# Patient Record
Sex: MALE | Race: WHITE | Employment: FULL TIME | URBAN - METROPOLITAN AREA
[De-identification: names, ages, dates, MRNs, and addresses within clinical notes are randomized per-mention and may not be internally consistent; named-entity substitution may affect disease eponyms.]

---

## 2017-01-03 ENCOUNTER — TELEPHONE (OUTPATIENT)
Dept: FAMILY MEDICINE CLINIC | Age: 47
End: 2017-01-03

## 2017-01-06 DIAGNOSIS — K21.00 GASTROESOPHAGEAL REFLUX DISEASE WITH ESOPHAGITIS: Primary | ICD-10-CM

## 2017-01-06 RX ORDER — OMEPRAZOLE 20 MG/1
20 CAPSULE, DELAYED RELEASE ORAL DAILY
Qty: 30 CAPSULE | Refills: 2 | Status: SHIPPED | OUTPATIENT
Start: 2017-01-06 | End: 2017-04-04 | Stop reason: SDUPTHER

## 2017-01-08 DIAGNOSIS — F41.1 GENERALIZED ANXIETY DISORDER: ICD-10-CM

## 2017-01-08 RX ORDER — CLONAZEPAM 1 MG/1
TABLET ORAL
Qty: 60 TABLET | Refills: 1 | OUTPATIENT
Start: 2017-01-08 | End: 2017-03-10 | Stop reason: SDUPTHER

## 2017-01-11 DIAGNOSIS — M19.90 OSTEOARTHRITIS, UNSPECIFIED OSTEOARTHRITIS TYPE, UNSPECIFIED SITE: ICD-10-CM

## 2017-01-11 RX ORDER — HYDROCODONE BITARTRATE AND ACETAMINOPHEN 5; 325 MG/1; MG/1
TABLET ORAL
Qty: 60 TABLET | Refills: 0 | Status: SHIPPED | OUTPATIENT
Start: 2017-01-11 | End: 2017-02-11 | Stop reason: SDUPTHER

## 2017-02-11 DIAGNOSIS — M19.90 OSTEOARTHRITIS, UNSPECIFIED OSTEOARTHRITIS TYPE, UNSPECIFIED SITE: ICD-10-CM

## 2017-02-11 RX ORDER — HYDROCODONE BITARTRATE AND ACETAMINOPHEN 5; 325 MG/1; MG/1
TABLET ORAL
Qty: 60 TABLET | Refills: 0 | Status: SHIPPED | OUTPATIENT
Start: 2017-02-11 | End: 2017-07-14 | Stop reason: SDUPTHER

## 2017-03-10 DIAGNOSIS — F41.1 GENERALIZED ANXIETY DISORDER: ICD-10-CM

## 2017-03-10 RX ORDER — CLONAZEPAM 1 MG/1
TABLET ORAL
Qty: 60 TABLET | Refills: 1 | Status: SHIPPED | OUTPATIENT
Start: 2017-03-10 | End: 2017-04-04 | Stop reason: SDUPTHER

## 2017-03-21 ENCOUNTER — TELEPHONE (OUTPATIENT)
Dept: FAMILY MEDICINE CLINIC | Age: 47
End: 2017-03-21

## 2017-03-21 DIAGNOSIS — M19.90 OSTEOARTHRITIS, UNSPECIFIED OSTEOARTHRITIS TYPE, UNSPECIFIED SITE: ICD-10-CM

## 2017-03-21 RX ORDER — HYDROCODONE BITARTRATE AND ACETAMINOPHEN 5; 325 MG/1; MG/1
TABLET ORAL
Qty: 60 TABLET | Refills: 0 | Status: SHIPPED | OUTPATIENT
Start: 2017-03-21 | End: 2017-04-20 | Stop reason: SDUPTHER

## 2017-04-04 ENCOUNTER — OFFICE VISIT (OUTPATIENT)
Dept: FAMILY MEDICINE CLINIC | Age: 47
End: 2017-04-04

## 2017-04-04 VITALS
DIASTOLIC BLOOD PRESSURE: 70 MMHG | HEIGHT: 72 IN | TEMPERATURE: 98.1 F | BODY MASS INDEX: 39.14 KG/M2 | SYSTOLIC BLOOD PRESSURE: 136 MMHG | HEART RATE: 83 BPM | WEIGHT: 289 LBS | RESPIRATION RATE: 12 BRPM

## 2017-04-04 DIAGNOSIS — M54.40 CHRONIC LOW BACK PAIN WITH SCIATICA, SCIATICA LATERALITY UNSPECIFIED, UNSPECIFIED BACK PAIN LATERALITY: ICD-10-CM

## 2017-04-04 DIAGNOSIS — K21.00 GASTROESOPHAGEAL REFLUX DISEASE WITH ESOPHAGITIS: ICD-10-CM

## 2017-04-04 DIAGNOSIS — I10 ESSENTIAL HYPERTENSION: ICD-10-CM

## 2017-04-04 DIAGNOSIS — F41.1 GENERALIZED ANXIETY DISORDER: ICD-10-CM

## 2017-04-04 DIAGNOSIS — Z91.199 POOR COMPLIANCE: ICD-10-CM

## 2017-04-04 DIAGNOSIS — G89.29 CHRONIC LOW BACK PAIN WITH SCIATICA, SCIATICA LATERALITY UNSPECIFIED, UNSPECIFIED BACK PAIN LATERALITY: ICD-10-CM

## 2017-04-04 PROCEDURE — 99214 OFFICE O/P EST MOD 30 MIN: CPT | Performed by: FAMILY MEDICINE

## 2017-04-04 RX ORDER — ATENOLOL 25 MG/1
25 TABLET ORAL DAILY
Qty: 90 TABLET | Refills: 3 | Status: SHIPPED | OUTPATIENT
Start: 2017-04-04 | End: 2018-04-04 | Stop reason: SDUPTHER

## 2017-04-04 RX ORDER — OMEPRAZOLE 20 MG/1
20 CAPSULE, DELAYED RELEASE ORAL DAILY
Qty: 30 CAPSULE | Refills: 5 | Status: SHIPPED | OUTPATIENT
Start: 2017-04-04 | End: 2017-04-12 | Stop reason: SDUPTHER

## 2017-04-04 RX ORDER — CLONAZEPAM 1 MG/1
TABLET ORAL
Qty: 60 TABLET | Refills: 1 | Status: SHIPPED | OUTPATIENT
Start: 2017-04-04 | End: 2017-07-06 | Stop reason: SDUPTHER

## 2017-04-12 DIAGNOSIS — K21.00 GASTROESOPHAGEAL REFLUX DISEASE WITH ESOPHAGITIS: ICD-10-CM

## 2017-04-12 RX ORDER — OMEPRAZOLE 20 MG/1
20 CAPSULE, DELAYED RELEASE ORAL DAILY
Qty: 30 CAPSULE | Refills: 5 | Status: SHIPPED | OUTPATIENT
Start: 2017-04-12 | End: 2017-04-24 | Stop reason: SDUPTHER

## 2017-04-13 ASSESSMENT — ENCOUNTER SYMPTOMS
CONSTIPATION: 0
NAUSEA: 1
WHEEZING: 0
BLOOD IN STOOL: 0
COUGH: 0
DIARRHEA: 0
ABDOMINAL PAIN: 0
CHEST TIGHTNESS: 1
BACK PAIN: 1
VOMITING: 0
SHORTNESS OF BREATH: 0

## 2017-04-20 DIAGNOSIS — M19.90 OSTEOARTHRITIS, UNSPECIFIED OSTEOARTHRITIS TYPE, UNSPECIFIED SITE: ICD-10-CM

## 2017-04-20 RX ORDER — HYDROCODONE BITARTRATE AND ACETAMINOPHEN 5; 325 MG/1; MG/1
TABLET ORAL
Qty: 60 TABLET | Refills: 0 | Status: SHIPPED | OUTPATIENT
Start: 2017-04-20 | End: 2017-05-18 | Stop reason: SDUPTHER

## 2017-04-24 DIAGNOSIS — K21.00 GASTROESOPHAGEAL REFLUX DISEASE WITH ESOPHAGITIS: ICD-10-CM

## 2017-04-24 RX ORDER — OMEPRAZOLE 20 MG/1
20 CAPSULE, DELAYED RELEASE ORAL DAILY
Qty: 30 CAPSULE | Refills: 5 | Status: SHIPPED | OUTPATIENT
Start: 2017-04-24 | End: 2018-02-23 | Stop reason: ALTCHOICE

## 2017-05-18 DIAGNOSIS — M19.90 OSTEOARTHRITIS, UNSPECIFIED OSTEOARTHRITIS TYPE, UNSPECIFIED SITE: ICD-10-CM

## 2017-05-18 RX ORDER — HYDROCODONE BITARTRATE AND ACETAMINOPHEN 5; 325 MG/1; MG/1
TABLET ORAL
Qty: 60 TABLET | Refills: 0 | Status: SHIPPED | OUTPATIENT
Start: 2017-05-18 | End: 2017-06-16 | Stop reason: SDUPTHER

## 2017-06-16 ENCOUNTER — TELEPHONE (OUTPATIENT)
Dept: FAMILY MEDICINE CLINIC | Age: 47
End: 2017-06-16

## 2017-06-16 DIAGNOSIS — M19.90 OSTEOARTHRITIS, UNSPECIFIED OSTEOARTHRITIS TYPE, UNSPECIFIED SITE: ICD-10-CM

## 2017-06-16 RX ORDER — HYDROCODONE BITARTRATE AND ACETAMINOPHEN 5; 325 MG/1; MG/1
TABLET ORAL
Qty: 60 TABLET | Refills: 0 | Status: SHIPPED | OUTPATIENT
Start: 2017-06-16 | End: 2017-07-14 | Stop reason: SDUPTHER

## 2017-07-06 DIAGNOSIS — F41.1 GENERALIZED ANXIETY DISORDER: ICD-10-CM

## 2017-07-06 RX ORDER — CLONAZEPAM 1 MG/1
TABLET ORAL
Qty: 60 TABLET | Refills: 0 | Status: SHIPPED | OUTPATIENT
Start: 2017-07-06 | End: 2017-08-07 | Stop reason: SDUPTHER

## 2017-07-07 LAB
ANION GAP SERPL CALCULATED.3IONS-SCNC: 14 MEQ/L (ref 7–13)
BUN BLDV-MCNC: 9 MG/DL (ref 6–20)
CALCIUM SERPL-MCNC: 9.1 MG/DL (ref 8.6–10.2)
CHLORIDE BLD-SCNC: 98 MEQ/L (ref 98–107)
CO2: 25 MEQ/L (ref 22–29)
CREAT SERPL-MCNC: 0.68 MG/DL (ref 0.7–1.2)
CREATININE URINE: 211.5 MG/DL
GFR AFRICAN AMERICAN: >60
GFR NON-AFRICAN AMERICAN: >60
GLUCOSE BLD-MCNC: 167 MG/DL (ref 74–109)
HBA1C MFR BLD: 7.7 % (ref 4.8–5.9)
LDL CHOLESTEROL DIRECT: 106 MG/DL (ref 0–129)
MICROALBUMIN UR-MCNC: 20.4 MG/DL
MICROALBUMIN/CREAT UR-RTO: 96.5 MG/G (ref 0–30)
POTASSIUM SERPL-SCNC: 4.6 MEQ/L (ref 3.5–5.1)
SODIUM BLD-SCNC: 137 MEQ/L (ref 132–144)

## 2017-07-11 DIAGNOSIS — F41.1 GENERALIZED ANXIETY DISORDER: ICD-10-CM

## 2017-07-11 RX ORDER — CLONAZEPAM 1 MG/1
TABLET ORAL
Qty: 60 TABLET | Refills: 0 | Status: CANCELLED | OUTPATIENT
Start: 2017-07-11

## 2017-07-14 ENCOUNTER — OFFICE VISIT (OUTPATIENT)
Dept: FAMILY MEDICINE CLINIC | Age: 47
End: 2017-07-14

## 2017-07-14 VITALS
BODY MASS INDEX: 39.42 KG/M2 | RESPIRATION RATE: 16 BRPM | WEIGHT: 291 LBS | DIASTOLIC BLOOD PRESSURE: 86 MMHG | HEART RATE: 82 BPM | HEIGHT: 72 IN | SYSTOLIC BLOOD PRESSURE: 132 MMHG | TEMPERATURE: 98.8 F

## 2017-07-14 DIAGNOSIS — I10 BENIGN ESSENTIAL HTN: Primary | ICD-10-CM

## 2017-07-14 DIAGNOSIS — M54.41 CHRONIC RIGHT-SIDED LOW BACK PAIN WITH RIGHT-SIDED SCIATICA: ICD-10-CM

## 2017-07-14 DIAGNOSIS — G89.29 CHRONIC RIGHT-SIDED LOW BACK PAIN WITH RIGHT-SIDED SCIATICA: ICD-10-CM

## 2017-07-14 DIAGNOSIS — E11.29 DIABETES MELLITUS WITH MICROALBUMINURIA (HCC): ICD-10-CM

## 2017-07-14 DIAGNOSIS — R80.9 DIABETES MELLITUS WITH MICROALBUMINURIA (HCC): ICD-10-CM

## 2017-07-14 DIAGNOSIS — M17.0 OSTEOARTHRITIS OF BOTH KNEES, UNSPECIFIED OSTEOARTHRITIS TYPE: ICD-10-CM

## 2017-07-14 DIAGNOSIS — F41.1 GENERALIZED ANXIETY DISORDER: ICD-10-CM

## 2017-07-14 PROCEDURE — 99213 OFFICE O/P EST LOW 20 MIN: CPT | Performed by: FAMILY MEDICINE

## 2017-07-14 RX ORDER — HYDROCODONE BITARTRATE AND ACETAMINOPHEN 5; 325 MG/1; MG/1
TABLET ORAL
Qty: 60 TABLET | Refills: 0 | Status: SHIPPED | OUTPATIENT
Start: 2017-07-14 | End: 2017-08-14 | Stop reason: SDUPTHER

## 2017-07-14 RX ORDER — LISINOPRIL 5 MG/1
5 TABLET ORAL DAILY
Qty: 30 TABLET | Refills: 3 | Status: SHIPPED | OUTPATIENT
Start: 2017-07-14 | End: 2017-08-08 | Stop reason: SDUPTHER

## 2017-07-14 RX ORDER — IBUPROFEN 800 MG/1
TABLET ORAL
Qty: 90 TABLET | Refills: 3 | Status: SHIPPED | OUTPATIENT
Start: 2017-07-14 | End: 2018-10-15 | Stop reason: SDUPTHER

## 2017-07-14 RX ORDER — CYCLOBENZAPRINE HCL 10 MG
TABLET ORAL
Qty: 60 TABLET | Refills: 2 | Status: SHIPPED | OUTPATIENT
Start: 2017-07-14 | End: 2018-06-20 | Stop reason: SDUPTHER

## 2017-07-14 RX ORDER — CLONAZEPAM 1 MG/1
TABLET ORAL
Qty: 60 TABLET | Refills: 0 | Status: CANCELLED | OUTPATIENT
Start: 2017-07-14

## 2017-07-14 ASSESSMENT — PATIENT HEALTH QUESTIONNAIRE - PHQ9
1. LITTLE INTEREST OR PLEASURE IN DOING THINGS: 0
2. FEELING DOWN, DEPRESSED OR HOPELESS: 0
SUM OF ALL RESPONSES TO PHQ9 QUESTIONS 1 & 2: 0
SUM OF ALL RESPONSES TO PHQ QUESTIONS 1-9: 0

## 2017-07-24 ASSESSMENT — ENCOUNTER SYMPTOMS
ABDOMINAL PAIN: 0
BACK PAIN: 1
COUGH: 1
SHORTNESS OF BREATH: 0

## 2017-08-07 DIAGNOSIS — F41.1 GENERALIZED ANXIETY DISORDER: ICD-10-CM

## 2017-08-07 DIAGNOSIS — E11.9 TYPE 2 DIABETES MELLITUS WITHOUT COMPLICATION, WITHOUT LONG-TERM CURRENT USE OF INSULIN (HCC): ICD-10-CM

## 2017-08-07 RX ORDER — CLONAZEPAM 1 MG/1
TABLET ORAL
Qty: 60 TABLET | Refills: 0 | Status: SHIPPED | OUTPATIENT
Start: 2017-08-07 | End: 2017-09-06 | Stop reason: SDUPTHER

## 2017-08-08 ENCOUNTER — TELEPHONE (OUTPATIENT)
Dept: FAMILY MEDICINE CLINIC | Age: 47
End: 2017-08-08

## 2017-08-08 DIAGNOSIS — I10 BENIGN ESSENTIAL HTN: ICD-10-CM

## 2017-08-08 RX ORDER — LISINOPRIL 5 MG/1
5 TABLET ORAL DAILY
Qty: 30 TABLET | Refills: 5 | Status: SHIPPED | OUTPATIENT
Start: 2017-08-08 | End: 2018-02-11 | Stop reason: SDUPTHER

## 2017-08-14 DIAGNOSIS — M17.0 OSTEOARTHRITIS OF BOTH KNEES, UNSPECIFIED OSTEOARTHRITIS TYPE: ICD-10-CM

## 2017-08-14 DIAGNOSIS — G89.29 CHRONIC RIGHT-SIDED LOW BACK PAIN WITH RIGHT-SIDED SCIATICA: ICD-10-CM

## 2017-08-14 DIAGNOSIS — M54.41 CHRONIC RIGHT-SIDED LOW BACK PAIN WITH RIGHT-SIDED SCIATICA: ICD-10-CM

## 2017-08-15 RX ORDER — HYDROCODONE BITARTRATE AND ACETAMINOPHEN 5; 325 MG/1; MG/1
TABLET ORAL
Qty: 60 TABLET | Refills: 0 | Status: SHIPPED | OUTPATIENT
Start: 2017-08-15 | End: 2017-10-20 | Stop reason: DRUGHIGH

## 2017-08-17 ENCOUNTER — OFFICE VISIT (OUTPATIENT)
Dept: FAMILY MEDICINE CLINIC | Age: 47
End: 2017-08-17

## 2017-08-17 VITALS
HEART RATE: 84 BPM | DIASTOLIC BLOOD PRESSURE: 68 MMHG | HEIGHT: 72 IN | RESPIRATION RATE: 20 BRPM | BODY MASS INDEX: 38.06 KG/M2 | SYSTOLIC BLOOD PRESSURE: 108 MMHG | WEIGHT: 281 LBS | TEMPERATURE: 98.6 F

## 2017-08-17 DIAGNOSIS — F41.1 GENERALIZED ANXIETY DISORDER: ICD-10-CM

## 2017-08-17 DIAGNOSIS — G45.0 TIA INVOLVING VERTEBRAL ARTERY: Primary | ICD-10-CM

## 2017-08-17 DIAGNOSIS — I10 BENIGN ESSENTIAL HTN: ICD-10-CM

## 2017-08-17 PROCEDURE — 99213 OFFICE O/P EST LOW 20 MIN: CPT | Performed by: FAMILY MEDICINE

## 2017-08-17 RX ORDER — ATORVASTATIN CALCIUM 40 MG/1
TABLET, FILM COATED ORAL
Refills: 0 | COMMUNITY
Start: 2017-08-11 | End: 2017-08-31

## 2017-08-23 ENCOUNTER — TELEPHONE (OUTPATIENT)
Dept: FAMILY MEDICINE CLINIC | Age: 47
End: 2017-08-23

## 2017-08-28 PROBLEM — I10 BENIGN ESSENTIAL HTN: Status: ACTIVE | Noted: 2017-08-28

## 2017-08-28 ASSESSMENT — ENCOUNTER SYMPTOMS
WHEEZING: 0
ABDOMINAL PAIN: 0
BACK PAIN: 1
SHORTNESS OF BREATH: 0
BLOOD IN STOOL: 0

## 2017-08-31 ENCOUNTER — OFFICE VISIT (OUTPATIENT)
Dept: FAMILY MEDICINE CLINIC | Age: 47
End: 2017-08-31

## 2017-08-31 DIAGNOSIS — J01.90 ACUTE BACTERIAL SINUSITIS: ICD-10-CM

## 2017-08-31 DIAGNOSIS — B96.89 ACUTE BACTERIAL SINUSITIS: ICD-10-CM

## 2017-08-31 DIAGNOSIS — F41.1 GENERALIZED ANXIETY DISORDER: Primary | ICD-10-CM

## 2017-08-31 DIAGNOSIS — E53.8 B12 DEFICIENCY: ICD-10-CM

## 2017-08-31 DIAGNOSIS — I10 BENIGN ESSENTIAL HTN: ICD-10-CM

## 2017-08-31 DIAGNOSIS — E11.9 TYPE 2 DIABETES MELLITUS WITHOUT COMPLICATION, WITHOUT LONG-TERM CURRENT USE OF INSULIN (HCC): ICD-10-CM

## 2017-08-31 DIAGNOSIS — G45.0 TIA INVOLVING BASILAR ARTERY: ICD-10-CM

## 2017-08-31 PROCEDURE — 96372 THER/PROPH/DIAG INJ SC/IM: CPT | Performed by: FAMILY MEDICINE

## 2017-08-31 PROCEDURE — 99214 OFFICE O/P EST MOD 30 MIN: CPT | Performed by: FAMILY MEDICINE

## 2017-08-31 RX ORDER — CYANOCOBALAMIN 1000 UG/ML
1000 INJECTION INTRAMUSCULAR; SUBCUTANEOUS ONCE
Status: COMPLETED | OUTPATIENT
Start: 2017-08-31 | End: 2017-08-31

## 2017-08-31 RX ORDER — AZITHROMYCIN 250 MG/1
TABLET, FILM COATED ORAL
Qty: 1 PACKET | Refills: 0 | Status: SHIPPED | OUTPATIENT
Start: 2017-08-31 | End: 2017-09-10

## 2017-08-31 RX ADMIN — CYANOCOBALAMIN 1000 MCG: 1000 INJECTION INTRAMUSCULAR; SUBCUTANEOUS at 14:31

## 2017-09-01 VITALS
HEART RATE: 86 BPM | BODY MASS INDEX: 37.94 KG/M2 | HEIGHT: 71 IN | RESPIRATION RATE: 18 BRPM | SYSTOLIC BLOOD PRESSURE: 132 MMHG | WEIGHT: 271 LBS | DIASTOLIC BLOOD PRESSURE: 80 MMHG | TEMPERATURE: 98.7 F

## 2017-09-01 ASSESSMENT — ENCOUNTER SYMPTOMS
SINUS PRESSURE: 1
ANAL BLEEDING: 0
CONSTIPATION: 0
SHORTNESS OF BREATH: 0
RHINORRHEA: 1
DIARRHEA: 0
BACK PAIN: 1
ABDOMINAL PAIN: 0

## 2017-09-06 DIAGNOSIS — F41.1 GENERALIZED ANXIETY DISORDER: ICD-10-CM

## 2017-09-06 RX ORDER — CLONAZEPAM 1 MG/1
TABLET ORAL
Qty: 60 TABLET | Refills: 0 | Status: SHIPPED | OUTPATIENT
Start: 2017-09-06 | End: 2017-10-06 | Stop reason: SDUPTHER

## 2017-09-13 DIAGNOSIS — M54.41 CHRONIC RIGHT-SIDED LOW BACK PAIN WITH RIGHT-SIDED SCIATICA: ICD-10-CM

## 2017-09-13 DIAGNOSIS — G89.29 CHRONIC RIGHT-SIDED LOW BACK PAIN WITH RIGHT-SIDED SCIATICA: ICD-10-CM

## 2017-09-13 DIAGNOSIS — M17.0 OSTEOARTHRITIS OF BOTH KNEES, UNSPECIFIED OSTEOARTHRITIS TYPE: ICD-10-CM

## 2017-09-13 RX ORDER — HYDROCODONE BITARTRATE AND ACETAMINOPHEN 5; 325 MG/1; MG/1
TABLET ORAL
Qty: 60 TABLET | Refills: 0 | Status: CANCELLED | OUTPATIENT
Start: 2017-09-13

## 2017-09-13 RX ORDER — HYDROCODONE BITARTRATE AND ACETAMINOPHEN 7.5; 325 MG/1; MG/1
1 TABLET ORAL NIGHTLY PRN
Qty: 30 TABLET | Refills: 0 | Status: SHIPPED | OUTPATIENT
Start: 2017-09-13 | End: 2017-10-11 | Stop reason: SDUPTHER

## 2017-10-06 DIAGNOSIS — F41.1 GENERALIZED ANXIETY DISORDER: ICD-10-CM

## 2017-10-06 RX ORDER — CLONAZEPAM 1 MG/1
TABLET ORAL
Qty: 60 TABLET | Refills: 0 | Status: SHIPPED | OUTPATIENT
Start: 2017-10-06 | End: 2017-11-05 | Stop reason: SDUPTHER

## 2017-10-06 NOTE — TELEPHONE ENCOUNTER
Call, as rx called    ; Controlled Substances Monitoring:     Attestation: The Prescription Monitoring Report for this patient was reviewed today. Orville Gitelman, DO)  Documentation: Possible medication side effects, risk of tolerance and/or dependence, and alternative treatments discussed., No signs of potential drug abuse or diversion identified. Orville Gitelman, DO)  Chronic Pain: Dose reduction has been attempted. Orville Gitelman, DO)  Medication Contracts: Existing medication contract.  Orville Gitelman, DO)

## 2017-10-11 DIAGNOSIS — M17.0 OSTEOARTHRITIS OF BOTH KNEES, UNSPECIFIED OSTEOARTHRITIS TYPE: ICD-10-CM

## 2017-10-11 DIAGNOSIS — M54.41 CHRONIC RIGHT-SIDED LOW BACK PAIN WITH RIGHT-SIDED SCIATICA: ICD-10-CM

## 2017-10-11 DIAGNOSIS — G89.29 CHRONIC RIGHT-SIDED LOW BACK PAIN WITH RIGHT-SIDED SCIATICA: ICD-10-CM

## 2017-10-11 RX ORDER — HYDROCODONE BITARTRATE AND ACETAMINOPHEN 7.5; 325 MG/1; MG/1
1 TABLET ORAL NIGHTLY PRN
Qty: 30 TABLET | Refills: 0 | Status: SHIPPED | OUTPATIENT
Start: 2017-10-11 | End: 2017-11-10 | Stop reason: SDUPTHER

## 2017-10-11 NOTE — TELEPHONE ENCOUNTER
Call, as rx called    ; Controlled Substances Monitoring:     Attestation: The Prescription Monitoring Report for this patient was reviewed today. Joey Kong, )  Documentation: Possible medication side effects, risk of tolerance and/or dependence, and alternative treatments discussed., No signs of potential drug abuse or diversion identified. Joey Kong, )  Chronic Pain: Treatment objectives documented - patient is progressing appropriately. , Functional status reviewed - continues with improved or maintaining ADL's., Dose reduction has been attempted.  Joey Kong, )

## 2017-10-13 LAB
ANION GAP SERPL CALCULATED.3IONS-SCNC: 18 MEQ/L (ref 7–13)
BUN BLDV-MCNC: 10 MG/DL (ref 6–20)
CALCIUM SERPL-MCNC: 9.6 MG/DL (ref 8.6–10.2)
CHLORIDE BLD-SCNC: 98 MEQ/L (ref 98–107)
CO2: 27 MEQ/L (ref 22–29)
CREAT SERPL-MCNC: 0.64 MG/DL (ref 0.7–1.2)
GFR AFRICAN AMERICAN: >60
GFR NON-AFRICAN AMERICAN: >60
GLUCOSE BLD-MCNC: 92 MG/DL (ref 74–109)
HBA1C MFR BLD: 6.2 % (ref 4.8–5.9)
POTASSIUM SERPL-SCNC: 4.7 MEQ/L (ref 3.5–5.1)
SODIUM BLD-SCNC: 143 MEQ/L (ref 132–144)

## 2017-10-20 ENCOUNTER — OFFICE VISIT (OUTPATIENT)
Dept: FAMILY MEDICINE CLINIC | Age: 47
End: 2017-10-20

## 2017-10-20 VITALS
BODY MASS INDEX: 37.8 KG/M2 | HEART RATE: 78 BPM | TEMPERATURE: 98.8 F | HEIGHT: 71 IN | WEIGHT: 270 LBS | SYSTOLIC BLOOD PRESSURE: 112 MMHG | RESPIRATION RATE: 16 BRPM | DIASTOLIC BLOOD PRESSURE: 82 MMHG

## 2017-10-20 DIAGNOSIS — F41.1 GAD (GENERALIZED ANXIETY DISORDER): ICD-10-CM

## 2017-10-20 DIAGNOSIS — E11.9 CONTROLLED TYPE 2 DIABETES MELLITUS WITHOUT COMPLICATION, WITHOUT LONG-TERM CURRENT USE OF INSULIN (HCC): Primary | ICD-10-CM

## 2017-10-20 DIAGNOSIS — M54.16 CHRONIC RADICULAR LUMBAR PAIN: ICD-10-CM

## 2017-10-20 DIAGNOSIS — I10 BENIGN ESSENTIAL HTN: ICD-10-CM

## 2017-10-20 DIAGNOSIS — G89.29 CHRONIC RADICULAR LUMBAR PAIN: ICD-10-CM

## 2017-10-20 PROCEDURE — 99213 OFFICE O/P EST LOW 20 MIN: CPT | Performed by: FAMILY MEDICINE

## 2017-10-20 PROCEDURE — G8427 DOCREV CUR MEDS BY ELIG CLIN: HCPCS | Performed by: FAMILY MEDICINE

## 2017-10-20 PROCEDURE — G8417 CALC BMI ABV UP PARAM F/U: HCPCS | Performed by: FAMILY MEDICINE

## 2017-10-20 PROCEDURE — 3044F HG A1C LEVEL LT 7.0%: CPT | Performed by: FAMILY MEDICINE

## 2017-10-20 PROCEDURE — 4004F PT TOBACCO SCREEN RCVD TLK: CPT | Performed by: FAMILY MEDICINE

## 2017-10-20 PROCEDURE — G8484 FLU IMMUNIZE NO ADMIN: HCPCS | Performed by: FAMILY MEDICINE

## 2017-10-20 NOTE — PROGRESS NOTES
ADENOIDECTOMY      UPPER GASTROINTESTINAL ENDOSCOPY  2011    NORMAL     Social History     Social History    Marital status:      Spouse name: N/A    Number of children: N/A    Years of education: N/A     Occupational History    Not on file. Social History Main Topics    Smoking status: Current Every Day Smoker     Packs/day: 0.50     Types: Cigarettes    Smokeless tobacco: Never Used    Alcohol use Yes      Comment: occasionally    Drug use: No    Sexual activity: Not on file     Other Topics Concern    Not on file     Social History Narrative    No narrative on file     Family History   Problem Relation Age of Onset    Diabetes Father           Current Outpatient Prescriptions on File Prior to Visit   Medication Sig Dispense Refill    HYDROcodone-acetaminophen ([employer]dy) 7.5-325 MG per tablet Take 1 tablet by mouth nightly as needed for Pain . 30 tablet 0    clonazePAM (KLONOPIN) 1 MG tablet take 1 tablet by mouth twice a day if needed 60 tablet 0    lisinopril (PRINIVIL;ZESTRIL) 5 MG tablet Take 1 tablet by mouth daily 30 tablet 5    metFORMIN (GLUCOPHAGE) 1000 MG tablet take 1 tablet by mouth twice a day 180 tablet 3    ibuprofen (ADVIL;MOTRIN) 800 MG tablet take 1 tablet by mouth daily if needed 90 tablet 3    omeprazole (PRILOSEC) 20 MG delayed release capsule Take 1 capsule by mouth daily 30 capsule 5    atenolol (TENORMIN) 25 MG tablet Take 1 tablet by mouth daily 90 tablet 3    nitroGLYCERIN (NITROSTAT) 0.4 MG SL tablet Dissolve 1 tab under tongue at first sign of chest pain. May repeat every 5 minutes until relief is obtained. If pain persists after taking 3 tabs in a 15-minute period, or the pain is different than is typically experienced, call 9-1-1 immediately.  25 tablet 3    Respiratory Therapy Supplies YONIS 1 Device by Does not apply route nightly PRESSURE 16cm; needs c-pap machine/KONG 1 Device 0    aspirin 81 MG tablet Take 325 mg by mouth daily       cyclobenzaprine Results     Component Value Ref Range & Units Status Collected Lab   Hemoglobin A1C 7.7   4.8 - 5.9 % Final 07/07/2017  8:01 AM  - [facility]   Testing Performed By     Saqib [place] Renetta Name Director Address Valid Date Range   [address] [facility] LAB Unknown Unknown 04/06/16 1754-08/30/17 1247   Lab and Collection     Hemoglobin A1C on 7/7/2017   Result History       Orders Only on 10/13/2017   Component Date Value Ref Range Status    Hemoglobin A1C 10/13/2017 6.2* 4.8 - 5.9 % Final    Sodium 10/13/2017 143  132 - 144 mEq/L Final    Potassium 10/13/2017 4.7  3.5 - 5.1 mEq/L Final    Chloride 10/13/2017 98  98 - 107 mEq/L Final    CO2 10/13/2017 27  22 - 29 mEq/L Final    Anion Gap 10/13/2017 18* 7 - 13 mEq/L Final    Glucose 10/13/2017 92  74 - 109 mg/dL Final    BUN 10/13/2017 10  6 - 20 mg/dL Final    CREATININE 10/13/2017 0.64* 0.70 - 1.20 mg/dL Final    GFR Non- 10/13/2017 >60.0  >60 Final    Comment: >60 mL/min/1.73m2 EGFR, calc. for ages 25 and older using the  MDRD formula (not corrected for weight), is valid for stable  renal function.  GFR  10/13/2017 >60.0  >60 Final    Comment: >60 mL/min/1.73m2 EGFR, calc. for ages 25 and older using the  MDRD formula (not corrected for weight), is valid for stable  renal function.  Calcium 10/13/2017 9.6  8.6 - 10.2 mg/dL Final    much improved [id number] lab  Assessment & Plan   1. Controlled type 2 diabetes mellitus without complication, without long-term current use of insulin ([facility])   DIABETES FOOT EXAM    Hemoglobin A1C    Comprehensive Metabolic Panel   2. Benign essential HTN     3. KENNY (generalized anxiety disorder)     4. Chronic radicular lumbar pain     no narcs w/ etoh ;See above orders, as use and side effects reviewed ;Continue same meds, as common side effects and use reviewed-call if ineffective or problems ; The patient is asked to make an attempt to improve

## 2017-10-29 ASSESSMENT — ENCOUNTER SYMPTOMS
ABDOMINAL PAIN: 0
COUGH: 0
BLOOD IN STOOL: 0
SHORTNESS OF BREATH: 0
BACK PAIN: 1

## 2017-11-05 DIAGNOSIS — F41.1 GENERALIZED ANXIETY DISORDER: ICD-10-CM

## 2017-11-05 RX ORDER — CLONAZEPAM 1 MG/1
TABLET ORAL
Qty: 60 TABLET | Refills: 0 | Status: SHIPPED | OUTPATIENT
Start: 2017-11-05 | End: 2017-12-05 | Stop reason: SDUPTHER

## 2017-11-10 DIAGNOSIS — G89.29 CHRONIC RIGHT-SIDED LOW BACK PAIN WITH RIGHT-SIDED SCIATICA: ICD-10-CM

## 2017-11-10 DIAGNOSIS — M54.41 CHRONIC RIGHT-SIDED LOW BACK PAIN WITH RIGHT-SIDED SCIATICA: ICD-10-CM

## 2017-11-10 DIAGNOSIS — M17.0 OSTEOARTHRITIS OF BOTH KNEES, UNSPECIFIED OSTEOARTHRITIS TYPE: ICD-10-CM

## 2017-11-10 RX ORDER — HYDROCODONE BITARTRATE AND ACETAMINOPHEN 7.5; 325 MG/1; MG/1
1 TABLET ORAL NIGHTLY PRN
Qty: 30 TABLET | Refills: 0 | Status: SHIPPED | OUTPATIENT
Start: 2017-11-10 | End: 2017-12-08 | Stop reason: SDUPTHER

## 2017-11-10 NOTE — TELEPHONE ENCOUNTER
Pt's wife Yaquelin Speak called in asking for a refill of [place] to be sent to [facility] in [place]. Please approve or deny.  Thanks    Last ov 10/20/17  Fill 10/11/17    Pt can be reached at [phone]

## 2017-12-05 ENCOUNTER — TELEPHONE (OUTPATIENT)
Dept: FAMILY MEDICINE CLINIC | Age: 47
End: 2017-12-05

## 2017-12-05 DIAGNOSIS — F41.1 GENERALIZED ANXIETY DISORDER: ICD-10-CM

## 2017-12-05 RX ORDER — CLONAZEPAM 1 MG/1
TABLET ORAL
Qty: 60 TABLET | Refills: 0 | Status: SHIPPED | OUTPATIENT
Start: 2017-12-05 | End: 2018-01-04 | Stop reason: SDUPTHER

## 2017-12-05 NOTE — TELEPHONE ENCOUNTER
Call, as rx called    ; Controlled Substances Monitoring:     Attestation: The Prescription Monitoring Report for this patient was reviewed today. Patricio Mendez DO)  Documentation: Possible medication side effects, risk of tolerance and/or dependence, and alternative treatments discussed., No signs of potential drug abuse or diversion identified., Existing medication contract. Patricio Mendez DO)  Chronic Pain: Dose reduction has been attempted.  Patricio Mendez DO)

## 2017-12-08 DIAGNOSIS — G89.29 CHRONIC RIGHT-SIDED LOW BACK PAIN WITH RIGHT-SIDED SCIATICA: ICD-10-CM

## 2017-12-08 DIAGNOSIS — M54.41 CHRONIC RIGHT-SIDED LOW BACK PAIN WITH RIGHT-SIDED SCIATICA: ICD-10-CM

## 2017-12-08 DIAGNOSIS — M17.0 OSTEOARTHRITIS OF BOTH KNEES, UNSPECIFIED OSTEOARTHRITIS TYPE: ICD-10-CM

## 2017-12-08 RX ORDER — HYDROCODONE BITARTRATE AND ACETAMINOPHEN 7.5; 325 MG/1; MG/1
1 TABLET ORAL NIGHTLY PRN
Qty: 30 TABLET | Refills: 0 | Status: SHIPPED | OUTPATIENT
Start: 2017-12-08 | End: 2018-01-09 | Stop reason: SDUPTHER

## 2017-12-08 NOTE — TELEPHONE ENCOUNTER
Pt wife requests refill on medication. Please approve or deny this request.  Last seen by you on 10/20/17.  Last Fill: 11/10/17  Future Appointments  Date Time Provider Cesar Shin   2/23/2018 8:00 AM DO PHAN Estrada [facility] PCP [facility] [place]

## 2017-12-11 DIAGNOSIS — K21.00 GASTROESOPHAGEAL REFLUX DISEASE WITH ESOPHAGITIS: ICD-10-CM

## 2017-12-11 RX ORDER — OMEPRAZOLE 20 MG/1
20 CAPSULE, DELAYED RELEASE ORAL DAILY
Qty: 30 CAPSULE | Refills: 5 | Status: SHIPPED | OUTPATIENT
Start: 2017-12-11 | End: 2018-08-13 | Stop reason: CLARIF

## 2018-01-04 DIAGNOSIS — F41.1 GENERALIZED ANXIETY DISORDER: ICD-10-CM

## 2018-01-04 RX ORDER — CLONAZEPAM 1 MG/1
TABLET ORAL
Qty: 60 TABLET | Refills: 0 | Status: SHIPPED | OUTPATIENT
Start: 2018-01-04 | End: 2018-02-01 | Stop reason: SDUPTHER

## 2018-01-04 NOTE — TELEPHONE ENCOUNTER
Call, as rx called    ; Controlled Substances Monitoring:     Attestation: The Prescription Monitoring Report for this patient was reviewed today. Jay Morgan DO)  Documentation: Possible medication side effects, risk of tolerance and/or dependence, and alternative treatments discussed., No signs of potential drug abuse or diversion identified. Jay Morgan DO)  Chronic Pain: Dose reduction has been attempted. Jay Morgan DO)  Medication Contracts: Existing medication contract.  Jay Morgan DO)

## 2018-01-04 NOTE — TELEPHONE ENCOUNTER
Pharmacy REQUESTING REFILL. ORDER PENDED.  THANK YOU.    LOV-10/20/2017  Last refill-12/5/2017    Future Appointments  Date Time Provider Cesar Shin   2/23/2018 8:00 AM DO PHAN [place] 0562

## 2018-01-09 DIAGNOSIS — G89.29 CHRONIC RIGHT-SIDED LOW BACK PAIN WITH RIGHT-SIDED SCIATICA: ICD-10-CM

## 2018-01-09 DIAGNOSIS — M54.41 CHRONIC RIGHT-SIDED LOW BACK PAIN WITH RIGHT-SIDED SCIATICA: ICD-10-CM

## 2018-01-09 DIAGNOSIS — M17.0 OSTEOARTHRITIS OF BOTH KNEES, UNSPECIFIED OSTEOARTHRITIS TYPE: ICD-10-CM

## 2018-01-09 RX ORDER — HYDROCODONE BITARTRATE AND ACETAMINOPHEN 7.5; 325 MG/1; MG/1
1 TABLET ORAL NIGHTLY PRN
Qty: 30 TABLET | Refills: 0 | Status: SHIPPED | OUTPATIENT
Start: 2018-01-09 | End: 2018-02-09 | Stop reason: SDUPTHER

## 2018-01-31 ENCOUNTER — TELEPHONE (OUTPATIENT)
Dept: FAMILY MEDICINE CLINIC | Age: 48
End: 2018-01-31

## 2018-01-31 DIAGNOSIS — R73.01 IFG (IMPAIRED FASTING GLUCOSE): Primary | ICD-10-CM

## 2018-02-01 DIAGNOSIS — F41.1 GENERALIZED ANXIETY DISORDER: ICD-10-CM

## 2018-02-01 RX ORDER — CLONAZEPAM 1 MG/1
TABLET ORAL
Qty: 60 TABLET | Refills: 0 | Status: SHIPPED | OUTPATIENT
Start: 2018-02-01 | End: 2018-02-23 | Stop reason: SDUPTHER

## 2018-02-01 NOTE — TELEPHONE ENCOUNTER
Pharmacy requesting refill    Medication pended. Last Ov: 10/20/17  Last Rx: 1/4/18    Please approve or deny.     Future Appointments  Date Time Provider Cesar Shin   2/23/2018 8:00 AM [address]

## 2018-02-09 ENCOUNTER — TELEPHONE (OUTPATIENT)
Dept: FAMILY MEDICINE CLINIC | Age: 48
End: 2018-02-09

## 2018-02-09 DIAGNOSIS — M17.0 OSTEOARTHRITIS OF BOTH KNEES, UNSPECIFIED OSTEOARTHRITIS TYPE: ICD-10-CM

## 2018-02-09 DIAGNOSIS — G89.29 CHRONIC RIGHT-SIDED LOW BACK PAIN WITH RIGHT-SIDED SCIATICA: ICD-10-CM

## 2018-02-09 DIAGNOSIS — M54.41 CHRONIC RIGHT-SIDED LOW BACK PAIN WITH RIGHT-SIDED SCIATICA: ICD-10-CM

## 2018-02-09 RX ORDER — HYDROCODONE BITARTRATE AND ACETAMINOPHEN 7.5; 325 MG/1; MG/1
1 TABLET ORAL NIGHTLY PRN
Qty: 30 TABLET | Refills: 0 | Status: SHIPPED | OUTPATIENT
Start: 2018-02-09 | End: 2018-03-09 | Stop reason: SDUPTHER

## 2018-02-09 NOTE — TELEPHONE ENCOUNTER
I SPOKE WITH THE PATIENT REGARDING HIS MONITOR. HE IS WILLING TO GO WITH ANY GLUCOSE MONITOR AND TEST STRIP THE INSURANCE WILL PAY FOR. IT DOES NOT MATTER TO HIM, AS LONG AS HE MAY BE ABLE TO TEST HIS SUGAR.   PLEASE SEND TO [facility]

## 2018-02-11 DIAGNOSIS — I10 BENIGN ESSENTIAL HTN: ICD-10-CM

## 2018-02-11 RX ORDER — LISINOPRIL 5 MG/1
TABLET ORAL
Qty: 30 TABLET | Refills: 5 | Status: SHIPPED | OUTPATIENT
Start: 2018-02-11 | End: 2018-08-07 | Stop reason: SDUPTHER

## 2018-02-11 NOTE — TELEPHONE ENCOUNTER
Pharmacy requesting refill    Medication pended. Last Ov: 10/20/17  Last Rx: 1/10/18    Please approve or deny.     Future Appointments  Date Time Provider Cesar Shin   2/23/2018 8:00 AM [address]

## 2018-02-12 NOTE — TELEPHONE ENCOUNTER
Call pharm as to [facility] glucometer Ins will cover???; let me know, as we can order #100 test strips ans 1- glucometer IF pharmacist asks!!

## 2018-02-20 DIAGNOSIS — E11.9 CONTROLLED TYPE 2 DIABETES MELLITUS WITHOUT COMPLICATION, WITHOUT LONG-TERM CURRENT USE OF INSULIN (HCC): ICD-10-CM

## 2018-02-20 LAB
ALBUMIN SERPL-MCNC: 4.1 G/DL (ref 3.9–4.9)
ALP BLD-CCNC: 77 U/L (ref 35–104)
ALT SERPL-CCNC: 49 U/L (ref 0–41)
ANION GAP SERPL CALCULATED.3IONS-SCNC: 21 MEQ/L (ref 7–13)
AST SERPL-CCNC: 61 U/L (ref 0–40)
BILIRUB SERPL-MCNC: 1.2 MG/DL (ref 0–1.2)
BUN BLDV-MCNC: 9 MG/DL (ref 6–20)
CALCIUM SERPL-MCNC: 9.1 MG/DL (ref 8.6–10.2)
CHLORIDE BLD-SCNC: 97 MEQ/L (ref 98–107)
CO2: 24 MEQ/L (ref 22–29)
CREAT SERPL-MCNC: 0.58 MG/DL (ref 0.7–1.2)
GFR AFRICAN AMERICAN: >60
GFR NON-AFRICAN AMERICAN: >60
GLOBULIN: 3.2 G/DL (ref 2.3–3.5)
GLUCOSE BLD-MCNC: 154 MG/DL (ref 74–109)
HBA1C MFR BLD: 6.8 % (ref 4.8–5.9)
POTASSIUM SERPL-SCNC: 4.5 MEQ/L (ref 3.5–5.1)
SODIUM BLD-SCNC: 142 MEQ/L (ref 132–144)
TOTAL PROTEIN: 7.3 G/DL (ref 6.4–8.1)

## 2018-02-23 ENCOUNTER — OFFICE VISIT (OUTPATIENT)
Dept: FAMILY MEDICINE CLINIC | Age: 48
End: 2018-02-23
Payer: COMMERCIAL

## 2018-02-23 VITALS
HEART RATE: 94 BPM | BODY MASS INDEX: 40.6 KG/M2 | TEMPERATURE: 97.4 F | WEIGHT: 290 LBS | HEIGHT: 71 IN | RESPIRATION RATE: 18 BRPM | SYSTOLIC BLOOD PRESSURE: 102 MMHG | DIASTOLIC BLOOD PRESSURE: 74 MMHG

## 2018-02-23 DIAGNOSIS — K21.00 GASTROESOPHAGEAL REFLUX DISEASE WITH ESOPHAGITIS: ICD-10-CM

## 2018-02-23 DIAGNOSIS — I10 BENIGN ESSENTIAL HTN: ICD-10-CM

## 2018-02-23 DIAGNOSIS — F41.1 GENERALIZED ANXIETY DISORDER: ICD-10-CM

## 2018-02-23 DIAGNOSIS — N18.1 DIABETES MELLITUS DUE TO UNDERLYING CONDITION, CONTROLLED, WITH STAGE 1 CHRONIC KIDNEY DISEASE, WITHOUT LONG-TERM CURRENT USE OF INSULIN (HCC): Primary | ICD-10-CM

## 2018-02-23 DIAGNOSIS — E08.22 DIABETES MELLITUS DUE TO UNDERLYING CONDITION, CONTROLLED, WITH STAGE 1 CHRONIC KIDNEY DISEASE, WITHOUT LONG-TERM CURRENT USE OF INSULIN (HCC): Primary | ICD-10-CM

## 2018-02-23 PROCEDURE — 99213 OFFICE O/P EST LOW 20 MIN: CPT | Performed by: FAMILY MEDICINE

## 2018-02-23 RX ORDER — BLOOD-GLUCOSE METER
1 KIT MISCELLANEOUS DAILY PRN
Qty: 1 KIT | Refills: 0 | Status: SHIPPED | OUTPATIENT
Start: 2018-02-23

## 2018-02-23 RX ORDER — PANTOPRAZOLE SODIUM 40 MG/1
40 TABLET, DELAYED RELEASE ORAL DAILY
Qty: 30 TABLET | Refills: 3 | Status: SHIPPED | OUTPATIENT
Start: 2018-02-23 | End: 2018-06-20 | Stop reason: SDUPTHER

## 2018-02-23 RX ORDER — CLONAZEPAM 1 MG/1
TABLET ORAL
Qty: 60 TABLET | Refills: 0 | Status: SHIPPED | OUTPATIENT
Start: 2018-02-23 | End: 2018-04-04 | Stop reason: SDUPTHER

## 2018-02-23 NOTE — TELEPHONE ENCOUNTER
Pt called back states insurance will cover \"Free Style\" glucometer and test strips. Send to [employer].

## 2018-02-23 NOTE — PROGRESS NOTES
Bilateral 10/26/2016    DR. ALTAMIRANO     TONSILLECTOMY AND ADENOIDECTOMY      UPPER GASTROINTESTINAL ENDOSCOPY  2011    NORMAL     Social History     Social History    Marital status:      Spouse name: N/A    Number of children: N/A    Years of education: N/A     Occupational History    Not on file. Social History Main Topics    Smoking status: Current Every Day Smoker     Packs/day: 0.50     Types: Cigarettes    Smokeless tobacco: Never Used    Alcohol use Yes      Comment: occasionally    Drug use: No    Sexual activity: Not on file     Other Topics Concern    Not on file     Social History Narrative    No narrative on file     Family History   Problem Relation Age of Onset    Diabetes Father           Current Outpatient Prescriptions on File Prior to Visit   Medication Sig Dispense Refill    lisinopril (PRINIVIL;ZESTRIL) 5 MG tablet take 1 tablet by mouth once daily 30 tablet 5    HYDROcodone-acetaminophen (NORCO) 7.5-325 MG per tablet Take 1 tablet by mouth nightly as needed for Pain for up to 30 days. 30 tablet 0    omeprazole (PRILOSEC) 20 MG delayed release capsule take 1 capsule by mouth daily 30 capsule 5    metFORMIN (GLUCOPHAGE) 1000 MG tablet take 1 tablet by mouth twice a day 180 tablet 3    atenolol (TENORMIN) 25 MG tablet Take 1 tablet by mouth daily 90 tablet 3    nitroGLYCERIN (NITROSTAT) 0.4 MG SL tablet Dissolve 1 tab under tongue at first sign of chest pain. May repeat every 5 minutes until relief is obtained. If pain persists after taking 3 tabs in a 15-minute period, or the pain is different than is typically experienced, call 9-1-1 immediately.  25 tablet 3    Respiratory Therapy Supplies YONIS 1 Device by Does not apply route nightly PRESSURE 16cm; needs c-pap machine/KONG 1 Device 0    aspirin 81 MG tablet Take 325 mg by mouth daily       glucose monitoring kit (FREESTYLE) monitoring kit 1 kit by Does not apply route daily as needed (glucose) 1 kit 0    glucose blood VI test strips (ASCENSIA AUTODISC VI;ONE TOUCH ULTRA TEST VI) strip 1 each by In Vitro route daily As needed. 100 each 5    ibuprofen (ADVIL;MOTRIN) 800 MG tablet take 1 tablet by mouth daily if needed 90 tablet 3    cyclobenzaprine (FLEXERIL) 10 MG tablet take 1 tablet by mouth three times a day if needed 60 tablet 2     Current Facility-Administered Medications on File Prior to Visit   Medication Dose Route Frequency Provider Last Rate Last Dose    methylPREDNISolone acetate (DEPO-MEDROL) injection 40 mg  40 mg Intra-Lesional Once Armida Posey DO           Objective    Vitals:    02/23/18 0757   BP: 102/74   Pulse: 94   Resp: 18   Temp: 97.4 °F (36.3 °C)   TempSrc: Temporal   Weight: 290 lb (131.5 kg)   Height: 5' 11\" (1.803 m)     Physical Exam   Constitutional: He is oriented to person, place, and time and well-developed, well-nourished, and in no distress. No distress. Eyes: No scleral icterus. Neck: Normal range of motion. Neck supple. Carotid bruit is not present. No neck rigidity. No thyroid mass and no thyromegaly present. Cardiovascular: Normal rate, regular rhythm, S1 normal, S2 normal and normal heart sounds. No extrasystoles are present. No murmur heard. Pulmonary/Chest: Effort normal and breath sounds normal.   Abdominal: Soft. Normal appearance and bowel sounds are normal. He exhibits no abdominal bruit and no mass. There is no hepatosplenomegaly. There is tenderness (1/4 tx) in the epigastric area. There is no rigidity, no rebound, no guarding, no CVA tenderness, no tenderness at McBurney's point and negative Davalos's sign. Musculoskeletal: He exhibits no edema. Lumbar back: He exhibits tenderness, pain and spasm. He exhibits no bony tenderness. Back:    Neurological: He is alert and oriented to person, place, and time. He has normal sensation, normal strength and intact cranial nerves.  He has a normal Straight Leg Raise Test. Gait normal.   Reflex Scores:

## 2018-03-04 PROBLEM — E08.22: Status: ACTIVE | Noted: 2018-03-04

## 2018-03-04 PROBLEM — N18.1: Status: ACTIVE | Noted: 2018-03-04

## 2018-03-04 ASSESSMENT — ENCOUNTER SYMPTOMS
BLOOD IN STOOL: 0
ABDOMINAL DISTENTION: 0
DIARRHEA: 0
SHORTNESS OF BREATH: 0
CONSTIPATION: 0
BACK PAIN: 1
NAUSEA: 1
ABDOMINAL PAIN: 1
VOMITING: 0

## 2018-03-09 ENCOUNTER — TELEPHONE (OUTPATIENT)
Dept: FAMILY MEDICINE CLINIC | Age: 48
End: 2018-03-09

## 2018-03-09 DIAGNOSIS — M54.41 CHRONIC RIGHT-SIDED LOW BACK PAIN WITH RIGHT-SIDED SCIATICA: ICD-10-CM

## 2018-03-09 DIAGNOSIS — M17.0 OSTEOARTHRITIS OF BOTH KNEES, UNSPECIFIED OSTEOARTHRITIS TYPE: ICD-10-CM

## 2018-03-09 DIAGNOSIS — G89.29 CHRONIC RIGHT-SIDED LOW BACK PAIN WITH RIGHT-SIDED SCIATICA: ICD-10-CM

## 2018-03-09 RX ORDER — HYDROCODONE BITARTRATE AND ACETAMINOPHEN 7.5; 325 MG/1; MG/1
1 TABLET ORAL NIGHTLY PRN
Qty: 30 TABLET | Refills: 0 | Status: SHIPPED | OUTPATIENT
Start: 2018-03-09 | End: 2018-04-09 | Stop reason: SDUPTHER

## 2018-03-09 NOTE — TELEPHONE ENCOUNTER
Call, as rx called    ; Controlled Substances Monitoring: The Prescription Monitoring Report for this patient was reviewed today. Leatha Chauhan DO)    Possible medication side effects, risk of tolerance/dependence & alternative treatments discussed., No signs of potential drug abuse or diversion identified. Leatha Chauhan DO)         Treatment objectives documented - patient is progressing appropriately. , Functional status reviewed - continues with improved or maintaining ADL's., Reviewed the patient's functional status and documentation. , Dose reduction has been attempted. Leatha Chauhan DO)    Existing medication contract.  Leatha Chauhan DO)

## 2018-03-09 NOTE — TELEPHONE ENCOUNTER
Pharmacy requests refill on medication.  Please approve or deny this request.    LOV 2/23/2018    Last refill 2/9/18    Future Appointments  Date Time Provider Cesar Shin   5/23/2018 8:30 AM Mika Fernandez [facility]

## 2018-04-04 DIAGNOSIS — F41.1 GENERALIZED ANXIETY DISORDER: ICD-10-CM

## 2018-04-04 DIAGNOSIS — I10 ESSENTIAL HYPERTENSION: ICD-10-CM

## 2018-04-04 RX ORDER — ATENOLOL 25 MG/1
25 TABLET ORAL DAILY
Qty: 90 TABLET | Refills: 3 | Status: SHIPPED | OUTPATIENT
Start: 2018-04-04 | End: 2019-03-31 | Stop reason: SDUPTHER

## 2018-04-04 RX ORDER — CLONAZEPAM 1 MG/1
TABLET ORAL
Qty: 60 TABLET | Refills: 0 | Status: SHIPPED | OUTPATIENT
Start: 2018-04-04 | End: 2018-05-03 | Stop reason: SDUPTHER

## 2018-04-09 DIAGNOSIS — G89.29 CHRONIC RIGHT-SIDED LOW BACK PAIN WITH RIGHT-SIDED SCIATICA: ICD-10-CM

## 2018-04-09 DIAGNOSIS — M17.0 OSTEOARTHRITIS OF BOTH KNEES, UNSPECIFIED OSTEOARTHRITIS TYPE: ICD-10-CM

## 2018-04-09 DIAGNOSIS — M54.41 CHRONIC RIGHT-SIDED LOW BACK PAIN WITH RIGHT-SIDED SCIATICA: ICD-10-CM

## 2018-04-09 RX ORDER — HYDROCODONE BITARTRATE AND ACETAMINOPHEN 7.5; 325 MG/1; MG/1
1 TABLET ORAL NIGHTLY PRN
Qty: 30 TABLET | Refills: 0 | Status: SHIPPED | OUTPATIENT
Start: 2018-04-09 | End: 2018-05-07 | Stop reason: SDUPTHER

## 2018-05-03 DIAGNOSIS — F41.1 GENERALIZED ANXIETY DISORDER: ICD-10-CM

## 2018-05-03 RX ORDER — CLONAZEPAM 1 MG/1
TABLET ORAL
Qty: 60 TABLET | Refills: 0 | Status: SHIPPED | OUTPATIENT
Start: 2018-05-03 | End: 2018-06-04 | Stop reason: SDUPTHER

## 2018-05-07 DIAGNOSIS — M17.0 OSTEOARTHRITIS OF BOTH KNEES, UNSPECIFIED OSTEOARTHRITIS TYPE: ICD-10-CM

## 2018-05-07 DIAGNOSIS — G89.29 CHRONIC RIGHT-SIDED LOW BACK PAIN WITH RIGHT-SIDED SCIATICA: ICD-10-CM

## 2018-05-07 DIAGNOSIS — M54.41 CHRONIC RIGHT-SIDED LOW BACK PAIN WITH RIGHT-SIDED SCIATICA: ICD-10-CM

## 2018-05-07 RX ORDER — HYDROCODONE BITARTRATE AND ACETAMINOPHEN 7.5; 325 MG/1; MG/1
1 TABLET ORAL NIGHTLY PRN
Qty: 30 TABLET | Refills: 0 | Status: SHIPPED | OUTPATIENT
Start: 2018-05-07 | End: 2018-06-07 | Stop reason: SDUPTHER

## 2018-05-18 ENCOUNTER — TELEPHONE (OUTPATIENT)
Dept: FAMILY MEDICINE CLINIC | Age: 48
End: 2018-05-18

## 2018-06-04 DIAGNOSIS — F41.1 GENERALIZED ANXIETY DISORDER: ICD-10-CM

## 2018-06-04 RX ORDER — CLONAZEPAM 1 MG/1
TABLET ORAL
Qty: 60 TABLET | Refills: 0 | Status: SHIPPED | OUTPATIENT
Start: 2018-06-04 | End: 2018-07-06 | Stop reason: SDUPTHER

## 2018-06-07 ENCOUNTER — TELEPHONE (OUTPATIENT)
Dept: FAMILY MEDICINE CLINIC | Age: 48
End: 2018-06-07

## 2018-06-07 DIAGNOSIS — M17.0 OSTEOARTHRITIS OF BOTH KNEES, UNSPECIFIED OSTEOARTHRITIS TYPE: ICD-10-CM

## 2018-06-07 DIAGNOSIS — G89.29 CHRONIC RIGHT-SIDED LOW BACK PAIN WITH RIGHT-SIDED SCIATICA: ICD-10-CM

## 2018-06-07 DIAGNOSIS — M54.41 CHRONIC RIGHT-SIDED LOW BACK PAIN WITH RIGHT-SIDED SCIATICA: ICD-10-CM

## 2018-06-07 RX ORDER — HYDROCODONE BITARTRATE AND ACETAMINOPHEN 7.5; 325 MG/1; MG/1
1 TABLET ORAL NIGHTLY PRN
Qty: 30 TABLET | Refills: 0 | Status: SHIPPED | OUTPATIENT
Start: 2018-06-07 | End: 2018-06-07 | Stop reason: SDUPTHER

## 2018-06-07 RX ORDER — HYDROCODONE BITARTRATE AND ACETAMINOPHEN 7.5; 325 MG/1; MG/1
1 TABLET ORAL NIGHTLY PRN
Qty: 30 TABLET | Refills: 0 | Status: SHIPPED | OUTPATIENT
Start: 2018-06-07 | End: 2018-07-09 | Stop reason: SDUPTHER

## 2018-06-20 ENCOUNTER — TELEPHONE (OUTPATIENT)
Dept: FAMILY MEDICINE CLINIC | Age: 48
End: 2018-06-20

## 2018-06-20 DIAGNOSIS — G89.29 CHRONIC RIGHT-SIDED LOW BACK PAIN WITH RIGHT-SIDED SCIATICA: ICD-10-CM

## 2018-06-20 DIAGNOSIS — M54.41 CHRONIC RIGHT-SIDED LOW BACK PAIN WITH RIGHT-SIDED SCIATICA: ICD-10-CM

## 2018-06-20 DIAGNOSIS — K21.00 GASTROESOPHAGEAL REFLUX DISEASE WITH ESOPHAGITIS: ICD-10-CM

## 2018-06-20 RX ORDER — CYCLOBENZAPRINE HCL 10 MG
TABLET ORAL
Qty: 40 TABLET | Refills: 2 | Status: SHIPPED | OUTPATIENT
Start: 2018-06-20 | End: 2018-08-01 | Stop reason: SDUPTHER

## 2018-06-20 RX ORDER — PANTOPRAZOLE SODIUM 40 MG/1
TABLET, DELAYED RELEASE ORAL
Qty: 30 TABLET | Refills: 3 | Status: SHIPPED | OUTPATIENT
Start: 2018-06-20 | End: 2018-10-13 | Stop reason: SDUPTHER

## 2018-07-06 DIAGNOSIS — F41.1 GENERALIZED ANXIETY DISORDER: ICD-10-CM

## 2018-07-06 RX ORDER — CLONAZEPAM 1 MG/1
TABLET ORAL
Qty: 60 TABLET | Refills: 0 | Status: SHIPPED | OUTPATIENT
Start: 2018-07-06 | End: 2018-08-06 | Stop reason: SDUPTHER

## 2018-07-06 NOTE — TELEPHONE ENCOUNTER
Call, as rx called    ; Controlled Substances Monitoring:     RX Monitoring 7/6/2018   Attestation The Prescription Monitoring Report for this patient was reviewed today. Documentation Possible medication side effects, risk of tolerance/dependence & alternative treatments discussed. ;No signs of potential drug abuse or diversion identified. Chronic Pain Dose reduction has been attempted. Medication Contracts Existing medication contract.

## 2018-07-09 DIAGNOSIS — M17.0 OSTEOARTHRITIS OF BOTH KNEES, UNSPECIFIED OSTEOARTHRITIS TYPE: ICD-10-CM

## 2018-07-09 DIAGNOSIS — F41.1 GENERALIZED ANXIETY DISORDER: ICD-10-CM

## 2018-07-09 DIAGNOSIS — M54.41 CHRONIC RIGHT-SIDED LOW BACK PAIN WITH RIGHT-SIDED SCIATICA: ICD-10-CM

## 2018-07-09 DIAGNOSIS — G89.29 CHRONIC RIGHT-SIDED LOW BACK PAIN WITH RIGHT-SIDED SCIATICA: ICD-10-CM

## 2018-07-09 RX ORDER — HYDROCODONE BITARTRATE AND ACETAMINOPHEN 7.5; 325 MG/1; MG/1
1 TABLET ORAL NIGHTLY PRN
Qty: 30 TABLET | Refills: 0 | Status: SHIPPED | OUTPATIENT
Start: 2018-07-09 | End: 2018-08-07 | Stop reason: ALTCHOICE

## 2018-07-09 NOTE — TELEPHONE ENCOUNTER
Call, as rx called    ; Controlled Substances Monitoring:     RX Monitoring 7/9/2018   Attestation The Prescription Monitoring Report for this patient was reviewed today. Documentation Possible medication side effects, risk of tolerance/dependence & alternative treatments discussed. ;Obtaining appropriate analgesic effect of treatment. ;No signs of potential drug abuse or diversion identified. Chronic Pain Treatment objectives documented - patient is progressing appropriately. ;Functional status reviewed - continues with improved or maintaining ADL's.;Reviewed the patient's functional status and documentation. ;Dose reduction has been attempted. Medication Contracts Existing medication contract.

## 2018-07-09 NOTE — TELEPHONE ENCOUNTER
patient requesting refill. Please approve or deny refill request. Medication pended. Thank you. Last OV: 2/23/2018  Last RX: 06/07/18    Next Visit Date:  No future appointments.

## 2018-08-06 DIAGNOSIS — F41.1 GENERALIZED ANXIETY DISORDER: ICD-10-CM

## 2018-08-06 RX ORDER — CLONAZEPAM 1 MG/1
TABLET ORAL
Qty: 60 TABLET | Refills: 0 | Status: SHIPPED | OUTPATIENT
Start: 2018-08-06 | End: 2018-09-06 | Stop reason: SDUPTHER

## 2018-08-06 NOTE — TELEPHONE ENCOUNTER
Call, as rx called    ; Controlled Substances Monitoring:     RX Monitoring 8/6/2018   Attestation The Prescription Monitoring Report for this patient was reviewed today. Documentation Possible medication side effects, risk of tolerance/dependence & alternative treatments discussed. ;No signs of potential drug abuse or diversion identified. Chronic Pain Dose reduction has been attempted. Medication Contracts Existing medication contract.

## 2018-08-07 ENCOUNTER — CARE COORDINATION (OUTPATIENT)
Dept: CASE MANAGEMENT | Age: 48
End: 2018-08-07

## 2018-08-07 DIAGNOSIS — I20.0 UNSTABLE ANGINA (HCC): ICD-10-CM

## 2018-08-07 PROCEDURE — 1111F DSCHRG MED/CURRENT MED MERGE: CPT | Performed by: FAMILY MEDICINE

## 2018-08-07 RX ORDER — MAGNESIUM OXIDE 400 MG/1
1 TABLET ORAL 2 TIMES DAILY
COMMUNITY

## 2018-08-07 RX ORDER — LISINOPRIL 5 MG/1
5 TABLET ORAL DAILY
COMMUNITY
End: 2018-08-13 | Stop reason: SDUPTHER

## 2018-08-07 RX ORDER — HYDROCODONE BITARTRATE AND ACETAMINOPHEN 7.5; 3 MG/1; MG/1
1 TABLET ORAL EVERY 6 HOURS PRN
COMMUNITY
End: 2018-08-13 | Stop reason: SDUPTHER

## 2018-08-07 RX ORDER — ASPIRIN 325 MG
325 TABLET ORAL DAILY
COMMUNITY

## 2018-08-08 DIAGNOSIS — M54.41 CHRONIC RIGHT-SIDED LOW BACK PAIN WITH RIGHT-SIDED SCIATICA: ICD-10-CM

## 2018-08-08 DIAGNOSIS — M17.0 OSTEOARTHRITIS OF BOTH KNEES, UNSPECIFIED OSTEOARTHRITIS TYPE: ICD-10-CM

## 2018-08-08 DIAGNOSIS — G89.29 CHRONIC RIGHT-SIDED LOW BACK PAIN WITH RIGHT-SIDED SCIATICA: ICD-10-CM

## 2018-08-09 DIAGNOSIS — R07.89 ATYPICAL CHEST PAIN: Primary | ICD-10-CM

## 2018-08-09 PROBLEM — I20.0 UNSTABLE ANGINA (HCC): Status: ACTIVE | Noted: 2018-08-09

## 2018-08-09 RX ORDER — NITROGLYCERIN 0.4 MG/1
TABLET SUBLINGUAL
Qty: 25 TABLET | Refills: 3 | Status: SHIPPED | OUTPATIENT
Start: 2018-08-09

## 2018-08-09 RX ORDER — HYDROCODONE BITARTRATE AND ACETAMINOPHEN 7.5; 325 MG/1; MG/1
1 TABLET ORAL NIGHTLY PRN
Qty: 30 TABLET | Refills: 0 | Status: SHIPPED | OUTPATIENT
Start: 2018-08-09 | End: 2018-09-07 | Stop reason: SDUPTHER

## 2018-08-09 NOTE — TELEPHONE ENCOUNTER
Call, as I re-ordered the pain rx. .    ;  Controlled Substances Monitoring:     RX Monitoring 8/9/2018   Attestation The Prescription Monitoring Report for this patient was reviewed today. Documentation Possible medication side effects, risk of tolerance/dependence & alternative treatments discussed. ;Obtaining appropriate analgesic effect of treatment. ;No signs of potential drug abuse or diversion identified. Chronic Pain Treatment objectives documented - patient is progressing appropriately. ;Functional status reviewed - continues with improved or maintaining ADL's.;Reviewed the patient's functional status and documentation. ;Dose reduction has been attempted. Medication Contracts Existing medication contract.

## 2018-08-13 ENCOUNTER — OFFICE VISIT (OUTPATIENT)
Dept: FAMILY MEDICINE CLINIC | Age: 48
End: 2018-08-13
Payer: COMMERCIAL

## 2018-08-13 VITALS
HEART RATE: 91 BPM | BODY MASS INDEX: 40.46 KG/M2 | HEIGHT: 71 IN | SYSTOLIC BLOOD PRESSURE: 102 MMHG | RESPIRATION RATE: 16 BRPM | DIASTOLIC BLOOD PRESSURE: 70 MMHG | TEMPERATURE: 97.5 F | WEIGHT: 289 LBS

## 2018-08-13 DIAGNOSIS — F41.1 GAD (GENERALIZED ANXIETY DISORDER): ICD-10-CM

## 2018-08-13 DIAGNOSIS — K76.6 PORTAL HYPERTENSION (HCC): ICD-10-CM

## 2018-08-13 DIAGNOSIS — K75.9 HEPATITIS: ICD-10-CM

## 2018-08-13 DIAGNOSIS — K75.9 HEPATITIS: Primary | ICD-10-CM

## 2018-08-13 DIAGNOSIS — R07.89 ATYPICAL CHEST PAIN: ICD-10-CM

## 2018-08-13 DIAGNOSIS — I10 ESSENTIAL HYPERTENSION: ICD-10-CM

## 2018-08-13 LAB
HAV IGM SER IA-ACNC: NORMAL
HEPATITIS B CORE IGM ANTIBODY: NORMAL
HEPATITIS B SURFACE ANTIGEN INTERPRETATION: NORMAL
HEPATITIS C ANTIBODY INTERPRETATION: NORMAL
HEPATITIS INTERPRETATION:: NORMAL

## 2018-08-13 PROCEDURE — 99495 TRANSJ CARE MGMT MOD F2F 14D: CPT | Performed by: FAMILY MEDICINE

## 2018-08-13 PROCEDURE — 1111F DSCHRG MED/CURRENT MED MERGE: CPT | Performed by: FAMILY MEDICINE

## 2018-08-13 RX ORDER — SPIRONOLACTONE 50 MG/1
50 TABLET, FILM COATED ORAL DAILY
Qty: 30 TABLET | Refills: 3 | Status: SHIPPED | OUTPATIENT
Start: 2018-08-13 | End: 2018-12-03 | Stop reason: SDUPTHER

## 2018-08-13 ASSESSMENT — PATIENT HEALTH QUESTIONNAIRE - PHQ9
SUM OF ALL RESPONSES TO PHQ QUESTIONS 1-9: 0
2. FEELING DOWN, DEPRESSED OR HOPELESS: 0
SUM OF ALL RESPONSES TO PHQ9 QUESTIONS 1 & 2: 0
SUM OF ALL RESPONSES TO PHQ QUESTIONS 1-9: 0
1. LITTLE INTEREST OR PLEASURE IN DOING THINGS: 0

## 2018-08-13 NOTE — PROGRESS NOTES
mouth once daily 90 tablet 3    HYDROcodone-acetaminophen (NORCO) 7.5-325 MG per tablet Take 1 tablet by mouth nightly as needed for Pain for up to 30 days. . 30 tablet 0    nitroGLYCERIN (NITROSTAT) 0.4 MG SL tablet up to max of 3 total doses. If no relief after 1 dose, call 911. 25 tablet 3    aspirin 325 MG tablet Take 325 mg by mouth daily      magnesium oxide (MAG-OX) 400 MG tablet Take 1 tablet by mouth 2 times daily      Multiple Vitamins-Minerals (MENS MULTIVITAMIN PLUS) TABS Take 1 tablet by mouth daily      clonazePAM (KLONOPIN) 1 MG tablet take 1 tablet by mouth twice a day if needed 60 tablet 0    cyclobenzaprine (FLEXERIL) 10 MG tablet take 1 tablet by mouth three times a day if needed 40 tablet 1    pantoprazole (PROTONIX) 40 MG tablet take 1 tablet by mouth once daily 30 tablet 3    atenolol (TENORMIN) 25 MG tablet take 1 tablet by mouth daily 90 tablet 3    glucose monitoring kit (FREESTYLE) monitoring kit 1 kit by Does not apply route daily as needed (glucose) 1 kit 0    glucose blood VI test strips (ASCENSIA AUTODISC VI;ONE TOUCH ULTRA TEST VI) strip 1 each by In Vitro route daily As needed. 100 each 5    ibuprofen (ADVIL;MOTRIN) 800 MG tablet take 1 tablet by mouth daily if needed 90 tablet 3    nitroGLYCERIN (NITROSTAT) 0.4 MG SL tablet Dissolve 1 tab under tongue at first sign of chest pain. May repeat every 5 minutes until relief is obtained. If pain persists after taking 3 tabs in a 15-minute period, or the pain is different than is typically experienced, call 9-1-1 immediately.  25 tablet 3    Respiratory Therapy Supplies YONIS 1 Device by Does not apply route nightly PRESSURE 16cm; needs c-pap machine/KONG 1 Device 0        Medications patient taking as of now reconciled against medications ordered at time of hospital discharge: Yes    Chief Complaint   Patient presents with   [address] from Hospital     f/u from hospital stay at [facility] from 08/05/2018-08/06/2018 for unstable

## 2018-08-15 DIAGNOSIS — K76.6 PORTAL HYPERTENSION (HCC): Primary | ICD-10-CM

## 2018-08-15 DIAGNOSIS — K70.30 ALCOHOLIC CIRRHOSIS OF LIVER WITHOUT ASCITES (HCC): ICD-10-CM

## 2018-08-21 ASSESSMENT — ENCOUNTER SYMPTOMS
SHORTNESS OF BREATH: 0
RECTAL PAIN: 0
BACK PAIN: 1
ABDOMINAL DISTENTION: 0
VOMITING: 0
BLOOD IN STOOL: 0
ABDOMINAL PAIN: 0
NAUSEA: 1

## 2018-09-06 DIAGNOSIS — K76.6 PORTAL HYPERTENSION (HCC): ICD-10-CM

## 2018-09-06 DIAGNOSIS — K70.30 ALCOHOLIC CIRRHOSIS OF LIVER WITHOUT ASCITES (HCC): ICD-10-CM

## 2018-09-06 LAB
ALBUMIN SERPL-MCNC: 4 G/DL (ref 3.9–4.9)
ALP BLD-CCNC: 58 U/L (ref 35–104)
ALT SERPL-CCNC: 64 U/L (ref 0–41)
ANION GAP SERPL CALCULATED.3IONS-SCNC: 15 MEQ/L (ref 7–13)
AST SERPL-CCNC: 87 U/L (ref 0–40)
BILIRUB SERPL-MCNC: 0.6 MG/DL (ref 0–1.2)
BUN BLDV-MCNC: 8 MG/DL (ref 6–20)
CALCIUM SERPL-MCNC: 9.9 MG/DL (ref 8.6–10.2)
CHLORIDE BLD-SCNC: 99 MEQ/L (ref 98–107)
CO2: 24 MEQ/L (ref 22–29)
CREAT SERPL-MCNC: 0.65 MG/DL (ref 0.7–1.2)
GFR AFRICAN AMERICAN: >60
GFR NON-AFRICAN AMERICAN: >60
GLOBULIN: 3.6 G/DL (ref 2.3–3.5)
GLUCOSE BLD-MCNC: 106 MG/DL (ref 74–109)
POTASSIUM SERPL-SCNC: 5.1 MEQ/L (ref 3.5–5.1)
SODIUM BLD-SCNC: 138 MEQ/L (ref 132–144)
TOTAL PROTEIN: 7.6 G/DL (ref 6.4–8.1)

## 2018-09-07 ENCOUNTER — TELEPHONE (OUTPATIENT)
Dept: FAMILY MEDICINE CLINIC | Age: 48
End: 2018-09-07

## 2018-09-07 DIAGNOSIS — M54.41 CHRONIC RIGHT-SIDED LOW BACK PAIN WITH RIGHT-SIDED SCIATICA: ICD-10-CM

## 2018-09-07 DIAGNOSIS — M17.0 OSTEOARTHRITIS OF BOTH KNEES, UNSPECIFIED OSTEOARTHRITIS TYPE: ICD-10-CM

## 2018-09-07 DIAGNOSIS — G89.29 CHRONIC RIGHT-SIDED LOW BACK PAIN WITH RIGHT-SIDED SCIATICA: ICD-10-CM

## 2018-09-07 RX ORDER — HYDROCODONE BITARTRATE AND ACETAMINOPHEN 7.5; 325 MG/1; MG/1
1 TABLET ORAL NIGHTLY PRN
Qty: 30 TABLET | Refills: 0 | Status: SHIPPED | OUTPATIENT
Start: 2018-09-07 | End: 2018-10-08 | Stop reason: SDUPTHER

## 2018-09-07 NOTE — TELEPHONE ENCOUNTER
PT WIFE CALLED IN FOR REFILL ON MED FOR PT HYDROcodone-acetaminophen (NORCO) 7.5-325 MG per tablet     PLEASE ADVISE      PT PH: [phone]

## 2018-10-02 DIAGNOSIS — F41.1 GENERALIZED ANXIETY DISORDER: ICD-10-CM

## 2018-10-02 RX ORDER — CLONAZEPAM 1 MG/1
TABLET ORAL
Qty: 60 TABLET | Refills: 0 | Status: SHIPPED | OUTPATIENT
Start: 2018-10-02 | End: 2018-11-03 | Stop reason: SDUPTHER

## 2018-10-08 DIAGNOSIS — G89.29 CHRONIC RIGHT-SIDED LOW BACK PAIN WITH RIGHT-SIDED SCIATICA: ICD-10-CM

## 2018-10-08 DIAGNOSIS — M17.0 OSTEOARTHRITIS OF BOTH KNEES, UNSPECIFIED OSTEOARTHRITIS TYPE: ICD-10-CM

## 2018-10-08 DIAGNOSIS — M54.41 CHRONIC RIGHT-SIDED LOW BACK PAIN WITH RIGHT-SIDED SCIATICA: ICD-10-CM

## 2018-10-08 RX ORDER — HYDROCODONE BITARTRATE AND ACETAMINOPHEN 7.5; 325 MG/1; MG/1
1 TABLET ORAL NIGHTLY PRN
Qty: 30 TABLET | Refills: 0 | Status: SHIPPED | OUTPATIENT
Start: 2018-10-08 | End: 2018-11-07 | Stop reason: SDUPTHER

## 2018-10-15 DIAGNOSIS — M17.0 OSTEOARTHRITIS OF BOTH KNEES, UNSPECIFIED OSTEOARTHRITIS TYPE: ICD-10-CM

## 2018-10-15 RX ORDER — IBUPROFEN 800 MG/1
TABLET ORAL
Qty: 90 TABLET | Refills: 3 | Status: SHIPPED | OUTPATIENT
Start: 2018-10-15

## 2018-11-03 ENCOUNTER — TELEPHONE (OUTPATIENT)
Dept: FAMILY MEDICINE CLINIC | Age: 48
End: 2018-11-03

## 2018-11-03 DIAGNOSIS — F41.1 GENERALIZED ANXIETY DISORDER: ICD-10-CM

## 2018-11-04 RX ORDER — CLONAZEPAM 1 MG/1
TABLET ORAL
Qty: 60 TABLET | Refills: 0 | Status: SHIPPED | OUTPATIENT
Start: 2018-11-04 | End: 2018-11-30 | Stop reason: SDUPTHER

## 2018-11-07 DIAGNOSIS — G89.29 CHRONIC RIGHT-SIDED LOW BACK PAIN WITH RIGHT-SIDED SCIATICA: ICD-10-CM

## 2018-11-07 DIAGNOSIS — M17.0 OSTEOARTHRITIS OF BOTH KNEES, UNSPECIFIED OSTEOARTHRITIS TYPE: ICD-10-CM

## 2018-11-07 DIAGNOSIS — M54.41 CHRONIC RIGHT-SIDED LOW BACK PAIN WITH RIGHT-SIDED SCIATICA: ICD-10-CM

## 2018-11-07 RX ORDER — HYDROCODONE BITARTRATE AND ACETAMINOPHEN 7.5; 325 MG/1; MG/1
1 TABLET ORAL NIGHTLY PRN
Qty: 30 TABLET | Refills: 0 | Status: SHIPPED | OUTPATIENT
Start: 2018-11-07 | End: 2018-12-07 | Stop reason: SDUPTHER

## 2018-11-07 NOTE — TELEPHONE ENCOUNTER
PATIENT LAST SEEN 8/13/18  LAST REFILL 10/8/18  PLEASE APPROVE OR DENY. No future appointments.     **PT DUE FOR FOLLOW UP APPT**

## 2018-11-20 DIAGNOSIS — G89.29 CHRONIC RIGHT-SIDED LOW BACK PAIN WITH RIGHT-SIDED SCIATICA: ICD-10-CM

## 2018-11-20 DIAGNOSIS — M54.41 CHRONIC RIGHT-SIDED LOW BACK PAIN WITH RIGHT-SIDED SCIATICA: ICD-10-CM

## 2018-11-20 RX ORDER — CYCLOBENZAPRINE HCL 10 MG
TABLET ORAL
Qty: 40 TABLET | Refills: 2 | Status: SHIPPED | OUTPATIENT
Start: 2018-11-20 | End: 2018-12-05 | Stop reason: SDUPTHER

## 2018-11-30 DIAGNOSIS — F41.1 GENERALIZED ANXIETY DISORDER: ICD-10-CM

## 2018-11-30 RX ORDER — CLONAZEPAM 1 MG/1
TABLET ORAL
Qty: 60 TABLET | Refills: 0 | Status: SHIPPED | OUTPATIENT
Start: 2018-11-30 | End: 2019-01-02 | Stop reason: SDUPTHER

## 2018-12-03 DIAGNOSIS — K76.6 PORTAL HYPERTENSION (HCC): ICD-10-CM

## 2018-12-03 RX ORDER — SPIRONOLACTONE 50 MG/1
TABLET, FILM COATED ORAL
Qty: 30 TABLET | Refills: 5 | Status: SHIPPED | OUTPATIENT
Start: 2018-12-03

## 2018-12-05 DIAGNOSIS — M54.41 CHRONIC RIGHT-SIDED LOW BACK PAIN WITH RIGHT-SIDED SCIATICA: ICD-10-CM

## 2018-12-05 DIAGNOSIS — G89.29 CHRONIC RIGHT-SIDED LOW BACK PAIN WITH RIGHT-SIDED SCIATICA: ICD-10-CM

## 2018-12-05 RX ORDER — CYCLOBENZAPRINE HCL 10 MG
TABLET ORAL
Qty: 40 TABLET | Refills: 2 | Status: SHIPPED | OUTPATIENT
Start: 2018-12-05 | End: 2019-02-08 | Stop reason: SDUPTHER

## 2018-12-07 ENCOUNTER — TELEPHONE (OUTPATIENT)
Dept: FAMILY MEDICINE CLINIC | Age: 48
End: 2018-12-07

## 2018-12-07 DIAGNOSIS — G89.29 CHRONIC RIGHT-SIDED LOW BACK PAIN WITH RIGHT-SIDED SCIATICA: ICD-10-CM

## 2018-12-07 DIAGNOSIS — M17.0 OSTEOARTHRITIS OF BOTH KNEES, UNSPECIFIED OSTEOARTHRITIS TYPE: ICD-10-CM

## 2018-12-07 DIAGNOSIS — M54.41 CHRONIC RIGHT-SIDED LOW BACK PAIN WITH RIGHT-SIDED SCIATICA: ICD-10-CM

## 2018-12-07 RX ORDER — HYDROCODONE BITARTRATE AND ACETAMINOPHEN 7.5; 325 MG/1; MG/1
1 TABLET ORAL NIGHTLY PRN
Qty: 30 TABLET | Refills: 0 | Status: SHIPPED | OUTPATIENT
Start: 2018-12-07 | End: 2019-01-04 | Stop reason: SDUPTHER

## 2018-12-07 NOTE — TELEPHONE ENCOUNTER
Call, as rx called    ; Controlled Substances Monitoring:     RX Monitoring 12/7/2018   Attestation The Prescription Monitoring Report for this patient was reviewed today. Documentation Possible medication side effects, risk of tolerance/dependence & alternative treatments discussed. ;Obtaining appropriate analgesic effect of treatment. ;No signs of potential drug abuse or diversion identified. Chronic Pain Treatment objectives documented - patient is progressing appropriately. ;Functional status reviewed - continues with improved or maintaining ADL's.;Reviewed the patient's functional status and documentation. ;Dose reduction has been attempted. Medication Contracts Existing medication contract.

## 2018-12-07 NOTE — TELEPHONE ENCOUNTER
The patient is requesting a refill for HYDROcodone-acetaminophen (NORCO) 7.5-325 MG per tablet.   [phone]  LOV 8/13/18    The patient uses [facility] [address]    Please approve or deny. Thank you!

## 2018-12-18 ENCOUNTER — OFFICE VISIT (OUTPATIENT)
Dept: FAMILY MEDICINE CLINIC | Age: 48
End: 2018-12-18
Payer: COMMERCIAL

## 2018-12-18 DIAGNOSIS — E11.9 CONTROLLED TYPE 2 DIABETES MELLITUS WITHOUT COMPLICATION, WITHOUT LONG-TERM CURRENT USE OF INSULIN (HCC): ICD-10-CM

## 2018-12-18 DIAGNOSIS — J20.9 BRONCHOSPASM WITH BRONCHITIS, ACUTE: Primary | ICD-10-CM

## 2018-12-18 DIAGNOSIS — Z13.220 SCREENING FOR HYPERLIPIDEMIA: ICD-10-CM

## 2018-12-18 PROCEDURE — 2022F DILAT RTA XM EVC RTNOPTHY: CPT | Performed by: FAMILY MEDICINE

## 2018-12-18 PROCEDURE — G8427 DOCREV CUR MEDS BY ELIG CLIN: HCPCS | Performed by: FAMILY MEDICINE

## 2018-12-18 PROCEDURE — 3044F HG A1C LEVEL LT 7.0%: CPT | Performed by: FAMILY MEDICINE

## 2018-12-18 PROCEDURE — G8598 ASA/ANTIPLAT THER USED: HCPCS | Performed by: FAMILY MEDICINE

## 2018-12-18 PROCEDURE — 99213 OFFICE O/P EST LOW 20 MIN: CPT | Performed by: FAMILY MEDICINE

## 2018-12-18 PROCEDURE — G8484 FLU IMMUNIZE NO ADMIN: HCPCS | Performed by: FAMILY MEDICINE

## 2018-12-18 PROCEDURE — 4004F PT TOBACCO SCREEN RCVD TLK: CPT | Performed by: FAMILY MEDICINE

## 2018-12-18 PROCEDURE — G8417 CALC BMI ABV UP PARAM F/U: HCPCS | Performed by: FAMILY MEDICINE

## 2018-12-18 RX ORDER — METHYLPREDNISOLONE 4 MG/1
TABLET ORAL
Qty: 21 TABLET | Refills: 1 | Status: SHIPPED | OUTPATIENT
Start: 2018-12-18

## 2018-12-18 RX ORDER — DEXTROMETHORPHAN HYDROBROMIDE AND PROMETHAZINE HYDROCHLORIDE 15; 6.25 MG/5ML; MG/5ML
5 SYRUP ORAL 4 TIMES DAILY PRN
Qty: 180 ML | Refills: 0 | Status: SHIPPED | OUTPATIENT
Start: 2018-12-18 | End: 2018-12-27

## 2018-12-18 RX ORDER — AZITHROMYCIN 250 MG/1
TABLET, FILM COATED ORAL
Qty: 6 TABLET | Refills: 0 | Status: SHIPPED | OUTPATIENT
Start: 2018-12-18 | End: 2018-12-28

## 2018-12-18 RX ORDER — TRIAMCINOLONE ACETONIDE 1 MG/G
CREAM TOPICAL
COMMUNITY
Start: 2018-11-06

## 2018-12-18 NOTE — PROGRESS NOTES
Subjective  Cheikh Regan, 53 y.o. male presents today with:  Chief Complaint   Patient presents with    Nasal Congestion     onset about a month ago; been taking cold and flu        HPI  Patient presents today for nasal congestion that onset about a month ago. After 2-3 wks of pnd/coryza, more tightness of cough and wheezing  ;NO cp/palp/mendes/edema   No palp/fever;more fatigue w/ father's recent death  Rev/rxs; No abuse nor side effects on meds   Disc reducing etoh w/rxs/agrees  No gi-gu sxs  Review of Systems   Constitutional: Positive for fatigue. Negative for diaphoresis and fever. HENT: Positive for congestion, sinus pain, sinus pressure and voice change. Negative for ear pain, sore throat, tinnitus and trouble swallowing. Eyes: Negative for visual disturbance. Respiratory: Positive for cough and wheezing. Cardiovascular: Negative for chest pain, palpitations and leg swelling. Gastrointestinal: Negative for abdominal pain and nausea. Genitourinary: Negative for dysuria and frequency. Musculoskeletal: Positive for arthralgias and back pain. Neurological: Positive for headaches. Negative for light-headedness and numbness. Psychiatric/Behavioral: Positive for dysphoric mood. Negative for sleep disturbance. The patient is nervous/anxious (better/rx;no s-e). Allergies   Allergen Reactions    Oxycodone-Acetaminophen Nausea And Vomiting    Propoxyphene N-Acetaminophen Nausea And Vomiting     Darvocet       Past Medical History:   Diagnosis Date    DJD (degenerative joint disease)     NECK    Generalized anxiety disorder     GERD (gastroesophageal reflux disease)     History of depression     History of MRSA infection     HTN (hypertension)     IFG (impaired fasting glucose)     Type 2 diabetes mellitus ([facility] [address] 2015     Past Surgical History:   Procedure Laterality Date    CARDIAC CATHETERIZATION  08/06/2018    DR. FORBES    COLONOSCOPY  2011    NORMAL    FINGER SURGERY [CBT/meds/exercise/PT]  Reviewed Medication Agreement  Reviewed use/abuse/diversion of meds  No significant interactions/side effects reported    [facility]arrs UTD  Call or return to clinic prn if these symptoms worsen or fail to improve as anticipated.       Isaac Castro, DO

## 2018-12-24 VITALS
BODY MASS INDEX: 38.08 KG/M2 | SYSTOLIC BLOOD PRESSURE: 110 MMHG | OXYGEN SATURATION: 97 % | WEIGHT: 272 LBS | HEIGHT: 71 IN | HEART RATE: 86 BPM | TEMPERATURE: 97.3 F | DIASTOLIC BLOOD PRESSURE: 76 MMHG | RESPIRATION RATE: 18 BRPM

## 2018-12-24 ASSESSMENT — ENCOUNTER SYMPTOMS
VOICE CHANGE: 1
WHEEZING: 1
ABDOMINAL PAIN: 0
COUGH: 1
TROUBLE SWALLOWING: 0
SINUS PRESSURE: 1
NAUSEA: 0
SINUS PAIN: 1
SORE THROAT: 0
BACK PAIN: 1

## 2019-01-04 DIAGNOSIS — G89.29 CHRONIC RIGHT-SIDED LOW BACK PAIN WITH RIGHT-SIDED SCIATICA: ICD-10-CM

## 2019-01-04 DIAGNOSIS — M54.41 CHRONIC RIGHT-SIDED LOW BACK PAIN WITH RIGHT-SIDED SCIATICA: ICD-10-CM

## 2019-01-04 DIAGNOSIS — M17.0 OSTEOARTHRITIS OF BOTH KNEES, UNSPECIFIED OSTEOARTHRITIS TYPE: ICD-10-CM

## 2019-01-04 RX ORDER — HYDROCODONE BITARTRATE AND ACETAMINOPHEN 7.5; 325 MG/1; MG/1
1 TABLET ORAL NIGHTLY PRN
Qty: 30 TABLET | Refills: 0 | Status: SHIPPED | OUTPATIENT
Start: 2019-01-04 | End: 2019-02-05 | Stop reason: SDUPTHER

## 2019-01-31 DIAGNOSIS — F41.1 GENERALIZED ANXIETY DISORDER: ICD-10-CM

## 2019-01-31 RX ORDER — CLONAZEPAM 1 MG/1
TABLET ORAL
Qty: 60 TABLET | Refills: 0 | Status: SHIPPED | OUTPATIENT
Start: 2019-01-31 | End: 2019-02-27 | Stop reason: SDUPTHER

## 2019-02-05 ENCOUNTER — TELEPHONE (OUTPATIENT)
Dept: FAMILY MEDICINE CLINIC | Age: 49
End: 2019-02-05

## 2019-02-05 DIAGNOSIS — G89.29 CHRONIC RIGHT-SIDED LOW BACK PAIN WITH RIGHT-SIDED SCIATICA: ICD-10-CM

## 2019-02-05 DIAGNOSIS — M54.41 CHRONIC RIGHT-SIDED LOW BACK PAIN WITH RIGHT-SIDED SCIATICA: ICD-10-CM

## 2019-02-05 DIAGNOSIS — M17.0 OSTEOARTHRITIS OF BOTH KNEES, UNSPECIFIED OSTEOARTHRITIS TYPE: ICD-10-CM

## 2019-02-05 RX ORDER — HYDROCODONE BITARTRATE AND ACETAMINOPHEN 7.5; 325 MG/1; MG/1
1 TABLET ORAL NIGHTLY PRN
Qty: 30 TABLET | Refills: 0 | Status: SHIPPED | OUTPATIENT
Start: 2019-02-05 | End: 2019-03-04 | Stop reason: SDUPTHER

## 2019-02-08 DIAGNOSIS — G89.29 CHRONIC RIGHT-SIDED LOW BACK PAIN WITH RIGHT-SIDED SCIATICA: ICD-10-CM

## 2019-02-08 DIAGNOSIS — M54.41 CHRONIC RIGHT-SIDED LOW BACK PAIN WITH RIGHT-SIDED SCIATICA: ICD-10-CM

## 2019-02-08 RX ORDER — CYCLOBENZAPRINE HCL 10 MG
TABLET ORAL
Qty: 40 TABLET | Refills: 2 | Status: SHIPPED | OUTPATIENT
Start: 2019-02-08

## 2019-02-20 DIAGNOSIS — K21.00 GASTROESOPHAGEAL REFLUX DISEASE WITH ESOPHAGITIS: ICD-10-CM

## 2019-02-20 RX ORDER — PANTOPRAZOLE SODIUM 40 MG/1
TABLET, DELAYED RELEASE ORAL
Qty: 30 TABLET | Refills: 3 | Status: SHIPPED | OUTPATIENT
Start: 2019-02-20

## 2019-02-25 ENCOUNTER — TELEPHONE (OUTPATIENT)
Dept: FAMILY MEDICINE CLINIC | Age: 49
End: 2019-02-25

## 2019-03-04 ENCOUNTER — TELEPHONE (OUTPATIENT)
Dept: FAMILY MEDICINE CLINIC | Age: 49
End: 2019-03-04

## 2019-03-04 DIAGNOSIS — M54.41 CHRONIC RIGHT-SIDED LOW BACK PAIN WITH RIGHT-SIDED SCIATICA: ICD-10-CM

## 2019-03-04 DIAGNOSIS — G89.29 CHRONIC RIGHT-SIDED LOW BACK PAIN WITH RIGHT-SIDED SCIATICA: ICD-10-CM

## 2019-03-04 DIAGNOSIS — M17.0 OSTEOARTHRITIS OF BOTH KNEES, UNSPECIFIED OSTEOARTHRITIS TYPE: ICD-10-CM

## 2019-03-04 RX ORDER — HYDROCODONE BITARTRATE AND ACETAMINOPHEN 7.5; 325 MG/1; MG/1
1 TABLET ORAL NIGHTLY PRN
Qty: 30 TABLET | Refills: 0 | Status: SHIPPED | OUTPATIENT
Start: 2019-03-04 | End: 2019-03-29 | Stop reason: SDUPTHER

## 2019-03-05 ENCOUNTER — TELEPHONE (OUTPATIENT)
Dept: FAMILY MEDICINE CLINIC | Age: 49
End: 2019-03-05

## 2019-03-31 DIAGNOSIS — I10 ESSENTIAL HYPERTENSION: ICD-10-CM

## 2019-03-31 RX ORDER — ATENOLOL 25 MG/1
TABLET ORAL
Qty: 90 TABLET | Refills: 3 | Status: SHIPPED | OUTPATIENT
Start: 2019-03-31

## 2020-01-14 LAB
APPEARANCE: CLEAR
BILIRUBIN, URINE: NEGATIVE
BLOOD, URINE: NEGATIVE
COLOR, URINE: YELLOW
GLUCOSE, URINE: ABNORMAL MG/DL
KETONES, URINE: NEGATIVE MG/DL
LEUKOCYTE ESTERASE, URINE: NEGATIVE
NITRITE, URINE: NEGATIVE
PH UA: 6 (ref 5–8)
PROTEIN UA: NEGATIVE MG/DL
SPECIFIC GRAVITY, URINE: 1.02 (ref 1–1)
UROBILINOGEN, URINE: 4 MG/DL (ref 0–1.9)

## 2020-01-21 LAB — C-REACTIVE PROTEIN: 1.32 MG/DL

## 2020-01-22 LAB
ALBUMIN: 2.8 G/DL (ref 3.4–5)
ALP BLD-CCNC: 95 U/L (ref 33–120)
ALT SERPL-CCNC: 30 U/L (ref 10–52)
ANION GAP SERPL CALCULATED.3IONS-SCNC: 12 MMOL/L (ref 10–20)
AST SERPL-CCNC: 43 U/L (ref 9–39)
BICARBONATE: 33 MMOL/L (ref 21–32)
BILIRUB SERPL-MCNC: 7.3 MG/DL (ref 0–1.2)
CALCIUM SERPL-MCNC: 8.5 MG/DL (ref 8.6–10.3)
CHLORIDE BLD-SCNC: 91 MMOL/L (ref 98–107)
CREAT SERPL-MCNC: 1.06 MG/DL (ref 0.5–1.3)
ERYTHROCYTE [DISTWIDTH] IN BLOOD BY AUTOMATED COUNT: 15.2 % (ref 11.5–14)
GFR AFRICAN AMERICAN: >60 ML/MIN/1.73M2
GFR NON-AFRICAN AMERICAN: >60 ML/MIN/1.73M2
GLUCOSE: 252 MG/DL (ref 74–99)
HCT VFR BLD CALC: 28.2 % (ref 41–52)
HEMOGLOBIN: 9.5 G/DL (ref 13.5–17)
MCHC RBC AUTO-ENTMCNC: 33.7 G/DL (ref 32–36)
MCV RBC AUTO: 107 FL (ref 80–100)
PLATELET # BLD: 86 X10E9/L (ref 150–450)
POTASSIUM SERPL-SCNC: 4.2 MMOL/L (ref 3.5–5.3)
RBC # BLD: 2.63 X10E12/L (ref 4.5–5.9)
SODIUM BLD-SCNC: 132 MMOL/L (ref 136–145)
TOTAL PROTEIN: 5.8 G/DL (ref 6.4–8.2)
UREA NITROGEN: 10 MG/DL (ref 6–23)
WBC: 8 X10E9/L (ref 4.4–11.3)

## 2020-03-02 NOTE — TELEPHONE ENCOUNTER
Pharmacy requesting refill    Medication pended. Last Ov: 10/20/17  Last Rx: 10/6/17    Please approve or deny.     Future Appointments  Date Time Provider Cesar Shin   2/23/2018 8:00 AM DO PHAN Saha PCP [facility] EMERGENCY [facility] AT [place] decreased strength/impaired balance/decreased ROM

## 2021-11-10 NOTE — TELEPHONE ENCOUNTER
Pharmacy REQUESTING REFILL. ORDER PENDED.  [address].    LOV-10/20/2017    Future Appointments  Date Time Provider Cesar Shni   2/23/2018 8:00 AM DO PHAN Mckinely [address]

## 2022-12-12 ENCOUNTER — HOSPITAL ENCOUNTER (OUTPATIENT)
Dept: DATA CONVERSION | Facility: HOSPITAL | Age: 52
Discharge: HOME | End: 2022-12-12
Attending: TRANSPLANT SURGERY | Admitting: TRANSPLANT SURGERY

## 2022-12-12 DIAGNOSIS — E11.40 TYPE 2 DIABETES MELLITUS WITH DIABETIC NEUROPATHY, UNSPECIFIED (MULTI): ICD-10-CM

## 2022-12-12 DIAGNOSIS — B19.20 UNSPECIFIED VIRAL HEPATITIS C WITHOUT HEPATIC COMA: ICD-10-CM

## 2022-12-12 DIAGNOSIS — E66.9 OBESITY, UNSPECIFIED: ICD-10-CM

## 2022-12-12 DIAGNOSIS — E11.22 TYPE 2 DIABETES MELLITUS WITH DIABETIC CHRONIC KIDNEY DISEASE (MULTI): ICD-10-CM

## 2022-12-12 DIAGNOSIS — E87.5 HYPERKALEMIA: ICD-10-CM

## 2022-12-12 DIAGNOSIS — D84.9 IMMUNODEFICIENCY, UNSPECIFIED (MULTI): ICD-10-CM

## 2022-12-12 DIAGNOSIS — Z79.4 LONG TERM (CURRENT) USE OF INSULIN (MULTI): ICD-10-CM

## 2022-12-12 DIAGNOSIS — K70.11 ALCOHOLIC HEPATITIS WITH ASCITES (MULTI): ICD-10-CM

## 2022-12-12 DIAGNOSIS — N17.9 ACUTE KIDNEY FAILURE, UNSPECIFIED (CMS-HCC): ICD-10-CM

## 2022-12-12 DIAGNOSIS — G47.33 OBSTRUCTIVE SLEEP APNEA (ADULT) (PEDIATRIC): ICD-10-CM

## 2022-12-12 DIAGNOSIS — Z88.2 ALLERGY STATUS TO SULFONAMIDES: ICD-10-CM

## 2022-12-12 DIAGNOSIS — R06.02 SHORTNESS OF BREATH: ICD-10-CM

## 2022-12-12 DIAGNOSIS — K75.89 OTHER SPECIFIED INFLAMMATORY LIVER DISEASES: ICD-10-CM

## 2022-12-12 DIAGNOSIS — Z87.891 PERSONAL HISTORY OF NICOTINE DEPENDENCE: ICD-10-CM

## 2022-12-12 DIAGNOSIS — K70.31 ALCOHOLIC CIRRHOSIS OF LIVER WITH ASCITES (MULTI): ICD-10-CM

## 2022-12-12 DIAGNOSIS — I12.9 HYPERTENSIVE CHRONIC KIDNEY DISEASE WITH STAGE 1 THROUGH STAGE 4 CHRONIC KIDNEY DISEASE, OR UNSPECIFIED CHRONIC KIDNEY DISEASE: ICD-10-CM

## 2022-12-12 DIAGNOSIS — Z94.4 LIVER TRANSPLANT STATUS (MULTI): ICD-10-CM

## 2022-12-12 DIAGNOSIS — K43.2 INCISIONAL HERNIA WITHOUT OBSTRUCTION OR GANGRENE: ICD-10-CM

## 2022-12-12 DIAGNOSIS — K43.9 VENTRAL HERNIA WITHOUT OBSTRUCTION OR GANGRENE: ICD-10-CM

## 2022-12-12 DIAGNOSIS — Z79.82 LONG TERM (CURRENT) USE OF ASPIRIN: ICD-10-CM

## 2022-12-12 DIAGNOSIS — D62 ACUTE POSTHEMORRHAGIC ANEMIA: ICD-10-CM

## 2022-12-12 DIAGNOSIS — N18.2 CHRONIC KIDNEY DISEASE, STAGE 2 (MILD): ICD-10-CM

## 2022-12-12 DIAGNOSIS — Z86.73 PERSONAL HISTORY OF TRANSIENT ISCHEMIC ATTACK (TIA), AND CEREBRAL INFARCTION WITHOUT RESIDUAL DEFICITS: ICD-10-CM

## 2022-12-12 LAB
POCT GLUCOSE: 59 MG/DL (ref 74–99)
POCT GLUCOSE: 67 MG/DL (ref 74–99)
POCT GLUCOSE: 75 MG/DL (ref 74–99)

## 2022-12-14 LAB
COMPLETE PATHOLOGY REPORT: NORMAL
CONVERTED CLINICAL DIAGNOSIS-HISTORY: NORMAL
CONVERTED FINAL DIAGNOSIS: NORMAL
CONVERTED FINAL REPORT PDF LINK TO COPY AND PASTE: NORMAL
CONVERTED GROSS DESCRIPTION: NORMAL
CONVERTED PHYSICIAN NOTIFICATION: NORMAL

## 2023-02-27 LAB
ALANINE AMINOTRANSFERASE (SGPT) (U/L) IN SER/PLAS: 6 U/L (ref 10–52)
ALBUMIN (G/DL) IN SER/PLAS: 4.5 G/DL (ref 3.4–5)
ALKALINE PHOSPHATASE (U/L) IN SER/PLAS: 90 U/L (ref 33–120)
ANION GAP IN SER/PLAS: 11 MMOL/L (ref 10–20)
ASPARTATE AMINOTRANSFERASE (SGOT) (U/L) IN SER/PLAS: 11 U/L (ref 9–39)
BASOPHILS (10*3/UL) IN BLOOD BY AUTOMATED COUNT: 0.04 X10E9/L (ref 0–0.1)
BASOPHILS/100 LEUKOCYTES IN BLOOD BY AUTOMATED COUNT: 0.7 % (ref 0–2)
BILIRUBIN DIRECT (MG/DL) IN SER/PLAS: 0.1 MG/DL (ref 0–0.3)
BILIRUBIN TOTAL (MG/DL) IN SER/PLAS: 1 MG/DL (ref 0–1.2)
CALCIUM (MG/DL) IN SER/PLAS: 8.8 MG/DL (ref 8.6–10.3)
CARBON DIOXIDE, TOTAL (MMOL/L) IN SER/PLAS: 27 MMOL/L (ref 21–32)
CHLORIDE (MMOL/L) IN SER/PLAS: 104 MMOL/L (ref 98–107)
CREATININE (MG/DL) IN SER/PLAS: 1.04 MG/DL (ref 0.5–1.3)
EOSINOPHILS (10*3/UL) IN BLOOD BY AUTOMATED COUNT: 0.16 X10E9/L (ref 0–0.7)
EOSINOPHILS/100 LEUKOCYTES IN BLOOD BY AUTOMATED COUNT: 2.9 % (ref 0–6)
ERYTHROCYTE DISTRIBUTION WIDTH (RATIO) BY AUTOMATED COUNT: 14.1 % (ref 11.5–14.5)
ERYTHROCYTE MEAN CORPUSCULAR HEMOGLOBIN CONCENTRATION (G/DL) BY AUTOMATED: 32.5 G/DL (ref 32–36)
ERYTHROCYTE MEAN CORPUSCULAR VOLUME (FL) BY AUTOMATED COUNT: 90 FL (ref 80–100)
ERYTHROCYTES (10*6/UL) IN BLOOD BY AUTOMATED COUNT: 4.64 X10E12/L (ref 4.5–5.9)
GAMMA GLUTAMYL TRANSFERASE (U/L) IN SER/PLAS: 11 U/L (ref 5–64)
GFR MALE: 87 ML/MIN/1.73M2
GLUCOSE (MG/DL) IN SER/PLAS: 96 MG/DL (ref 74–99)
HEMATOCRIT (%) IN BLOOD BY AUTOMATED COUNT: 41.9 % (ref 41–52)
HEMOGLOBIN (G/DL) IN BLOOD: 13.6 G/DL (ref 13.5–17.5)
IMMATURE GRANULOCYTES/100 LEUKOCYTES IN BLOOD BY AUTOMATED COUNT: 0.4 % (ref 0–0.9)
LEUKOCYTES (10*3/UL) IN BLOOD BY AUTOMATED COUNT: 5.5 X10E9/L (ref 4.4–11.3)
LYMPHOCYTES (10*3/UL) IN BLOOD BY AUTOMATED COUNT: 1.48 X10E9/L (ref 1.2–4.8)
LYMPHOCYTES/100 LEUKOCYTES IN BLOOD BY AUTOMATED COUNT: 27 % (ref 13–44)
MAGNESIUM (MG/DL) IN SER/PLAS: 2.05 MG/DL (ref 1.6–2.4)
MONOCYTES (10*3/UL) IN BLOOD BY AUTOMATED COUNT: 0.51 X10E9/L (ref 0.1–1)
MONOCYTES/100 LEUKOCYTES IN BLOOD BY AUTOMATED COUNT: 9.3 % (ref 2–10)
NEUTROPHILS (10*3/UL) IN BLOOD BY AUTOMATED COUNT: 3.28 X10E9/L (ref 1.2–7.7)
NEUTROPHILS/100 LEUKOCYTES IN BLOOD BY AUTOMATED COUNT: 59.7 % (ref 40–80)
PHOSPHATE (MG/DL) IN SER/PLAS: 3.3 MG/DL (ref 2.5–4.9)
PLATELETS (10*3/UL) IN BLOOD AUTOMATED COUNT: 174 X10E9/L (ref 150–450)
POTASSIUM (MMOL/L) IN SER/PLAS: 4 MMOL/L (ref 3.5–5.3)
PROTEIN TOTAL: 6.7 G/DL (ref 6.4–8.2)
SODIUM (MMOL/L) IN SER/PLAS: 138 MMOL/L (ref 136–145)
TACROLIMUS (NG/ML) IN BLOOD: 3.2 NG/ML (ref 2–15)
UREA NITROGEN (MG/DL) IN SER/PLAS: 11 MG/DL (ref 6–23)

## 2023-03-01 LAB
6-ACETYLMORPHINE: <25 NG/ML
7-AMINOCLONAZEPAM: <25 NG/ML
ALPHA-HYDROXYALPRAZOLAM: <25 NG/ML
ALPHA-HYDROXYMIDAZOLAM: <25 NG/ML
ALPRAZOLAM: <25 NG/ML
AMPHETAMINE (PRESENCE) IN URINE BY SCREEN METHOD: ABNORMAL
BARBITURATES PRESENCE IN URINE BY SCREEN METHOD: ABNORMAL
CANNABINOIDS IN URINE BY SCREEN METHOD: ABNORMAL
CHLORDIAZEPOXIDE: <25 NG/ML
CLONAZEPAM: <25 NG/ML
COCAINE (PRESENCE) IN URINE BY SCREEN METHOD: ABNORMAL
CODEINE: <50 NG/ML
CREATINE, URINE FOR DRUG: 134.6 MG/DL
DIAZEPAM: <25 NG/ML
DRUG SCREEN COMMENT URINE: ABNORMAL
EDDP: <25 NG/ML
FENTANYL CONFIRMATION, URINE: <2.5 NG/ML
HYDROCODONE: <25 NG/ML
HYDROMORPHONE: <25 NG/ML
LORAZEPAM: <25 NG/ML
METHADONE CONFIRMATION,URINE: <25 NG/ML
MIDAZOLAM: <25 NG/ML
MORPHINE URINE: <50 NG/ML
NORDIAZEPAM: <25 NG/ML
NORFENTANYL: <2.5 NG/ML
NORHYDROCODONE: <25 NG/ML
NOROXYCODONE: >1000 NG/ML
O-DESMETHYLTRAMADOL: <50 NG/ML
OXAZEPAM: <25 NG/ML
OXYCODONE: 2256 NG/ML
OXYMORPHONE: 1831 NG/ML
PHENCYCLIDINE (PRESENCE) IN URINE BY SCREEN METHOD: ABNORMAL
TEMAZEPAM: <25 NG/ML
TRAMADOL: <50 NG/ML
ZOLPIDEM METABOLITE (ZCA): <25 NG/ML
ZOLPIDEM: <25 NG/ML

## 2023-03-01 NOTE — H&P
History & Physical Reviewed:   I have reviewed the History and Physical dated:  08-Dec-2022   History and Physical reviewed and relevant findings noted. Patient examined to review pertinent physical  findings.: No significant changes   Home Medications Reviewed: no changes noted   Allergies Reviewed: no changes noted       ERAS (Enhanced Recovery After Surgery):  ·  ERAS Patient: no     Consent:   COVID-19 Consent:  ·  COVID-19 Risk Consent Surgeon has reviewed key risks related to the risk of nain COVID-19 and if they contract COVID-19 what the risks are.     Attestation:   Note Completion:  I am a:  Resident/Fellow   Attending Attestation I saw and evaluated the patient.  I personally obtained the key and critical portions of the history and physical exam or was physically present for key and  critical portions performed by the resident/fellow. I reviewed the resident/fellow?s documentation and discussed the patient with the resident/fellow.  I agree with the resident/fellow?s medical decision making as documented in the note.     I personally evaluated the patient on 12-Dec-2022         Electronic Signatures:  Speedy Franco (Resident))  (Signed 12-Dec-2022 06:30)   Authored: History & Physical Reviewed, ERAS, Consent,  Note Completion  Jet Bradley)  (Signed 12-Dec-2022 15:58)   Authored: Note Completion   Co-Signer: History & Physical Reviewed, ERAS, Consent, Note Completion      Last Updated: 12-Dec-2022 15:58 by Jet Bradley)

## 2023-03-02 PROBLEM — E55.9 VITAMIN D DEFICIENCY: Status: ACTIVE | Noted: 2023-03-02

## 2023-03-02 PROBLEM — I95.9 HYPOTENSION: Status: ACTIVE | Noted: 2023-03-02

## 2023-03-02 PROBLEM — J18.9 PNEUMONIA: Status: ACTIVE | Noted: 2023-03-02

## 2023-03-02 PROBLEM — D50.9 ANEMIA, IRON DEFICIENCY: Status: ACTIVE | Noted: 2023-03-02

## 2023-03-02 PROBLEM — F41.9 ANXIETY: Status: ACTIVE | Noted: 2023-03-02

## 2023-03-02 PROBLEM — R06.00 DYSPNEA: Status: ACTIVE | Noted: 2023-03-02

## 2023-03-02 PROBLEM — R13.19 ESOPHAGEAL DYSPHAGIA: Status: ACTIVE | Noted: 2023-03-02

## 2023-03-02 PROBLEM — G47.00 INSOMNIA: Status: ACTIVE | Noted: 2023-03-02

## 2023-03-02 PROBLEM — R25.2 MUSCLE CRAMPING: Status: ACTIVE | Noted: 2023-03-02

## 2023-03-02 PROBLEM — R10.9 ABDOMINAL PAIN: Status: ACTIVE | Noted: 2023-03-02

## 2023-03-02 PROBLEM — N18.30 CKD (CHRONIC KIDNEY DISEASE) STAGE 3, GFR 30-59 ML/MIN (MULTI): Status: ACTIVE | Noted: 2023-03-02

## 2023-03-02 PROBLEM — E87.6 HYPOKALEMIA: Status: ACTIVE | Noted: 2023-03-02

## 2023-03-02 PROBLEM — K70.31 ALCOHOLIC CIRRHOSIS OF LIVER WITH ASCITES (MULTI): Status: ACTIVE | Noted: 2023-03-02

## 2023-03-02 PROBLEM — K64.8 INTERNAL HEMORRHOID, BLEEDING: Status: ACTIVE | Noted: 2023-03-02

## 2023-03-02 PROBLEM — E66.09 OBESITY DUE TO EXCESS CALORIES, UNSPECIFIED OBESITY SEVERITY: Status: ACTIVE | Noted: 2023-03-02

## 2023-03-02 PROBLEM — K76.0 FATTY LIVER: Status: ACTIVE | Noted: 2023-03-02

## 2023-03-02 PROBLEM — D53.9 MACROCYTIC ANEMIA: Status: ACTIVE | Noted: 2023-03-02

## 2023-03-02 PROBLEM — I85.10 ESOPHAGEAL VARICES IN CIRRHOSIS (MULTI): Status: ACTIVE | Noted: 2023-03-02

## 2023-03-02 PROBLEM — K59.00 CONSTIPATION: Status: ACTIVE | Noted: 2023-03-02

## 2023-03-02 PROBLEM — K46.9 HERNIA, ABDOMINAL: Status: ACTIVE | Noted: 2023-03-02

## 2023-03-02 PROBLEM — I10 HYPERTENSION: Status: ACTIVE | Noted: 2023-03-02

## 2023-03-02 PROBLEM — K43.2 INCISIONAL HERNIA, WITHOUT OBSTRUCTION OR GANGRENE: Status: ACTIVE | Noted: 2023-03-02

## 2023-03-02 PROBLEM — D84.9 IMMUNOSUPPRESSION (MULTI): Status: ACTIVE | Noted: 2023-03-02

## 2023-03-02 PROBLEM — K74.60 ESOPHAGEAL VARICES IN CIRRHOSIS (MULTI): Status: ACTIVE | Noted: 2023-03-02

## 2023-03-02 PROBLEM — E11.40 DIABETIC NEUROPATHY, PAINFUL (MULTI): Status: ACTIVE | Noted: 2023-03-02

## 2023-03-02 PROBLEM — E87.70 FLUID OVERLOAD, UNSPECIFIED: Status: ACTIVE | Noted: 2023-03-02

## 2023-03-02 PROBLEM — R79.89 D-DIMER, ELEVATED: Status: ACTIVE | Noted: 2023-03-02

## 2023-03-02 PROBLEM — R73.9 HYPERGLYCEMIA: Status: ACTIVE | Noted: 2023-03-02

## 2023-03-02 PROBLEM — Z94.4 HISTORY OF LIVER TRANSPLANT (MULTI): Status: ACTIVE | Noted: 2023-03-02

## 2023-03-02 PROBLEM — R60.0 BILATERAL LOWER EXTREMITY EDEMA: Status: ACTIVE | Noted: 2023-03-02

## 2023-03-02 PROBLEM — K76.89 NODULE ON LIVER: Status: ACTIVE | Noted: 2023-03-02

## 2023-03-02 PROBLEM — M54.16 LUMBAR RADICULOPATHY, CHRONIC: Status: ACTIVE | Noted: 2023-03-02

## 2023-03-02 PROBLEM — R51.9 PERSISTENT HEADACHES: Status: ACTIVE | Noted: 2023-03-02

## 2023-03-02 PROBLEM — K76.6 PORTAL HYPERTENSION (MULTI): Status: ACTIVE | Noted: 2023-03-02

## 2023-03-02 RX ORDER — TIZANIDINE 4 MG/1
1 TABLET ORAL 2 TIMES DAILY
COMMUNITY
Start: 2021-09-07 | End: 2023-10-17 | Stop reason: ALTCHOICE

## 2023-03-02 RX ORDER — DAPAGLIFLOZIN 10 MG/1
1 TABLET, FILM COATED ORAL DAILY
COMMUNITY
Start: 2022-07-07 | End: 2024-01-17 | Stop reason: SDUPTHER

## 2023-03-02 RX ORDER — CARVEDILOL 12.5 MG/1
1 TABLET ORAL 2 TIMES DAILY
COMMUNITY
Start: 2022-07-28

## 2023-03-02 RX ORDER — PEN NEEDLE, DIABETIC 30 GX3/16"
NEEDLE, DISPOSABLE MISCELLANEOUS
COMMUNITY
Start: 2022-03-08 | End: 2024-04-16 | Stop reason: SDUPTHER

## 2023-03-02 RX ORDER — IBUPROFEN 200 MG
1 TABLET ORAL
COMMUNITY
Start: 2022-07-13

## 2023-03-02 RX ORDER — TACROLIMUS 1 MG/1
1 CAPSULE ORAL EVERY 12 HOURS
COMMUNITY
Start: 2022-08-29 | End: 2023-10-17 | Stop reason: ALTCHOICE

## 2023-03-02 RX ORDER — DAPSONE 100 MG/1
1 TABLET ORAL DAILY
COMMUNITY
Start: 2022-10-27 | End: 2023-10-17 | Stop reason: ALTCHOICE

## 2023-03-02 RX ORDER — POTASSIUM CHLORIDE 20 MEQ/1
1 TABLET, EXTENDED RELEASE ORAL DAILY
COMMUNITY
Start: 2022-09-29 | End: 2023-10-17 | Stop reason: ALTCHOICE

## 2023-03-02 RX ORDER — CLINDAMYCIN PHOSPHATE 10 UG/ML
LOTION TOPICAL
COMMUNITY
Start: 2022-10-21 | End: 2023-10-17 | Stop reason: ALTCHOICE

## 2023-03-02 RX ORDER — INSULIN ASPART 100 [IU]/ML
INJECTION, SOLUTION INTRAVENOUS; SUBCUTANEOUS
COMMUNITY
Start: 2022-05-23 | End: 2024-04-16 | Stop reason: SDUPTHER

## 2023-03-02 RX ORDER — MYCOPHENOLATE MOFETIL 250 MG/1
1 CAPSULE ORAL EVERY 12 HOURS
COMMUNITY
Start: 2022-06-17 | End: 2023-10-17 | Stop reason: ALTCHOICE

## 2023-03-02 RX ORDER — PANTOPRAZOLE SODIUM 40 MG/1
1 TABLET, DELAYED RELEASE ORAL 2 TIMES DAILY
COMMUNITY
End: 2023-10-17 | Stop reason: ALTCHOICE

## 2023-03-02 RX ORDER — METOPROLOL TARTRATE 50 MG/1
1 TABLET ORAL 2 TIMES DAILY
COMMUNITY
Start: 2022-06-20 | End: 2023-10-17 | Stop reason: ALTCHOICE

## 2023-03-02 RX ORDER — ONDANSETRON 4 MG/1
1 TABLET, ORALLY DISINTEGRATING ORAL EVERY 8 HOURS PRN
COMMUNITY
Start: 2022-07-15 | End: 2024-01-08 | Stop reason: SDUPTHER

## 2023-03-02 RX ORDER — OXYCODONE HYDROCHLORIDE 5 MG/1
1 TABLET ORAL EVERY 8 HOURS
COMMUNITY
Start: 2022-09-08 | End: 2023-04-06 | Stop reason: SDUPTHER

## 2023-03-02 RX ORDER — LIDOCAINE 50 MG/G
PATCH TOPICAL
COMMUNITY
Start: 2022-07-11 | End: 2023-12-11 | Stop reason: SDUPTHER

## 2023-03-02 RX ORDER — ASPIRIN 81 MG/1
1 TABLET ORAL DAILY
COMMUNITY
Start: 2022-06-20 | End: 2023-11-13 | Stop reason: SDUPTHER

## 2023-03-02 RX ORDER — INSULIN GLARGINE 100 [IU]/ML
INJECTION, SOLUTION SUBCUTANEOUS
COMMUNITY
Start: 2022-06-21 | End: 2023-10-17 | Stop reason: ALTCHOICE

## 2023-03-02 RX ORDER — NITROGLYCERIN 0.4 MG/1
1 TABLET SUBLINGUAL EVERY 5 MIN PRN
COMMUNITY
Start: 2016-10-05

## 2023-03-02 RX ORDER — FERROUS SULFATE 325(65) MG
1 TABLET ORAL 3 TIMES DAILY
COMMUNITY
Start: 2022-10-31 | End: 2024-04-11 | Stop reason: SDUPTHER

## 2023-03-02 RX ORDER — NALOXONE HYDROCHLORIDE 4 MG/.1ML
SPRAY NASAL
COMMUNITY
Start: 2020-03-03 | End: 2023-10-17 | Stop reason: ALTCHOICE

## 2023-03-07 ENCOUNTER — TELEPHONE (OUTPATIENT)
Dept: PRIMARY CARE | Facility: CLINIC | Age: 53
End: 2023-03-07
Payer: MEDICARE

## 2023-03-07 NOTE — TELEPHONE ENCOUNTER
[employer] from  PT states they have a referral from Dr. Maynard for treatment for adhesive capsulitis of right shoulder (order is in [employer]). His ins requires his pcp to submit a request for prior auth to the ins. We then need to fax the auth # back to [employer].    Janna Fx [phone]    [employer] Ph # [phone]                 Fx # [phone]

## 2023-03-13 LAB
ALANINE AMINOTRANSFERASE (SGPT) (U/L) IN SER/PLAS: 6 U/L (ref 10–52)
ALBUMIN (G/DL) IN SER/PLAS: 4.2 G/DL (ref 3.4–5)
ALKALINE PHOSPHATASE (U/L) IN SER/PLAS: 71 U/L (ref 33–120)
ANION GAP IN SER/PLAS: 7 MMOL/L (ref 10–20)
ASPARTATE AMINOTRANSFERASE (SGOT) (U/L) IN SER/PLAS: 7 U/L (ref 9–39)
BASOPHILS (10*3/UL) IN BLOOD BY AUTOMATED COUNT: 0.01 X10E9/L (ref 0–0.1)
BASOPHILS/100 LEUKOCYTES IN BLOOD BY AUTOMATED COUNT: 0.2 % (ref 0–2)
BILIRUBIN DIRECT (MG/DL) IN SER/PLAS: 0.2 MG/DL (ref 0–0.3)
BILIRUBIN TOTAL (MG/DL) IN SER/PLAS: 1.1 MG/DL (ref 0–1.2)
CALCIUM (MG/DL) IN SER/PLAS: 8.5 MG/DL (ref 8.6–10.3)
CARBON DIOXIDE, TOTAL (MMOL/L) IN SER/PLAS: 30 MMOL/L (ref 21–32)
CHLORIDE (MMOL/L) IN SER/PLAS: 103 MMOL/L (ref 98–107)
CREATININE (MG/DL) IN SER/PLAS: 1.02 MG/DL (ref 0.5–1.3)
EOSINOPHILS (10*3/UL) IN BLOOD BY AUTOMATED COUNT: 0.1 X10E9/L (ref 0–0.7)
EOSINOPHILS/100 LEUKOCYTES IN BLOOD BY AUTOMATED COUNT: 1.6 % (ref 0–6)
ERYTHROCYTE DISTRIBUTION WIDTH (RATIO) BY AUTOMATED COUNT: 13.2 % (ref 11.5–14.5)
ERYTHROCYTE MEAN CORPUSCULAR HEMOGLOBIN CONCENTRATION (G/DL) BY AUTOMATED: 33 G/DL (ref 32–36)
ERYTHROCYTE MEAN CORPUSCULAR VOLUME (FL) BY AUTOMATED COUNT: 90 FL (ref 80–100)
ERYTHROCYTES (10*6/UL) IN BLOOD BY AUTOMATED COUNT: 4.68 X10E12/L (ref 4.5–5.9)
ESTIMATED AVERAGE GLUCOSE FOR HBA1C: 59 MG/DL
GAMMA GLUTAMYL TRANSFERASE (U/L) IN SER/PLAS: 12 U/L (ref 5–64)
GFR MALE: 89 ML/MIN/1.73M2
GLUCOSE (MG/DL) IN SER/PLAS: 118 MG/DL (ref 74–99)
HEMATOCRIT (%) IN BLOOD BY AUTOMATED COUNT: 42.1 % (ref 41–52)
HEMOGLOBIN (G/DL) IN BLOOD: 13.9 G/DL (ref 13.5–17.5)
HEMOGLOBIN A1C/HEMOGLOBIN TOTAL IN BLOOD: 3.7 %
IMMATURE GRANULOCYTES/100 LEUKOCYTES IN BLOOD BY AUTOMATED COUNT: 0.2 % (ref 0–0.9)
LEUKOCYTES (10*3/UL) IN BLOOD BY AUTOMATED COUNT: 6.2 X10E9/L (ref 4.4–11.3)
LYMPHOCYTES (10*3/UL) IN BLOOD BY AUTOMATED COUNT: 1.52 X10E9/L (ref 1.2–4.8)
LYMPHOCYTES/100 LEUKOCYTES IN BLOOD BY AUTOMATED COUNT: 24.4 % (ref 13–44)
MAGNESIUM (MG/DL) IN SER/PLAS: 1.98 MG/DL (ref 1.6–2.4)
MONOCYTES (10*3/UL) IN BLOOD BY AUTOMATED COUNT: 0.61 X10E9/L (ref 0.1–1)
MONOCYTES/100 LEUKOCYTES IN BLOOD BY AUTOMATED COUNT: 9.8 % (ref 2–10)
NEUTROPHILS (10*3/UL) IN BLOOD BY AUTOMATED COUNT: 3.98 X10E9/L (ref 1.2–7.7)
NEUTROPHILS/100 LEUKOCYTES IN BLOOD BY AUTOMATED COUNT: 63.8 % (ref 40–80)
PHOSPHATE (MG/DL) IN SER/PLAS: 2.5 MG/DL (ref 2.5–4.9)
PLATELETS (10*3/UL) IN BLOOD AUTOMATED COUNT: 139 X10E9/L (ref 150–450)
POTASSIUM (MMOL/L) IN SER/PLAS: 3.8 MMOL/L (ref 3.5–5.3)
PROTEIN TOTAL: 6.5 G/DL (ref 6.4–8.2)
SODIUM (MMOL/L) IN SER/PLAS: 136 MMOL/L (ref 136–145)
TACROLIMUS (NG/ML) IN BLOOD: 4.9 NG/ML (ref 2–15)
UREA NITROGEN (MG/DL) IN SER/PLAS: 17 MG/DL (ref 6–23)

## 2023-03-14 LAB
CYTOMEGALOVIRUS DNA, PCR COMMENT: NORMAL
CYTOMEGALOVIRUS DNA, PCR IU/ML: NOT DETECTED IU/ML
CYTOMEGALOVIRUS DNA, PCR LOG IU/ML: NORMAL LOG IU/ML
EBV PCR PLASMA LOG IU/ML: ABNORMAL LOG IU/ML
EBV PCR, QUANT, PLASMA: ABNORMAL IU/ML

## 2023-03-14 NOTE — TELEPHONE ENCOUNTER
I called patient and advised him that his PT referral was approved by his insurance and he is able to attend his PT Eval., appointment today 3/14/23 at 11AM in [facility].  Advised patient that referral auth was faxed to their office as well.

## 2023-03-14 NOTE — TELEPHONE ENCOUNTER
MARGARETM for [facility] PT, advising her that PT referral was approved by [employer] and the approval was faxed to her today. PT authorization from [employer] scanned into patient's chart.

## 2023-03-16 LAB
C. DIFFICILE TOXIN, PCR: NOT DETECTED
CAMPYLOBACTER GP: ABNORMAL
NOROVIRUS GI/GII: ABNORMAL
ROTAVIRUS A: ABNORMAL
SALMONELLA SP.: ABNORMAL
SHIGA TOXIN 1: ABNORMAL
SHIGA TOXIN 2: ABNORMAL
SHIGELLA SP.: ABNORMAL
VIBRIO GRP.: ABNORMAL
YERSINIA ENTEROCOLITICA: ABNORMAL

## 2023-03-21 LAB
CRYPTOSPORIDIUM ANTIGEN-DATA CONVERSION: NEGATIVE
GIARDIA LAMBLIA AG-DATA CONVERSION: NEGATIVE
OVA + PARASITE EXAM: NEGATIVE

## 2023-03-28 LAB
ALANINE AMINOTRANSFERASE (SGPT) (U/L) IN SER/PLAS: 8 U/L (ref 10–52)
ALBUMIN (G/DL) IN SER/PLAS: 3.9 G/DL (ref 3.4–5)
ALKALINE PHOSPHATASE (U/L) IN SER/PLAS: 94 U/L (ref 33–120)
ANION GAP IN SER/PLAS: 11 MMOL/L (ref 10–20)
ASPARTATE AMINOTRANSFERASE (SGOT) (U/L) IN SER/PLAS: 10 U/L (ref 9–39)
BASOPHILS (10*3/UL) IN BLOOD BY AUTOMATED COUNT: 0.04 X10E9/L (ref 0–0.1)
BASOPHILS/100 LEUKOCYTES IN BLOOD BY AUTOMATED COUNT: 0.8 % (ref 0–2)
BILIRUBIN DIRECT (MG/DL) IN SER/PLAS: 0.3 MG/DL (ref 0–0.3)
BILIRUBIN TOTAL (MG/DL) IN SER/PLAS: 1.6 MG/DL (ref 0–1.2)
CALCIUM (MG/DL) IN SER/PLAS: 8.4 MG/DL (ref 8.6–10.3)
CARBON DIOXIDE, TOTAL (MMOL/L) IN SER/PLAS: 27 MMOL/L (ref 21–32)
CHLORIDE (MMOL/L) IN SER/PLAS: 104 MMOL/L (ref 98–107)
CREATININE (MG/DL) IN SER/PLAS: 1.51 MG/DL (ref 0.5–1.3)
EOSINOPHILS (10*3/UL) IN BLOOD BY AUTOMATED COUNT: 0.43 X10E9/L (ref 0–0.7)
EOSINOPHILS/100 LEUKOCYTES IN BLOOD BY AUTOMATED COUNT: 8.3 % (ref 0–6)
ERYTHROCYTE DISTRIBUTION WIDTH (RATIO) BY AUTOMATED COUNT: 14.1 % (ref 11.5–14.5)
ERYTHROCYTE MEAN CORPUSCULAR HEMOGLOBIN CONCENTRATION (G/DL) BY AUTOMATED: 33 G/DL (ref 32–36)
ERYTHROCYTE MEAN CORPUSCULAR VOLUME (FL) BY AUTOMATED COUNT: 92 FL (ref 80–100)
ERYTHROCYTES (10*6/UL) IN BLOOD BY AUTOMATED COUNT: 4.4 X10E12/L (ref 4.5–5.9)
GAMMA GLUTAMYL TRANSFERASE (U/L) IN SER/PLAS: 8 U/L (ref 5–64)
GFR MALE: 55 ML/MIN/1.73M2
GLUCOSE (MG/DL) IN SER/PLAS: 103 MG/DL (ref 74–99)
HEMATOCRIT (%) IN BLOOD BY AUTOMATED COUNT: 40.3 % (ref 41–52)
HEMOGLOBIN (G/DL) IN BLOOD: 13.3 G/DL (ref 13.5–17.5)
IMMATURE GRANULOCYTES/100 LEUKOCYTES IN BLOOD BY AUTOMATED COUNT: 0.2 % (ref 0–0.9)
LEUKOCYTES (10*3/UL) IN BLOOD BY AUTOMATED COUNT: 5.2 X10E9/L (ref 4.4–11.3)
LYMPHOCYTES (10*3/UL) IN BLOOD BY AUTOMATED COUNT: 1.37 X10E9/L (ref 1.2–4.8)
LYMPHOCYTES/100 LEUKOCYTES IN BLOOD BY AUTOMATED COUNT: 26.4 % (ref 13–44)
MAGNESIUM (MG/DL) IN SER/PLAS: 1.63 MG/DL (ref 1.6–2.4)
MONOCYTES (10*3/UL) IN BLOOD BY AUTOMATED COUNT: 0.42 X10E9/L (ref 0.1–1)
MONOCYTES/100 LEUKOCYTES IN BLOOD BY AUTOMATED COUNT: 8.1 % (ref 2–10)
NEUTROPHILS (10*3/UL) IN BLOOD BY AUTOMATED COUNT: 2.92 X10E9/L (ref 1.2–7.7)
NEUTROPHILS/100 LEUKOCYTES IN BLOOD BY AUTOMATED COUNT: 56.2 % (ref 40–80)
PHOSPHATE (MG/DL) IN SER/PLAS: 3.1 MG/DL (ref 2.5–4.9)
PLATELETS (10*3/UL) IN BLOOD AUTOMATED COUNT: 126 X10E9/L (ref 150–450)
POTASSIUM (MMOL/L) IN SER/PLAS: 4.1 MMOL/L (ref 3.5–5.3)
PROTEIN TOTAL: 6.4 G/DL (ref 6.4–8.2)
SODIUM (MMOL/L) IN SER/PLAS: 138 MMOL/L (ref 136–145)
TACROLIMUS (NG/ML) IN BLOOD: 6.1 NG/ML (ref 2–15)
UREA NITROGEN (MG/DL) IN SER/PLAS: 17 MG/DL (ref 6–23)

## 2023-03-30 LAB
ALANINE AMINOTRANSFERASE (SGPT) (U/L) IN SER/PLAS: NORMAL
ALBUMIN (G/DL) IN SER/PLAS: NORMAL
ALKALINE PHOSPHATASE (U/L) IN SER/PLAS: NORMAL
ANION GAP IN SER/PLAS: NORMAL
ASPARTATE AMINOTRANSFERASE (SGOT) (U/L) IN SER/PLAS: NORMAL
BASOPHILS (10*3/UL) IN BLOOD BY AUTOMATED COUNT: NORMAL
BASOPHILS/100 LEUKOCYTES IN BLOOD BY AUTOMATED COUNT: NORMAL
BILIRUBIN DIRECT (MG/DL) IN SER/PLAS: NORMAL
BILIRUBIN TOTAL (MG/DL) IN SER/PLAS: NORMAL
C REACTIVE PROTEIN (MG/L) IN SER/PLAS: 0.93 MG/DL
CALCIUM (MG/DL) IN SER/PLAS: NORMAL
CARBON DIOXIDE, TOTAL (MMOL/L) IN SER/PLAS: NORMAL
CHLORIDE (MMOL/L) IN SER/PLAS: NORMAL
CREATINE KINASE (U/L) IN SER/PLAS: 155 U/L (ref 0–325)
CREATININE (MG/DL) IN SER/PLAS: NORMAL
CREATININE (MG/DL) IN URINE: 125 MG/DL (ref 20–370)
CRYOGLOBULIN  (SJC): NORMAL
EBV PCR PLASMA LOG IU/ML: NORMAL
EBV PCR, QUANT, PLASMA: NORMAL
EOSINOPHILS (10*3/UL) IN BLOOD BY AUTOMATED COUNT: NORMAL
EOSINOPHILS/100 LEUKOCYTES IN BLOOD BY AUTOMATED COUNT: NORMAL
ERYTHROCYTE DISTRIBUTION WIDTH (RATIO) BY AUTOMATED COUNT: NORMAL
ERYTHROCYTE MEAN CORPUSCULAR HEMOGLOBIN CONCENTRATION (G/DL) BY AUTOMATED: NORMAL
ERYTHROCYTE MEAN CORPUSCULAR VOLUME (FL) BY AUTOMATED COUNT: NORMAL
ERYTHROCYTES (10*6/UL) IN BLOOD BY AUTOMATED COUNT: NORMAL
GAMMA GLUTAMYL TRANSFERASE (U/L) IN SER/PLAS: NORMAL
GFR FEMALE: NORMAL
GFR MALE: NORMAL
GLUCOSE (MG/DL) IN SER/PLAS: NORMAL
HEMATOCRIT (%) IN BLOOD BY AUTOMATED COUNT: NORMAL
HEMOGLOBIN (G/DL) IN BLOOD: NORMAL
IMMATURE GRANULOCYTES/100 LEUKOCYTES IN BLOOD BY AUTOMATED COUNT: NORMAL
LEUKOCYTES (10*3/UL) IN BLOOD BY AUTOMATED COUNT: NORMAL
LYMPHOCYTES (10*3/UL) IN BLOOD BY AUTOMATED COUNT: NORMAL
LYMPHOCYTES/100 LEUKOCYTES IN BLOOD BY AUTOMATED COUNT: NORMAL
MAGNESIUM (MG/DL) IN SER/PLAS: NORMAL
MANUAL DIFFERENTIAL Y/N: NORMAL
MONOCYTES (10*3/UL) IN BLOOD BY AUTOMATED COUNT: NORMAL
MONOCYTES/100 LEUKOCYTES IN BLOOD BY AUTOMATED COUNT: NORMAL
NEUTROPHILS (10*3/UL) IN BLOOD BY AUTOMATED COUNT: NORMAL
NEUTROPHILS/100 LEUKOCYTES IN BLOOD BY AUTOMATED COUNT: NORMAL
NRBC (PER 100 WBCS) BY AUTOMATED COUNT: NORMAL
PERCENT CRYOCRIT (SJC): NORMAL
PHOSPHATE (MG/DL) IN SER/PLAS: 3.4 MG/DL (ref 2.5–4.9)
PHOSPHATE (MG/DL) IN SER/PLAS: NORMAL
PLATELETS (10*3/UL) IN BLOOD AUTOMATED COUNT: NORMAL
POTASSIUM (MMOL/L) IN SER/PLAS: NORMAL
PROTEIN (MG/DL) IN URINE: 16 MG/DL (ref 5–25)
PROTEIN TOTAL: NORMAL
PROTEIN/CREATININE (MG/MG) IN URINE: 0.13 MG/MG CREAT (ref 0–0.17)
RHEUMATOID FACTOR (IU/ML) IN SERUM OR PLASMA: <10 IU/ML (ref 0–15)
SEDIMENTATION RATE, ERYTHROCYTE: 5 MM/H (ref 0–20)
SODIUM (MMOL/L) IN SER/PLAS: NORMAL
TACROLIMUS (NG/ML) IN BLOOD: NORMAL
UREA NITROGEN (MG/DL) IN SER/PLAS: NORMAL

## 2023-03-31 LAB
ANTI-CENTROMERE: <0.2 AI
ANTI-CHROMATIN: <0.2 AI
ANTI-DNA (DS): 5 IU/ML
ANTI-JO-1 IGG: <0.2 AI
ANTI-NUCLEAR ANTIBODY (ANA): NEGATIVE
ANTI-RIBOSOMAL P: <0.2 AI
ANTI-RNP: 0.2 AI
ANTI-SCL-70: <0.2 AI
ANTI-SM/RNP: <0.2 AI
ANTI-SM: <0.2 AI
ANTI-SSA: <0.2 AI
ANTI-SSB: <0.2 AI

## 2023-04-01 LAB — CITRULLINE ANTIBODY, IGG: 2 UNITS (ref 0–19)

## 2023-04-03 DIAGNOSIS — M54.16 LUMBAR RADICULOPATHY, CHRONIC: ICD-10-CM

## 2023-04-03 LAB
ANCA IFA PATTERN: NORMAL
ANCA IFA TITER: NORMAL
MYELOPEROXIDASE (MPO) AB, IGG: 0 AU/ML (ref 0–19)
SERINE PROTEINASE 3 (PR3) AB, IGG: 0 AU/ML (ref 0–19)

## 2023-04-06 RX ORDER — OXYCODONE HYDROCHLORIDE 5 MG/1
5 TABLET ORAL EVERY 8 HOURS
Qty: 90 TABLET | Refills: 0 | Status: SHIPPED | OUTPATIENT
Start: 2023-04-06 | End: 2023-05-03 | Stop reason: SDUPTHER

## 2023-04-08 LAB
EJ (GLYCYL-TRNA SYNTHETASE) ANTIBODY: NEGATIVE
FIBRILLARIN (U3 RNP) AB, IGG: NEGATIVE
JO-1 (HISTIDYL-TRNA SYNTHETASE) AB, IGG: 9 AU/ML (ref 0–40)
KU ANTIBODY: NEGATIVE
MDA5 (CADM-140) AB: NEGATIVE
MI-2 (NUCLEAR HELICASE PROTEIN) ANTIBODY: NEGATIVE
MYOSITIS PANEL INTERPRETIVE DATA: NORMAL
NXP2 (NUCLEAR MATRIX PROTEIN-2) AB: NEGATIVE
OJ (ISOLEUCYL-TRNA SYNTHETASE) ANTIBODY: NEGATIVE
P155/140 ANTIBODY: NEGATIVE
PL-12 (ALANYL-TRNA SYNTHETASE) ANTIBODY: NEGATIVE
PL-7 (THREONYL-TRNA SYNTHETASE) ANTIBODY: NEGATIVE
PM/SCL 100 ANTIBODY, IGG: NEGATIVE
SAE1 (SUMO ACTIVATING ENZYME) AB: NEGATIVE
SMITH/RNP (ENA) AB, IGG: 2 UNITS (ref 0–19)
SRP (SIGNAL RECOGNITION PARTICLE) AB: NEGATIVE
SSA-52 (RO52) (ENA) ANTIBODY, IGG: 1 AU/ML (ref 0–40)
SSA-60 (RO60) (ENA) ANTIBODY, IGG: 1 AU/ML (ref 0–40)
TIF-1 GAMMA (155 KDA) AB: NEGATIVE

## 2023-04-11 LAB
ALANINE AMINOTRANSFERASE (SGPT) (U/L) IN SER/PLAS: 7 U/L (ref 10–52)
ALBUMIN (G/DL) IN SER/PLAS: 4.2 G/DL (ref 3.4–5)
ALKALINE PHOSPHATASE (U/L) IN SER/PLAS: 103 U/L (ref 33–120)
ANION GAP IN SER/PLAS: 11 MMOL/L (ref 10–20)
ASPARTATE AMINOTRANSFERASE (SGOT) (U/L) IN SER/PLAS: 9 U/L (ref 9–39)
BASOPHILS (10*3/UL) IN BLOOD BY AUTOMATED COUNT: 0.05 X10E9/L (ref 0–0.1)
BASOPHILS/100 LEUKOCYTES IN BLOOD BY AUTOMATED COUNT: 0.8 % (ref 0–2)
BILIRUBIN DIRECT (MG/DL) IN SER/PLAS: 0.3 MG/DL (ref 0–0.3)
BILIRUBIN TOTAL (MG/DL) IN SER/PLAS: 1.3 MG/DL (ref 0–1.2)
CALCIUM (MG/DL) IN SER/PLAS: 8.3 MG/DL (ref 8.6–10.3)
CARBON DIOXIDE, TOTAL (MMOL/L) IN SER/PLAS: 28 MMOL/L (ref 21–32)
CHLORIDE (MMOL/L) IN SER/PLAS: 103 MMOL/L (ref 98–107)
CREATININE (MG/DL) IN SER/PLAS: 1.52 MG/DL (ref 0.5–1.3)
EOSINOPHILS (10*3/UL) IN BLOOD BY AUTOMATED COUNT: 0.53 X10E9/L (ref 0–0.7)
EOSINOPHILS/100 LEUKOCYTES IN BLOOD BY AUTOMATED COUNT: 8 % (ref 0–6)
ERYTHROCYTE DISTRIBUTION WIDTH (RATIO) BY AUTOMATED COUNT: 14.1 % (ref 11.5–14.5)
ERYTHROCYTE MEAN CORPUSCULAR HEMOGLOBIN CONCENTRATION (G/DL) BY AUTOMATED: 32.5 G/DL (ref 32–36)
ERYTHROCYTE MEAN CORPUSCULAR VOLUME (FL) BY AUTOMATED COUNT: 93 FL (ref 80–100)
ERYTHROCYTES (10*6/UL) IN BLOOD BY AUTOMATED COUNT: 4.24 X10E12/L (ref 4.5–5.9)
GAMMA GLUTAMYL TRANSFERASE (U/L) IN SER/PLAS: 8 U/L (ref 5–64)
GFR MALE: 55 ML/MIN/1.73M2
GLUCOSE (MG/DL) IN SER/PLAS: 95 MG/DL (ref 74–99)
HEMATOCRIT (%) IN BLOOD BY AUTOMATED COUNT: 39.4 % (ref 41–52)
HEMOGLOBIN (G/DL) IN BLOOD: 12.8 G/DL (ref 13.5–17.5)
IMMATURE GRANULOCYTES/100 LEUKOCYTES IN BLOOD BY AUTOMATED COUNT: 0.5 % (ref 0–0.9)
LEUKOCYTES (10*3/UL) IN BLOOD BY AUTOMATED COUNT: 6.6 X10E9/L (ref 4.4–11.3)
LYMPHOCYTES (10*3/UL) IN BLOOD BY AUTOMATED COUNT: 1.25 X10E9/L (ref 1.2–4.8)
LYMPHOCYTES/100 LEUKOCYTES IN BLOOD BY AUTOMATED COUNT: 18.9 % (ref 13–44)
MAGNESIUM (MG/DL) IN SER/PLAS: 1.64 MG/DL (ref 1.6–2.4)
MONOCYTES (10*3/UL) IN BLOOD BY AUTOMATED COUNT: 0.52 X10E9/L (ref 0.1–1)
MONOCYTES/100 LEUKOCYTES IN BLOOD BY AUTOMATED COUNT: 7.9 % (ref 2–10)
NEUTROPHILS (10*3/UL) IN BLOOD BY AUTOMATED COUNT: 4.23 X10E9/L (ref 1.2–7.7)
NEUTROPHILS/100 LEUKOCYTES IN BLOOD BY AUTOMATED COUNT: 63.9 % (ref 40–80)
PHOSPHATE (MG/DL) IN SER/PLAS: 3.5 MG/DL (ref 2.5–4.9)
PLATELETS (10*3/UL) IN BLOOD AUTOMATED COUNT: 184 X10E9/L (ref 150–450)
POTASSIUM (MMOL/L) IN SER/PLAS: 4.3 MMOL/L (ref 3.5–5.3)
PROTEIN TOTAL: 6.7 G/DL (ref 6.4–8.2)
SODIUM (MMOL/L) IN SER/PLAS: 138 MMOL/L (ref 136–145)
TACROLIMUS (NG/ML) IN BLOOD: 6.9 NG/ML (ref 2–15)
UREA NITROGEN (MG/DL) IN SER/PLAS: 19 MG/DL (ref 6–23)

## 2023-04-18 ENCOUNTER — APPOINTMENT (OUTPATIENT)
Dept: PRIMARY CARE | Facility: CLINIC | Age: 53
End: 2023-04-18
Payer: MEDICARE

## 2023-04-18 LAB
ALANINE AMINOTRANSFERASE (SGPT) (U/L) IN SER/PLAS: 9 U/L (ref 10–52)
ALBUMIN (G/DL) IN SER/PLAS: 4 G/DL (ref 3.4–5)
ALKALINE PHOSPHATASE (U/L) IN SER/PLAS: 92 U/L (ref 33–120)
ANION GAP IN SER/PLAS: 12 MMOL/L (ref 10–20)
ASPARTATE AMINOTRANSFERASE (SGOT) (U/L) IN SER/PLAS: 10 U/L (ref 9–39)
BASOPHILS (10*3/UL) IN BLOOD BY AUTOMATED COUNT: 0.06 X10E9/L (ref 0–0.1)
BASOPHILS/100 LEUKOCYTES IN BLOOD BY AUTOMATED COUNT: 0.9 % (ref 0–2)
BILIRUBIN DIRECT (MG/DL) IN SER/PLAS: 0.2 MG/DL (ref 0–0.3)
BILIRUBIN TOTAL (MG/DL) IN SER/PLAS: 1.2 MG/DL (ref 0–1.2)
CALCIUM (MG/DL) IN SER/PLAS: 8.4 MG/DL (ref 8.6–10.3)
CARBON DIOXIDE, TOTAL (MMOL/L) IN SER/PLAS: 26 MMOL/L (ref 21–32)
CHLORIDE (MMOL/L) IN SER/PLAS: 103 MMOL/L (ref 98–107)
CREATININE (MG/DL) IN SER/PLAS: 1.41 MG/DL (ref 0.5–1.3)
EOSINOPHILS (10*3/UL) IN BLOOD BY AUTOMATED COUNT: 0.17 X10E9/L (ref 0–0.7)
EOSINOPHILS/100 LEUKOCYTES IN BLOOD BY AUTOMATED COUNT: 2.5 % (ref 0–6)
ERYTHROCYTE DISTRIBUTION WIDTH (RATIO) BY AUTOMATED COUNT: 14.1 % (ref 11.5–14.5)
ERYTHROCYTE MEAN CORPUSCULAR HEMOGLOBIN CONCENTRATION (G/DL) BY AUTOMATED: 32.4 G/DL (ref 32–36)
ERYTHROCYTE MEAN CORPUSCULAR VOLUME (FL) BY AUTOMATED COUNT: 93 FL (ref 80–100)
ERYTHROCYTES (10*6/UL) IN BLOOD BY AUTOMATED COUNT: 4.11 X10E12/L (ref 4.5–5.9)
GAMMA GLUTAMYL TRANSFERASE (U/L) IN SER/PLAS: 6 U/L (ref 5–64)
GFR MALE: 60 ML/MIN/1.73M2
GLUCOSE (MG/DL) IN SER/PLAS: 107 MG/DL (ref 74–99)
HEMATOCRIT (%) IN BLOOD BY AUTOMATED COUNT: 38.3 % (ref 41–52)
HEMOGLOBIN (G/DL) IN BLOOD: 12.4 G/DL (ref 13.5–17.5)
IMMATURE GRANULOCYTES/100 LEUKOCYTES IN BLOOD BY AUTOMATED COUNT: 0.7 % (ref 0–0.9)
LEUKOCYTES (10*3/UL) IN BLOOD BY AUTOMATED COUNT: 6.8 X10E9/L (ref 4.4–11.3)
LYMPHOCYTES (10*3/UL) IN BLOOD BY AUTOMATED COUNT: 1.26 X10E9/L (ref 1.2–4.8)
LYMPHOCYTES/100 LEUKOCYTES IN BLOOD BY AUTOMATED COUNT: 18.5 % (ref 13–44)
MAGNESIUM (MG/DL) IN SER/PLAS: 1.51 MG/DL (ref 1.6–2.4)
MONOCYTES (10*3/UL) IN BLOOD BY AUTOMATED COUNT: 0.43 X10E9/L (ref 0.1–1)
MONOCYTES/100 LEUKOCYTES IN BLOOD BY AUTOMATED COUNT: 6.3 % (ref 2–10)
NEUTROPHILS (10*3/UL) IN BLOOD BY AUTOMATED COUNT: 4.84 X10E9/L (ref 1.2–7.7)
NEUTROPHILS/100 LEUKOCYTES IN BLOOD BY AUTOMATED COUNT: 71.1 % (ref 40–80)
PHOSPHATE (MG/DL) IN SER/PLAS: 3.5 MG/DL (ref 2.5–4.9)
PLATELETS (10*3/UL) IN BLOOD AUTOMATED COUNT: 170 X10E9/L (ref 150–450)
POTASSIUM (MMOL/L) IN SER/PLAS: 4 MMOL/L (ref 3.5–5.3)
PROTEIN TOTAL: 6.1 G/DL (ref 6.4–8.2)
SODIUM (MMOL/L) IN SER/PLAS: 137 MMOL/L (ref 136–145)
TACROLIMUS (NG/ML) IN BLOOD: 5.1 NG/ML (ref 2–15)
UREA NITROGEN (MG/DL) IN SER/PLAS: 16 MG/DL (ref 6–23)

## 2023-04-25 ENCOUNTER — APPOINTMENT (OUTPATIENT)
Dept: LAB | Facility: LAB | Age: 53
End: 2023-04-25

## 2023-04-25 LAB
ALANINE AMINOTRANSFERASE (SGPT) (U/L) IN SER/PLAS: 7 U/L (ref 10–52)
ALBUMIN (G/DL) IN SER/PLAS: 4.1 G/DL (ref 3.4–5)
ALBUMIN (G/DL) IN SER/PLAS: 4.1 G/DL (ref 3.4–5)
ALBUMIN (MG/L) IN URINE: 11.9 MG/L
ALBUMIN/CREATININE (UG/MG) IN URINE: 7.6 UG/MG CRT (ref 0–30)
ALKALINE PHOSPHATASE (U/L) IN SER/PLAS: 97 U/L (ref 33–120)
ANION GAP IN SER/PLAS: 12 MMOL/L (ref 10–20)
ASPARTATE AMINOTRANSFERASE (SGOT) (U/L) IN SER/PLAS: 9 U/L (ref 9–39)
BASOPHILS (10*3/UL) IN BLOOD BY AUTOMATED COUNT: 0.03 X10E9/L (ref 0–0.1)
BASOPHILS/100 LEUKOCYTES IN BLOOD BY AUTOMATED COUNT: 0.6 % (ref 0–2)
BILIRUBIN DIRECT (MG/DL) IN SER/PLAS: 0.2 MG/DL (ref 0–0.3)
BILIRUBIN TOTAL (MG/DL) IN SER/PLAS: 1.3 MG/DL (ref 0–1.2)
CALCIDIOL (25 OH VITAMIN D3) (NG/ML) IN SER/PLAS: 44 NG/ML
CALCIUM (MG/DL) IN SER/PLAS: 8.5 MG/DL (ref 8.6–10.6)
CARBON DIOXIDE, TOTAL (MMOL/L) IN SER/PLAS: 28 MMOL/L (ref 21–32)
CHLORIDE (MMOL/L) IN SER/PLAS: 105 MMOL/L (ref 98–107)
CREATININE (MG/DL) IN SER/PLAS: 1.25 MG/DL (ref 0.5–1.3)
CREATININE (MG/DL) IN URINE: 156 MG/DL (ref 20–370)
CREATININE (MG/DL) IN URINE: 156 MG/DL (ref 20–370)
EOSINOPHILS (10*3/UL) IN BLOOD BY AUTOMATED COUNT: 0.31 X10E9/L (ref 0–0.7)
EOSINOPHILS/100 LEUKOCYTES IN BLOOD BY AUTOMATED COUNT: 6.4 % (ref 0–6)
ERYTHROCYTE DISTRIBUTION WIDTH (RATIO) BY AUTOMATED COUNT: 14.2 % (ref 11.5–14.5)
ERYTHROCYTE MEAN CORPUSCULAR HEMOGLOBIN CONCENTRATION (G/DL) BY AUTOMATED: 32 G/DL (ref 32–36)
ERYTHROCYTE MEAN CORPUSCULAR VOLUME (FL) BY AUTOMATED COUNT: 94 FL (ref 80–100)
ERYTHROCYTES (10*6/UL) IN BLOOD BY AUTOMATED COUNT: 4.01 X10E12/L (ref 4.5–5.9)
FERRITIN (UG/LL) IN SER/PLAS: 287 UG/L (ref 20–300)
GAMMA GLUTAMYL TRANSFERASE (U/L) IN SER/PLAS: 7 U/L (ref 5–64)
GFR MALE: 70 ML/MIN/1.73M2
GLUCOSE (MG/DL) IN SER/PLAS: 76 MG/DL (ref 74–99)
HEMATOCRIT (%) IN BLOOD BY AUTOMATED COUNT: 37.8 % (ref 41–52)
HEMOGLOBIN (G/DL) IN BLOOD: 12.1 G/DL (ref 13.5–17.5)
HYALINE CASTS, URINE: ABNORMAL /LPF
IMMATURE GRANULOCYTES/100 LEUKOCYTES IN BLOOD BY AUTOMATED COUNT: 0.4 % (ref 0–0.9)
IRON (UG/DL) IN SER/PLAS: 64 UG/DL (ref 35–150)
IRON BINDING CAPACITY (UG/DL) IN SER/PLAS: 238 UG/DL (ref 240–445)
IRON SATURATION (%) IN SER/PLAS: 27 % (ref 25–45)
LEUKOCYTES (10*3/UL) IN BLOOD BY AUTOMATED COUNT: 4.8 X10E9/L (ref 4.4–11.3)
LYMPHOCYTES (10*3/UL) IN BLOOD BY AUTOMATED COUNT: 1.27 X10E9/L (ref 1.2–4.8)
LYMPHOCYTES/100 LEUKOCYTES IN BLOOD BY AUTOMATED COUNT: 26.3 % (ref 13–44)
MAGNESIUM (MG/DL) IN SER/PLAS: 1.73 MG/DL (ref 1.6–2.4)
MONOCYTES (10*3/UL) IN BLOOD BY AUTOMATED COUNT: 0.34 X10E9/L (ref 0.1–1)
MONOCYTES/100 LEUKOCYTES IN BLOOD BY AUTOMATED COUNT: 7 % (ref 2–10)
MUCUS, URINE: ABNORMAL /LPF
NEUTROPHILS (10*3/UL) IN BLOOD BY AUTOMATED COUNT: 2.86 X10E9/L (ref 1.2–7.7)
NEUTROPHILS/100 LEUKOCYTES IN BLOOD BY AUTOMATED COUNT: 59.3 % (ref 40–80)
NRBC (PER 100 WBCS) BY AUTOMATED COUNT: 0 /100 WBC (ref 0–0)
PARATHYRIN INTACT (PG/ML) IN SER/PLAS: 111.8 PG/ML (ref 18.5–88)
PHOSPHATE (MG/DL) IN SER/PLAS: 3.4 MG/DL (ref 2.5–4.9)
PLATELETS (10*3/UL) IN BLOOD AUTOMATED COUNT: 154 X10E9/L (ref 150–450)
POC CALCIUM IONIZED (MMOL/L) IN BLOOD: 1.13 MMOL/L (ref 1.1–1.33)
POTASSIUM (MMOL/L) IN SER/PLAS: 4.8 MMOL/L (ref 3.5–5.3)
PROSTATE SPECIFIC ANTIGEN,SCREEN: 0.22 NG/ML (ref 0–4)
PROTEIN (MG/DL) IN URINE: 16 MG/DL (ref 5–25)
PROTEIN TOTAL: 6 G/DL (ref 6.4–8.2)
PROTEIN/CREATININE (MG/MG) IN URINE: 0.1 MG/MG CREAT (ref 0–0.17)
RBC, URINE: <1 /HPF (ref 0–5)
SODIUM (MMOL/L) IN SER/PLAS: 140 MMOL/L (ref 136–145)
SQUAMOUS EPITHELIAL CELLS, URINE: <1 /HPF
TACROLIMUS (NG/ML) IN BLOOD: 5.7 NG/ML (ref 2–15)
URATE (MG/DL) IN SER/PLAS: 7.2 MG/DL (ref 4–7.5)
UREA NITROGEN (MG/DL) IN SER/PLAS: 16 MG/DL (ref 6–23)
WBC, URINE: <1 /HPF (ref 0–5)

## 2023-04-26 LAB
CYTOMEGALOVIRUS DNA, PCR COMMENT: ABNORMAL
CYTOMEGALOVIRUS DNA, PCR IU/ML: ABNORMAL IU/ML
CYTOMEGALOVIRUS DNA, PCR LOG IU/ML: ABNORMAL LOG IU/ML
EBV PCR PLASMA LOG IU/ML: NORMAL LOG IU/ML
EBV PCR, QUANT, PLASMA: NOT DETECTED IU/ML
URINE CULTURE: NORMAL

## 2023-05-01 LAB
CRYOCRIT IMMUNODIFFUSION (SJC): NORMAL
CRYOCRIT IMMUNOFIXATION (SJC): NORMAL
CRYOGLOBULIN  (SJC): NEGATIVE
PERCENT CRYOCRIT (SJC): NORMAL %
RHEUMATOID FACTOR (SJC): NORMAL IU/ML

## 2023-05-03 DIAGNOSIS — M54.16 LUMBAR RADICULOPATHY, CHRONIC: ICD-10-CM

## 2023-05-03 RX ORDER — OXYCODONE HYDROCHLORIDE 5 MG/1
5 TABLET ORAL EVERY 8 HOURS
Qty: 90 TABLET | Refills: 0 | Status: SHIPPED | OUTPATIENT
Start: 2023-05-03 | End: 2023-05-31 | Stop reason: SDUPTHER

## 2023-05-03 NOTE — TELEPHONE ENCOUNTER
Rx Refill Request     Name: Karl Estevez  :  1970   Medication Name:  OXYCODONE 5MG  Specific Pharmacy location:  [facility]  Date of last appointment:  Visit date not found   Date of next appointment:  2023   Best number to reach patient:  [phone]

## 2023-05-08 LAB
ALANINE AMINOTRANSFERASE (SGPT) (U/L) IN SER/PLAS: 5 U/L (ref 10–52)
ALBUMIN (G/DL) IN SER/PLAS: 3.9 G/DL (ref 3.4–5)
ALKALINE PHOSPHATASE (U/L) IN SER/PLAS: 78 U/L (ref 33–120)
ANION GAP IN SER/PLAS: 9 MMOL/L (ref 10–20)
ASPARTATE AMINOTRANSFERASE (SGOT) (U/L) IN SER/PLAS: 7 U/L (ref 9–39)
BASOPHILS (10*3/UL) IN BLOOD BY AUTOMATED COUNT: 0.04 X10E9/L (ref 0–0.1)
BASOPHILS/100 LEUKOCYTES IN BLOOD BY AUTOMATED COUNT: 0.8 % (ref 0–2)
BILIRUBIN DIRECT (MG/DL) IN SER/PLAS: 0.2 MG/DL (ref 0–0.3)
BILIRUBIN TOTAL (MG/DL) IN SER/PLAS: 0.9 MG/DL (ref 0–1.2)
CALCIUM (MG/DL) IN SER/PLAS: 8.1 MG/DL (ref 8.6–10.3)
CARBON DIOXIDE, TOTAL (MMOL/L) IN SER/PLAS: 28 MMOL/L (ref 21–32)
CHLORIDE (MMOL/L) IN SER/PLAS: 102 MMOL/L (ref 98–107)
CREATININE (MG/DL) IN SER/PLAS: 1.21 MG/DL (ref 0.5–1.3)
EOSINOPHILS (10*3/UL) IN BLOOD BY AUTOMATED COUNT: 0.29 X10E9/L (ref 0–0.7)
EOSINOPHILS/100 LEUKOCYTES IN BLOOD BY AUTOMATED COUNT: 5.5 % (ref 0–6)
ERYTHROCYTE DISTRIBUTION WIDTH (RATIO) BY AUTOMATED COUNT: 13.2 % (ref 11.5–14.5)
ERYTHROCYTE MEAN CORPUSCULAR HEMOGLOBIN CONCENTRATION (G/DL) BY AUTOMATED: 32.3 G/DL (ref 32–36)
ERYTHROCYTE MEAN CORPUSCULAR VOLUME (FL) BY AUTOMATED COUNT: 92 FL (ref 80–100)
ERYTHROCYTES (10*6/UL) IN BLOOD BY AUTOMATED COUNT: 3.92 X10E12/L (ref 4.5–5.9)
GAMMA GLUTAMYL TRANSFERASE (U/L) IN SER/PLAS: 6 U/L (ref 5–64)
GFR MALE: 72 ML/MIN/1.73M2
GLUCOSE (MG/DL) IN SER/PLAS: 100 MG/DL (ref 74–99)
HEMATOCRIT (%) IN BLOOD BY AUTOMATED COUNT: 35.9 % (ref 41–52)
HEMOGLOBIN (G/DL) IN BLOOD: 11.6 G/DL (ref 13.5–17.5)
IMMATURE GRANULOCYTES/100 LEUKOCYTES IN BLOOD BY AUTOMATED COUNT: 0.6 % (ref 0–0.9)
LEUKOCYTES (10*3/UL) IN BLOOD BY AUTOMATED COUNT: 5.3 X10E9/L (ref 4.4–11.3)
LYMPHOCYTES (10*3/UL) IN BLOOD BY AUTOMATED COUNT: 1.24 X10E9/L (ref 1.2–4.8)
LYMPHOCYTES/100 LEUKOCYTES IN BLOOD BY AUTOMATED COUNT: 23.5 % (ref 13–44)
MAGNESIUM (MG/DL) IN SER/PLAS: 1.73 MG/DL (ref 1.6–2.4)
MONOCYTES (10*3/UL) IN BLOOD BY AUTOMATED COUNT: 0.45 X10E9/L (ref 0.1–1)
MONOCYTES/100 LEUKOCYTES IN BLOOD BY AUTOMATED COUNT: 8.5 % (ref 2–10)
NEUTROPHILS (10*3/UL) IN BLOOD BY AUTOMATED COUNT: 3.22 X10E9/L (ref 1.2–7.7)
NEUTROPHILS/100 LEUKOCYTES IN BLOOD BY AUTOMATED COUNT: 61.1 % (ref 40–80)
PHOSPHATE (MG/DL) IN SER/PLAS: 3 MG/DL (ref 2.5–4.9)
PLATELETS (10*3/UL) IN BLOOD AUTOMATED COUNT: 131 X10E9/L (ref 150–450)
POTASSIUM (MMOL/L) IN SER/PLAS: 4.3 MMOL/L (ref 3.5–5.3)
PROTEIN TOTAL: 6 G/DL (ref 6.4–8.2)
SODIUM (MMOL/L) IN SER/PLAS: 135 MMOL/L (ref 136–145)
TACROLIMUS (NG/ML) IN BLOOD: 4.6 NG/ML (ref 2–15)
UREA NITROGEN (MG/DL) IN SER/PLAS: 12 MG/DL (ref 6–23)

## 2023-05-09 ENCOUNTER — APPOINTMENT (OUTPATIENT)
Dept: PRIMARY CARE | Facility: CLINIC | Age: 53
End: 2023-05-09
Payer: MEDICARE

## 2023-05-12 ENCOUNTER — APPOINTMENT (OUTPATIENT)
Dept: PRIMARY CARE | Facility: CLINIC | Age: 53
End: 2023-05-12
Payer: MEDICARE

## 2023-05-22 LAB
ALANINE AMINOTRANSFERASE (SGPT) (U/L) IN SER/PLAS: 6 U/L (ref 10–52)
ALBUMIN (G/DL) IN SER/PLAS: 4.2 G/DL (ref 3.4–5)
ALKALINE PHOSPHATASE (U/L) IN SER/PLAS: 82 U/L (ref 33–120)
ANION GAP IN SER/PLAS: 11 MMOL/L (ref 10–20)
ASPARTATE AMINOTRANSFERASE (SGOT) (U/L) IN SER/PLAS: 8 U/L (ref 9–39)
BASOPHILS (10*3/UL) IN BLOOD BY AUTOMATED COUNT: 0.07 X10E9/L (ref 0–0.1)
BASOPHILS/100 LEUKOCYTES IN BLOOD BY AUTOMATED COUNT: 1.3 % (ref 0–2)
BILIRUBIN DIRECT (MG/DL) IN SER/PLAS: 0.2 MG/DL (ref 0–0.3)
BILIRUBIN TOTAL (MG/DL) IN SER/PLAS: 0.9 MG/DL (ref 0–1.2)
CALCIUM (MG/DL) IN SER/PLAS: 8.8 MG/DL (ref 8.6–10.3)
CARBON DIOXIDE, TOTAL (MMOL/L) IN SER/PLAS: 26 MMOL/L (ref 21–32)
CHLORIDE (MMOL/L) IN SER/PLAS: 103 MMOL/L (ref 98–107)
CREATININE (MG/DL) IN SER/PLAS: 1.13 MG/DL (ref 0.5–1.3)
EOSINOPHILS (10*3/UL) IN BLOOD BY AUTOMATED COUNT: 0.23 X10E9/L (ref 0–0.7)
EOSINOPHILS/100 LEUKOCYTES IN BLOOD BY AUTOMATED COUNT: 4.2 % (ref 0–6)
ERYTHROCYTE DISTRIBUTION WIDTH (RATIO) BY AUTOMATED COUNT: 13.4 % (ref 11.5–14.5)
ERYTHROCYTE MEAN CORPUSCULAR HEMOGLOBIN CONCENTRATION (G/DL) BY AUTOMATED: 31.7 G/DL (ref 32–36)
ERYTHROCYTE MEAN CORPUSCULAR VOLUME (FL) BY AUTOMATED COUNT: 92 FL (ref 80–100)
ERYTHROCYTES (10*6/UL) IN BLOOD BY AUTOMATED COUNT: 4.13 X10E12/L (ref 4.5–5.9)
GAMMA GLUTAMYL TRANSFERASE (U/L) IN SER/PLAS: 7 U/L (ref 5–64)
GFR MALE: 78 ML/MIN/1.73M2
GLUCOSE (MG/DL) IN SER/PLAS: 100 MG/DL (ref 74–99)
HEMATOCRIT (%) IN BLOOD BY AUTOMATED COUNT: 37.9 % (ref 41–52)
HEMOGLOBIN (G/DL) IN BLOOD: 12 G/DL (ref 13.5–17.5)
IMMATURE GRANULOCYTES/100 LEUKOCYTES IN BLOOD BY AUTOMATED COUNT: 0.4 % (ref 0–0.9)
LEUKOCYTES (10*3/UL) IN BLOOD BY AUTOMATED COUNT: 5.5 X10E9/L (ref 4.4–11.3)
LYMPHOCYTES (10*3/UL) IN BLOOD BY AUTOMATED COUNT: 1.43 X10E9/L (ref 1.2–4.8)
LYMPHOCYTES/100 LEUKOCYTES IN BLOOD BY AUTOMATED COUNT: 26 % (ref 13–44)
MAGNESIUM (MG/DL) IN SER/PLAS: 1.67 MG/DL (ref 1.6–2.4)
MONOCYTES (10*3/UL) IN BLOOD BY AUTOMATED COUNT: 0.38 X10E9/L (ref 0.1–1)
MONOCYTES/100 LEUKOCYTES IN BLOOD BY AUTOMATED COUNT: 6.9 % (ref 2–10)
NEUTROPHILS (10*3/UL) IN BLOOD BY AUTOMATED COUNT: 3.36 X10E9/L (ref 1.2–7.7)
NEUTROPHILS/100 LEUKOCYTES IN BLOOD BY AUTOMATED COUNT: 61.2 % (ref 40–80)
PHOSPHATE (MG/DL) IN SER/PLAS: 2.9 MG/DL (ref 2.5–4.9)
PLATELETS (10*3/UL) IN BLOOD AUTOMATED COUNT: 151 X10E9/L (ref 150–450)
POTASSIUM (MMOL/L) IN SER/PLAS: 3.9 MMOL/L (ref 3.5–5.3)
PROTEIN TOTAL: 6.6 G/DL (ref 6.4–8.2)
SODIUM (MMOL/L) IN SER/PLAS: 136 MMOL/L (ref 136–145)
TACROLIMUS (NG/ML) IN BLOOD: 3.9 NG/ML (ref 2–15)
UREA NITROGEN (MG/DL) IN SER/PLAS: 15 MG/DL (ref 6–23)

## 2023-05-30 DIAGNOSIS — M54.16 LUMBAR RADICULOPATHY, CHRONIC: ICD-10-CM

## 2023-05-30 NOTE — TELEPHONE ENCOUNTER
Rx Refill Request     Name: Karl Smithyajaira  :  1970   Medication Name:  Oxycodone 5 MG-Take 1 tablet by mouth every 8 hours.  Specific Pharmacy location:  [facility]  Date of last appointment:  2023   Date of next appointment:  Visit date not found   Best number to reach patient:  [phone]

## 2023-05-31 RX ORDER — OXYCODONE HYDROCHLORIDE 5 MG/1
5 TABLET ORAL EVERY 8 HOURS
Qty: 90 TABLET | Refills: 0 | Status: SHIPPED | OUTPATIENT
Start: 2023-06-02 | End: 2023-07-05 | Stop reason: SDUPTHER

## 2023-06-01 ENCOUNTER — TELEPHONE (OUTPATIENT)
Dept: PRIMARY CARE | Facility: CLINIC | Age: 53
End: 2023-06-01
Payer: MEDICARE

## 2023-06-01 NOTE — TELEPHONE ENCOUNTER
Called patient to try to reschedule the appt he walked out on and he didn't want to schedule anything until August because he said he was just here and not his fault he didn't get seen when he walked out after waiting an hour.

## 2023-06-22 LAB
ALANINE AMINOTRANSFERASE (SGPT) (U/L) IN SER/PLAS: 5 U/L (ref 10–52)
ALBUMIN (G/DL) IN SER/PLAS: 4.1 G/DL (ref 3.4–5)
ALKALINE PHOSPHATASE (U/L) IN SER/PLAS: 76 U/L (ref 33–120)
ANION GAP IN SER/PLAS: 11 MMOL/L (ref 10–20)
ASPARTATE AMINOTRANSFERASE (SGOT) (U/L) IN SER/PLAS: 10 U/L (ref 9–39)
BASOPHILS (10*3/UL) IN BLOOD BY AUTOMATED COUNT: 0.05 X10E9/L (ref 0–0.1)
BASOPHILS/100 LEUKOCYTES IN BLOOD BY AUTOMATED COUNT: 1 % (ref 0–2)
BILIRUBIN DIRECT (MG/DL) IN SER/PLAS: 0.1 MG/DL (ref 0–0.3)
BILIRUBIN TOTAL (MG/DL) IN SER/PLAS: 0.5 MG/DL (ref 0–1.2)
CALCIUM (MG/DL) IN SER/PLAS: 8.5 MG/DL (ref 8.6–10.3)
CARBON DIOXIDE, TOTAL (MMOL/L) IN SER/PLAS: 26 MMOL/L (ref 21–32)
CHLORIDE (MMOL/L) IN SER/PLAS: 105 MMOL/L (ref 98–107)
CREATININE (MG/DL) IN SER/PLAS: 1.19 MG/DL (ref 0.5–1.3)
EOSINOPHILS (10*3/UL) IN BLOOD BY AUTOMATED COUNT: 0.25 X10E9/L (ref 0–0.7)
EOSINOPHILS/100 LEUKOCYTES IN BLOOD BY AUTOMATED COUNT: 5.2 % (ref 0–6)
ERYTHROCYTE DISTRIBUTION WIDTH (RATIO) BY AUTOMATED COUNT: 12.8 % (ref 11.5–14.5)
ERYTHROCYTE MEAN CORPUSCULAR HEMOGLOBIN CONCENTRATION (G/DL) BY AUTOMATED: 33.4 G/DL (ref 32–36)
ERYTHROCYTE MEAN CORPUSCULAR VOLUME (FL) BY AUTOMATED COUNT: 87 FL (ref 80–100)
ERYTHROCYTES (10*6/UL) IN BLOOD BY AUTOMATED COUNT: 4.38 X10E12/L (ref 4.5–5.9)
ESTIMATED AVERAGE GLUCOSE FOR HBA1C: 54 MG/DL
GAMMA GLUTAMYL TRANSFERASE (U/L) IN SER/PLAS: 5 U/L (ref 5–64)
GFR MALE: 74 ML/MIN/1.73M2
GLUCOSE (MG/DL) IN SER/PLAS: 110 MG/DL (ref 74–99)
HEMATOCRIT (%) IN BLOOD BY AUTOMATED COUNT: 38 % (ref 41–52)
HEMOGLOBIN (G/DL) IN BLOOD: 12.7 G/DL (ref 13.5–17.5)
HEMOGLOBIN A1C/HEMOGLOBIN TOTAL IN BLOOD: 3.5 %
IMMATURE GRANULOCYTES/100 LEUKOCYTES IN BLOOD BY AUTOMATED COUNT: 0.4 % (ref 0–0.9)
LEUKOCYTES (10*3/UL) IN BLOOD BY AUTOMATED COUNT: 4.8 X10E9/L (ref 4.4–11.3)
LYMPHOCYTES (10*3/UL) IN BLOOD BY AUTOMATED COUNT: 1.3 X10E9/L (ref 1.2–4.8)
LYMPHOCYTES/100 LEUKOCYTES IN BLOOD BY AUTOMATED COUNT: 26.9 % (ref 13–44)
MAGNESIUM (MG/DL) IN SER/PLAS: 1.81 MG/DL (ref 1.6–2.4)
MONOCYTES (10*3/UL) IN BLOOD BY AUTOMATED COUNT: 0.34 X10E9/L (ref 0.1–1)
MONOCYTES/100 LEUKOCYTES IN BLOOD BY AUTOMATED COUNT: 7 % (ref 2–10)
NEUTROPHILS (10*3/UL) IN BLOOD BY AUTOMATED COUNT: 2.88 X10E9/L (ref 1.2–7.7)
NEUTROPHILS/100 LEUKOCYTES IN BLOOD BY AUTOMATED COUNT: 59.5 % (ref 40–80)
PHOSPHATE (MG/DL) IN SER/PLAS: 3.8 MG/DL (ref 2.5–4.9)
PLATELETS (10*3/UL) IN BLOOD AUTOMATED COUNT: 151 X10E9/L (ref 150–450)
POTASSIUM (MMOL/L) IN SER/PLAS: 4 MMOL/L (ref 3.5–5.3)
PROTEIN TOTAL: 6.3 G/DL (ref 6.4–8.2)
SODIUM (MMOL/L) IN SER/PLAS: 138 MMOL/L (ref 136–145)
TACROLIMUS (NG/ML) IN BLOOD: 4.4 NG/ML (ref 2–15)
UREA NITROGEN (MG/DL) IN SER/PLAS: 18 MG/DL (ref 6–23)

## 2023-07-05 DIAGNOSIS — M54.16 LUMBAR RADICULOPATHY, CHRONIC: ICD-10-CM

## 2023-07-05 RX ORDER — OXYCODONE HYDROCHLORIDE 5 MG/1
5 TABLET ORAL EVERY 8 HOURS
Qty: 90 TABLET | Refills: 0 | Status: SHIPPED | OUTPATIENT
Start: 2023-07-05 | End: 2023-08-02 | Stop reason: SDUPTHER

## 2023-07-05 NOTE — TELEPHONE ENCOUNTER
Rx Refill Request Telephone Encounter    Name:  Karl Estevez  :  [id number]  Medication Name:  oxycodone (Roxicodone) 5 mg   Specific Pharmacy location:  [facility]  Date of last appointment:    Date of next appointment:    Best number to reach patient:  [phone]        Patient is aware it is important he attend his appointment for this medication on  as he walked out of his appointment on  and has not been seen since

## 2023-07-12 ENCOUNTER — OFFICE VISIT (OUTPATIENT)
Dept: PRIMARY CARE | Facility: CLINIC | Age: 53
End: 2023-07-12
Payer: MEDICARE

## 2023-07-12 VITALS
TEMPERATURE: 97.8 F | DIASTOLIC BLOOD PRESSURE: 76 MMHG | SYSTOLIC BLOOD PRESSURE: 122 MMHG | WEIGHT: 216 LBS | BODY MASS INDEX: 30.92 KG/M2 | RESPIRATION RATE: 16 BRPM | OXYGEN SATURATION: 97 % | HEART RATE: 95 BPM | HEIGHT: 70 IN

## 2023-07-12 DIAGNOSIS — J01.01 ACUTE RECURRENT MAXILLARY SINUSITIS: Primary | ICD-10-CM

## 2023-07-12 PROCEDURE — 1036F TOBACCO NON-USER: CPT | Performed by: FAMILY MEDICINE

## 2023-07-12 PROCEDURE — 3008F BODY MASS INDEX DOCD: CPT | Performed by: FAMILY MEDICINE

## 2023-07-12 PROCEDURE — 3044F HG A1C LEVEL LT 7.0%: CPT | Performed by: FAMILY MEDICINE

## 2023-07-12 PROCEDURE — 3078F DIAST BP <80 MM HG: CPT | Performed by: FAMILY MEDICINE

## 2023-07-12 PROCEDURE — 3074F SYST BP LT 130 MM HG: CPT | Performed by: FAMILY MEDICINE

## 2023-07-12 PROCEDURE — 99213 OFFICE O/P EST LOW 20 MIN: CPT | Performed by: FAMILY MEDICINE

## 2023-07-12 PROCEDURE — 3066F NEPHROPATHY DOC TX: CPT | Performed by: FAMILY MEDICINE

## 2023-07-12 RX ORDER — CEFUROXIME AXETIL 250 MG/1
250 TABLET ORAL 2 TIMES DAILY
Qty: 20 TABLET | Refills: 0 | Status: SHIPPED | OUTPATIENT
Start: 2023-07-12 | End: 2023-07-22

## 2023-07-16 ASSESSMENT — ENCOUNTER SYMPTOMS
BACK PAIN: 1
FATIGUE: 0
ADENOPATHY: 0
RHINORRHEA: 1
TREMORS: 0
DYSURIA: 0
PALPITATIONS: 0
COUGH: 0
NUMBNESS: 1
SINUS PRESSURE: 1
SINUS PAIN: 1
CHEST TIGHTNESS: 0
FEVER: 0
SHORTNESS OF BREATH: 0
CONFUSION: 0
ARTHRALGIAS: 1
HEADACHES: 0
ABDOMINAL DISTENTION: 0
WEAKNESS: 0
HEMATURIA: 0
MYALGIAS: 1
SLEEP DISTURBANCE: 0
SORE THROAT: 0
FREQUENCY: 0
ABDOMINAL PAIN: 0
BLOOD IN STOOL: 0

## 2023-07-16 NOTE — PROGRESS NOTES
Subjective   Patient ID: Karl Estevez is a 52 y.o. male who presents for Back Pain (3 month follow up ) and Sinusitis.    Back Pain  Associated symptoms include numbness (3 of painful diabetic peripheral neuropathy). Pertinent negatives include no abdominal pain, chest pain, dysuria, fever, headaches or weakness.   Sinusitis  Associated symptoms include sinus pressure. Pertinent negatives include no coughing, headaches, shortness of breath or sore throat.      52-year-old patient is here to recheck his chronic pain management for nightly peripheral neuropathy earlier and abdominal pain.  He would now low back discomfort is followed by orthopedics and is currently deciding on whether to proceed with lumbar surgery for disc disease.  Patient's been oxycodone 5 mg every 8 hours with definite improvement been endorsed by his subspecialist otherwise benefit still outweighs the risk without any significant side effects.  Patient is status post liver transplant is on immunosuppressive drugs.  His diabetic insulin-dependent and followed by endocrinologist with his insulin and insulin sliding scale.  Takes metoprolol 25 mg twice a day.  Otherwise gets his left followed almost mostly by his liver transplant team.  Besides orthopedic diabetic and orthopedic clinic we see him for his pain as well as muscle relaxer and hypertension.  Gratefully no chest pain shortness of palpitations or new GI- issues.  Sugars A1c cholesterol through have endocrinologist as well as liver functions through his liver transplant team  Safety issues regarding oxycodone was reviewed  OARRS reviewed  Template for opioids completed    OARRS:  No data recorded  I have personally reviewed the OARRS report for Karl Estevez. I have considered the risks of abuse, dependence, addiction and diversion    Is the patient prescribed a combination of a benzodiazepine and opioid?  No    Last Urine Drug Screen / ordered today: Yes  Recent Results (from  the past 34656 hour(s))   OPIATE/OPIOID/BENZO PRESCRIPTION COMPLIANCE    Collection Time: 02/27/23  8:42 AM   Result Value Ref Range    DRUG SCREEN COMMENT URINE SEE BELOW     Creatine, Urine 134.6 mg/dL    Amphetamine Screen, Urine PRESUMPTIVE NEGATIVE NEGATIVE    Barbiturate Screen, Urine PRESUMPTIVE NEGATIVE NEGATIVE    Cannabinoid Screen, Urine PRESUMPTIVE NEGATIVE NEGATIVE    Cocaine Screen, Urine PRESUMPTIVE NEGATIVE NEGATIVE    PCP Screen, Urine PRESUMPTIVE NEGATIVE NEGATIVE    7-Aminoclonazepam <25 Cutoff <25 ng/mL    Alpha-Hydroxyalprazolam <25 Cutoff <25 ng/mL    Alpha-Hydroxymidazolam <25 Cutoff <25 ng/mL    Alprazolam <25 Cutoff <25 ng/mL    Chlordiazepoxide <25 Cutoff <25 ng/mL    Clonazepam <25 Cutoff <25 ng/mL    Diazepam <25 Cutoff <25 ng/mL    Lorazepam <25 Cutoff <25 ng/mL    Midazolam <25 Cutoff <25 ng/mL    Nordiazepam <25 Cutoff <25 ng/mL    Oxazepam <25 Cutoff <25 ng/mL    Temazepam <25 Cutoff <25 ng/mL    Zolpidem <25 Cutoff <25 ng/mL    Zolpidem Metabolite (ZCA) <25 Cutoff <25 ng/mL    6-Acetylmorphine <25 Cutoff <25 ng/mL    Codeine <50 Cutoff <50 ng/mL    Hydrocodone <25 Cutoff <25 ng/mL    Hydromorphone <25 Cutoff <25 ng/mL    Morphine Urine <50 Cutoff <50 ng/mL    Norhydrocodone <25 Cutoff <25 ng/mL    Noroxycodone >1000 (A) Cutoff <25 ng/mL    Oxycodone 2,256 (A) Cutoff <25 ng/mL    Oxymorphone 1,831 (A) Cutoff <25 ng/mL    Tramadol <50 Cutoff <50 ng/mL    O-Desmethyltramadol <50 Cutoff <50 ng/mL    Fentanyl <2.5 Cutoff<2.5 ng/mL    Norfentanyl <2.5 Cutoff<2.5 ng/mL    METHADONE CONFIRMATION,URINE <25 Cutoff <25 ng/mL    EDDP <25 Cutoff <25 ng/mL   DRUG SCREEN,URINE    Collection Time: 02/03/22  3:10 AM   Result Value Ref Range    DRUG SCREEN COMMENT URINE SEE BELOW     Amphetamine Screen, Urine PRESUMPTIVE NEGATIVE NEGATIVE    Barbiturate Screen, Urine PRESUMPTIVE NEGATIVE NEGATIVE    BENZODIAZEPINE (PRESENCE) IN URINE BY SCREEN METHOD PRESUMPTIVE NEGATIVE NEGATIVE    Cannabinoid  Screen, Urine PRESUMPTIVE NEGATIVE NEGATIVE    Cocaine Screen, Urine PRESUMPTIVE NEGATIVE NEGATIVE    Fentanyl, Ur PRESUMPTIVE NEGATIVE NEGATIVE    Methadone Screen, Urine PRESUMPTIVE NEGATIVE NEGATIVE    Opiate Screen, Urine PRESUMPTIVE NEGATIVE NEGATIVE    Oxycodone Screen, Ur PRESUMPTIVE POSITIVE (A) NEGATIVE    PCP Screen, Urine PRESUMPTIVE NEGATIVE NEGATIVE   Drug Screen, Urine With Reflex to Confirmation    Collection Time: 09/28/21  9:12 AM   Result Value Ref Range    DRUG SCREEN COMMENT URINE SEE BELOW     Amphetamine Screen, Urine PRESUMPTIVE NEGATIVE NEGATIVE    Barbiturate Screen, Urine PRESUMPTIVE NEGATIVE NEGATIVE    BENZODIAZEPINE (PRESENCE) IN URINE BY SCREEN METHOD PRESUMPTIVE NEGATIVE NEGATIVE    Cannabinoid Screen, Urine PRESUMPTIVE NEGATIVE NEGATIVE    Cocaine Screen, Urine PRESUMPTIVE NEGATIVE NEGATIVE    Fentanyl, Ur PRESUMPTIVE NEGATIVE NEGATIVE    Methadone Screen, Urine PRESUMPTIVE NEGATIVE NEGATIVE    Opiate Screen, Urine PRESUMPTIVE NEGATIVE NEGATIVE    Oxycodone Screen, Ur PRESUMPTIVE NEGATIVE NEGATIVE    PCP Screen, Urine PRESUMPTIVE NEGATIVE NEGATIVE   Opiate Confirmation, Urine    Collection Time: 09/15/21 11:36 AM   Result Value Ref Range    6-Acetylmorphine <25 Cutoff <25 ng/mL    Codeine <50 Cutoff <50 ng/mL    Hydrocodone <25 Cutoff <25 ng/mL    Hydromorphone <25 Cutoff <25 ng/mL    Morphine Urine <50 Cutoff <50 ng/mL    Norhydrocodone <25 Cutoff <25 ng/mL    Noroxycodone 240 (A) Cutoff <25 ng/mL    Oxycodone 187 (A) Cutoff <25 ng/mL    Oxymorphone <25 Cutoff <25 ng/mL   Benzodiazepine Confirmation, Urine    Collection Time: 09/01/21 12:00 AM   Result Value Ref Range    7-Aminoclonazepam <25 Cutoff <25 ng/mL    Alpha-Hydroxyalprazolam 55 (A) Cutoff <25 ng/mL    Alpha-Hydroxymidazolam <25 Cutoff <25 ng/mL    Alprazolam 30 (A) Cutoff <25 ng/mL    Chlordiazepoxide <25 Cutoff <25 ng/mL    Clonazepam <25 Cutoff <25 ng/mL    Diazepam <25 Cutoff <25 ng/mL    Lorazepam <25 Cutoff <25 ng/mL     Midazolam <25 Cutoff <25 ng/mL    Nordiazepam <25 Cutoff <25 ng/mL    Oxazepam <25 Cutoff <25 ng/mL    Temazepam <25 Cutoff <25 ng/mL     Results are as expected.     Controlled Substance Agreement:  Date of the Last Agreement: 2-  Reviewed Controlled Substance Agreement including but not limited to the benefits, risks, and alternatives to treatment with a Controlled Substance medication(s).    Opioids:  What is the patient's goal of therapy?  For control of low back discomfort as well as abdominal pain peripheral neuropathy endorsed by his pain clinic physician as well as his diabetic diabetic doctor.  Benefits always a risk and no evidence abuse side effects or divergence  Is this being achieved with current treatment? yes    I have calculated the patient's Morphine Dose Equivalent (MED):   I have considered referral to Pain Management and/or a specialist, and do not feel it is necessary at this time.    I feel that it is clinically indicated to continue this current medication regimen after consideration of alternative therapies, and other non-opioid treatment.    Opioid Risk Screening:  No data recorded    Pain Assessment:  No data recorded  Review of Systems   Constitutional:  Negative for fatigue and fever.   HENT:  Positive for postnasal drip, rhinorrhea, sinus pressure and sinus pain. Negative for hearing loss and sore throat.    Eyes:  Negative for visual disturbance.   Respiratory:  Negative for cough, chest tightness and shortness of breath.    Cardiovascular:  Negative for chest pain, palpitations and leg swelling.   Gastrointestinal:  Negative for abdominal distention, abdominal pain and blood in stool.   Genitourinary:  Negative for dysuria, frequency and hematuria.   Musculoskeletal:  Positive for arthralgias, back pain and myalgias.   Skin:  Negative for rash.   Neurological:  Positive for numbness (3 of painful diabetic peripheral neuropathy). Negative for tremors, weakness and headaches.  "  Hematological:  Negative for adenopathy.   Psychiatric/Behavioral:  Negative for behavioral problems, confusion, sleep disturbance and suicidal ideas.        Objective   /76 (BP Location: Right arm, Patient Position: Sitting, BP Cuff Size: Large adult)   Pulse 95   Temp 36.6 °C (97.8 °F) (Temporal)   Resp 16   Ht 1.778 m (5' 10\")   Wt 98 kg (216 lb)   SpO2 97%   BMI 30.99 kg/m²    Contains abnormal data Hepatic Function Panel  Order: [id number]  Status: Final result       Visible to patient: Yes (not seen)    0 Result Notes   important suggestion  Newer results are available. Click to view them now.                  Component Ref Range & Units 4 mo ago  (3/13/23) 4 mo ago  (2/27/23) 5 mo ago  (2/13/23) 5 mo ago  (1/30/23) 6 mo ago  (1/17/23) 6 mo ago  (1/3/23) 6 mo ago  (12/19/22)   Albumin 3.4 - 5.0 g/dL 4.2 4.5 4.1 4.2 4.2 4.0 4.0   Total Bilirubin 0.0 - 1.2 mg/dL 1.1 1.0 0.8 0.9 1.2 0.7 0.5   Bilirubin, Direct 0.0 - 0.3 mg/dL 0.2 0.1 0.1 0.1 0.1 0.1 0.1   Alkaline Phosphatase 33 - 120 U/L 71 90 81 83 80 74 81   ALT (SGPT) 10 - 52 U/L 6 Low  6 Low  CM 5 Low  CM 6 Low  CM 7 Low  CM 5 Low  CM 6 Low  CM   Comment:  Patients treated with Sulfasalazine may generate   falsely decreased results for ALT.   AST 9 - 39 U/L 7 Low  11 6 Low  9 8 Low  8 Low  9   Total Protein 6.4 - 8.2 g/dL 6.5 6.7 6.7 6.8 6.6 6.7 6.9   Resulting Agency              [facility]  Order: [id number]  Status: Final result       Visible to patient: Yes (not seen)    0 Result Notes   important suggestion  Newer results are available. Click to view them now.                Component Ref Range & Units 4 mo ago  (3/13/23) 4 mo ago  (2/27/23) 5 mo ago  (2/13/23) 5 mo ago  (1/30/23) 6 mo ago  (1/17/23) 6 mo ago  (1/3/23) 6 mo ago  (12/19/22)   Magnesium 1.60 - 2.40 mg/dL 1.98 2.05 1.92 1.98 1.84 1.90 1.76   Resulting Agency  [facility]" [facility]                 CBC and Auto Differential  Order: [id number]  Status: Final result       Visible to patient: Yes (not seen)    0 Result Notes   important suggestion  Newer results are available. Click to view them now.                Component Ref Range & Units 4 mo ago  (2/27/23) 5 mo ago  (2/13/23) 5 mo ago  (1/30/23) 6 mo ago  (1/17/23) 6 mo ago  (1/3/23) 6 mo ago  (12/19/22) 7 mo ago  (12/5/22)   WBC 4.4 - 11.3 x10E9/L 5.5 5.4 5.8 5.1 5.3 5.2 5.2   RBC 4.50 - 5.90 x10E12/L 4.64 4.41 Low  4.47 Low  4.79 5.10 5.18 5.09   Hemoglobin 13.5 - 17.5 g/dL 13.6 12.9 Low  12.7 Low  12.9 Low  13.2 Low  13.4 Low  13.0 Low    Hematocrit 41.0 - 52.0 % 41.9 39.5 Low  39.8 Low  40.5 Low  42.8 42.2 41.6   MCV 80 - 100 fL 90 90 89 85 84 81 82   MCHC 32.0 - 36.0 g/dL 32.5 32.7 31.9 Low  31.9 Low  30.8 Low  31.8 Low  31.3 Low    Platelets 150 - 450 x10E9/L 174 174 185 146 Low  160 219 184   RDW 11.5 - 14.5 % 14.1 14.6 High  15.9 High  15.9 High  15.4 High  15.3 High  16.9 High    Neutrophils % 40.0 - 80.0 % 59.7 62.3 61.5 55.0 63.0 65.8 61.2   Immature Granulocytes %, Automated 0.0 - 0.9 % 0.4            Basic Metabolic Panel  Order: [id number]  Status: Final result       Visible to patient: Yes (not seen)    0 Result Notes   important suggestion  Newer results are available. Click to view them now.                Component Ref Range & Units 4 mo ago  (2/27/23) 5 mo ago  (2/13/23) 5 mo ago  (1/30/23) 6 mo ago  (1/17/23) 6 mo ago  (1/3/23) 6 mo ago  (12/19/22) 7 mo ago  (12/5/22)   Glucose 74 - 99 mg/dL 96 113 High  89 124 High  85 86 91   Sodium 136 - 145 mmol/L 138 136 138 138 139 138 137   Potassium 3.5 - 5.3 mmol/L 4.0 4.0 4.1 4.0 4.2 4.1 3.8   Chloride 98 - 107 mmol/L 104 102 105 103 105 100 102   Bicarbonate 21 - 32 mmol/L 27 27 28 29 29 33 High  30   Anion Gap 10 - 20 mmol/L 11 11 9 Low  10 9 Low  9 Low  9 Low    Urea Nitrogen 6 - 23 mg/dL 11 11 13 13 13 14 14   Creatinine 0.50 - 1.30 mg/dL  1.04 1.10 1.13 1.08 1.18 1.13 1.28   GFR MALE >90 mL/min/1.73m2 87 81 CM 79 CM 83 CM 75 CM 79 CM 68 CM   Comment:  CALCULATIONS OF ESTIMATED GFR ARE PERFORMED   USING THE 2021 CKD-EPI STUDY REFIT EQUATION   WITHOUT THE RACE VARIABLE FOR THE IDMS-TRACEABLE   CREATININE METHODS.    https://jasn.asnjournals.org/content/early/2021/09/22/ASN.5612580055   Calcium 8.6 - 10.3 mg/dL 8.8 9.1 8.5 Low  8.6 8.7 9.1 8.6   Resulting Agency  [facility]                       Physical Exam  Signs reviewed and normal pulse ox is normal  General-inspection is a pleasant or active individual in no acute distress  ENT eyes are clear PERRLA bilaterally nose shows sublimation the right with thick yellow rhinorrhea right greater than left tenderness right maxillary sinus both TMs retracted with superior hyperemia in the right TM.  Oral exam shows posterior injection only no ulcerations or exudate.  Neck neck is all without mass adenopathy bruits or rigidity  Cardiovascular-RSR without significant murmurs gallop or ectopy  Respiratory-chest clear anteriorly and posteriorly  Abdominal upper incisions healing nicely.  No rebound tenderness no bruits no hepatosplenomegaly and no significant upper or lower abdominal tenderness today no CVA tenderness  Peripheral vascular no symmetric or asymmetric edema except for maybe trace over 4+ edema slightly reduced sensation to vibration bilaterally of the lower feet but vascular tone intact  Assessment/Plan     Placed on Ceftin twice a day for 10 days for the buffed sinusitis normal saline as well as antihistamines at nighttime and nasal steroids  Follow-up with endocrinology for his diabetes  Follow-up with liver transplant team regarding his liver  Follow-up the orthopedist regarding his chronic low back pain and potential surgery  May continue every 8-12 hours  on his oxycodone use and side effects reviewed no evidence abuse or side effects  Oarrs performed  Safety issues reviewed  Template for opioids completed  Follow-up in 3 months or earlier if normal problems  @discharge  The above diagnosis and treatment plan was discussed with the patient patient will continue appropriate diet and exercise as reviewed  Patient will recheck earlier if any interval problems of significance or clinical worsening of the above problems.  Agrees above surveillance.  All question were addressed regarding above meds

## 2023-07-21 LAB
ALANINE AMINOTRANSFERASE (SGPT) (U/L) IN SER/PLAS: 7 U/L (ref 10–52)
ALBUMIN (G/DL) IN SER/PLAS: 4 G/DL (ref 3.4–5)
ALKALINE PHOSPHATASE (U/L) IN SER/PLAS: 81 U/L (ref 33–120)
ANION GAP IN SER/PLAS: 10 MMOL/L (ref 10–20)
ASPARTATE AMINOTRANSFERASE (SGOT) (U/L) IN SER/PLAS: 9 U/L (ref 9–39)
BASOPHILS (10*3/UL) IN BLOOD BY AUTOMATED COUNT: 0.05 X10E9/L (ref 0–0.1)
BASOPHILS/100 LEUKOCYTES IN BLOOD BY AUTOMATED COUNT: 0.9 % (ref 0–2)
BILIRUBIN TOTAL (MG/DL) IN SER/PLAS: 0.7 MG/DL (ref 0–1.2)
CALCIDIOL (25 OH VITAMIN D3) (NG/ML) IN SER/PLAS: 28 NG/ML
CALCIUM (MG/DL) IN SER/PLAS: 8.5 MG/DL (ref 8.6–10.3)
CARBON DIOXIDE, TOTAL (MMOL/L) IN SER/PLAS: 29 MMOL/L (ref 21–32)
CHLORIDE (MMOL/L) IN SER/PLAS: 102 MMOL/L (ref 98–107)
CREATININE (MG/DL) IN SER/PLAS: 1.35 MG/DL (ref 0.5–1.3)
CREATININE (MG/DL) IN URINE: 242 MG/DL (ref 20–370)
EOSINOPHILS (10*3/UL) IN BLOOD BY AUTOMATED COUNT: 0.4 X10E9/L (ref 0–0.7)
EOSINOPHILS/100 LEUKOCYTES IN BLOOD BY AUTOMATED COUNT: 7.4 % (ref 0–6)
ERYTHROCYTE DISTRIBUTION WIDTH (RATIO) BY AUTOMATED COUNT: 12.8 % (ref 11.5–14.5)
ERYTHROCYTE MEAN CORPUSCULAR HEMOGLOBIN CONCENTRATION (G/DL) BY AUTOMATED: 32.8 G/DL (ref 32–36)
ERYTHROCYTE MEAN CORPUSCULAR VOLUME (FL) BY AUTOMATED COUNT: 87 FL (ref 80–100)
ERYTHROCYTES (10*6/UL) IN BLOOD BY AUTOMATED COUNT: 4.76 X10E12/L (ref 4.5–5.9)
ESTIMATED AVERAGE GLUCOSE FOR HBA1C: 71 MG/DL
FERRITIN (UG/LL) IN SER/PLAS: 151 UG/L (ref 20–300)
GAMMA GLUTAMYL TRANSFERASE (U/L) IN SER/PLAS: 9 U/L (ref 5–64)
GFR MALE: 63 ML/MIN/1.73M2
GLUCOSE (MG/DL) IN SER/PLAS: 82 MG/DL (ref 74–99)
HEMATOCRIT (%) IN BLOOD BY AUTOMATED COUNT: 41.2 % (ref 41–52)
HEMOGLOBIN (G/DL) IN BLOOD: 13.5 G/DL (ref 13.5–17.5)
HEMOGLOBIN A1C/HEMOGLOBIN TOTAL IN BLOOD: 4.1 %
IMMATURE GRANULOCYTES/100 LEUKOCYTES IN BLOOD BY AUTOMATED COUNT: 0.2 % (ref 0–0.9)
IRON (UG/DL) IN SER/PLAS: 59 UG/DL (ref 35–150)
IRON BINDING CAPACITY (UG/DL) IN SER/PLAS: 244 UG/DL (ref 240–445)
IRON SATURATION (%) IN SER/PLAS: 24 % (ref 25–45)
LEUKOCYTES (10*3/UL) IN BLOOD BY AUTOMATED COUNT: 5.4 X10E9/L (ref 4.4–11.3)
LYMPHOCYTES (10*3/UL) IN BLOOD BY AUTOMATED COUNT: 1.41 X10E9/L (ref 1.2–4.8)
LYMPHOCYTES/100 LEUKOCYTES IN BLOOD BY AUTOMATED COUNT: 26.2 % (ref 13–44)
MONOCYTES (10*3/UL) IN BLOOD BY AUTOMATED COUNT: 0.42 X10E9/L (ref 0.1–1)
MONOCYTES/100 LEUKOCYTES IN BLOOD BY AUTOMATED COUNT: 7.8 % (ref 2–10)
NEUTROPHILS (10*3/UL) IN BLOOD BY AUTOMATED COUNT: 3.1 X10E9/L (ref 1.2–7.7)
NEUTROPHILS/100 LEUKOCYTES IN BLOOD BY AUTOMATED COUNT: 57.5 % (ref 40–80)
PARATHYRIN INTACT (PG/ML) IN SER/PLAS: 85.3 PG/ML (ref 18.5–88)
PLATELETS (10*3/UL) IN BLOOD AUTOMATED COUNT: 147 X10E9/L (ref 150–450)
POTASSIUM (MMOL/L) IN SER/PLAS: 3.9 MMOL/L (ref 3.5–5.3)
PROTEIN (MG/DL) IN URINE: 29 MG/DL (ref 5–25)
PROTEIN TOTAL: 6.5 G/DL (ref 6.4–8.2)
PROTEIN/CREATININE (MG/MG) IN URINE: 0.12 MG/MG CREAT (ref 0–0.17)
SODIUM (MMOL/L) IN SER/PLAS: 137 MMOL/L (ref 136–145)
TACROLIMUS (NG/ML) IN BLOOD: 4.4 NG/ML (ref 2–15)
UREA NITROGEN (MG/DL) IN SER/PLAS: 16 MG/DL (ref 6–23)

## 2023-07-23 LAB
CYTOMEGALOVIRUS DNA, PCR COMMENT: NORMAL
CYTOMEGALOVIRUS DNA, PCR IU/ML: NOT DETECTED IU/ML
CYTOMEGALOVIRUS DNA, PCR LOG IU/ML: NORMAL LOG IU/ML

## 2023-08-02 DIAGNOSIS — M54.16 LUMBAR RADICULOPATHY, CHRONIC: ICD-10-CM

## 2023-08-02 NOTE — TELEPHONE ENCOUNTER
Rx Refill Request Telephone Encounter    Name:  Karl Estevez  :  [id number]  Medication Name:  oxycodone   Specific Pharmacy location:  [facility]    Date of last appointment: 23  Date of next appointment:  10/31/23  Best number to reach patient:  [phone]

## 2023-08-03 RX ORDER — OXYCODONE HYDROCHLORIDE 5 MG/1
5 TABLET ORAL EVERY 8 HOURS
Qty: 21 TABLET | Refills: 0 | Status: SHIPPED | OUTPATIENT
Start: 2023-08-03 | End: 2023-08-10

## 2023-08-17 PROBLEM — M14.679 CHARCOT ARTHROPATHY OF MIDFOOT: Status: ACTIVE | Noted: 2023-08-17

## 2023-08-17 PROBLEM — D63.1 ANEMIA IN CHRONIC KIDNEY DISEASE: Status: ACTIVE | Noted: 2022-06-13

## 2023-08-17 PROBLEM — I10 BENIGN ESSENTIAL HTN: Status: ACTIVE | Noted: 2017-08-28

## 2023-08-17 PROBLEM — N18.1: Status: ACTIVE | Noted: 2018-03-04

## 2023-08-17 PROBLEM — M76.829 POSTERIOR TIBIAL TENDON DYSFUNCTION: Status: ACTIVE | Noted: 2023-08-17

## 2023-08-17 PROBLEM — R73.01 IFG (IMPAIRED FASTING GLUCOSE): Status: ACTIVE | Noted: 2023-08-17

## 2023-08-17 PROBLEM — M48.062 NEUROGENIC CLAUDICATION DUE TO LUMBAR SPINAL STENOSIS: Status: ACTIVE | Noted: 2023-08-17

## 2023-08-17 PROBLEM — E83.51 HYPOCALCEMIA: Status: ACTIVE | Noted: 2023-08-17

## 2023-08-17 PROBLEM — R20.2 PARESTHESIA: Status: ACTIVE | Noted: 2023-08-17

## 2023-08-17 PROBLEM — G62.9 PERIPHERAL POLYNEUROPATHY: Status: ACTIVE | Noted: 2023-08-17

## 2023-08-17 PROBLEM — E87.70 GENERALIZED EDEMA DUE TO FLUID OVERLOAD: Status: ACTIVE | Noted: 2023-08-17

## 2023-08-17 PROBLEM — M75.01 ADHESIVE CAPSULITIS OF RIGHT SHOULDER: Status: ACTIVE | Noted: 2023-08-17

## 2023-08-17 PROBLEM — B27.00 EBV (EPSTEIN-BARR VIRUS) VIREMIA: Status: ACTIVE | Noted: 2023-08-17

## 2023-08-17 PROBLEM — Z86.59 HISTORY OF DEPRESSION: Status: ACTIVE | Noted: 2023-08-17

## 2023-08-17 PROBLEM — M19.90 DJD (DEGENERATIVE JOINT DISEASE): Status: ACTIVE | Noted: 2023-08-17

## 2023-08-17 PROBLEM — B25.9 CYTOMEGALOVIRUS (CMV) VIREMIA (MULTI): Status: ACTIVE | Noted: 2023-08-17

## 2023-08-17 PROBLEM — I73.00 RAYNAUD'S PHENOMENON: Status: ACTIVE | Noted: 2023-08-17

## 2023-08-17 PROBLEM — N40.0 BPH (BENIGN PROSTATIC HYPERPLASIA): Status: ACTIVE | Noted: 2023-08-17

## 2023-08-17 PROBLEM — F41.1 GENERALIZED ANXIETY DISORDER: Status: ACTIVE | Noted: 2023-08-17

## 2023-08-17 PROBLEM — N18.9 ANEMIA IN CHRONIC KIDNEY DISEASE: Status: ACTIVE | Noted: 2022-06-13

## 2023-08-17 PROBLEM — R60.1 GENERALIZED EDEMA DUE TO FLUID OVERLOAD: Status: ACTIVE | Noted: 2023-08-17

## 2023-08-17 PROBLEM — J98.4 MILD OBSTRUCTION OF PULMONARY AIRFLOW: Status: ACTIVE | Noted: 2023-08-17

## 2023-08-17 PROBLEM — E08.22: Status: ACTIVE | Noted: 2018-03-04

## 2023-08-17 PROBLEM — I20.0 UNSTABLE ANGINA (MULTI): Status: ACTIVE | Noted: 2018-08-09

## 2023-08-17 PROBLEM — N25.81 SECONDARY HYPERPARATHYROIDISM OF RENAL ORIGIN (MULTI): Status: ACTIVE | Noted: 2023-08-17

## 2023-08-17 PROBLEM — K21.9 GERD (GASTROESOPHAGEAL REFLUX DISEASE): Status: ACTIVE | Noted: 2023-08-17

## 2023-08-17 PROBLEM — Z86.14 HISTORY OF MRSA INFECTION: Status: ACTIVE | Noted: 2023-08-17

## 2023-08-17 PROBLEM — N17.9 AKI (ACUTE KIDNEY INJURY) (CMS-HCC): Status: ACTIVE | Noted: 2023-08-17

## 2023-08-17 PROBLEM — E79.0 HYPERURICEMIA: Status: ACTIVE | Noted: 2023-08-17

## 2023-08-17 PROBLEM — M20.41 ACQUIRED HAMMERTOE OF RIGHT FOOT: Status: ACTIVE | Noted: 2023-08-17

## 2023-08-17 RX ORDER — METOLAZONE 5 MG/1
1 TABLET ORAL DAILY
COMMUNITY
Start: 2022-09-23 | End: 2023-10-17 | Stop reason: ALTCHOICE

## 2023-08-17 RX ORDER — ATENOLOL 25 MG/1
1 TABLET ORAL DAILY
COMMUNITY
Start: 2009-11-09 | End: 2023-10-17 | Stop reason: ALTCHOICE

## 2023-08-17 RX ORDER — CLONAZEPAM 1 MG/1
0.5 TABLET ORAL AS NEEDED
COMMUNITY
Start: 2009-11-09 | End: 2023-10-17 | Stop reason: ALTCHOICE

## 2023-08-17 RX ORDER — GABAPENTIN 300 MG/1
CAPSULE ORAL
COMMUNITY
End: 2023-10-17 | Stop reason: SDUPTHER

## 2023-08-17 RX ORDER — METOPROLOL TARTRATE 25 MG/1
TABLET, FILM COATED ORAL
COMMUNITY
Start: 2020-01-22 | End: 2023-10-17 | Stop reason: ALTCHOICE

## 2023-08-17 RX ORDER — OXYCODONE HYDROCHLORIDE 5 MG/1
1 CAPSULE ORAL EVERY 6 HOURS PRN
COMMUNITY
Start: 2022-12-12 | End: 2023-11-07 | Stop reason: SDUPTHER

## 2023-08-17 RX ORDER — CLOBETASOL PROPIONATE 0.5 MG/G
CREAM TOPICAL 2 TIMES DAILY
COMMUNITY
Start: 2022-12-08

## 2023-08-17 RX ORDER — METFORMIN HYDROCHLORIDE 1000 MG/1
1 TABLET ORAL 2 TIMES DAILY
COMMUNITY
Start: 2018-08-11 | End: 2023-10-17 | Stop reason: ALTCHOICE

## 2023-08-17 RX ORDER — PROMETHAZINE HYDROCHLORIDE 25 MG/1
25 TABLET ORAL EVERY 6 HOURS PRN
COMMUNITY
Start: 2020-06-10 | End: 2023-12-11 | Stop reason: ALTCHOICE

## 2023-08-17 RX ORDER — TIZANIDINE 4 MG/1
1 TABLET ORAL 3 TIMES DAILY
COMMUNITY
Start: 2022-07-12 | End: 2024-01-08 | Stop reason: SDUPTHER

## 2023-08-17 RX ORDER — MAGNESIUM HYDROXIDE 400 MG/5ML
SUSPENSION, ORAL (FINAL DOSE FORM) ORAL
COMMUNITY
End: 2023-10-17 | Stop reason: ALTCHOICE

## 2023-08-17 RX ORDER — BUMETANIDE 1 MG/1
3 TABLET ORAL DAILY
COMMUNITY
Start: 2023-01-25 | End: 2023-10-17 | Stop reason: ALTCHOICE

## 2023-08-17 RX ORDER — TACROLIMUS 0.5 MG/1
1 CAPSULE ORAL 2 TIMES DAILY
COMMUNITY
Start: 2022-08-05 | End: 2023-11-13 | Stop reason: ALTCHOICE

## 2023-08-17 RX ORDER — NYSTATIN 100000 [USP'U]/G
POWDER TOPICAL 2 TIMES DAILY
COMMUNITY
Start: 2022-07-15 | End: 2023-10-17 | Stop reason: ALTCHOICE

## 2023-08-17 RX ORDER — FOLIC ACID 1 MG/1
TABLET ORAL
COMMUNITY
End: 2023-10-17 | Stop reason: ALTCHOICE

## 2023-08-17 RX ORDER — OXYCODONE HYDROCHLORIDE 5 MG/1
TABLET ORAL EVERY 12 HOURS
COMMUNITY
Start: 2020-01-28 | End: 2023-10-17 | Stop reason: ALTCHOICE

## 2023-08-17 RX ORDER — INSULIN GLARGINE 100 [IU]/ML
30 INJECTION, SOLUTION SUBCUTANEOUS 2 TIMES DAILY
COMMUNITY
Start: 2022-07-15 | End: 2023-10-17 | Stop reason: ALTCHOICE

## 2023-08-17 RX ORDER — INSULIN GLARGINE 100 [IU]/ML
INJECTION, SOLUTION SUBCUTANEOUS
COMMUNITY
End: 2023-10-17 | Stop reason: ALTCHOICE

## 2023-08-17 RX ORDER — LORAZEPAM 0.5 MG/1
TABLET ORAL
COMMUNITY
End: 2023-10-17 | Stop reason: ALTCHOICE

## 2023-08-17 RX ORDER — PNV NO.95/FERROUS FUM/FOLIC AC 28MG-0.8MG
TABLET ORAL EVERY 24 HOURS
COMMUNITY
End: 2023-10-17 | Stop reason: ALTCHOICE

## 2023-08-17 RX ORDER — MIDODRINE HYDROCHLORIDE 5 MG/1
TABLET ORAL
COMMUNITY
Start: 2020-06-24 | End: 2023-10-17 | Stop reason: ALTCHOICE

## 2023-08-17 RX ORDER — LANOLIN ALCOHOL/MO/W.PET/CERES
CREAM (GRAM) TOPICAL
COMMUNITY
End: 2023-10-17 | Stop reason: ALTCHOICE

## 2023-08-17 RX ORDER — INSULIN PUMP SYRINGE, 3 ML
1 EACH MISCELLANEOUS
COMMUNITY
Start: 2018-02-23 | End: 2023-12-11 | Stop reason: ALTCHOICE

## 2023-08-17 RX ORDER — INSULIN ASPART 100 [IU]/ML
30 INJECTION, SOLUTION INTRAVENOUS; SUBCUTANEOUS 3 TIMES DAILY
COMMUNITY
Start: 2022-06-03 | End: 2023-10-17 | Stop reason: ALTCHOICE

## 2023-08-17 RX ORDER — GABAPENTIN 100 MG/1
200 CAPSULE ORAL 2 TIMES DAILY
COMMUNITY
Start: 2020-03-06 | End: 2023-10-17 | Stop reason: ALTCHOICE

## 2023-08-17 RX ORDER — ACETAMINOPHEN 500 MG
2 TABLET ORAL DAILY
COMMUNITY
Start: 2022-03-21

## 2023-08-17 RX ORDER — CYCLOBENZAPRINE HCL 10 MG
1 TABLET ORAL 2 TIMES DAILY PRN
COMMUNITY
Start: 2019-02-08 | End: 2023-10-17 | Stop reason: ALTCHOICE

## 2023-08-17 RX ORDER — SPIRONOLACTONE 50 MG/1
1 TABLET, FILM COATED ORAL DAILY
COMMUNITY
Start: 2018-12-03 | End: 2023-10-17 | Stop reason: ALTCHOICE

## 2023-08-17 RX ORDER — LACTULOSE 10 G/15ML
30 SOLUTION ORAL; RECTAL
COMMUNITY
Start: 2020-07-02 | End: 2023-10-17 | Stop reason: ALTCHOICE

## 2023-08-17 RX ORDER — BUDESONIDE AND FORMOTEROL FUMARATE DIHYDRATE 80; 4.5 UG/1; UG/1
2 AEROSOL RESPIRATORY (INHALATION) 2 TIMES DAILY
COMMUNITY
Start: 2023-01-16 | End: 2023-12-11 | Stop reason: ALTCHOICE

## 2023-08-17 RX ORDER — CHLORHEXIDINE GLUCONATE ORAL RINSE 1.2 MG/ML
SOLUTION DENTAL
COMMUNITY
Start: 2023-03-02 | End: 2023-10-17 | Stop reason: ALTCHOICE

## 2023-08-17 RX ORDER — LANOLIN ALCOHOL/MO/W.PET/CERES
CREAM (GRAM) TOPICAL EVERY 24 HOURS
COMMUNITY
Start: 2020-01-31 | End: 2023-10-17 | Stop reason: ALTCHOICE

## 2023-08-17 RX ORDER — ACETAMINOPHEN 325 MG/1
2 TABLET ORAL
COMMUNITY
Start: 2022-07-01 | End: 2024-02-12 | Stop reason: SDUPTHER

## 2023-08-17 RX ORDER — UMECLIDINIUM BROMIDE AND VILANTEROL TRIFENATATE 62.5; 25 UG/1; UG/1
1 POWDER RESPIRATORY (INHALATION) DAILY
COMMUNITY
Start: 2023-02-03 | End: 2024-04-29 | Stop reason: ALTCHOICE

## 2023-08-17 RX ORDER — POLYETHYLENE GLYCOL 3350 17 G/17G
17 POWDER, FOR SOLUTION ORAL DAILY
COMMUNITY
Start: 2022-07-12 | End: 2023-10-17 | Stop reason: ALTCHOICE

## 2023-08-17 RX ORDER — NALOXONE HYDROCHLORIDE 4 MG/.1ML
0.1 SPRAY NASAL AS NEEDED
COMMUNITY
Start: 2020-03-03 | End: 2023-10-17 | Stop reason: ALTCHOICE

## 2023-08-17 RX ORDER — PREDNISONE 5 MG/1
TABLET ORAL
COMMUNITY
Start: 2022-09-29 | End: 2023-10-17 | Stop reason: ALTCHOICE

## 2023-08-17 RX ORDER — DAPAGLIFLOZIN 10 MG/1
1 TABLET, FILM COATED ORAL DAILY
COMMUNITY
Start: 2022-07-01 | End: 2023-10-17 | Stop reason: ALTCHOICE

## 2023-08-17 RX ORDER — METFORMIN HYDROCHLORIDE 1000 MG/1
1000 TABLET ORAL DAILY
COMMUNITY
End: 2023-10-17 | Stop reason: SINTOL

## 2023-08-17 RX ORDER — TRAZODONE HYDROCHLORIDE 100 MG/1
1 TABLET ORAL DAILY
COMMUNITY
Start: 2022-07-12 | End: 2023-10-17 | Stop reason: ALTCHOICE

## 2023-08-17 RX ORDER — CHLORHEXIDINE GLUCONATE 40 MG/ML
SOLUTION TOPICAL
COMMUNITY
Start: 2023-03-02 | End: 2023-10-17 | Stop reason: ALTCHOICE

## 2023-08-17 RX ORDER — ASPIRIN 325 MG
325 TABLET ORAL DAILY
COMMUNITY
End: 2023-10-17 | Stop reason: ALTCHOICE

## 2023-08-17 RX ORDER — SPIRONOLACTONE 100 MG/1
200 TABLET, FILM COATED ORAL
COMMUNITY
Start: 2020-08-04 | End: 2023-10-17 | Stop reason: ALTCHOICE

## 2023-08-17 RX ORDER — IBUPROFEN 800 MG/1
1 TABLET ORAL DAILY PRN
COMMUNITY
Start: 2018-10-15 | End: 2023-10-17 | Stop reason: ALTCHOICE

## 2023-08-17 RX ORDER — DOCUSATE SODIUM 100 MG/1
1 CAPSULE, LIQUID FILLED ORAL 2 TIMES DAILY
COMMUNITY
Start: 2022-07-12 | End: 2023-12-11 | Stop reason: ALTCHOICE

## 2023-08-17 RX ORDER — ACETAZOLAMIDE 500 MG/1
1 CAPSULE, EXTENDED RELEASE ORAL DAILY
COMMUNITY
Start: 2022-08-28 | End: 2023-10-17 | Stop reason: ALTCHOICE

## 2023-08-17 RX ORDER — TRAZODONE HYDROCHLORIDE 50 MG/1
TABLET ORAL
COMMUNITY
Start: 2020-02-17 | End: 2023-10-17 | Stop reason: ALTCHOICE

## 2023-08-17 RX ORDER — FUROSEMIDE 20 MG/1
1 TABLET ORAL DAILY
COMMUNITY
End: 2023-10-17 | Stop reason: ALTCHOICE

## 2023-08-17 RX ORDER — LORAZEPAM 0.5 MG/1
TABLET ORAL 2 TIMES DAILY PRN
COMMUNITY
Start: 2022-06-01 | End: 2023-12-11 | Stop reason: ALTCHOICE

## 2023-08-17 RX ORDER — PREDNISOLONE 5 MG/1
5 TABLET ORAL DAILY
COMMUNITY
End: 2023-10-17 | Stop reason: ALTCHOICE

## 2023-08-17 RX ORDER — LISINOPRIL 5 MG/1
1 TABLET ORAL DAILY
COMMUNITY
Start: 2018-08-11 | End: 2023-10-17 | Stop reason: ALTCHOICE

## 2023-08-17 RX ORDER — CEPHALEXIN 500 MG/1
1 CAPSULE ORAL 2 TIMES DAILY
COMMUNITY
Start: 2022-12-12 | End: 2023-10-17 | Stop reason: ALTCHOICE

## 2023-08-17 RX ORDER — ALLOPURINOL 100 MG/1
1 TABLET ORAL DAILY
COMMUNITY
End: 2024-01-24 | Stop reason: SDUPTHER

## 2023-08-17 RX ORDER — TIRZEPATIDE 7.5 MG/.5ML
INJECTION, SOLUTION SUBCUTANEOUS
COMMUNITY
Start: 2022-10-13 | End: 2023-10-17 | Stop reason: ALTCHOICE

## 2023-08-17 RX ORDER — VALGANCICLOVIR 450 MG/1
1 TABLET, FILM COATED ORAL DAILY
COMMUNITY
Start: 2022-07-01 | End: 2023-10-17 | Stop reason: ALTCHOICE

## 2023-08-17 RX ORDER — BUMETANIDE 2 MG/1
2 TABLET ORAL DAILY
COMMUNITY
End: 2023-10-17 | Stop reason: ALTCHOICE

## 2023-08-17 RX ORDER — CLOTRIMAZOLE 10 MG/1
LOZENGE ORAL; TOPICAL
COMMUNITY
Start: 2022-09-06 | End: 2023-10-17 | Stop reason: ALTCHOICE

## 2023-08-17 RX ORDER — PANTOPRAZOLE SODIUM 40 MG/1
1 TABLET, DELAYED RELEASE ORAL DAILY
COMMUNITY
Start: 2019-02-20 | End: 2023-10-17 | Stop reason: ALTCHOICE

## 2023-08-17 RX ORDER — PREDNISONE 20 MG/1
1 TABLET ORAL DAILY
COMMUNITY
Start: 2022-07-01 | End: 2023-10-17 | Stop reason: ALTCHOICE

## 2023-08-17 RX ORDER — VELPATASVIR AND SOFOSBUVIR 100; 400 MG/1; MG/1
1 TABLET, FILM COATED ORAL DAILY
COMMUNITY
Start: 2022-07-15 | End: 2023-10-17 | Stop reason: ALTCHOICE

## 2023-08-17 RX ORDER — METFORMIN HYDROCHLORIDE 500 MG/1
1 TABLET, EXTENDED RELEASE ORAL
COMMUNITY
End: 2023-11-20 | Stop reason: SDUPTHER

## 2023-08-17 RX ORDER — TRIAMCINOLONE ACETONIDE 1 MG/G
1 CREAM TOPICAL 2 TIMES DAILY
COMMUNITY
Start: 2018-11-06 | End: 2023-10-17 | Stop reason: ALTCHOICE

## 2023-08-17 RX ORDER — PANTOPRAZOLE SODIUM 20 MG/1
1 TABLET, DELAYED RELEASE ORAL DAILY
COMMUNITY
Start: 2022-07-15 | End: 2023-11-13 | Stop reason: SDUPTHER

## 2023-08-24 LAB
ALANINE AMINOTRANSFERASE (SGPT) (U/L) IN SER/PLAS: 6 U/L (ref 10–52)
ALANINE AMINOTRANSFERASE (SGPT) (U/L) IN SER/PLAS: ABNORMAL U/L (ref 10–52)
ALBUMIN (G/DL) IN SER/PLAS: 4 G/DL (ref 3.4–5)
ALBUMIN (G/DL) IN SER/PLAS: ABNORMAL G/DL (ref 3.4–5)
ALKALINE PHOSPHATASE (U/L) IN SER/PLAS: 75 U/L (ref 33–120)
ALKALINE PHOSPHATASE (U/L) IN SER/PLAS: ABNORMAL U/L (ref 33–120)
ANION GAP IN SER/PLAS: 10 MMOL/L (ref 10–20)
ASPARTATE AMINOTRANSFERASE (SGOT) (U/L) IN SER/PLAS: 8 U/L (ref 9–39)
ASPARTATE AMINOTRANSFERASE (SGOT) (U/L) IN SER/PLAS: ABNORMAL U/L (ref 9–39)
BASOPHILS (10*3/UL) IN BLOOD BY AUTOMATED COUNT: 0.04 X10E9/L (ref 0–0.1)
BASOPHILS/100 LEUKOCYTES IN BLOOD BY AUTOMATED COUNT: 0.8 % (ref 0–2)
BILIRUBIN DIRECT (MG/DL) IN SER/PLAS: 0.1 MG/DL (ref 0–0.3)
BILIRUBIN TOTAL (MG/DL) IN SER/PLAS: 0.6 MG/DL (ref 0–1.2)
BILIRUBIN TOTAL (MG/DL) IN SER/PLAS: ABNORMAL MG/DL (ref 0–1.2)
CALCIUM (MG/DL) IN SER/PLAS: 8.5 MG/DL (ref 8.6–10.3)
CARBON DIOXIDE, TOTAL (MMOL/L) IN SER/PLAS: 28 MMOL/L (ref 21–32)
CHLORIDE (MMOL/L) IN SER/PLAS: 101 MMOL/L (ref 98–107)
CREATININE (MG/DL) IN SER/PLAS: 1.09 MG/DL (ref 0.5–1.3)
EOSINOPHILS (10*3/UL) IN BLOOD BY AUTOMATED COUNT: 0.11 X10E9/L (ref 0–0.7)
EOSINOPHILS/100 LEUKOCYTES IN BLOOD BY AUTOMATED COUNT: 2.3 % (ref 0–6)
ERYTHROCYTE DISTRIBUTION WIDTH (RATIO) BY AUTOMATED COUNT: 13.4 % (ref 11.5–14.5)
ERYTHROCYTE MEAN CORPUSCULAR HEMOGLOBIN CONCENTRATION (G/DL) BY AUTOMATED: 33.3 G/DL (ref 32–36)
ERYTHROCYTE MEAN CORPUSCULAR VOLUME (FL) BY AUTOMATED COUNT: 88 FL (ref 80–100)
ERYTHROCYTES (10*6/UL) IN BLOOD BY AUTOMATED COUNT: 4.59 X10E12/L (ref 4.5–5.9)
GAMMA GLUTAMYL TRANSFERASE (U/L) IN SER/PLAS: 7 U/L (ref 5–64)
GFR MALE: 82 ML/MIN/1.73M2
GLUCOSE (MG/DL) IN SER/PLAS: 102 MG/DL (ref 74–99)
HEMATOCRIT (%) IN BLOOD BY AUTOMATED COUNT: 40.6 % (ref 41–52)
HEMOGLOBIN (G/DL) IN BLOOD: 13.5 G/DL (ref 13.5–17.5)
IMMATURE GRANULOCYTES/100 LEUKOCYTES IN BLOOD BY AUTOMATED COUNT: 0.4 % (ref 0–0.9)
LEUKOCYTES (10*3/UL) IN BLOOD BY AUTOMATED COUNT: 4.8 X10E9/L (ref 4.4–11.3)
LYMPHOCYTES (10*3/UL) IN BLOOD BY AUTOMATED COUNT: 1.18 X10E9/L (ref 1.2–4.8)
LYMPHOCYTES/100 LEUKOCYTES IN BLOOD BY AUTOMATED COUNT: 24.6 % (ref 13–44)
MAGNESIUM (MG/DL) IN SER/PLAS: 1.94 MG/DL (ref 1.6–2.4)
MONOCYTES (10*3/UL) IN BLOOD BY AUTOMATED COUNT: 0.29 X10E9/L (ref 0.1–1)
MONOCYTES/100 LEUKOCYTES IN BLOOD BY AUTOMATED COUNT: 6.1 % (ref 2–10)
NEUTROPHILS (10*3/UL) IN BLOOD BY AUTOMATED COUNT: 3.15 X10E9/L (ref 1.2–7.7)
NEUTROPHILS/100 LEUKOCYTES IN BLOOD BY AUTOMATED COUNT: 65.8 % (ref 40–80)
PHOSPHATE (MG/DL) IN SER/PLAS: 2.7 MG/DL (ref 2.5–4.9)
PLATELETS (10*3/UL) IN BLOOD AUTOMATED COUNT: 160 X10E9/L (ref 150–450)
POTASSIUM (MMOL/L) IN SER/PLAS: 4.3 MMOL/L (ref 3.5–5.3)
PROTEIN TOTAL: 6.7 G/DL (ref 6.4–8.2)
PROTEIN TOTAL: ABNORMAL G/DL (ref 6.4–8.2)
SODIUM (MMOL/L) IN SER/PLAS: 135 MMOL/L (ref 136–145)
TACROLIMUS (NG/ML) IN BLOOD: 4.6 NG/ML (ref 2–15)
UREA NITROGEN (MG/DL) IN SER/PLAS: 12 MG/DL (ref 6–23)

## 2023-09-08 VITALS — HEIGHT: 70 IN | WEIGHT: 282.63 LBS | BODY MASS INDEX: 40.46 KG/M2

## 2023-09-08 DIAGNOSIS — E11.40 DIABETIC NEUROPATHY, PAINFUL (MULTI): Primary | ICD-10-CM

## 2023-09-08 DIAGNOSIS — M54.16 LUMBAR RADICULOPATHY, CHRONIC: ICD-10-CM

## 2023-09-08 DIAGNOSIS — M54.16 LUMBAR RADICULOPATHY, CHRONIC: Primary | ICD-10-CM

## 2023-09-08 RX ORDER — OXYCODONE HYDROCHLORIDE 5 MG/1
5 TABLET ORAL EVERY 12 HOURS
Qty: 60 TABLET | Refills: 0 | Status: CANCELLED | OUTPATIENT
Start: 2023-09-08 | End: 2023-10-08

## 2023-09-08 RX ORDER — OXYCODONE HYDROCHLORIDE 5 MG/1
5 TABLET ORAL 3 TIMES DAILY PRN
Qty: 90 TABLET | Refills: 0 | Status: SHIPPED | OUTPATIENT
Start: 2023-09-08 | End: 2023-10-08

## 2023-09-08 NOTE — TELEPHONE ENCOUNTER
Rx Refill Request Telephone Encounter    Name:  Karl BEST Zenaida  :  [id number]  Medication Name:  Oxycodone 5mg   Specific Pharmacy location:  [facility] [place]  Date of last appointment:  23  Date of next appointment:  10-31-23  Best number to reach patient:  [phone]

## 2023-09-12 ENCOUNTER — TELEPHONE (OUTPATIENT)
Dept: PRIMARY CARE | Facility: CLINIC | Age: 53
End: 2023-09-12
Payer: MEDICARE

## 2023-09-12 NOTE — TELEPHONE ENCOUNTER
Maryam from [employer] called in to check on a medical necessity form for Karl that was faxed over on 8/14. Did not locate it in his chart, stated she will refax the form and she is requesting a phone call to discuss this form. Stating its about diabetic shoes and inserts. Please advise.    Call back# [phone]

## 2023-09-25 LAB
ALANINE AMINOTRANSFERASE (SGPT) (U/L) IN SER/PLAS: 5 U/L (ref 10–52)
ALANINE AMINOTRANSFERASE (SGPT) (U/L) IN SER/PLAS: ABNORMAL U/L (ref 10–52)
ALBUMIN (G/DL) IN SER/PLAS: 3.9 G/DL (ref 3.4–5)
ALBUMIN (G/DL) IN SER/PLAS: ABNORMAL G/DL (ref 3.4–5)
ALKALINE PHOSPHATASE (U/L) IN SER/PLAS: 66 U/L (ref 33–120)
ALKALINE PHOSPHATASE (U/L) IN SER/PLAS: ABNORMAL U/L (ref 33–120)
ANION GAP IN SER/PLAS: 9 MMOL/L (ref 10–20)
ASPARTATE AMINOTRANSFERASE (SGOT) (U/L) IN SER/PLAS: 8 U/L (ref 9–39)
ASPARTATE AMINOTRANSFERASE (SGOT) (U/L) IN SER/PLAS: ABNORMAL U/L (ref 9–39)
BASOPHILS (10*3/UL) IN BLOOD BY AUTOMATED COUNT: 0.03 X10E9/L (ref 0–0.1)
BASOPHILS/100 LEUKOCYTES IN BLOOD BY AUTOMATED COUNT: 0.6 % (ref 0–2)
BILIRUBIN DIRECT (MG/DL) IN SER/PLAS: 0.1 MG/DL (ref 0–0.3)
BILIRUBIN TOTAL (MG/DL) IN SER/PLAS: 0.5 MG/DL (ref 0–1.2)
BILIRUBIN TOTAL (MG/DL) IN SER/PLAS: ABNORMAL MG/DL (ref 0–1.2)
CALCIUM (MG/DL) IN SER/PLAS: 8.2 MG/DL (ref 8.6–10.3)
CARBON DIOXIDE, TOTAL (MMOL/L) IN SER/PLAS: 28 MMOL/L (ref 21–32)
CHLORIDE (MMOL/L) IN SER/PLAS: 102 MMOL/L (ref 98–107)
CREATININE (MG/DL) IN SER/PLAS: 1.13 MG/DL (ref 0.5–1.3)
EOSINOPHILS (10*3/UL) IN BLOOD BY AUTOMATED COUNT: 0.16 X10E9/L (ref 0–0.7)
EOSINOPHILS/100 LEUKOCYTES IN BLOOD BY AUTOMATED COUNT: 3.1 % (ref 0–6)
ERYTHROCYTE DISTRIBUTION WIDTH (RATIO) BY AUTOMATED COUNT: 14 % (ref 11.5–14.5)
ERYTHROCYTE MEAN CORPUSCULAR HEMOGLOBIN CONCENTRATION (G/DL) BY AUTOMATED: 33.2 G/DL (ref 32–36)
ERYTHROCYTE MEAN CORPUSCULAR VOLUME (FL) BY AUTOMATED COUNT: 94 FL (ref 80–100)
ERYTHROCYTES (10*6/UL) IN BLOOD BY AUTOMATED COUNT: 3.91 X10E12/L (ref 4.5–5.9)
ESTIMATED AVERAGE GLUCOSE FOR HBA1C: 54 MG/DL
GAMMA GLUTAMYL TRANSFERASE (U/L) IN SER/PLAS: 6 U/L (ref 5–64)
GFR MALE: 78 ML/MIN/1.73M2
GLUCOSE (MG/DL) IN SER/PLAS: 103 MG/DL (ref 74–99)
HEMATOCRIT (%) IN BLOOD BY AUTOMATED COUNT: 36.7 % (ref 41–52)
HEMOGLOBIN (G/DL) IN BLOOD: 12.2 G/DL (ref 13.5–17.5)
HEMOGLOBIN A1C/HEMOGLOBIN TOTAL IN BLOOD: <3.5 %
IMMATURE GRANULOCYTES/100 LEUKOCYTES IN BLOOD BY AUTOMATED COUNT: 0.6 % (ref 0–0.9)
LEUKOCYTES (10*3/UL) IN BLOOD BY AUTOMATED COUNT: 5.2 X10E9/L (ref 4.4–11.3)
LYMPHOCYTES (10*3/UL) IN BLOOD BY AUTOMATED COUNT: 1.1 X10E9/L (ref 1.2–4.8)
LYMPHOCYTES/100 LEUKOCYTES IN BLOOD BY AUTOMATED COUNT: 21 % (ref 13–44)
MAGNESIUM (MG/DL) IN SER/PLAS: 1.57 MG/DL (ref 1.6–2.4)
MONOCYTES (10*3/UL) IN BLOOD BY AUTOMATED COUNT: 0.29 X10E9/L (ref 0.1–1)
MONOCYTES/100 LEUKOCYTES IN BLOOD BY AUTOMATED COUNT: 5.5 % (ref 2–10)
NEUTROPHILS (10*3/UL) IN BLOOD BY AUTOMATED COUNT: 3.62 X10E9/L (ref 1.2–7.7)
NEUTROPHILS/100 LEUKOCYTES IN BLOOD BY AUTOMATED COUNT: 69.2 % (ref 40–80)
PHOSPHATE (MG/DL) IN SER/PLAS: 2.9 MG/DL (ref 2.5–4.9)
PLATELETS (10*3/UL) IN BLOOD AUTOMATED COUNT: 133 X10E9/L (ref 150–450)
POTASSIUM (MMOL/L) IN SER/PLAS: 3.8 MMOL/L (ref 3.5–5.3)
PROTEIN TOTAL: 6.4 G/DL (ref 6.4–8.2)
PROTEIN TOTAL: ABNORMAL G/DL (ref 6.4–8.2)
SODIUM (MMOL/L) IN SER/PLAS: 135 MMOL/L (ref 136–145)
TACROLIMUS (NG/ML) IN BLOOD: 4.1 NG/ML (ref 2–15)
UREA NITROGEN (MG/DL) IN SER/PLAS: 11 MG/DL (ref 6–23)

## 2023-10-03 ENCOUNTER — TELEPHONE (OUTPATIENT)
Dept: PRIMARY CARE | Facility: CLINIC | Age: 53
End: 2023-10-03
Payer: MEDICARE

## 2023-10-03 NOTE — TELEPHONE ENCOUNTER
Called pharmacy in regards to opioid prescription that was written by Dentist for Percocet 5-325 mg with a sig of 1-2 tabs q 6 hrs prn. Pharmacist did in fact deny the prescription due to patient having a current script of Oxycodone 5 mg at home. Pharmacist did agree with not dispensing the dentist rx. I did call spouse, there was no answer, I did leave a message with spouse stating that Dr. Sol did not want this script to go through as he already has a chronic script of opioid at home and that when he gets low to call us for medication refill. FYI.

## 2023-10-04 ENCOUNTER — OFFICE VISIT (OUTPATIENT)
Dept: SURGERY | Facility: CLINIC | Age: 53
End: 2023-10-04
Payer: MEDICARE

## 2023-10-04 VITALS
BODY MASS INDEX: 29.78 KG/M2 | SYSTOLIC BLOOD PRESSURE: 120 MMHG | HEIGHT: 70 IN | WEIGHT: 208 LBS | HEART RATE: 85 BPM | DIASTOLIC BLOOD PRESSURE: 82 MMHG

## 2023-10-04 DIAGNOSIS — K43.2 INCISIONAL HERNIA, WITHOUT OBSTRUCTION OR GANGRENE: Primary | ICD-10-CM

## 2023-10-04 DIAGNOSIS — Z94.4 LIVER TRANSPLANT STATUS (MULTI): ICD-10-CM

## 2023-10-04 PROCEDURE — 3079F DIAST BP 80-89 MM HG: CPT | Performed by: TRANSPLANT SURGERY

## 2023-10-04 PROCEDURE — 3046F HEMOGLOBIN A1C LEVEL >9.0%: CPT | Performed by: TRANSPLANT SURGERY

## 2023-10-04 PROCEDURE — 3066F NEPHROPATHY DOC TX: CPT | Performed by: TRANSPLANT SURGERY

## 2023-10-04 PROCEDURE — 4010F ACE/ARB THERAPY RXD/TAKEN: CPT | Performed by: TRANSPLANT SURGERY

## 2023-10-04 PROCEDURE — 3008F BODY MASS INDEX DOCD: CPT | Performed by: TRANSPLANT SURGERY

## 2023-10-04 PROCEDURE — 3074F SYST BP LT 130 MM HG: CPT | Performed by: TRANSPLANT SURGERY

## 2023-10-04 PROCEDURE — 99214 OFFICE O/P EST MOD 30 MIN: CPT | Performed by: TRANSPLANT SURGERY

## 2023-10-04 PROCEDURE — 1036F TOBACCO NON-USER: CPT | Performed by: TRANSPLANT SURGERY

## 2023-10-05 DIAGNOSIS — R10.13 EPIGASTRIC PAIN: ICD-10-CM

## 2023-10-05 DIAGNOSIS — M48.062 NEUROGENIC CLAUDICATION DUE TO LUMBAR SPINAL STENOSIS: ICD-10-CM

## 2023-10-05 DIAGNOSIS — E11.40 DIABETIC NEUROPATHY, PAINFUL (MULTI): Primary | ICD-10-CM

## 2023-10-05 RX ORDER — OXYCODONE HYDROCHLORIDE 5 MG/1
5 TABLET ORAL 3 TIMES DAILY PRN
Qty: 90 TABLET | Refills: 0 | Status: SHIPPED | OUTPATIENT
Start: 2023-10-05 | End: 2023-10-17 | Stop reason: ALTCHOICE

## 2023-10-05 NOTE — PROGRESS NOTES
Assessment and Plan  Subjective   Patient ID: MELANIE Estevez is a 52 y.o. male who presents for complaints of incisional pain likely suffering from a incisional hernia.  He had a history of liver transplantation with subsequent incisional hernia in the midline which was repaired many months ago.  He has a new bulge in the right lateral side of his incision and pain likely developing a new hernia.  He has had a CAT scan done which I will review.  If he indeed has a hernia, I will plan for repair with mesh.  He wishes to have surgery done in the wintertime due to his schedule.      HPI    Patient is a 52-year-old male who is well-known to me and the transplant service.  He had a liver transplant over a year ago.  He has done very well since then without major issues.  He has lost a significant amount of weight due to active lifestyle and feeling well after transplantation.  Posttransplantation he had developed an incisional hernia in the midline status post repair with mesh with Dr. Bradley last year.  He presented with complaints of possible needle hernia developing in the right side of his Mercedes-Vasyl incision.  He is complaining of sharp pain when he was active.  There is no complaining of nausea vomiting.    Review of Systems  Constitutional: no fever and no chills.   Eyes: no eye problems.   ENT: no ear symptoms and no nasal symptoms.   Cardiovascular: no chest pain.   Respiratory: no cough and no shortness of breath.   Gastrointestinal: no constipation, no vomiting, no diarrhea and no nausea.   Genitourinary. no dysuria.   Musculoskeletal: no myalgias.   Integumentary: no skin lesions.   Neurological no headache and no dizziness.   Psychiatric: has stable social support system.   Endocrine: not diabetic.   All other systems have been reviewed and are negative for complaint.    Objective     Physical Exam  Constitutional - General appearance: In no acute distress, well appearing and  well nourished.   Eyes Conjunctiva and lids: No erythema, swelling or discharge.    Ears, Nose, Mouth, and Throat - External inspection of ears and nose: Normal.   Neck - Exam: Appearance of the neck was normal. No neck masses observed   Pulmonary - Respiratory effort: Normal respiration.   Cardiovascular - Examination of extremities for edema and/or varicosities: No peripheral edema   Abdomen - Abdomen: Non-tender, no abdominal masses   Mild bulge and tenderness in the right lateral segment of incision  No obvious hernia appreicated  Musculoskeletal - normal palpation of joints, bones, and muscles.   Skin - skin and subcutaneous tissue: normal without rashes or lesions.   Neurologic - Coordination: normal gait, Focal deficit: no focal deficit, moves all extremities   Psychiatric - Orientation to person, place, and time: Normal, Mood and affect: Normal    Lab Results   Component Value Date    WBC 5.2 09/25/2023    HGB 12.2 (L) 09/25/2023    HCT 36.7 (L) 09/25/2023    MCV 94 09/25/2023     (L) 09/25/2023      Lab Results   Component Value Date    GLUCOSE 103 (H) 09/25/2023    CALCIUM 8.2 (L) 09/25/2023     (L) 09/25/2023    K 3.8 09/25/2023    CO2 28 09/25/2023     09/25/2023    BUN 11 09/25/2023    CREATININE 1.13 09/25/2023      Lab Results   Component Value Date    ALT 5 (L) 09/25/2023    ALT CANCELED (L) 09/25/2023    AST 8 (L) 09/25/2023    AST CANCELED (L) 09/25/2023    GGT 6 09/25/2023    ALKPHOS 66 09/25/2023    ALKPHOS CANCELED 09/25/2023    BILITOT 0.5 09/25/2023    BILITOT CANCELED 09/25/2023        Assessment/Plan

## 2023-10-17 ENCOUNTER — OFFICE VISIT (OUTPATIENT)
Dept: ENDOCRINOLOGY | Facility: CLINIC | Age: 53
End: 2023-10-17
Payer: MEDICARE

## 2023-10-17 VITALS
BODY MASS INDEX: 29.63 KG/M2 | SYSTOLIC BLOOD PRESSURE: 117 MMHG | HEIGHT: 70 IN | RESPIRATION RATE: 16 BRPM | WEIGHT: 207 LBS | HEART RATE: 78 BPM | DIASTOLIC BLOOD PRESSURE: 85 MMHG

## 2023-10-17 DIAGNOSIS — J42 CHRONIC BRONCHITIS, UNSPECIFIED CHRONIC BRONCHITIS TYPE (MULTI): ICD-10-CM

## 2023-10-17 DIAGNOSIS — E11.42 TYPE 2 DIABETES MELLITUS WITH DIABETIC POLYNEUROPATHY, WITHOUT LONG-TERM CURRENT USE OF INSULIN (MULTI): Primary | ICD-10-CM

## 2023-10-17 DIAGNOSIS — D50.9 IRON DEFICIENCY ANEMIA, UNSPECIFIED IRON DEFICIENCY ANEMIA TYPE: ICD-10-CM

## 2023-10-17 DIAGNOSIS — I85.10 ESOPHAGEAL VARICES IN CIRRHOSIS (MULTI): ICD-10-CM

## 2023-10-17 DIAGNOSIS — N25.81 SECONDARY HYPERPARATHYROIDISM OF RENAL ORIGIN (MULTI): ICD-10-CM

## 2023-10-17 DIAGNOSIS — K74.60 ESOPHAGEAL VARICES IN CIRRHOSIS (MULTI): ICD-10-CM

## 2023-10-17 PROCEDURE — 3008F BODY MASS INDEX DOCD: CPT | Performed by: PHYSICIAN ASSISTANT

## 2023-10-17 PROCEDURE — 3046F HEMOGLOBIN A1C LEVEL >9.0%: CPT | Performed by: PHYSICIAN ASSISTANT

## 2023-10-17 PROCEDURE — 3066F NEPHROPATHY DOC TX: CPT | Performed by: PHYSICIAN ASSISTANT

## 2023-10-17 PROCEDURE — 3079F DIAST BP 80-89 MM HG: CPT | Performed by: PHYSICIAN ASSISTANT

## 2023-10-17 PROCEDURE — 1036F TOBACCO NON-USER: CPT | Performed by: PHYSICIAN ASSISTANT

## 2023-10-17 PROCEDURE — 3074F SYST BP LT 130 MM HG: CPT | Performed by: PHYSICIAN ASSISTANT

## 2023-10-17 PROCEDURE — 99214 OFFICE O/P EST MOD 30 MIN: CPT | Performed by: PHYSICIAN ASSISTANT

## 2023-10-17 PROCEDURE — 95251 CONT GLUC MNTR ANALYSIS I&R: CPT | Performed by: PHYSICIAN ASSISTANT

## 2023-10-17 RX ORDER — GABAPENTIN 300 MG/1
CAPSULE ORAL
Qty: 150 CAPSULE | Refills: 11 | Status: SHIPPED | OUTPATIENT
Start: 2023-10-17 | End: 2024-01-17 | Stop reason: SDUPTHER

## 2023-10-17 RX ORDER — TIRZEPATIDE 10 MG/.5ML
10 INJECTION, SOLUTION SUBCUTANEOUS
Qty: 2 ML | Refills: 5 | Status: SHIPPED | OUTPATIENT
Start: 2023-10-17 | End: 2024-01-17 | Stop reason: SDUPTHER

## 2023-10-17 ASSESSMENT — ENCOUNTER SYMPTOMS: NUMBNESS: 1

## 2023-10-17 NOTE — PATIENT INSTRUCTIONS
Type 2 diabetes mellitus, is at goal. Diabetes looks great, we will check to see if vitamin B12 is contributing to finger pain    RX changes: continue mounjaro 10mg once weekly, continue farxiga 10mg once daily, continue metformin ER 500mg twice daily, increase gabapentin 300mg as 1-2 capsulses as 2-3 times daily   Education:  interpretation of lab results, blood sugar goals, and nutrition  Follow up: I recommend diabetes care be 3 months, 6 months, and 9 months

## 2023-10-17 NOTE — PROGRESS NOTES
Subjective   MELANIE Estevez is a 52 y.o. male who presents for follow-up of Type 2 diabetes mellitus. The initial diagnosis of diabetes was made  more than 10 years ago . The patient does have a known family history of diabetes.    Known complications due to diabetes included peripheral neuropathy    Cardiovascular risk factors include diabetes mellitus and male gender. The patient is not on an ACE inhibitor or angiotensin II receptor blocker.  The patient has not been previously hospitalized due to diabetic ketoacidosis.     Current symptoms/problems include paresthesia of the feet. His clinical course has been stable.     Current diabetes regimen is as follows:  GIP-GLP1, SGLT2, and metformin      The patient is currently checking the blood glucose 5+ times per day.    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    Review of Systems   Neurological:  Positive for numbness.   All other systems reviewed and are negative.      Objective   There were no vitals taken for this visit.  Physical Exam  Constitutional:       Appearance: Normal appearance. He is normal weight.   HENT:      Nose: Nose normal.      Mouth/Throat:      Mouth: Mucous membranes are moist.      Pharynx: Oropharynx is clear.   Eyes:      Extraocular Movements: Extraocular movements intact.      Conjunctiva/sclera: Conjunctivae  normal.   Cardiovascular:      Rate and Rhythm: Normal rate and regular rhythm.      Pulses: Normal pulses.           Dorsalis pedis pulses are 2+ on the right side and 2+ on the left side.        Posterior tibial pulses are 2+ on the right side and 2+ on the left side.      Heart sounds: Normal heart sounds.   Pulmonary:      Effort: Pulmonary effort is normal.      Breath sounds: Normal breath sounds.   Abdominal:      General: Abdomen is flat.      Palpations: Abdomen is soft.   Musculoskeletal:      Right foot: Deformity and Charcot foot present.      Left foot: No deformity or Charcot foot.   Feet:      Right foot:      Toenail Condition: Right toenails are abnormally thick.      Left foot:      Toenail Condition: Left toenails are abnormally thick.   Neurological:      General: No focal deficit present.      Mental Status: He is alert and oriented to person, place, and time. Mental status is at baseline.   Psychiatric:         Mood and Affect: Mood normal.         Behavior: Behavior normal.         Thought Content: Thought content normal.         Judgment: Judgment normal.         Lab Review  Glucose (mg/dL)   Date Value   09/25/2023 103 (H)   08/24/2023 102 (H)   07/21/2023 82     Hemoglobin A1C (%)   Date Value   09/25/2023 <3.5   07/21/2023 4.1   06/22/2023 3.5     Bicarbonate (mmol/L)   Date Value   09/25/2023 28   08/24/2023 28   07/21/2023 29     Urea Nitrogen (mg/dL)   Date Value   09/25/2023 11   08/24/2023 12   07/21/2023 16     Creatinine (mg/dL)   Date Value   09/25/2023 1.13   08/24/2023 1.09   07/21/2023 1.35 (H)       Health Maintenance:   Foot Exam: podiatry last seen April 2023  Eye Exam: June 2023  Lipid Panel: due for update, ordered today  Urine Albumin: 4/2023    Assessment/Plan   There are no diagnoses linked to this encounter.    Type 2 diabetes mellitus, is at goal. Diabetes looks great, we will check to see if vitamin B12 is contributing to finger pain    RX changes:  continue mounjaro  10mg once weekly, continue farxiga 10mg once daily, continue metformin ER 500mg twice daily, increase gabapentin 300mg as 1-2 capsulses as 2-3 times daily    Education:  interpretation of lab results, blood sugar goals, and nutrition  Follow up: I recommend diabetes care be 3 & 6 months.

## 2023-10-19 ENCOUNTER — APPOINTMENT (OUTPATIENT)
Dept: RADIOLOGY | Facility: CLINIC | Age: 53
End: 2023-10-19
Payer: MEDICARE

## 2023-10-25 ENCOUNTER — SPECIALTY PHARMACY (OUTPATIENT)
Dept: PHARMACY | Facility: CLINIC | Age: 53
End: 2023-10-25

## 2023-10-25 ENCOUNTER — LAB (OUTPATIENT)
Dept: LAB | Facility: LAB | Age: 53
End: 2023-10-25
Payer: MEDICARE

## 2023-10-25 DIAGNOSIS — Z94.0 KIDNEY TRANSPLANT STATUS (HHS-HCC): Primary | ICD-10-CM

## 2023-10-25 DIAGNOSIS — E53.8 VITAMIN B12 DEFICIENCY: Primary | ICD-10-CM

## 2023-10-25 DIAGNOSIS — D84.9 IMMUNODEFICIENCY, UNSPECIFIED (MULTI): ICD-10-CM

## 2023-10-25 DIAGNOSIS — E11.42 TYPE 2 DIABETES MELLITUS WITH DIABETIC POLYNEUROPATHY, WITHOUT LONG-TERM CURRENT USE OF INSULIN (MULTI): ICD-10-CM

## 2023-10-25 DIAGNOSIS — Z94.4 LIVER TRANSPLANT STATUS (MULTI): Primary | ICD-10-CM

## 2023-10-25 DIAGNOSIS — D50.9 IRON DEFICIENCY ANEMIA, UNSPECIFIED IRON DEFICIENCY ANEMIA TYPE: ICD-10-CM

## 2023-10-25 LAB
ALBUMIN SERPL BCP-MCNC: 4 G/DL (ref 3.4–5)
ALP SERPL-CCNC: 74 U/L (ref 33–120)
ALT SERPL W P-5'-P-CCNC: 5 U/L (ref 10–52)
ANION GAP SERPL CALC-SCNC: 11 MMOL/L (ref 10–20)
AST SERPL W P-5'-P-CCNC: 10 U/L (ref 9–39)
BASOPHILS # BLD AUTO: 0.05 X10*3/UL (ref 0–0.1)
BASOPHILS NFR BLD AUTO: 0.9 %
BILIRUB SERPL-MCNC: 0.6 MG/DL (ref 0–1.2)
BUN SERPL-MCNC: 14 MG/DL (ref 6–23)
CALCIUM SERPL-MCNC: 8.5 MG/DL (ref 8.6–10.3)
CHLORIDE SERPL-SCNC: 102 MMOL/L (ref 98–107)
CHOLEST SERPL-MCNC: 129 MG/DL (ref 0–199)
CHOLESTEROL/HDL RATIO: 5.3
CO2 SERPL-SCNC: 27 MMOL/L (ref 21–32)
CREAT SERPL-MCNC: 1.16 MG/DL (ref 0.5–1.3)
EOSINOPHIL # BLD AUTO: 0.18 X10*3/UL (ref 0–0.7)
EOSINOPHIL NFR BLD AUTO: 3.3 %
ERYTHROCYTE [DISTWIDTH] IN BLOOD BY AUTOMATED COUNT: 13.2 % (ref 11.5–14.5)
GFR SERPL CREATININE-BSD FRML MDRD: 76 ML/MIN/1.73M*2
GGT SERPL-CCNC: 9 U/L (ref 5–64)
GLUCOSE SERPL-MCNC: 74 MG/DL (ref 74–99)
HCT VFR BLD AUTO: 37.7 % (ref 41–52)
HDLC SERPL-MCNC: 24.2 MG/DL
HGB BLD-MCNC: 12 G/DL (ref 13.5–17.5)
IMM GRANULOCYTES # BLD AUTO: 0.06 X10*3/UL (ref 0–0.7)
IMM GRANULOCYTES NFR BLD AUTO: 1.1 % (ref 0–0.9)
LDLC SERPL CALC-MCNC: 84 MG/DL
LYMPHOCYTES # BLD AUTO: 1.3 X10*3/UL (ref 1.2–4.8)
LYMPHOCYTES NFR BLD AUTO: 24.1 %
MCH RBC QN AUTO: 29.2 PG (ref 26–34)
MCHC RBC AUTO-ENTMCNC: 31.8 G/DL (ref 32–36)
MCV RBC AUTO: 92 FL (ref 80–100)
MONOCYTES # BLD AUTO: 0.39 X10*3/UL (ref 0.1–1)
MONOCYTES NFR BLD AUTO: 7.2 %
NEUTROPHILS # BLD AUTO: 3.41 X10*3/UL (ref 1.2–7.7)
NEUTROPHILS NFR BLD AUTO: 63.4 %
NON HDL CHOLESTEROL: 105 MG/DL (ref 0–149)
NRBC BLD-RTO: 0 /100 WBCS (ref 0–0)
PLATELET # BLD AUTO: 162 X10*3/UL (ref 150–450)
PMV BLD AUTO: 9.8 FL (ref 7.5–11.5)
POTASSIUM SERPL-SCNC: 4 MMOL/L (ref 3.5–5.3)
PROT SERPL-MCNC: 6.5 G/DL (ref 6.4–8.2)
RBC # BLD AUTO: 4.11 X10*6/UL (ref 4.5–5.9)
SODIUM SERPL-SCNC: 136 MMOL/L (ref 136–145)
TACROLIMUS BLD-MCNC: 5.8 NG/ML
TRIGL SERPL-MCNC: 103 MG/DL (ref 0–149)
VIT B12 SERPL-MCNC: 168 PG/ML (ref 211–911)
VLDL: 21 MG/DL (ref 0–40)
WBC # BLD AUTO: 5.4 X10*3/UL (ref 4.4–11.3)

## 2023-10-25 PROCEDURE — 82607 VITAMIN B-12: CPT

## 2023-10-25 PROCEDURE — 85025 COMPLETE CBC W/AUTO DIFF WBC: CPT

## 2023-10-25 PROCEDURE — 36415 COLL VENOUS BLD VENIPUNCTURE: CPT

## 2023-10-25 PROCEDURE — 82977 ASSAY OF GGT: CPT

## 2023-10-25 PROCEDURE — 80053 COMPREHEN METABOLIC PANEL: CPT

## 2023-10-25 PROCEDURE — 80061 LIPID PANEL: CPT

## 2023-10-25 PROCEDURE — 80197 ASSAY OF TACROLIMUS: CPT

## 2023-10-25 RX ORDER — CYANOCOBALAMIN 1000 UG/ML
1000 INJECTION, SOLUTION INTRAMUSCULAR; SUBCUTANEOUS ONCE
Status: DISCONTINUED | OUTPATIENT
Start: 2023-10-25 | End: 2023-10-25

## 2023-10-25 RX ORDER — CHOLECALCIFEROL (VITAMIN D3) 25 MCG
TABLET,CHEWABLE ORAL
Qty: 90 LOZENGE | Refills: 3 | Status: SHIPPED | OUTPATIENT
Start: 2023-10-25 | End: 2024-01-23 | Stop reason: SDUPTHER

## 2023-10-25 NOTE — PROGRESS NOTES
Patient was called and updated on his vitamin B12 deficiency noted on labs.  He was instructed to take injected 1000 mcg of B12 every 2 weeks x 2 doses.  He was instructed to start oral B12 supplementation once daily after second dose of injected B12

## 2023-10-26 RX ORDER — DAPSONE 100 MG/1
1 TABLET ORAL DAILY
Qty: 30 TABLET | Refills: 11 | Status: SHIPPED | OUTPATIENT
Start: 2023-10-26 | End: 2023-10-31 | Stop reason: ALTCHOICE

## 2023-10-30 ENCOUNTER — PHARMACY VISIT (OUTPATIENT)
Dept: PHARMACY | Facility: CLINIC | Age: 53
End: 2023-10-30
Payer: MEDICARE

## 2023-10-31 ENCOUNTER — OFFICE VISIT (OUTPATIENT)
Dept: PRIMARY CARE | Facility: CLINIC | Age: 53
End: 2023-10-31
Payer: MEDICARE

## 2023-10-31 VITALS
OXYGEN SATURATION: 98 % | BODY MASS INDEX: 28.63 KG/M2 | DIASTOLIC BLOOD PRESSURE: 64 MMHG | HEIGHT: 70 IN | HEART RATE: 74 BPM | WEIGHT: 200 LBS | SYSTOLIC BLOOD PRESSURE: 108 MMHG | TEMPERATURE: 96.9 F | RESPIRATION RATE: 16 BRPM

## 2023-10-31 DIAGNOSIS — E11.42 TYPE 2 DIABETES MELLITUS WITH DIABETIC POLYNEUROPATHY, WITH LONG-TERM CURRENT USE OF INSULIN (MULTI): ICD-10-CM

## 2023-10-31 DIAGNOSIS — I10 BENIGN ESSENTIAL HTN: ICD-10-CM

## 2023-10-31 DIAGNOSIS — Z94.4 HISTORY OF LIVER TRANSPLANT (MULTI): ICD-10-CM

## 2023-10-31 DIAGNOSIS — I73.00 RAYNAUD'S PHENOMENON WITHOUT GANGRENE: Primary | ICD-10-CM

## 2023-10-31 DIAGNOSIS — M48.062 NEUROGENIC CLAUDICATION DUE TO LUMBAR SPINAL STENOSIS: ICD-10-CM

## 2023-10-31 DIAGNOSIS — R35.0 BENIGN PROSTATIC HYPERPLASIA WITH URINARY FREQUENCY: ICD-10-CM

## 2023-10-31 DIAGNOSIS — G62.89 OTHER POLYNEUROPATHY: ICD-10-CM

## 2023-10-31 DIAGNOSIS — N41.1 CHRONIC PROSTATITIS: ICD-10-CM

## 2023-10-31 DIAGNOSIS — N40.1 BENIGN PROSTATIC HYPERPLASIA WITH URINARY FREQUENCY: ICD-10-CM

## 2023-10-31 DIAGNOSIS — Z79.4 TYPE 2 DIABETES MELLITUS WITH DIABETIC POLYNEUROPATHY, WITH LONG-TERM CURRENT USE OF INSULIN (MULTI): ICD-10-CM

## 2023-10-31 PROCEDURE — 99214 OFFICE O/P EST MOD 30 MIN: CPT | Performed by: FAMILY MEDICINE

## 2023-10-31 PROCEDURE — 3074F SYST BP LT 130 MM HG: CPT | Performed by: FAMILY MEDICINE

## 2023-10-31 PROCEDURE — 3046F HEMOGLOBIN A1C LEVEL >9.0%: CPT | Performed by: FAMILY MEDICINE

## 2023-10-31 PROCEDURE — 3008F BODY MASS INDEX DOCD: CPT | Performed by: FAMILY MEDICINE

## 2023-10-31 PROCEDURE — 3048F LDL-C <100 MG/DL: CPT | Performed by: FAMILY MEDICINE

## 2023-10-31 PROCEDURE — 3078F DIAST BP <80 MM HG: CPT | Performed by: FAMILY MEDICINE

## 2023-10-31 PROCEDURE — 3066F NEPHROPATHY DOC TX: CPT | Performed by: FAMILY MEDICINE

## 2023-10-31 PROCEDURE — 1036F TOBACCO NON-USER: CPT | Performed by: FAMILY MEDICINE

## 2023-10-31 RX ORDER — AMLODIPINE BESYLATE 2.5 MG/1
2.5 TABLET ORAL DAILY
Qty: 30 TABLET | Refills: 2 | Status: SHIPPED | OUTPATIENT
Start: 2023-10-31 | End: 2024-04-16 | Stop reason: DRUGHIGH

## 2023-10-31 RX ORDER — OXYCODONE HYDROCHLORIDE 5 MG/1
5 CAPSULE ORAL EVERY 8 HOURS PRN
Qty: 90 CAPSULE | Refills: 0 | Status: CANCELLED | OUTPATIENT
Start: 2023-11-08

## 2023-10-31 RX ORDER — CIPROFLOXACIN 250 MG/1
250 TABLET, FILM COATED ORAL 2 TIMES DAILY
Qty: 28 TABLET | Refills: 0 | Status: SHIPPED | OUTPATIENT
Start: 2023-10-31 | End: 2023-11-14

## 2023-10-31 NOTE — PROGRESS NOTES
Subjective   Patient ID: MELANIE Estevez is a 52 y.o. male who presents for Back Pain (3 month follow up ).  HPI  : 52-year-old gentleman is status post liver transplant and is followed by cardiology [facility] for diabetes on Farxiga and low-dose metformin as well as Mounjaro otherwise A1c has been excellent  Does take his oxycodone for his peripheral neuropathy and also low back discomfort for lumbar canal stenosis he elects no surgery at this time has been followed by pain clinic in the past.  Debating surgery but declines it at this time  Otherwise followed by liver transplant team because successful liver transplant liver enzymes been stable and otherwise for hypertension does take low-dose Coreg but no increased abdominal swelling or peripheral edema.  Template for opioids completed safety is reviewed  Oarrs completed  Gomez has some coolness and discoloration of the fingertips and exposure to cold compatible with Raynaud's phenomenon.  Otherwise chronic meds from subspecialist were reviewed no reported side effects.  Really he had these fingertips symptoms in cold weather before institution of Coreg  Review of Systems   Constitutional:  Negative for fatigue, fever and unexpected weight change.   Eyes:  Negative for visual disturbance.   Respiratory:  Negative for cough, chest tightness and shortness of breath.    Cardiovascular:  Negative for chest pain, palpitations and leg swelling.   Gastrointestinal:  Negative for abdominal pain, blood in stool and vomiting.   Genitourinary:  Negative for dysuria, frequency and hematuria.   Musculoskeletal:  Positive for arthralgias and back pain. Negative for myalgias.   Skin:  Negative for rash.   Neurological:  Positive for numbness. Negative for weakness, light-headedness and headaches.   Hematological:  Negative for adenopathy.   Psychiatric/Behavioral:  Positive for dysphoric mood and sleep disturbance. Negative for behavioral problems and suicidal ideas.   "      Objective   /64 (BP Location: Right arm, Patient Position: Sitting, BP Cuff Size: Large adult)   Pulse 74   Temp 36.1 °C (96.9 °F) (Temporal)   Resp 16   Ht 1.778 m (5' 10\")   Wt 90.7 kg (200 lb)   SpO2 98%   BMI 28.70 kg/m²    Contains abnormal data Comprehensive Metabolic Panel  Order: [id number]  Status: Final result       Visible to patient: Yes (seen)       Dx: Liver transplant status (CMS/HCC); Im...    0 Result Notes             Component  Ref Range & Units 11 d ago  (10/25/23) 1 mo ago  (9/25/23) 1 mo ago  (9/25/23) 2 mo ago  (8/24/23) 2 mo ago  (8/24/23) 3 mo ago  (7/21/23) 4 mo ago  (6/22/23) 4 mo ago  (6/22/23)   Glucose  74 - 99 mg/dL 74 103 High   102 High   82 110 High     Sodium  136 - 145 mmol/L 136 135 Low   135 Low   137 138    Potassium  3.5 - 5.3 mmol/L 4.0 3.8  4.3  3.9 4.0    Chloride  98 - 107 mmol/L 102 102  101  102 105    Bicarbonate  21 - 32 mmol/L 27 28  28  29 26    Anion Gap  10 - 20 mmol/L 11 9 Low   10  10 11    Urea Nitrogen  6 - 23 mg/dL 14 11  12  16 18    Creatinine  0.50 - 1.30 mg/dL 1.16 1.13  1.09  1.35 High  1.19    eGFR  >60 mL/min/1.73m*2 76          Comment: Calculations of estimated GFR are performed using the 2021 CKD-EPI Study Refit equation without the race variable for the IDMS-Traceable creatinine methods.  https://jasn.asnjournals.org/content/early/2021/09/22/ASN.8948666069   Calcium  8.6 - 10.3 mg/dL 8.5 Low  8.2 Low   8.5 Low   8.5 Low  8.5 Low     Albumin  3.4 - 5.0 g/dL 4.0 3.9 CANCELED CM 4.0 CANCELED CM 4.0  4.1   Alkaline Phosphatase  33 - 120 U/L 74 66 CANCELED CM 75 CANCELED CM 81  76   Total Protein  6.4 - 8.2 g/dL 6.5 6.4 CANCELED CM 6.7 CANCELED CM 6.5  6.3 Low    AST  9 - 39 U/L 10 8 Low  CANCELED Low  CM 8 Low  CANCELED Low  CM 9  10   Bilirubin, Total  0.0 - 1.2 mg/dL 0.6 0.5 CANCELED CM 0.6 CANCELED CM 0.7  0.5   ALT  10 - 52 U/L 5 Low  5 Low  CM CANCELED Low  CM 6 Low  CM CANCELED Low  CM 7 Low  CM  5 Low  CM   Comment: Patients " treated with Sulfasalazine may generate falsely decreased results for ALT.   Resulting Agency [facility]             Vitamin B12  Order: [id number]  Status: Final result       Visible to patient: Yes (seen)       Dx: Iron deficiency anemia, unspecified i...    0 Result Notes        Component  Ref Range & Units 11 d ago 1 yr ago 3 yr ago   Vitamin B12  211 - 911 pg/mL 168 Low  127 Low  1,403 High    Resulting Agency [facility]                    Lipid panel  Order: [id number]  Status: Final result       Visible to patient: Yes (seen)       Dx: Type 2 diabetes mellitus with diabeti...    0 Result Notes       1  Topic        Component  Ref Range & Units 11 d ago 3 yr ago 4 yr ago   Cholesterol  0 - 199 mg/dL 129 109  CM   Comment:      Age      Desirable   Borderline High   High     0-19 Y     0 - 169       170 - 199     >/= 200    20-24 Y     0 - 189       190 - 224     >/= 225        >24 Y     0 - 199       200 - 239     >/= 240   **All ranges are based on fasting samples. Specific   therapeutic targets will vary based on patient-specific   cardiac risk.    Pediatric guidelines reference:Pediatrics 2011, 128(S5).Adult guidelines reference: NCEP ATPIII Guidelines,SHAWN 2001, 258:2486-97    Venipuncture immediately after or during the administration of Metamizole may lead to falsely low results. Testing should be performed immediately prior to Metamizole dosing.   HDL-Cholesterol  mg/dL 24.2 29.9 Abnormal  CM 23.9 Abnormal  CM   Comment:  Age       Very Low   Low     Normal    High    0-19 Y    < 35      < 40     40-45     ----  20-24 Y    ----     < 40      >45      ----        >24 Y      ----     < 40     40-60      >60   Cholesterol/HDL Ratio 5.3 3.6 CM 6.9 Abnormal  CM   Comment:  Ref Values  Desirable  < 3.4  High Risk  > 5.0   LDL Calculated  <=99 mg/dL 84 61 R,  High  R, CM   Comment:                            Near   Borderline      AGE      Desirable  Optimal    High     High     Very  High     0-19 Y     0 - 109     ---    110-129   >/= 130     ----    20-24 Y     0 - 119     ---    120-159   >/= 160     ----      >24 Y     0 -  99   100-129  130-159   160-189     >/=190   VLDL  0 - 40 mg/dL 21 19 37   Triglycerides  0 - 149 mg/dL 103        Hemoglobin A1C  Order: [id number]  Status: Final result       Visible to patient: Yes (not seen)    0 Result Notes       1  Topic            Component  Ref Range & Units 1 mo ago  (9/25/23) 3 mo ago  (7/21/23) 4 mo ago  (6/22/23) 7 mo ago  (3/13/23) 10 mo ago  (1/3/23) 1 yr ago  (9/12/22) 1 yr ago  (6/14/22)   Hemoglobin A1C  % <3.5 4.1 CM 3.5 CM 3.7 CM 3.7 CM 4.7 CM 6.7 Abnormal  CM   Comment:      Diagnosis of Diabetes-Adults   Non-Diabetic: < or = 5.6%   Increased risk for developing diabetes: 5.7-6.4%   Diagnostic of diabetes: > or = 6.5%  .       Monitoring of Diabetes                Age (y)     Therapeutic Goal (%)   Adults:          >18           <7.0   Pediatrics:    13-18           <7.5                   7-12           <8.0                Prostate Specific Antigen, Screen  Order: [id number]  Status: Final result       Visible to patient: Yes (not seen)    0 Result Notes      Component  Ref Range & Units 6 mo ago   Prostate Specific Antigen,Screen  0.00 - 4.00 ng/mL 0.22   Comment: The FDA requires that the method used for PSA assay be  reported to the physician. Values obtained with different  assay methods must not be used interchangeably. This test  was performed at [facility] using the Siemens      OARRS:  Jose Sol DO on 10/31/2023  8:54 AM  I have personally reviewed the OARRS report for Karl Estevez. I have considered the risks of abuse, dependence, addiction and diversion    Is the patient prescribed a combination of a benzodiazepine and opioid?  No    Last Urine Drug Screen / ordered today: Yes  Recent Results (from the past 8760 hour(s))   OPIATE/OPIOID/BENZO PRESCRIPTION COMPLIANCE    Collection Time: 02/27/23   8:42 AM   Result Value Ref Range    DRUG SCREEN COMMENT URINE SEE BELOW     Creatine, Urine 134.6 mg/dL    Amphetamine Screen, Urine PRESUMPTIVE NEGATIVE NEGATIVE    Barbiturate Screen, Urine PRESUMPTIVE NEGATIVE NEGATIVE    Cannabinoid Screen, Urine PRESUMPTIVE NEGATIVE NEGATIVE    Cocaine Screen, Urine PRESUMPTIVE NEGATIVE NEGATIVE    PCP Screen, Urine PRESUMPTIVE NEGATIVE NEGATIVE    7-Aminoclonazepam <25 Cutoff <25 ng/mL    Alpha-Hydroxyalprazolam <25 Cutoff <25 ng/mL    Alpha-Hydroxymidazolam <25 Cutoff <25 ng/mL    Alprazolam <25 Cutoff <25 ng/mL    Chlordiazepoxide <25 Cutoff <25 ng/mL    Clonazepam <25 Cutoff <25 ng/mL    Diazepam <25 Cutoff <25 ng/mL    Lorazepam <25 Cutoff <25 ng/mL    Midazolam <25 Cutoff <25 ng/mL    Nordiazepam <25 Cutoff <25 ng/mL    Oxazepam <25 Cutoff <25 ng/mL    Temazepam <25 Cutoff <25 ng/mL    Zolpidem <25 Cutoff <25 ng/mL    Zolpidem Metabolite (ZCA) <25 Cutoff <25 ng/mL    6-Acetylmorphine <25 Cutoff <25 ng/mL    Codeine <50 Cutoff <50 ng/mL    Hydrocodone <25 Cutoff <25 ng/mL    Hydromorphone <25 Cutoff <25 ng/mL    Morphine Urine <50 Cutoff <50 ng/mL    Norhydrocodone <25 Cutoff <25 ng/mL    Noroxycodone >1000 (A) Cutoff <25 ng/mL    Oxycodone 2,256 (A) Cutoff <25 ng/mL    Oxymorphone 1,831 (A) Cutoff <25 ng/mL    Tramadol <50 Cutoff <50 ng/mL    O-Desmethyltramadol <50 Cutoff <50 ng/mL    Fentanyl <2.5 Cutoff<2.5 ng/mL    Norfentanyl <2.5 Cutoff<2.5 ng/mL    METHADONE CONFIRMATION,URINE <25 Cutoff <25 ng/mL    EDDP <25 Cutoff <25 ng/mL     Results are as expected.         Controlled Substance Agreement:  Date of the Last Agreement: 2-  Reviewed Controlled Substance Agreement including but not limited to the benefits, risks, and alternatives to treatment with a Controlled Substance medication(s).    Opioids:  What is the patient's goal of therapy?  Trolled peripheral neuropathy from diabetes as well as low back discomfort benefit still outweighs risk and no evidence of  abuse side effects or divergence  Is this being achieved with current treatment? yes    I have calculated the patient's Morphine Dose Equivalent (MED):   I have considered referral to Pain Management and/or a specialist, and do not feel it is necessary at this time.    I feel that it is clinically indicated to continue this current medication regimen after consideration of alternative therapies, and other non-opioid treatment.    Opioid Risk Screening:  No data recorded    Pain Assessment:  No data recorded          Physical Exam  Also reviewed and normal good 8 pound weight loss in the last 1 to 2 months.  General-inspection feels a more relaxed individual in no acute distress  Neck neck is supple masses adenopathy bruits or rigidity  Pulmonary chest clear without wheezing rales or rhonchi  Cardiovascular RSR without murmurs gallop or ectopy  Abdominal no distention no ascites minimal tenderness in the mid epigastric area otherwise no right upper or lower abdominal tenderness rebound bowel sounds are normal no CVA tenderness minimal suprapubic tenderness  Peripheral vascular no symmetric or asymmetric edema mild decrease sensation to vibration otherwise motor exam and vascular tone is normal  Mood mood is stable without anxiety depressive or cognitive issues  Skin no rashes petechiae or jaundice  Neurologic no cranial nerve deficits no new sensory or motor deficit the upper and lower extremities speech and gait is normal no resting tremor there is no sensory deficit of the fingertips of the hands and color is normal.  The test is negative      Assessment/Plan   Problem List Items Addressed This Visit    None  Plan definite history of Raynaud's phenomena we will start on calcium channel blocker in the form of low-dose Norvasc.  Aware to wear warm gloves when he is outside since is no guarantee the Norvasc will prevent coronary vasospasm.  Continue good low-salt low-fat low carbohydrate diet continue follow-up with  his liver transplant team as well as his cardiologist.  He will continue his oxycodone as benefit still outweighs the risk for both feet as well as low back.  Safety is reviewed  Template for opioids completed  3 months call if interval problems recent aggravation of his BPH with probable infection will place on the low-dose Cipro for 10 days continue increasing liquids we will follow-up in 3 to 4 weeks if interval worsening of his polyuria otherwise follow-up in 3 months all questions were addressed  @discharge  The above diagnosis and treatment plan was discussed with the patient patient will continue appropriate diet and exercise as reviewed  Patient will recheck earlier if any interval problems of significance or clinical worsening of the above problems.  Agrees above surveillance.  All question were addressed regarding above meds

## 2023-11-05 PROBLEM — R73.01 IFG (IMPAIRED FASTING GLUCOSE): Status: RESOLVED | Noted: 2023-08-17 | Resolved: 2023-11-05

## 2023-11-05 ASSESSMENT — ENCOUNTER SYMPTOMS
COUGH: 0
SLEEP DISTURBANCE: 1
BLOOD IN STOOL: 0
SHORTNESS OF BREATH: 0
ADENOPATHY: 0
ABDOMINAL PAIN: 0
VOMITING: 0
HEMATURIA: 0
HEADACHES: 0
ARTHRALGIAS: 1
DYSPHORIC MOOD: 1
DYSURIA: 0
FATIGUE: 0
FREQUENCY: 0
PALPITATIONS: 0
CHEST TIGHTNESS: 0
MYALGIAS: 0
BACK PAIN: 1
LIGHT-HEADEDNESS: 0
UNEXPECTED WEIGHT CHANGE: 0
WEAKNESS: 0
NUMBNESS: 1
FEVER: 0

## 2023-11-07 DIAGNOSIS — G62.89 OTHER POLYNEUROPATHY: ICD-10-CM

## 2023-11-07 RX ORDER — OXYCODONE HYDROCHLORIDE 5 MG/1
5 CAPSULE ORAL 3 TIMES DAILY PRN
Qty: 90 CAPSULE | Refills: 0 | Status: SHIPPED | OUTPATIENT
Start: 2023-11-07 | End: 2023-12-11 | Stop reason: SDUPTHER

## 2023-11-07 NOTE — TELEPHONE ENCOUNTER
Rx Refill Request     Name: Karl Estevez  :  1970   Medication Name:  OXYCODONE 0.5 MG  Specific Pharmacy location:  [facility]  Date of last appointment:  10/31/2023   Date of next appointment:  2024   Best number to reach patient:  [phone]

## 2023-11-10 DIAGNOSIS — G62.89 OTHER POLYNEUROPATHY: ICD-10-CM

## 2023-11-10 RX ORDER — OXYCODONE HYDROCHLORIDE 5 MG/1
5 CAPSULE ORAL 3 TIMES DAILY PRN
Qty: 90 CAPSULE | Refills: 0 | Status: CANCELLED | OUTPATIENT
Start: 2023-11-10

## 2023-11-10 NOTE — TELEPHONE ENCOUNTER
oxyCODONE (Oxy-IR) 5 mg immediate release capsule [[id number]]    Order Details    Dose: 5 mg Route: oral Frequency: 3 times daily PRN for severe pain (7 - 10)   Dispense Quantity: 90 capsule Refills: 0          Sig: Take 1 capsule (5 mg) by mouth 3 times a day as needed for severe pain (7 - 10).         Patient wife called stating that medication as not gone through   Please advise

## 2023-11-13 ENCOUNTER — TELEPHONE (OUTPATIENT)
Dept: TRANSPLANT | Facility: HOSPITAL | Age: 53
End: 2023-11-13
Payer: MEDICARE

## 2023-11-13 DIAGNOSIS — M48.062 NEUROGENIC CLAUDICATION DUE TO LUMBAR SPINAL STENOSIS: Primary | ICD-10-CM

## 2023-11-13 DIAGNOSIS — Z94.4 LIVER REPLACED BY TRANSPLANT (MULTI): ICD-10-CM

## 2023-11-13 RX ORDER — OXYCODONE HYDROCHLORIDE 5 MG/1
5 TABLET ORAL EVERY 8 HOURS PRN
Qty: 90 TABLET | Refills: 0 | Status: SHIPPED | OUTPATIENT
Start: 2023-11-13 | End: 2023-12-11 | Stop reason: SDUPTHER

## 2023-11-13 RX ORDER — PANTOPRAZOLE SODIUM 20 MG/1
20 TABLET, DELAYED RELEASE ORAL DAILY
Qty: 90 TABLET | Refills: 3 | Status: SHIPPED | OUTPATIENT
Start: 2023-11-13 | End: 2024-04-23 | Stop reason: SDUPTHER

## 2023-11-13 RX ORDER — ASPIRIN 81 MG/1
81 TABLET ORAL DAILY
Qty: 90 TABLET | Refills: 3 | Status: SHIPPED | OUTPATIENT
Start: 2023-11-13 | End: 2024-11-12

## 2023-11-13 RX ORDER — TACROLIMUS 1 MG/1
1 CAPSULE ORAL 2 TIMES DAILY
Qty: 180 CAPSULE | Refills: 3 | Status: SHIPPED | OUTPATIENT
Start: 2023-11-13 | End: 2024-11-12

## 2023-11-13 NOTE — TELEPHONE ENCOUNTER
VM from PT saying he is out of [place] and needs a refill, needs refill of 325 Aspirin, and there's one other he can't remember. Wants a call back at [phone]

## 2023-11-14 ENCOUNTER — PHARMACY VISIT (OUTPATIENT)
Dept: PHARMACY | Facility: CLINIC | Age: 53
End: 2023-11-14
Payer: COMMERCIAL

## 2023-11-14 PROCEDURE — RXMED WILLOW AMBULATORY MEDICATION CHARGE

## 2023-11-17 ENCOUNTER — TELEPHONE (OUTPATIENT)
Dept: TRANSPLANT | Facility: HOSPITAL | Age: 53
End: 2023-11-17
Payer: MEDICARE

## 2023-11-17 NOTE — TELEPHONE ENCOUNTER
PT called this afternoon and says he is out of Metformin and asked if I could send Jessie Disla a message. I said I can send one, but he should probably call her office. He said he tried. The dose changed to BID, and he is out. His phone [id number] is [phone]

## 2023-11-20 ENCOUNTER — TELEPHONE (OUTPATIENT)
Dept: PRIMARY CARE | Facility: CLINIC | Age: 53
End: 2023-11-20
Payer: MEDICARE

## 2023-11-20 ENCOUNTER — TELEPHONE (OUTPATIENT)
Dept: TRANSPLANT | Facility: HOSPITAL | Age: 53
End: 2023-11-20
Payer: MEDICARE

## 2023-11-20 DIAGNOSIS — E11.9 TYPE 2 DIABETES MELLITUS WITHOUT COMPLICATION, WITH LONG-TERM CURRENT USE OF INSULIN (MULTI): ICD-10-CM

## 2023-11-20 DIAGNOSIS — Z79.4 TYPE 2 DIABETES MELLITUS WITHOUT COMPLICATION, WITH LONG-TERM CURRENT USE OF INSULIN (MULTI): ICD-10-CM

## 2023-11-20 RX ORDER — METFORMIN HYDROCHLORIDE 500 MG/1
500 TABLET, EXTENDED RELEASE ORAL
Qty: 180 TABLET | Refills: 2 | Status: SHIPPED | OUTPATIENT
Start: 2023-11-20 | End: 2023-12-11 | Stop reason: SDUPTHER

## 2023-11-21 DIAGNOSIS — M48.062 NEUROGENIC CLAUDICATION DUE TO LUMBAR SPINAL STENOSIS: Primary | ICD-10-CM

## 2023-11-21 RX ORDER — LIDOCAINE 50 MG/G
1 PATCH TOPICAL DAILY
Qty: 30 PATCH | Refills: 1 | Status: SHIPPED | OUTPATIENT
Start: 2023-11-21 | End: 2023-12-07 | Stop reason: SDUPTHER

## 2023-11-21 NOTE — TELEPHONE ENCOUNTER
Call from mom November 20.  Patient called regarding potential second opinion i.e. orthopedic referral because of intractable pain down right as well as left low back area is going into the right leg.  Is been on pain medicine for back as well as peripheral neuropathy from diabetes for years Percocet twice 3 times a day in the form of oxycodone.  However despite taking gabapentin as well as the above-mentioned pain medicine his pain is really increased in severity total and I would check for referral to Dr. Morton or other [place] orthopedic surgeon for second opinion I did review his MRI results and because an extreme worsening pain over the last 6 months from his January 2023 x-ray we will try to get a second opinion for this gentleman to see what his treatment options are medicine versus surgical.  He agrees to this and referral to orthopedic surgery for second opinion was formalized.  I did review the MRI please see enclosed MRI results from January of this year

## 2023-11-27 ENCOUNTER — LAB (OUTPATIENT)
Dept: LAB | Facility: LAB | Age: 53
End: 2023-11-27
Payer: MEDICARE

## 2023-11-27 DIAGNOSIS — D84.9 IMMUNODEFICIENCY, UNSPECIFIED (MULTI): ICD-10-CM

## 2023-11-27 DIAGNOSIS — Z94.4 LIVER TRANSPLANT STATUS (MULTI): Primary | ICD-10-CM

## 2023-11-27 LAB
ALBUMIN SERPL BCP-MCNC: 4.1 G/DL (ref 3.4–5)
ALP SERPL-CCNC: 73 U/L (ref 33–120)
ALT SERPL W P-5'-P-CCNC: 5 U/L (ref 10–52)
ANION GAP SERPL CALC-SCNC: 12 MMOL/L (ref 10–20)
AST SERPL W P-5'-P-CCNC: 7 U/L (ref 9–39)
BASOPHILS # BLD AUTO: 0.04 X10*3/UL (ref 0–0.1)
BASOPHILS NFR BLD AUTO: 0.9 %
BILIRUB SERPL-MCNC: 0.4 MG/DL (ref 0–1.2)
BUN SERPL-MCNC: 11 MG/DL (ref 6–23)
CALCIUM SERPL-MCNC: 8.8 MG/DL (ref 8.6–10.3)
CHLORIDE SERPL-SCNC: 99 MMOL/L (ref 98–107)
CO2 SERPL-SCNC: 31 MMOL/L (ref 21–32)
CREAT SERPL-MCNC: 1.14 MG/DL (ref 0.5–1.3)
EOSINOPHIL # BLD AUTO: 0.2 X10*3/UL (ref 0–0.7)
EOSINOPHIL NFR BLD AUTO: 4.6 %
ERYTHROCYTE [DISTWIDTH] IN BLOOD BY AUTOMATED COUNT: 11.9 % (ref 11.5–14.5)
GFR SERPL CREATININE-BSD FRML MDRD: 77 ML/MIN/1.73M*2
GGT SERPL-CCNC: 9 U/L (ref 5–64)
GLUCOSE SERPL-MCNC: 80 MG/DL (ref 74–99)
HCT VFR BLD AUTO: 43 % (ref 41–52)
HGB BLD-MCNC: 14.2 G/DL (ref 13.5–17.5)
IMM GRANULOCYTES # BLD AUTO: 0.01 X10*3/UL (ref 0–0.7)
IMM GRANULOCYTES NFR BLD AUTO: 0.2 % (ref 0–0.9)
LYMPHOCYTES # BLD AUTO: 1.08 X10*3/UL (ref 1.2–4.8)
LYMPHOCYTES NFR BLD AUTO: 25.1 %
MCH RBC QN AUTO: 28.7 PG (ref 26–34)
MCHC RBC AUTO-ENTMCNC: 33 G/DL (ref 32–36)
MCV RBC AUTO: 87 FL (ref 80–100)
MONOCYTES # BLD AUTO: 0.32 X10*3/UL (ref 0.1–1)
MONOCYTES NFR BLD AUTO: 7.4 %
NEUTROPHILS # BLD AUTO: 2.66 X10*3/UL (ref 1.2–7.7)
NEUTROPHILS NFR BLD AUTO: 61.8 %
NRBC BLD-RTO: 0 /100 WBCS (ref 0–0)
PLATELET # BLD AUTO: 157 X10*3/UL (ref 150–450)
POTASSIUM SERPL-SCNC: 4.5 MMOL/L (ref 3.5–5.3)
PROT SERPL-MCNC: 6.6 G/DL (ref 6.4–8.2)
RBC # BLD AUTO: 4.95 X10*6/UL (ref 4.5–5.9)
SODIUM SERPL-SCNC: 137 MMOL/L (ref 136–145)
TACROLIMUS BLD-MCNC: 5.7 NG/ML
WBC # BLD AUTO: 4.3 X10*3/UL (ref 4.4–11.3)

## 2023-11-27 PROCEDURE — 82977 ASSAY OF GGT: CPT

## 2023-11-27 PROCEDURE — 80197 ASSAY OF TACROLIMUS: CPT

## 2023-11-27 PROCEDURE — 36415 COLL VENOUS BLD VENIPUNCTURE: CPT

## 2023-11-27 PROCEDURE — 80053 COMPREHEN METABOLIC PANEL: CPT

## 2023-11-27 PROCEDURE — 85025 COMPLETE CBC W/AUTO DIFF WBC: CPT

## 2023-11-30 ENCOUNTER — TELEPHONE (OUTPATIENT)
Dept: PRIMARY CARE | Facility: CLINIC | Age: 53
End: 2023-11-30
Payer: MEDICARE

## 2023-11-30 NOTE — TELEPHONE ENCOUNTER
We received a request for prior authorization on the patient's Lidocaine 5% patches  from their pharmacy. Prior authorization was submitted to insurance today. We will await their determination.

## 2023-12-07 ENCOUNTER — TELEPHONE (OUTPATIENT)
Dept: PRIMARY CARE | Facility: CLINIC | Age: 53
End: 2023-12-07
Payer: MEDICARE

## 2023-12-07 DIAGNOSIS — M48.062 NEUROGENIC CLAUDICATION DUE TO LUMBAR SPINAL STENOSIS: ICD-10-CM

## 2023-12-07 RX ORDER — LIDOCAINE 50 MG/G
1 PATCH TOPICAL DAILY
Qty: 30 PATCH | Refills: 3 | Status: SHIPPED | OUTPATIENT
Start: 2023-12-07 | End: 2024-04-05

## 2023-12-07 NOTE — TELEPHONE ENCOUNTER
Patient's wife Jo called in stating the PA on the patient's lidocaine (Lidoderm) 5 % patches was approved. She is asking for this to be re-sent to the [facility] in [place]  Please Advise

## 2023-12-11 ENCOUNTER — OFFICE VISIT (OUTPATIENT)
Dept: TRANSPLANT | Facility: HOSPITAL | Age: 53
End: 2023-12-11
Payer: MEDICARE

## 2023-12-11 VITALS
DIASTOLIC BLOOD PRESSURE: 94 MMHG | HEART RATE: 85 BPM | SYSTOLIC BLOOD PRESSURE: 126 MMHG | BODY MASS INDEX: 28.67 KG/M2 | TEMPERATURE: 97.4 F | WEIGHT: 199.8 LBS | OXYGEN SATURATION: 96 %

## 2023-12-11 DIAGNOSIS — E11.9 TYPE 2 DIABETES MELLITUS WITHOUT COMPLICATION, WITH LONG-TERM CURRENT USE OF INSULIN (MULTI): ICD-10-CM

## 2023-12-11 DIAGNOSIS — Z79.4 TYPE 2 DIABETES MELLITUS WITHOUT COMPLICATION, WITH LONG-TERM CURRENT USE OF INSULIN (MULTI): ICD-10-CM

## 2023-12-11 DIAGNOSIS — Z94.4 HISTORY OF LIVER TRANSPLANT (MULTI): Primary | ICD-10-CM

## 2023-12-11 DIAGNOSIS — N18.30 STAGE 3 CHRONIC KIDNEY DISEASE, UNSPECIFIED WHETHER STAGE 3A OR 3B CKD (MULTI): ICD-10-CM

## 2023-12-11 DIAGNOSIS — D84.9 IMMUNOSUPPRESSION (MULTI): ICD-10-CM

## 2023-12-11 DIAGNOSIS — Z94.4 HISTORY OF LIVER TRANSPLANT (MULTI): ICD-10-CM

## 2023-12-11 PROCEDURE — 3080F DIAST BP >= 90 MM HG: CPT | Performed by: INTERNAL MEDICINE

## 2023-12-11 PROCEDURE — 3048F LDL-C <100 MG/DL: CPT | Performed by: INTERNAL MEDICINE

## 2023-12-11 PROCEDURE — 3046F HEMOGLOBIN A1C LEVEL >9.0%: CPT | Performed by: INTERNAL MEDICINE

## 2023-12-11 PROCEDURE — 3066F NEPHROPATHY DOC TX: CPT | Performed by: INTERNAL MEDICINE

## 2023-12-11 PROCEDURE — 1036F TOBACCO NON-USER: CPT | Performed by: INTERNAL MEDICINE

## 2023-12-11 PROCEDURE — 99214 OFFICE O/P EST MOD 30 MIN: CPT | Performed by: INTERNAL MEDICINE

## 2023-12-11 PROCEDURE — 3008F BODY MASS INDEX DOCD: CPT | Performed by: INTERNAL MEDICINE

## 2023-12-11 PROCEDURE — 3074F SYST BP LT 130 MM HG: CPT | Performed by: INTERNAL MEDICINE

## 2023-12-11 RX ORDER — METFORMIN HYDROCHLORIDE 500 MG/1
1000 TABLET, EXTENDED RELEASE ORAL
Qty: 360 TABLET | Refills: 3 | Status: SHIPPED | OUTPATIENT
Start: 2023-12-11 | End: 2024-01-17 | Stop reason: SDUPTHER

## 2023-12-11 RX ORDER — METFORMIN HYDROCHLORIDE 500 MG/1
1000 TABLET, EXTENDED RELEASE ORAL
Qty: 360 TABLET | Refills: 3 | Status: SHIPPED | OUTPATIENT
Start: 2023-12-11 | End: 2023-12-11 | Stop reason: SDUPTHER

## 2023-12-11 ASSESSMENT — PAIN SCALES - GENERAL: PAINLEVEL: 0-NO PAIN

## 2023-12-11 NOTE — PROGRESS NOTES
Subjective   Patient ID: Karl Estevez is a 53 y.o. male who presents for Liver Post Transplant Follow-up.  HPI Doing well.   Has lost weight and is exercising.   Renal function good.   Still has neuropathy and abdominal wall bulge on right side that is uncomfortable.   No nausea or vomiting reported.   Eating well.   On tacrolimus 1 mg po bid with level of 5.7.       Review of Systems  No diarrhea.   Some neuropathy and abdominal discomfort on the right side.      Objective   Physical Exam  Physical Exam  Constitutional:       Appearance: Normal appearance.   Eyes:      General: No scleral icterus.  Cardiovascular:      Rate and Rhythm: Normal rate and regular rhythm.      Heart sounds: No murmur heard.     No gallop.   Pulmonary:      Effort: Pulmonary effort is normal.      Breath sounds: Normal breath sounds.   Abdominal:      General: Abdomen is flat. 10 cm bulge on right reducable.   Surgical wound underneth is intact.    Bowel sounds are normal. Small sub xyphoid hernia reducible.         Palpations: Abdomen is soft. There is no fluid wave, hepatomegaly or splenomegaly.      Tenderness: There is no abdominal tenderness.   Musculoskeletal:      Cervical back: Normal range of motion. No tenderness.      Right lower leg: No edema.      Left lower leg: No edema.   Lymphadenopathy:      Cervical: No cervical adenopathy.   Skin:     Coloration: Skin is not jaundiced.   Neurological:      General: No focal deficit present.      Mental Status: He  is alert.       Assessment/Plan     Doing well post transplant.   On tacrolimus monotherapy with low level with good liver and renal function.   Following with PCP for DM and with Dr. Mcelroy for his kidney.   I will see him again in 4 months.            Guanako Gonsalez MD 12/11/23 8:26 AM

## 2023-12-11 NOTE — PROGRESS NOTES
Resent Rx. Metformin rx was sent, and a cancellation notice was sent back from pharmacy in response

## 2023-12-13 ENCOUNTER — HOSPITAL ENCOUNTER (OUTPATIENT)
Dept: RADIOLOGY | Facility: HOSPITAL | Age: 53
Discharge: HOME | End: 2023-12-13
Payer: MEDICARE

## 2023-12-13 ENCOUNTER — OFFICE VISIT (OUTPATIENT)
Dept: SURGERY | Facility: CLINIC | Age: 53
End: 2023-12-13
Payer: MEDICARE

## 2023-12-13 ENCOUNTER — APPOINTMENT (OUTPATIENT)
Dept: SURGERY | Facility: CLINIC | Age: 53
End: 2023-12-13
Payer: MEDICARE

## 2023-12-13 VITALS
TEMPERATURE: 97.7 F | HEART RATE: 92 BPM | DIASTOLIC BLOOD PRESSURE: 79 MMHG | BODY MASS INDEX: 28.71 KG/M2 | WEIGHT: 200.1 LBS | SYSTOLIC BLOOD PRESSURE: 131 MMHG

## 2023-12-13 DIAGNOSIS — Z94.4 LIVER TRANSPLANT RECIPIENT (MULTI): Primary | ICD-10-CM

## 2023-12-13 DIAGNOSIS — K43.2 INCISIONAL HERNIA, WITHOUT OBSTRUCTION OR GANGRENE: ICD-10-CM

## 2023-12-13 PROCEDURE — 74176 CT ABD & PELVIS W/O CONTRAST: CPT

## 2023-12-13 PROCEDURE — 3066F NEPHROPATHY DOC TX: CPT | Performed by: STUDENT IN AN ORGANIZED HEALTH CARE EDUCATION/TRAINING PROGRAM

## 2023-12-13 PROCEDURE — 99213 OFFICE O/P EST LOW 20 MIN: CPT | Performed by: STUDENT IN AN ORGANIZED HEALTH CARE EDUCATION/TRAINING PROGRAM

## 2023-12-13 PROCEDURE — 1036F TOBACCO NON-USER: CPT | Performed by: STUDENT IN AN ORGANIZED HEALTH CARE EDUCATION/TRAINING PROGRAM

## 2023-12-13 PROCEDURE — 3075F SYST BP GE 130 - 139MM HG: CPT | Performed by: STUDENT IN AN ORGANIZED HEALTH CARE EDUCATION/TRAINING PROGRAM

## 2023-12-13 PROCEDURE — 74176 CT ABD & PELVIS W/O CONTRAST: CPT | Performed by: RADIOLOGY

## 2023-12-13 PROCEDURE — 3078F DIAST BP <80 MM HG: CPT | Performed by: STUDENT IN AN ORGANIZED HEALTH CARE EDUCATION/TRAINING PROGRAM

## 2023-12-13 PROCEDURE — 3008F BODY MASS INDEX DOCD: CPT | Performed by: STUDENT IN AN ORGANIZED HEALTH CARE EDUCATION/TRAINING PROGRAM

## 2023-12-13 ASSESSMENT — PAIN SCALES - GENERAL: PAINLEVEL: 7

## 2023-12-13 NOTE — PROGRESS NOTES
Subjective   Patient ID: MELANIE Estevez is a 53 y.o. male who presents for complaints of incisional pain likely suffering from a incisional hernia.  He had a history of liver transplantation with subsequent incisional hernia in the midline which was repaired many months ago.  He has a new bulge in the right lateral side of his incision and pain likely developing a new hernia.  He has had a CAT scan done which I will review.  If he indeed has a hernia, I will plan for repair with mesh.  He wishes to have surgery done in the wintertime due to his schedule.    HPI    Patient is a 52-year-old male who is well-known to  the transplant service.  He had a liver transplant over a year ago.  He has done very well since then without major issues.  He has lost a significant amount of weight due to active lifestyle and feeling well after transplantation.  Posttransplantation he had developed an incisional hernia in the midline status post repair with mesh with Dr. Bradley last year.  He presented with complaints of possible needle hernia developing in the right side of his  incision.  He is complaining of sharp pain when he was active.  There is no complaining of nausea vomiting.    Since last seen he underwent a CT ab/pelvis without obvious herniation     Review of Systems  Constitutional: no fever and no chills.   Eyes: no eye problems.   ENT: no ear symptoms and no nasal symptoms.   Cardiovascular: no chest pain.   Respiratory: no cough and no shortness of breath.   Gastrointestinal: no constipation, no vomiting, no diarrhea and no nausea.   Genitourinary. no dysuria.   Musculoskeletal: no myalgias.   Integumentary: no skin lesions.   Neurological no headache and no dizziness.   Psychiatric: has stable social support system.   Endocrine: not diabetic.   All other systems have been reviewed and are negative for complaint.    Objective     Physical Exam  Constitutional - General appearance: In no acute distress, well  appearing and well nourished.   Eyes Conjunctiva and lids: No erythema, swelling or discharge.    Ears, Nose, Mouth, and Throat - External inspection of ears and nose: Normal.   Neck - Exam: Appearance of the neck was normal. No neck masses observed   Pulmonary - Respiratory effort: Normal respiration.   Cardiovascular - Examination of extremities for edema and/or varicosities: No peripheral edema   Abdomen - Abdomen: Non-tender, no abdominal masses   Mild bulge and tenderness in the right lateral segment of incision  No obvious hernia appreicated  Musculoskeletal - normal palpation of joints, bones, and muscles.   Skin - skin and subcutaneous tissue: normal without rashes or lesions.   Neurologic - Coordination: normal gait, Focal deficit: no focal deficit, moves all extremities   Psychiatric - Orientation to person, place, and time: Normal, Mood and affect: Normal    Lab Results   Component Value Date    WBC 4.3 (L) 11/27/2023    HGB 14.2 11/27/2023    HCT 43.0 11/27/2023    MCV 87 11/27/2023     11/27/2023      Lab Results   Component Value Date    GLUCOSE 80 11/27/2023    CALCIUM 8.8 11/27/2023     11/27/2023    K 4.5 11/27/2023    CO2 31 11/27/2023    CL 99 11/27/2023    BUN 11 11/27/2023    CREATININE 1.14 11/27/2023      Lab Results   Component Value Date    ALT 5 (L) 11/27/2023    AST 7 (L) 11/27/2023    GGT 9 11/27/2023    ALKPHOS 73 11/27/2023    BILITOT 0.4 11/27/2023        Assessment/Plan     54 y/o M s/p OLT and midline hernia s/p repair presenting with chronic right sided incisional pain.     No obvious hernia on exam or imaging.     Discussed imaging results and no clear hernia with Mr. Estevez. Due to persistent pain over a year, we discussed the possibility of local exploration or diagnostic laparoscopy to evaluate for a defect not appreciated on exam or CT. There is a possibility this would be a negative exploration.     Mr. Estevez would like to proceed with exploration given extent  of his symptoms. Will arrange for local exploration with possible diagnostic laparoscopy possible hernia repair.  Ivette Maya MD

## 2023-12-13 NOTE — PROGRESS NOTES
Subjective   Patient ID: Karl Estevez is a 53 y.o. male who presents for No chief complaint on file..  HPI    Review of Systems    Objective   Physical Exam    Assessment/Plan            Ivette Mcneil RN 12/13/23 1:50 PM

## 2023-12-14 ENCOUNTER — APPOINTMENT (OUTPATIENT)
Dept: SURGERY | Facility: CLINIC | Age: 53
End: 2023-12-14
Payer: MEDICARE

## 2023-12-15 PROBLEM — Z94.4 LIVER TRANSPLANT RECIPIENT (MULTI): Status: ACTIVE | Noted: 2023-12-13

## 2023-12-28 ENCOUNTER — LAB (OUTPATIENT)
Dept: LAB | Facility: LAB | Age: 53
End: 2023-12-28
Payer: MEDICARE

## 2023-12-28 DIAGNOSIS — Z94.4 HISTORY OF LIVER TRANSPLANT (MULTI): ICD-10-CM

## 2023-12-28 DIAGNOSIS — D84.9 IMMUNOSUPPRESSION (MULTI): ICD-10-CM

## 2023-12-28 DIAGNOSIS — N18.30 STAGE 3 CHRONIC KIDNEY DISEASE, UNSPECIFIED WHETHER STAGE 3A OR 3B CKD (MULTI): ICD-10-CM

## 2023-12-28 LAB
ALBUMIN SERPL BCP-MCNC: 4.1 G/DL (ref 3.4–5)
ALP SERPL-CCNC: 62 U/L (ref 33–120)
ALT SERPL W P-5'-P-CCNC: 5 U/L (ref 10–52)
ANION GAP SERPL CALC-SCNC: 12 MMOL/L (ref 10–20)
AST SERPL W P-5'-P-CCNC: 9 U/L (ref 9–39)
BASOPHILS # BLD AUTO: 0.05 X10*3/UL (ref 0–0.1)
BASOPHILS NFR BLD AUTO: 0.9 %
BILIRUB SERPL-MCNC: 0.4 MG/DL (ref 0–1.2)
BUN SERPL-MCNC: 15 MG/DL (ref 6–23)
CALCIUM SERPL-MCNC: 8.7 MG/DL (ref 8.6–10.3)
CHLORIDE SERPL-SCNC: 99 MMOL/L (ref 98–107)
CO2 SERPL-SCNC: 30 MMOL/L (ref 21–32)
CREAT SERPL-MCNC: 1.07 MG/DL (ref 0.5–1.3)
EOSINOPHIL # BLD AUTO: 0.46 X10*3/UL (ref 0–0.7)
EOSINOPHIL NFR BLD AUTO: 8 %
ERYTHROCYTE [DISTWIDTH] IN BLOOD BY AUTOMATED COUNT: 12.4 % (ref 11.5–14.5)
GFR SERPL CREATININE-BSD FRML MDRD: 83 ML/MIN/1.73M*2
GLUCOSE SERPL-MCNC: 93 MG/DL (ref 74–99)
HCT VFR BLD AUTO: 43.1 % (ref 41–52)
HGB BLD-MCNC: 14.5 G/DL (ref 13.5–17.5)
IMM GRANULOCYTES # BLD AUTO: 0.01 X10*3/UL (ref 0–0.7)
IMM GRANULOCYTES NFR BLD AUTO: 0.2 % (ref 0–0.9)
LYMPHOCYTES # BLD AUTO: 1.46 X10*3/UL (ref 1.2–4.8)
LYMPHOCYTES NFR BLD AUTO: 25.4 %
MCH RBC QN AUTO: 28.2 PG (ref 26–34)
MCHC RBC AUTO-ENTMCNC: 33.6 G/DL (ref 32–36)
MCV RBC AUTO: 84 FL (ref 80–100)
MONOCYTES # BLD AUTO: 0.35 X10*3/UL (ref 0.1–1)
MONOCYTES NFR BLD AUTO: 6.1 %
NEUTROPHILS # BLD AUTO: 3.42 X10*3/UL (ref 1.2–7.7)
NEUTROPHILS NFR BLD AUTO: 59.4 %
NRBC BLD-RTO: 0 /100 WBCS (ref 0–0)
PLATELET # BLD AUTO: 159 X10*3/UL (ref 150–450)
POTASSIUM SERPL-SCNC: 3.9 MMOL/L (ref 3.5–5.3)
PROT SERPL-MCNC: 6.9 G/DL (ref 6.4–8.2)
RBC # BLD AUTO: 5.14 X10*6/UL (ref 4.5–5.9)
SODIUM SERPL-SCNC: 137 MMOL/L (ref 136–145)
TACROLIMUS BLD-MCNC: 5.6 NG/ML
WBC # BLD AUTO: 5.8 X10*3/UL (ref 4.4–11.3)

## 2023-12-28 PROCEDURE — 36415 COLL VENOUS BLD VENIPUNCTURE: CPT

## 2023-12-28 PROCEDURE — 80197 ASSAY OF TACROLIMUS: CPT

## 2023-12-28 PROCEDURE — 85025 COMPLETE CBC W/AUTO DIFF WBC: CPT

## 2023-12-28 PROCEDURE — 80053 COMPREHEN METABOLIC PANEL: CPT

## 2024-01-08 DIAGNOSIS — R25.2 MUSCLE CRAMPING: ICD-10-CM

## 2024-01-08 DIAGNOSIS — K21.9 GASTROESOPHAGEAL REFLUX DISEASE, UNSPECIFIED WHETHER ESOPHAGITIS PRESENT: ICD-10-CM

## 2024-01-08 DIAGNOSIS — M48.062 NEUROGENIC CLAUDICATION DUE TO LUMBAR SPINAL STENOSIS: ICD-10-CM

## 2024-01-08 RX ORDER — OXYCODONE HYDROCHLORIDE 5 MG/1
5 TABLET ORAL EVERY 8 HOURS PRN
Qty: 90 TABLET | Refills: 0 | Status: SHIPPED | OUTPATIENT
Start: 2024-01-08 | End: 2024-02-12 | Stop reason: SDUPTHER

## 2024-01-08 RX ORDER — ONDANSETRON 4 MG/1
4 TABLET, ORALLY DISINTEGRATING ORAL EVERY 8 HOURS PRN
Qty: 20 TABLET | Refills: 1 | Status: SHIPPED | OUTPATIENT
Start: 2024-01-08 | End: 2024-02-02 | Stop reason: SDUPTHER

## 2024-01-08 RX ORDER — TIZANIDINE 4 MG/1
4 TABLET ORAL 3 TIMES DAILY
Qty: 30 TABLET | Refills: 0 | Status: SHIPPED | OUTPATIENT
Start: 2024-01-08 | End: 2024-04-23 | Stop reason: SDUPTHER

## 2024-01-08 NOTE — TELEPHONE ENCOUNTER
Rx Refill Request     Name: Karl Estevez  :  1970   Medication Name:   tiZANidine (Zanaflex) 4 mg tablet -Take 1 tablet (4 mg) by mouth 3 times a day.  oxyCODONE (Roxicodone) 5 mg immediate release tablet - Take 1 tablet (5 mg) by mouth every 8 hours if needed for severe pain (7-10).  ondansetron ODT (Zofran-ODT) 4 mg disintegrating tablet - Take 1 tablet (4 mg) by mouth every 8 hours if needed for nausea or vomiting.  Specific Pharmacy location:  [facility]  Date of last appointment:  10/31/2023   Date of next appointment:  2024   Best number to reach patient:  [phone]

## 2024-01-16 ENCOUNTER — SPECIALTY PHARMACY (OUTPATIENT)
Dept: PHARMACY | Facility: CLINIC | Age: 54
End: 2024-01-16

## 2024-01-17 ENCOUNTER — OFFICE VISIT (OUTPATIENT)
Dept: ENDOCRINOLOGY | Facility: CLINIC | Age: 54
End: 2024-01-17
Payer: MEDICARE

## 2024-01-17 VITALS
BODY MASS INDEX: 28.2 KG/M2 | WEIGHT: 197 LBS | HEART RATE: 78 BPM | DIASTOLIC BLOOD PRESSURE: 85 MMHG | HEIGHT: 70 IN | SYSTOLIC BLOOD PRESSURE: 129 MMHG

## 2024-01-17 DIAGNOSIS — E11.42 TYPE 2 DIABETES MELLITUS WITH DIABETIC POLYNEUROPATHY, WITHOUT LONG-TERM CURRENT USE OF INSULIN (MULTI): Primary | ICD-10-CM

## 2024-01-17 DIAGNOSIS — D50.9 IRON DEFICIENCY ANEMIA, UNSPECIFIED IRON DEFICIENCY ANEMIA TYPE: ICD-10-CM

## 2024-01-17 DIAGNOSIS — E11.9 TYPE 2 DIABETES MELLITUS WITHOUT COMPLICATION, WITH LONG-TERM CURRENT USE OF INSULIN (MULTI): ICD-10-CM

## 2024-01-17 DIAGNOSIS — E53.8 VITAMIN B 12 DEFICIENCY: ICD-10-CM

## 2024-01-17 DIAGNOSIS — Z79.4 TYPE 2 DIABETES MELLITUS WITHOUT COMPLICATION, WITH LONG-TERM CURRENT USE OF INSULIN (MULTI): ICD-10-CM

## 2024-01-17 DIAGNOSIS — E08.22 DIABETES MELLITUS DUE TO UNDERLYING CONDITION, CONTROLLED, WITH STAGE 1 CHRONIC KIDNEY DISEASE, WITHOUT LONG-TERM CURRENT USE OF INSULIN (MULTI): ICD-10-CM

## 2024-01-17 DIAGNOSIS — Z94.4 LIVER TRANSPLANT RECIPIENT (MULTI): ICD-10-CM

## 2024-01-17 DIAGNOSIS — N18.1 DIABETES MELLITUS DUE TO UNDERLYING CONDITION, CONTROLLED, WITH STAGE 1 CHRONIC KIDNEY DISEASE, WITHOUT LONG-TERM CURRENT USE OF INSULIN (MULTI): ICD-10-CM

## 2024-01-17 PROCEDURE — 99214 OFFICE O/P EST MOD 30 MIN: CPT | Performed by: PHYSICIAN ASSISTANT

## 2024-01-17 PROCEDURE — 3008F BODY MASS INDEX DOCD: CPT | Performed by: PHYSICIAN ASSISTANT

## 2024-01-17 PROCEDURE — 3066F NEPHROPATHY DOC TX: CPT | Performed by: PHYSICIAN ASSISTANT

## 2024-01-17 PROCEDURE — 3074F SYST BP LT 130 MM HG: CPT | Performed by: PHYSICIAN ASSISTANT

## 2024-01-17 PROCEDURE — 1036F TOBACCO NON-USER: CPT | Performed by: PHYSICIAN ASSISTANT

## 2024-01-17 PROCEDURE — 95251 CONT GLUC MNTR ANALYSIS I&R: CPT | Performed by: PHYSICIAN ASSISTANT

## 2024-01-17 PROCEDURE — 3079F DIAST BP 80-89 MM HG: CPT | Performed by: PHYSICIAN ASSISTANT

## 2024-01-17 RX ORDER — DAPAGLIFLOZIN 10 MG/1
10 TABLET, FILM COATED ORAL DAILY
Qty: 90 TABLET | Refills: 3 | Status: SHIPPED | OUTPATIENT
Start: 2024-01-17 | End: 2025-01-16

## 2024-01-17 RX ORDER — TIRZEPATIDE 10 MG/.5ML
10 INJECTION, SOLUTION SUBCUTANEOUS
Qty: 2 ML | Refills: 5 | Status: SHIPPED | OUTPATIENT
Start: 2023-10-17

## 2024-01-17 RX ORDER — GABAPENTIN 300 MG/1
CAPSULE ORAL
Qty: 150 CAPSULE | Refills: 11 | Status: SHIPPED | OUTPATIENT
Start: 2024-01-17

## 2024-01-17 RX ORDER — METFORMIN HYDROCHLORIDE 500 MG/1
1000 TABLET, EXTENDED RELEASE ORAL
Qty: 360 TABLET | Refills: 3 | Status: SHIPPED | OUTPATIENT
Start: 2024-01-17 | End: 2024-04-16 | Stop reason: SDUPTHER

## 2024-01-17 ASSESSMENT — PAIN SCALES - GENERAL: PAINLEVEL: 6

## 2024-01-17 NOTE — PROGRESS NOTES
"Subjective   MELANIE Estevez is a 53 y.o. male who presents for follow-up of Type 2 diabetes mellitus. The initial diagnosis of diabetes was made  several years ago, prior to 2018 . The patient does have a known family history of diabetes.    Known complications due to diabetes included peripheral neuropathy, cardiovascular disease, and obesity    Cardiovascular risk factors include diabetes mellitus, hypertension, and male gender. The patient is not on an ACE inhibitor or angiotensin II receptor blocker.  The patient has not been previously hospitalized due to diabetic ketoacidosis.     Current symptoms/problems include none. His clinical course has improved.     Current diabetes regimen is as follows: Mounjaro 10 mg, Farxiga 10 mg, metformin 1000 mg twice daily    The patient is currently checking the blood glucose 5+ times per day.    Patient is using: continuous glucose monitor -Dexcom G7        Hypoglycemia frequency: 0%  Hypoglycemia awareness: Yes     Exercise: intermittently   Meal panning: He is using avoidance of concentrated sweets.    Review of Systems   All other systems reviewed and are negative.      Objective   /85   Pulse 78   Ht 1.778 m (5' 10\")   Wt 89.4 kg (197 lb)   BMI 28.27 kg/m²   Physical Exam  Constitutional:       Appearance: Normal appearance. He is normal weight.   HENT:      Head: Normocephalic and atraumatic.      Nose: Nose normal.      Mouth/Throat:      Mouth: Mucous membranes are dry.      Pharynx: Oropharynx is clear.   Eyes:      Extraocular Movements: Extraocular movements intact.      Conjunctiva/sclera: Conjunctivae normal.   Cardiovascular:      Rate and Rhythm: Normal rate and regular rhythm.      Pulses: Normal pulses.      Heart sounds: Normal heart sounds.   Pulmonary:      Effort: Pulmonary effort is normal.      Breath sounds: Normal breath sounds.   Abdominal:      General: Abdomen is flat. Bowel sounds are normal.      Palpations: Abdomen is soft. "   Skin:     General: Skin is warm and dry.   Neurological:      General: No focal deficit present.      Mental Status: He is alert and oriented to person, place, and time. Mental status is at baseline.   Psychiatric:         Mood and Affect: Mood normal.         Behavior: Behavior normal.         Thought Content: Thought content normal.         Judgment: Judgment normal.         Lab Review  Glucose (mg/dL)   Date Value   12/28/2023 93   11/27/2023 80   10/25/2023 74     Hemoglobin A1C (%)   Date Value   09/25/2023 <3.5   07/21/2023 4.1   06/22/2023 3.5     Bicarbonate (mmol/L)   Date Value   12/28/2023 30   11/27/2023 31   10/25/2023 27     Urea Nitrogen (mg/dL)   Date Value   12/28/2023 15   11/27/2023 11   10/25/2023 14     Creatinine (mg/dL)   Date Value   12/28/2023 1.07   11/27/2023 1.14   10/25/2023 1.16       Health Maintenance:   Foot Exam: Due for update  Eye Exam: Up-to-date with no abnormal findings as of June 1, 2023  Lipid Panel: Up-to-date and near goal of LDL less than 70 as of October 2023  Urine Albumin: Up-to-date and at goal of less than 30 as of April 2023    Assessment/Plan   Diagnoses and all orders for this visit:  Type 2 diabetes mellitus with diabetic polyneuropathy, without long-term current use of insulin (CMS/[facility])  -     tirzepatide (Mounjaro) 10 mg/0.5 mL pen injector; Inject 10 mg under the skin 1 (one) time per week.  -     dapagliflozin propanediol (Farxiga) 10 mg; Take 1 tablet (10 mg) by mouth once daily.  -     gabapentin (Neurontin) 300 mg capsule; Take 1 capsule with breakfast then take 2 capsules with lunch and 2 with dinner  -     Hemoglobin A1C; Standing  -     Fructosamine; Future  Type 2 diabetes mellitus without complication, with long-term current use of insulin (CMS/HCC)  -     metFORMIN  mg 24 hr tablet; Take 2 tablets (1,000 mg) by mouth 2 times a day with meals. Take 1 tablet by mouth twice daily with meals  Iron deficiency anemia, unspecified iron deficiency  anemia type  -     Vitamin B12; Future  Vitamin B 12 deficiency  -     Vitamin B12; Future  Diabetes mellitus due to underlying condition, controlled, with stage 1 chronic kidney disease, without long-term current use of insulin (CMS/[facility])  Liver transplant recipient (CMS/[facility])      Type 2 diabetes mellitus, is at goal. A1C at target     RX changes: none   Education:  interpretation of lab results, blood sugar goals, and self-monitoring of blood glucose skills  Follow up: I recommend diabetes care be 3 months, then transfer care to new  endocrinologist

## 2024-01-17 NOTE — PATIENT INSTRUCTIONS
Type 2 diabetes mellitus with diabetic polyneuropathy, without long-term current use of insulin (CMS/HCC)  -     tirzepatide (Mounjaro) 10 mg/0.5 mL pen injector; Inject 10 mg under the skin 1 (one) time per week.  -     dapagliflozin propanediol (Farxiga) 10 mg; Take 1 tablet (10 mg) by mouth once daily.  -     gabapentin (Neurontin) 300 mg capsule; Take 1 capsule with breakfast then take 2 capsules with lunch and 2 with dinner  Type 2 diabetes mellitus without complication, with long-term current use of insulin (CMS/HCC)  -     metFORMIN  mg 24 hr tablet; Take 2 tablets (1,000 mg) by mouth 2 times a day with meals. Take 1 tablet by mouth twice daily with meals      Type 2 diabetes mellitus, is at goal. A1C at target     RX changes: none   Education:  interpretation of lab results, blood sugar goals, and self-monitoring of blood glucose skills  Follow up: I recommend diabetes care be 3 months.

## 2024-01-22 ENCOUNTER — APPOINTMENT (OUTPATIENT)
Dept: TRANSPLANT | Facility: HOSPITAL | Age: 54
End: 2024-01-22
Payer: MEDICARE

## 2024-01-22 ENCOUNTER — TELEPHONE (OUTPATIENT)
Dept: TRANSPLANT | Facility: HOSPITAL | Age: 54
End: 2024-01-22
Payer: MEDICARE

## 2024-01-23 ENCOUNTER — LAB (OUTPATIENT)
Dept: LAB | Facility: LAB | Age: 54
End: 2024-01-23
Payer: MEDICARE

## 2024-01-23 DIAGNOSIS — D50.9 IRON DEFICIENCY ANEMIA, UNSPECIFIED IRON DEFICIENCY ANEMIA TYPE: ICD-10-CM

## 2024-01-23 DIAGNOSIS — E11.42 TYPE 2 DIABETES MELLITUS WITH DIABETIC POLYNEUROPATHY, WITHOUT LONG-TERM CURRENT USE OF INSULIN (MULTI): ICD-10-CM

## 2024-01-23 DIAGNOSIS — Z94.4 HISTORY OF LIVER TRANSPLANT (MULTI): ICD-10-CM

## 2024-01-23 DIAGNOSIS — N18.30 STAGE 3 CHRONIC KIDNEY DISEASE, UNSPECIFIED WHETHER STAGE 3A OR 3B CKD (MULTI): ICD-10-CM

## 2024-01-23 DIAGNOSIS — D84.9 IMMUNOSUPPRESSION (MULTI): ICD-10-CM

## 2024-01-23 DIAGNOSIS — E53.8 VITAMIN B 12 DEFICIENCY: ICD-10-CM

## 2024-01-23 DIAGNOSIS — E53.8 VITAMIN B12 DEFICIENCY: ICD-10-CM

## 2024-01-23 LAB
ALBUMIN SERPL BCP-MCNC: 4.2 G/DL (ref 3.4–5)
ALP SERPL-CCNC: 76 U/L (ref 33–120)
ALT SERPL W P-5'-P-CCNC: 6 U/L (ref 10–52)
ANION GAP SERPL CALC-SCNC: 11 MMOL/L (ref 10–20)
AST SERPL W P-5'-P-CCNC: 9 U/L (ref 9–39)
BASOPHILS # BLD AUTO: 0.04 X10*3/UL (ref 0–0.1)
BASOPHILS NFR BLD AUTO: 0.8 %
BILIRUB SERPL-MCNC: 0.6 MG/DL (ref 0–1.2)
BUN SERPL-MCNC: 14 MG/DL (ref 6–23)
CALCIUM SERPL-MCNC: 9 MG/DL (ref 8.6–10.3)
CHLORIDE SERPL-SCNC: 99 MMOL/L (ref 98–107)
CO2 SERPL-SCNC: 29 MMOL/L (ref 21–32)
CREAT SERPL-MCNC: 1.17 MG/DL (ref 0.5–1.3)
EGFRCR SERPLBLD CKD-EPI 2021: 75 ML/MIN/1.73M*2
EOSINOPHIL # BLD AUTO: 0.13 X10*3/UL (ref 0–0.7)
EOSINOPHIL NFR BLD AUTO: 2.6 %
ERYTHROCYTE [DISTWIDTH] IN BLOOD BY AUTOMATED COUNT: 13.1 % (ref 11.5–14.5)
EST. AVERAGE GLUCOSE BLD GHB EST-MCNC: 97 MG/DL
GLUCOSE SERPL-MCNC: 88 MG/DL (ref 74–99)
HBA1C MFR BLD: 5 %
HCT VFR BLD AUTO: 45.1 % (ref 41–52)
HGB BLD-MCNC: 15.3 G/DL (ref 13.5–17.5)
IMM GRANULOCYTES # BLD AUTO: 0.01 X10*3/UL (ref 0–0.7)
IMM GRANULOCYTES NFR BLD AUTO: 0.2 % (ref 0–0.9)
LYMPHOCYTES # BLD AUTO: 1.15 X10*3/UL (ref 1.2–4.8)
LYMPHOCYTES NFR BLD AUTO: 23 %
MCH RBC QN AUTO: 28.7 PG (ref 26–34)
MCHC RBC AUTO-ENTMCNC: 33.9 G/DL (ref 32–36)
MCV RBC AUTO: 85 FL (ref 80–100)
MONOCYTES # BLD AUTO: 0.3 X10*3/UL (ref 0.1–1)
MONOCYTES NFR BLD AUTO: 6 %
NEUTROPHILS # BLD AUTO: 3.36 X10*3/UL (ref 1.2–7.7)
NEUTROPHILS NFR BLD AUTO: 67.4 %
NRBC BLD-RTO: 0 /100 WBCS (ref 0–0)
PLATELET # BLD AUTO: 194 X10*3/UL (ref 150–450)
POTASSIUM SERPL-SCNC: 4.4 MMOL/L (ref 3.5–5.3)
PROT SERPL-MCNC: 6.9 G/DL (ref 6.4–8.2)
RBC # BLD AUTO: 5.34 X10*6/UL (ref 4.5–5.9)
SODIUM SERPL-SCNC: 135 MMOL/L (ref 136–145)
TACROLIMUS BLD-MCNC: 5.5 NG/ML
VIT B12 SERPL-MCNC: 255 PG/ML (ref 211–911)
WBC # BLD AUTO: 5 X10*3/UL (ref 4.4–11.3)

## 2024-01-23 PROCEDURE — 83036 HEMOGLOBIN GLYCOSYLATED A1C: CPT

## 2024-01-23 PROCEDURE — 82985 ASSAY OF GLYCATED PROTEIN: CPT

## 2024-01-23 PROCEDURE — 80053 COMPREHEN METABOLIC PANEL: CPT

## 2024-01-23 PROCEDURE — 36415 COLL VENOUS BLD VENIPUNCTURE: CPT

## 2024-01-23 PROCEDURE — 82607 VITAMIN B-12: CPT

## 2024-01-23 PROCEDURE — 80197 ASSAY OF TACROLIMUS: CPT

## 2024-01-23 PROCEDURE — 85025 COMPLETE CBC W/AUTO DIFF WBC: CPT

## 2024-01-23 RX ORDER — CHOLECALCIFEROL (VITAMIN D3) 25 MCG
TABLET,CHEWABLE ORAL
Qty: 90 LOZENGE | Refills: 3 | Status: SHIPPED | OUTPATIENT
Start: 2024-01-23

## 2024-01-24 DIAGNOSIS — N18.32 CHRONIC KIDNEY DISEASE (CKD) STAGE G3B/A3, MODERATELY DECREASED GLOMERULAR FILTRATION RATE (GFR) BETWEEN 30-44 ML/MIN/1.73 SQUARE METER AND ALBUMINURIA CREATININE RATIO GREATER THAN 300 MG/G * (MULTI): ICD-10-CM

## 2024-01-24 RX ORDER — ALLOPURINOL 100 MG/1
100 TABLET ORAL DAILY
Qty: 90 TABLET | Refills: 3 | Status: SHIPPED | OUTPATIENT
Start: 2024-01-24 | End: 2025-01-23

## 2024-01-25 LAB — FRUCTOSAMINE SERPL-SCNC: 230 UMOL/L (ref 205–285)

## 2024-01-26 ENCOUNTER — ANESTHESIA EVENT (OUTPATIENT)
Dept: OPERATING ROOM | Facility: HOSPITAL | Age: 54
End: 2024-01-26
Payer: MEDICARE

## 2024-01-29 ENCOUNTER — HOSPITAL ENCOUNTER (OUTPATIENT)
Facility: HOSPITAL | Age: 54
Setting detail: OUTPATIENT SURGERY
Discharge: HOME | End: 2024-01-29
Attending: STUDENT IN AN ORGANIZED HEALTH CARE EDUCATION/TRAINING PROGRAM | Admitting: STUDENT IN AN ORGANIZED HEALTH CARE EDUCATION/TRAINING PROGRAM
Payer: MEDICARE

## 2024-01-29 ENCOUNTER — ANESTHESIA (OUTPATIENT)
Dept: OPERATING ROOM | Facility: HOSPITAL | Age: 54
End: 2024-01-29
Payer: MEDICARE

## 2024-01-29 VITALS
OXYGEN SATURATION: 97 % | DIASTOLIC BLOOD PRESSURE: 88 MMHG | SYSTOLIC BLOOD PRESSURE: 150 MMHG | RESPIRATION RATE: 12 BRPM | HEART RATE: 92 BPM | TEMPERATURE: 96.8 F

## 2024-01-29 DIAGNOSIS — G89.18 POSTOPERATIVE PAIN: ICD-10-CM

## 2024-01-29 DIAGNOSIS — G89.18 PAIN ASSOCIATED WITH SURGICAL PROCEDURE: Primary | ICD-10-CM

## 2024-01-29 LAB
GLUCOSE BLD MANUAL STRIP-MCNC: 109 MG/DL (ref 74–99)
GLUCOSE BLD MANUAL STRIP-MCNC: 75 MG/DL (ref 74–99)
GLUCOSE BLD MANUAL STRIP-MCNC: <10 MG/DL (ref 74–99)

## 2024-01-29 PROCEDURE — 7100000010 HC PHASE TWO TIME - EACH INCREMENTAL 1 MINUTE: Performed by: STUDENT IN AN ORGANIZED HEALTH CARE EDUCATION/TRAINING PROGRAM

## 2024-01-29 PROCEDURE — 2500000005 HC RX 250 GENERAL PHARMACY W/O HCPCS: Mod: SE

## 2024-01-29 PROCEDURE — 3700000002 HC GENERAL ANESTHESIA TIME - EACH INCREMENTAL 1 MINUTE: Performed by: STUDENT IN AN ORGANIZED HEALTH CARE EDUCATION/TRAINING PROGRAM

## 2024-01-29 PROCEDURE — 3700000001 HC GENERAL ANESTHESIA TIME - INITIAL BASE CHARGE: Performed by: STUDENT IN AN ORGANIZED HEALTH CARE EDUCATION/TRAINING PROGRAM

## 2024-01-29 PROCEDURE — 3600000003 HC OR TIME - INITIAL BASE CHARGE - PROCEDURE LEVEL THREE: Performed by: STUDENT IN AN ORGANIZED HEALTH CARE EDUCATION/TRAINING PROGRAM

## 2024-01-29 PROCEDURE — 2500000004 HC RX 250 GENERAL PHARMACY W/ HCPCS (ALT 636 FOR OP/ED): Mod: SE | Performed by: STUDENT IN AN ORGANIZED HEALTH CARE EDUCATION/TRAINING PROGRAM

## 2024-01-29 PROCEDURE — 2500000004 HC RX 250 GENERAL PHARMACY W/ HCPCS (ALT 636 FOR OP/ED): Mod: SE

## 2024-01-29 PROCEDURE — 2780000003 HC OR 278 NO HCPCS: Performed by: STUDENT IN AN ORGANIZED HEALTH CARE EDUCATION/TRAINING PROGRAM

## 2024-01-29 PROCEDURE — A49591 PR RPR AA HERNIA 1ST < 3 CM REDUCIBLE: Performed by: ANESTHESIOLOGY

## 2024-01-29 PROCEDURE — 3600000008 HC OR TIME - EACH INCREMENTAL 1 MINUTE - PROCEDURE LEVEL THREE: Performed by: STUDENT IN AN ORGANIZED HEALTH CARE EDUCATION/TRAINING PROGRAM

## 2024-01-29 PROCEDURE — 7100000002 HC RECOVERY ROOM TIME - EACH INCREMENTAL 1 MINUTE: Performed by: STUDENT IN AN ORGANIZED HEALTH CARE EDUCATION/TRAINING PROGRAM

## 2024-01-29 PROCEDURE — 7100000001 HC RECOVERY ROOM TIME - INITIAL BASE CHARGE: Performed by: STUDENT IN AN ORGANIZED HEALTH CARE EDUCATION/TRAINING PROGRAM

## 2024-01-29 PROCEDURE — 99222 1ST HOSP IP/OBS MODERATE 55: CPT | Performed by: STUDENT IN AN ORGANIZED HEALTH CARE EDUCATION/TRAINING PROGRAM

## 2024-01-29 PROCEDURE — 82947 ASSAY GLUCOSE BLOOD QUANT: CPT

## 2024-01-29 PROCEDURE — 7100000009 HC PHASE TWO TIME - INITIAL BASE CHARGE: Performed by: STUDENT IN AN ORGANIZED HEALTH CARE EDUCATION/TRAINING PROGRAM

## 2024-01-29 PROCEDURE — 49591 RPR AA HRN 1ST < 3 CM RDC: CPT | Performed by: STUDENT IN AN ORGANIZED HEALTH CARE EDUCATION/TRAINING PROGRAM

## 2024-01-29 PROCEDURE — 2500000005 HC RX 250 GENERAL PHARMACY W/O HCPCS: Mod: SE | Performed by: STUDENT IN AN ORGANIZED HEALTH CARE EDUCATION/TRAINING PROGRAM

## 2024-01-29 PROCEDURE — C1781 MESH (IMPLANTABLE): HCPCS | Performed by: STUDENT IN AN ORGANIZED HEALTH CARE EDUCATION/TRAINING PROGRAM

## 2024-01-29 PROCEDURE — 2720000007 HC OR 272 NO HCPCS: Performed by: STUDENT IN AN ORGANIZED HEALTH CARE EDUCATION/TRAINING PROGRAM

## 2024-01-29 DEVICE — IMPLANTABLE DEVICE: Type: IMPLANTABLE DEVICE | Site: ABDOMEN | Status: FUNCTIONAL

## 2024-01-29 RX ORDER — DIPHENHYDRAMINE HYDROCHLORIDE 50 MG/ML
12.5 INJECTION INTRAMUSCULAR; INTRAVENOUS ONCE AS NEEDED
Status: DISCONTINUED | OUTPATIENT
Start: 2024-01-29 | End: 2024-01-29 | Stop reason: HOSPADM

## 2024-01-29 RX ORDER — ONDANSETRON HYDROCHLORIDE 2 MG/ML
4 INJECTION, SOLUTION INTRAVENOUS ONCE AS NEEDED
Status: COMPLETED | OUTPATIENT
Start: 2024-01-29 | End: 2024-01-29

## 2024-01-29 RX ORDER — LIDOCAINE HYDROCHLORIDE 20 MG/ML
INJECTION, SOLUTION INFILTRATION; PERINEURAL AS NEEDED
Status: DISCONTINUED | OUTPATIENT
Start: 2024-01-29 | End: 2024-01-29

## 2024-01-29 RX ORDER — ONDANSETRON HYDROCHLORIDE 2 MG/ML
INJECTION, SOLUTION INTRAVENOUS AS NEEDED
Status: DISCONTINUED | OUTPATIENT
Start: 2024-01-29 | End: 2024-01-29

## 2024-01-29 RX ORDER — HYDROMORPHONE HYDROCHLORIDE 1 MG/ML
0.5 INJECTION, SOLUTION INTRAMUSCULAR; INTRAVENOUS; SUBCUTANEOUS EVERY 5 MIN PRN
Status: DISCONTINUED | OUTPATIENT
Start: 2024-01-29 | End: 2024-01-29 | Stop reason: HOSPADM

## 2024-01-29 RX ORDER — SODIUM CHLORIDE, SODIUM LACTATE, POTASSIUM CHLORIDE, CALCIUM CHLORIDE 600; 310; 30; 20 MG/100ML; MG/100ML; MG/100ML; MG/100ML
100 INJECTION, SOLUTION INTRAVENOUS CONTINUOUS
Status: DISCONTINUED | OUTPATIENT
Start: 2024-01-29 | End: 2024-01-29 | Stop reason: HOSPADM

## 2024-01-29 RX ORDER — LABETALOL HYDROCHLORIDE 5 MG/ML
INJECTION, SOLUTION INTRAVENOUS AS NEEDED
Status: DISCONTINUED | OUTPATIENT
Start: 2024-01-29 | End: 2024-01-29

## 2024-01-29 RX ORDER — FENTANYL CITRATE 50 UG/ML
INJECTION, SOLUTION INTRAMUSCULAR; INTRAVENOUS AS NEEDED
Status: DISCONTINUED | OUTPATIENT
Start: 2024-01-29 | End: 2024-01-29

## 2024-01-29 RX ORDER — SODIUM CHLORIDE, SODIUM LACTATE, POTASSIUM CHLORIDE, CALCIUM CHLORIDE 600; 310; 30; 20 MG/100ML; MG/100ML; MG/100ML; MG/100ML
INJECTION, SOLUTION INTRAVENOUS CONTINUOUS PRN
Status: DISCONTINUED | OUTPATIENT
Start: 2024-01-29 | End: 2024-01-29

## 2024-01-29 RX ORDER — MIDAZOLAM HYDROCHLORIDE 1 MG/ML
INJECTION, SOLUTION INTRAMUSCULAR; INTRAVENOUS AS NEEDED
Status: DISCONTINUED | OUTPATIENT
Start: 2024-01-29 | End: 2024-01-29

## 2024-01-29 RX ORDER — HYDROMORPHONE HYDROCHLORIDE 1 MG/ML
0.2 INJECTION, SOLUTION INTRAMUSCULAR; INTRAVENOUS; SUBCUTANEOUS EVERY 5 MIN PRN
Status: DISCONTINUED | OUTPATIENT
Start: 2024-01-29 | End: 2024-01-29 | Stop reason: HOSPADM

## 2024-01-29 RX ORDER — OXYCODONE HYDROCHLORIDE 5 MG/1
5 TABLET ORAL EVERY 4 HOURS PRN
Status: DISCONTINUED | OUTPATIENT
Start: 2024-01-29 | End: 2024-01-29 | Stop reason: HOSPADM

## 2024-01-29 RX ORDER — ALBUTEROL SULFATE 0.83 MG/ML
2.5 SOLUTION RESPIRATORY (INHALATION) ONCE AS NEEDED
Status: DISCONTINUED | OUTPATIENT
Start: 2024-01-29 | End: 2024-01-29 | Stop reason: HOSPADM

## 2024-01-29 RX ORDER — BUPIVACAINE HYDROCHLORIDE 5 MG/ML
INJECTION, SOLUTION PERINEURAL AS NEEDED
Status: DISCONTINUED | OUTPATIENT
Start: 2024-01-29 | End: 2024-01-29 | Stop reason: HOSPADM

## 2024-01-29 RX ORDER — OXYCODONE HYDROCHLORIDE 5 MG/1
10 TABLET ORAL EVERY 6 HOURS PRN
Qty: 28 TABLET | Refills: 0 | Status: SHIPPED | OUTPATIENT
Start: 2024-01-29 | End: 2024-02-04 | Stop reason: ALTCHOICE

## 2024-01-29 RX ORDER — ESMOLOL HYDROCHLORIDE 10 MG/ML
INJECTION INTRAVENOUS AS NEEDED
Status: DISCONTINUED | OUTPATIENT
Start: 2024-01-29 | End: 2024-01-29

## 2024-01-29 RX ORDER — TACROLIMUS 1 MG/1
1 CAPSULE ORAL
Status: DISCONTINUED | OUTPATIENT
Start: 2024-01-29 | End: 2024-01-29 | Stop reason: HOSPADM

## 2024-01-29 RX ORDER — METHOCARBAMOL 500 MG/1
500 TABLET, FILM COATED ORAL EVERY 6 HOURS SCHEDULED
Status: DISCONTINUED | OUTPATIENT
Start: 2024-01-29 | End: 2024-01-29

## 2024-01-29 RX ORDER — PROPOFOL 10 MG/ML
INJECTION, EMULSION INTRAVENOUS AS NEEDED
Status: DISCONTINUED | OUTPATIENT
Start: 2024-01-29 | End: 2024-01-29

## 2024-01-29 RX ORDER — LABETALOL HYDROCHLORIDE 5 MG/ML
5 INJECTION, SOLUTION INTRAVENOUS ONCE AS NEEDED
Status: DISCONTINUED | OUTPATIENT
Start: 2024-01-29 | End: 2024-01-29 | Stop reason: SDUPTHER

## 2024-01-29 RX ORDER — ROCURONIUM BROMIDE 10 MG/ML
INJECTION, SOLUTION INTRAVENOUS AS NEEDED
Status: DISCONTINUED | OUTPATIENT
Start: 2024-01-29 | End: 2024-01-29

## 2024-01-29 RX ORDER — ACETAMINOPHEN 325 MG/1
650 TABLET ORAL EVERY 4 HOURS PRN
Status: DISCONTINUED | OUTPATIENT
Start: 2024-01-29 | End: 2024-01-29

## 2024-01-29 RX ORDER — METHOCARBAMOL 100 MG/ML
1000 INJECTION, SOLUTION INTRAMUSCULAR; INTRAVENOUS EVERY 6 HOURS PRN
Status: DISCONTINUED | OUTPATIENT
Start: 2024-01-29 | End: 2024-01-29 | Stop reason: HOSPADM

## 2024-01-29 RX ORDER — METHOCARBAMOL 750 MG/1
750 TABLET, FILM COATED ORAL 4 TIMES DAILY
Qty: 28 TABLET | Refills: 0 | Status: SHIPPED | OUTPATIENT
Start: 2024-01-29 | End: 2024-03-11

## 2024-01-29 RX ORDER — LABETALOL HYDROCHLORIDE 5 MG/ML
5 INJECTION, SOLUTION INTRAVENOUS ONCE AS NEEDED
Status: DISCONTINUED | OUTPATIENT
Start: 2024-01-29 | End: 2024-01-29 | Stop reason: HOSPADM

## 2024-01-29 RX ORDER — ALBUTEROL SULFATE 0.83 MG/ML
2.5 SOLUTION RESPIRATORY (INHALATION) ONCE AS NEEDED
Status: DISCONTINUED | OUTPATIENT
Start: 2024-01-29 | End: 2024-01-29 | Stop reason: SDUPTHER

## 2024-01-29 RX ORDER — ACETAMINOPHEN 325 MG/1
650 TABLET ORAL EVERY 4 HOURS
Status: DISCONTINUED | OUTPATIENT
Start: 2024-01-29 | End: 2024-01-29 | Stop reason: HOSPADM

## 2024-01-29 RX ORDER — HYDROMORPHONE HYDROCHLORIDE 1 MG/ML
INJECTION, SOLUTION INTRAMUSCULAR; INTRAVENOUS; SUBCUTANEOUS AS NEEDED
Status: DISCONTINUED | OUTPATIENT
Start: 2024-01-29 | End: 2024-01-29

## 2024-01-29 RX ORDER — METHOCARBAMOL 100 MG/ML
1000 INJECTION, SOLUTION INTRAMUSCULAR; INTRAVENOUS ONCE
Status: DISCONTINUED | OUTPATIENT
Start: 2024-01-29 | End: 2024-01-29 | Stop reason: HOSPADM

## 2024-01-29 RX ORDER — OXYCODONE HYDROCHLORIDE 5 MG/1
10 TABLET ORAL EVERY 4 HOURS PRN
Status: DISCONTINUED | OUTPATIENT
Start: 2024-01-29 | End: 2024-01-29 | Stop reason: HOSPADM

## 2024-01-29 RX ORDER — HYDRALAZINE HYDROCHLORIDE 20 MG/ML
5 INJECTION INTRAMUSCULAR; INTRAVENOUS EVERY 30 MIN PRN
Status: DISCONTINUED | OUTPATIENT
Start: 2024-01-29 | End: 2024-01-29 | Stop reason: HOSPADM

## 2024-01-29 RX ORDER — CEFAZOLIN 1 G/1
INJECTION, POWDER, FOR SOLUTION INTRAVENOUS AS NEEDED
Status: DISCONTINUED | OUTPATIENT
Start: 2024-01-29 | End: 2024-01-29

## 2024-01-29 RX ORDER — DEXAMETHASONE SODIUM PHOSPHATE 4 MG/ML
INJECTION, SOLUTION INTRA-ARTICULAR; INTRALESIONAL; INTRAMUSCULAR; INTRAVENOUS; SOFT TISSUE AS NEEDED
Status: DISCONTINUED | OUTPATIENT
Start: 2024-01-29 | End: 2024-01-29

## 2024-01-29 RX ORDER — LIDOCAINE HYDROCHLORIDE 10 MG/ML
0.1 INJECTION INFILTRATION; PERINEURAL ONCE
Status: DISCONTINUED | OUTPATIENT
Start: 2024-01-29 | End: 2024-01-29 | Stop reason: HOSPADM

## 2024-01-29 RX ADMIN — ESMOLOL HYDROCHLORIDE 30 MG: 100 INJECTION, SOLUTION INTRAVENOUS at 10:04

## 2024-01-29 RX ADMIN — HYDROMORPHONE HYDROCHLORIDE 0.6 MG: 1 INJECTION, SOLUTION INTRAMUSCULAR; INTRAVENOUS; SUBCUTANEOUS at 10:12

## 2024-01-29 RX ADMIN — ONDANSETRON 4 MG: 2 INJECTION INTRAMUSCULAR; INTRAVENOUS at 10:49

## 2024-01-29 RX ADMIN — METHOCARBAMOL 1000 MG: 1000 INJECTION, SOLUTION INTRAMUSCULAR; INTRAVENOUS at 11:50

## 2024-01-29 RX ADMIN — ROCURONIUM BROMIDE 55 MG: 10 INJECTION INTRAVENOUS at 07:53

## 2024-01-29 RX ADMIN — HYDROMORPHONE HYDROCHLORIDE 0.5 MG: 1 INJECTION, SOLUTION INTRAMUSCULAR; INTRAVENOUS; SUBCUTANEOUS at 11:03

## 2024-01-29 RX ADMIN — HYDROMORPHONE HYDROCHLORIDE 0.2 MG: 1 INJECTION, SOLUTION INTRAMUSCULAR; INTRAVENOUS; SUBCUTANEOUS at 08:35

## 2024-01-29 RX ADMIN — ESMOLOL HYDROCHLORIDE 30 MG: 100 INJECTION, SOLUTION INTRAVENOUS at 08:28

## 2024-01-29 RX ADMIN — LIDOCAINE HYDROCHLORIDE 60 MG: 20 INJECTION, SOLUTION INFILTRATION; PERINEURAL at 07:52

## 2024-01-29 RX ADMIN — PROMETHAZINE HYDROCHLORIDE 6.25 MG: 25 INJECTION INTRAMUSCULAR; INTRAVENOUS at 12:29

## 2024-01-29 RX ADMIN — PROPOFOL 40 MG: 10 INJECTION, EMULSION INTRAVENOUS at 07:58

## 2024-01-29 RX ADMIN — ESMOLOL HYDROCHLORIDE 20 MG: 100 INJECTION, SOLUTION INTRAVENOUS at 07:58

## 2024-01-29 RX ADMIN — ONDANSETRON 4 MG: 2 INJECTION INTRAMUSCULAR; INTRAVENOUS at 09:57

## 2024-01-29 RX ADMIN — TACROLIMUS 1 MG: 1 CAPSULE ORAL at 11:49

## 2024-01-29 RX ADMIN — HYDROMORPHONE HYDROCHLORIDE 0.5 MG: 1 INJECTION, SOLUTION INTRAMUSCULAR; INTRAVENOUS; SUBCUTANEOUS at 12:23

## 2024-01-29 RX ADMIN — PROPOFOL 130 MG: 10 INJECTION, EMULSION INTRAVENOUS at 07:52

## 2024-01-29 RX ADMIN — HYDROMORPHONE HYDROCHLORIDE 0.5 MG: 1 INJECTION, SOLUTION INTRAMUSCULAR; INTRAVENOUS; SUBCUTANEOUS at 10:55

## 2024-01-29 RX ADMIN — DEXAMETHASONE SODIUM PHOSPHATE 8 MG: 4 INJECTION, SOLUTION INTRA-ARTICULAR; INTRALESIONAL; INTRAMUSCULAR; INTRAVENOUS; SOFT TISSUE at 07:55

## 2024-01-29 RX ADMIN — LABETALOL HYDROCHLORIDE 5 MG: 5 INJECTION INTRAVENOUS at 09:50

## 2024-01-29 RX ADMIN — CEFAZOLIN 2 G: 330 INJECTION, POWDER, FOR SOLUTION INTRAMUSCULAR; INTRAVENOUS at 08:12

## 2024-01-29 RX ADMIN — PROPOFOL 30 MG: 10 INJECTION, EMULSION INTRAVENOUS at 07:57

## 2024-01-29 RX ADMIN — LIDOCAINE HYDROCHLORIDE 40 MG: 20 INJECTION, SOLUTION INFILTRATION; PERINEURAL at 07:53

## 2024-01-29 RX ADMIN — FENTANYL CITRATE 50 MCG: 50 INJECTION, SOLUTION INTRAMUSCULAR; INTRAVENOUS at 08:26

## 2024-01-29 RX ADMIN — SUGAMMADEX 200 MG: 100 INJECTION, SOLUTION INTRAVENOUS at 10:04

## 2024-01-29 RX ADMIN — ESMOLOL HYDROCHLORIDE 20 MG: 100 INJECTION, SOLUTION INTRAVENOUS at 09:23

## 2024-01-29 RX ADMIN — MIDAZOLAM 2 MG: 1 INJECTION INTRAMUSCULAR; INTRAVENOUS at 07:42

## 2024-01-29 RX ADMIN — HYDROMORPHONE HYDROCHLORIDE 0.5 MG: 1 INJECTION, SOLUTION INTRAMUSCULAR; INTRAVENOUS; SUBCUTANEOUS at 11:20

## 2024-01-29 RX ADMIN — FENTANYL CITRATE 50 MCG: 50 INJECTION, SOLUTION INTRAMUSCULAR; INTRAVENOUS at 07:52

## 2024-01-29 RX ADMIN — SODIUM CHLORIDE, POTASSIUM CHLORIDE, SODIUM LACTATE AND CALCIUM CHLORIDE: 600; 310; 30; 20 INJECTION, SOLUTION INTRAVENOUS at 07:50

## 2024-01-29 RX ADMIN — HYDROMORPHONE HYDROCHLORIDE 0.2 MG: 1 INJECTION, SOLUTION INTRAMUSCULAR; INTRAVENOUS; SUBCUTANEOUS at 09:03

## 2024-01-29 SDOH — HEALTH STABILITY: MENTAL HEALTH: CURRENT SMOKER: 0

## 2024-01-29 ASSESSMENT — PAIN SCALES - GENERAL
PAINLEVEL_OUTOF10: 10 - WORST POSSIBLE PAIN
PAINLEVEL_OUTOF10: 9
PAINLEVEL_OUTOF10: 10 - WORST POSSIBLE PAIN
PAINLEVEL_OUTOF10: 9
PAINLEVEL_OUTOF10: 3
PAINLEVEL_OUTOF10: 6
PAINLEVEL_OUTOF10: 3
PAINLEVEL_OUTOF10: 10 - WORST POSSIBLE PAIN
PAINLEVEL_OUTOF10: 10 - WORST POSSIBLE PAIN
PAINLEVEL_OUTOF10: 9
PAINLEVEL_OUTOF10: 9
PAINLEVEL_OUTOF10: 10 - WORST POSSIBLE PAIN
PAINLEVEL_OUTOF10: 9
PAIN_LEVEL: 5
PAINLEVEL_OUTOF10: 9

## 2024-01-29 ASSESSMENT — COLUMBIA-SUICIDE SEVERITY RATING SCALE - C-SSRS
6. HAVE YOU EVER DONE ANYTHING, STARTED TO DO ANYTHING, OR PREPARED TO DO ANYTHING TO END YOUR LIFE?: NO
2. HAVE YOU ACTUALLY HAD ANY THOUGHTS OF KILLING YOURSELF?: NO
1. IN THE PAST MONTH, HAVE YOU WISHED YOU WERE DEAD OR WISHED YOU COULD GO TO SLEEP AND NOT WAKE UP?: NO

## 2024-01-29 NOTE — ANESTHESIA PROCEDURE NOTES
Airway  Date/Time: 1/29/2024 7:57 AM  Urgency: elective    Airway not difficult    Staffing  Performed: resident   Authorized by: Andra Farris MD    Performed by: Eloina Spears DO  Patient location during procedure: OR    Indications and Patient Condition  Indications for airway management: anesthesia  Spontaneous ventilation: present  Sedation level: deep  Preoxygenated: yes  Patient position: sniffing  Mask difficulty assessment: 1 - vent by mask  Planned trial extubation    Final Airway Details  Final airway type: endotracheal airway      Successful airway: ETT  Cuffed: yes   Successful intubation technique: direct laryngoscopy  Facilitating devices/methods: intubating stylet  Endotracheal tube insertion site: oral  Blade: Rhiannon  Blade size: #4  ETT size (mm): 7.5  Cormack-Lehane Classification: grade IIb - view of arytenoids or posterior of glottis only  Placement verified by: capnometry   Measured from: teeth  ETT to teeth (cm): 22  Number of attempts at approach: 1    Additional Comments  Anterior airway

## 2024-01-29 NOTE — ANESTHESIA POSTPROCEDURE EVALUATION
Patient: Karl Estevez    Procedure Summary       Date: 01/29/24 Room / Location: [facility] OR 20 / [facility] OR    Anesthesia Start: 0732 Anesthesia Stop: 1023    Procedure: Diagnostic laparoscopy, wound exploration, incisional hernia repair (Abdomen) Diagnosis:       Liver transplant recipient (CMS/HCC)      (Liver transplant recipient (CMS/HCC) [Z94.4])    Surgeons: Ivette Maya MD Responsible Provider: Andra Farris MD    Anesthesia Type: general ASA Status: 3            Anesthesia Type: general    Vitals Value Taken Time   /88 01/29/24 1023   Temp 36.8 01/29/24 1023   Pulse 92 01/29/24 1023   Resp 20 01/29/24 1023   SpO2 100 01/29/24 1023       Anesthesia Post Evaluation    Patient location during evaluation: PACU  Patient participation: complete - patient participated  Level of consciousness: awake and alert  Pain score: 5  Pain management: satisfactory to patient  Multimodal analgesia pain management approach  Airway patency: patent  Two or more strategies used to mitigate risk of obstructive sleep apnea  Cardiovascular status: acceptable and blood pressure returned to baseline  Respiratory status: acceptable  Hydration status: acceptable  Postoperative Nausea and Vomiting: none        No notable events documented.

## 2024-01-29 NOTE — H&P
Chief complaint: wound exploration, hernia repair    HPI  Patient is a 52-year-old male who is well-known to  the transplant service.  He had a liver transplant over a year ago.  He has done very well since then without major issues.  He has lost a significant amount of weight due to active lifestyle and feeling well after transplantation.  Posttransplantation he had developed an incisional hernia in the midline status post repair with mesh with Dr. Bradley last year.  He presented with complaints of possible needle hernia developing in the right side of his  incision.  He is complaining of sharp pain when he was active    Since last seen he underwent a CT ab/pelvis without obvious herniation     He has since developed pain on the left side as well. Also with concerns for redundancy over his right incision.     Review of Systems  Constitutional: no fever and no chills.   Eyes: no eye problems.   ENT: no ear symptoms and no nasal symptoms.   Cardiovascular: no chest pain.   Respiratory: no cough and no shortness of breath.   Gastrointestinal: no constipation, no vomiting, no diarrhea and no nausea.   Genitourinary. no dysuria.   Musculoskeletal: no myalgias.   Integumentary: no skin lesions.   Neurological no headache and no dizziness.   Psychiatric: has stable social support system.   Endocrine: not diabetic.   All other systems have been reviewed and are negative for complaint.    Objective   /74   Pulse 78   Temp 36.3 °C (97.3 °F)   SpO2 97%     Physical Exam  Constitutional - General appearance: In no acute distress, well appearing and well nourished.   Eyes Conjunctiva and lids: No erythema, swelling or discharge.    Ears, Nose, Mouth, and Throat - External inspection of ears and nose: Normal.   Neck - Exam: Appearance of the neck was normal. No neck masses observed   Pulmonary - Respiratory effort: Normal respiration.   Cardiovascular - Examination of extremities for edema and/or varicosities: No  peripheral edema   Abdomen - Abdomen: Non-tender, no abdominal masses   Mild bulge and tenderness in the right lateral segment of incision  No obvious hernia appreicated  Musculoskeletal - normal palpation of joints, bones, and muscles.   Skin - skin and subcutaneous tissue: normal without rashes or lesions.   Neurologic - Coordination: normal gait, Focal deficit: no focal deficit, moves all extremities   Psychiatric - Orientation to person, place, and time: Normal, Mood and affect: Normal    Lab Results   Component Value Date    WBC 5.0 01/23/2024    HGB 15.3 01/23/2024    HCT 45.1 01/23/2024    MCV 85 01/23/2024     01/23/2024      Lab Results   Component Value Date    GLUCOSE 88 01/23/2024    CALCIUM 9.0 01/23/2024     (L) 01/23/2024    K 4.4 01/23/2024    CO2 29 01/23/2024    CL 99 01/23/2024    BUN 14 01/23/2024    CREATININE 1.17 01/23/2024      Lab Results   Component Value Date    ALT 6 (L) 01/23/2024    AST 9 01/23/2024    GGT 9 11/27/2023    ALKPHOS 76 01/23/2024    BILITOT 0.6 01/23/2024        Assessment/Plan     54 y/o M s/p OLT and midline hernia s/p repair presenting with chronic right sided and new left sided incisional pain.      Will plan for right incisional wound exploration, diagnostic laparoscopy and possible right and left hernia repair with mesh. Procedure, risks and benefits explained.     Ivette Maya MD

## 2024-01-29 NOTE — ANESTHESIA PREPROCEDURE EVALUATION
Patient: Karl Estevez    Procedure Information       Date/Time: 01/29/24 0715    Procedure: Diagnostic laparoscopy, wound exploration, possible hernia repair - Diagnostic laparoscopy, wound exploration, possible hernia repair    Location: [facility] OR 20 / Virtual [facility] OR    Surgeons: Ivette Maya MD            Relevant Problems   Anesthesia (within normal limits)      Cardiovascular   (+) Benign essential HTN   (+) Hypertension   (+) Internal hemorrhoid, bleeding   (+) Unstable angina (CMS/HCC)      Endocrine   (+) Diabetic neuropathy, painful (CMS/HCC)   (+) Obesity due to excess calories, unspecified obesity severity   (+) Secondary hyperparathyroidism of renal origin (CMS/HCC)   (+) Type 2 diabetes mellitus with neurologic complication (CMS/HCC)      GI   (+) GERD (gastroesophageal reflux disease)   (+) Internal hemorrhoid, bleeding      /Renal   (+) CATA (acute kidney injury) (CMS/HCC)   (+) Alcoholic cirrhosis of liver with ascites (CMS/HCC)   (+) CKD (chronic kidney disease) stage 3, GFR 30-59 ml/min (CMS/HCC)   (+) Fatty liver   (+) Nodule on liver      Neuro/Psych   (+) Anxiety   (+) Generalized anxiety disorder   (+) Lumbar radiculopathy, chronic   (+) Peripheral polyneuropathy      Pulmonary   (+) Pneumonia      GI/Hepatic   (+) Alcoholic cirrhosis of liver with ascites (CMS/HCC)   (+) Fatty liver      Hematology   (+) Anemia in chronic kidney disease   (+) Anemia, iron deficiency   (+) Macrocytic anemia      Musculoskeletal   (+) DJD (degenerative joint disease)      Infectious Disease   (+) Cytomegalovirus (CMV) viremia (CMS/HCC)   (+) EBV (X[place]-Barr virus) viremia   (+) History of MRSA infection   (+) Onychomycosis   (+) Wart viral      Other   (+) Adhesive capsulitis of right shoulder   (+) Charcot arthropathy of midfoot       Clinical information reviewed:    Allergies  Meds               NPO Detail:  NPO/Void Status  Date of Last Liquid: 01/28/24  Time of Last Liquid: 2100  Date  of Last Solid: 01/28/24  Time of Last Solid: 2100  Time of Last Void: 0500         Vitals:    01/29/24 0605   BP: 114/74   Pulse: 78   Temp: 36.3 °C (97.3 °F)   SpO2: 97%        Chemistry    Lab Results   Component Value Date/Time     (L) 01/23/2024 0639    K 4.4 01/23/2024 0639    CL 99 01/23/2024 0639    CO2 29 01/23/2024 0639    BUN 14 01/23/2024 0639    CREATININE 1.17 01/23/2024 0639    Lab Results   Component Value Date/Time    CALCIUM 9.0 01/23/2024 0639    ALKPHOS 76 01/23/2024 0639    AST 9 01/23/2024 0639    ALT 6 (L) 01/23/2024 0639    BILITOT 0.6 01/23/2024 0639          Lab Results   Component Value Date/Time    WBC 5.0 01/23/2024 0639    HGB 15.3 01/23/2024 0639    HCT 45.1 01/23/2024 0639     01/23/2024 0639     Lab Results   Component Value Date/Time    PROTIME 11.3 03/02/2023 1016    INR 1.0 03/02/2023 1016     No results found. However, due to the size of the patient record, not all encounters were searched. Please check Results Review for a complete set of results.  No results found for this or any previous visit from the past 1095 days.       Physical Exam    Airway  Mallampati: II  TM distance: >3 FB  Neck ROM: limited  Comments: Can bite upper lip; a little decreased neck motion; possible anterior cords   Cardiovascular   Rhythm: regular  Rate: normal     Dental - normal exam     Pulmonary   Breath sounds clear to auscultation     Abdominal            Anesthesia Plan    History of general anesthesia?: yes  History of complications of general anesthesia?: no    ASA 3     general     The patient is not a current smoker. (quit smoking 2020)    intravenous induction   Postoperative administration of opioids is intended.  Trial extubation is planned.  Anesthetic plan and risks discussed with patient.  Use of blood products discussed with patient who consented to blood products.    Plan discussed with attending and resident.

## 2024-01-29 NOTE — ANESTHESIA PROCEDURE NOTES
Peripheral IV  Date/Time: 1/29/2024 7:20 AM      Placement  Needle size: 18 G  Laterality: left  Location: hand  Local anesthetic: none  Site prep: alcohol  Technique: anatomical landmarks  Attempts: 1

## 2024-01-29 NOTE — BRIEF OP NOTE
Date: 2024  OR Location: [facility] OR    Name: Karl Estevez : 1970, Age: 53 y.o., MRN: [id number], Sex: male    Diagnosis  Pre-op Diagnosis     * Liver transplant recipient (CMS/HCC) [Z94.4] Post-op Diagnosis     * Liver transplant recipient (CMS/HCC) [Z94.4]     Procedures  Diagnostic laparoscopy, wound exploration, incisional hernia repair  08588 - [place] LAPAROSCOPY SURG W/BX SINGLE/MULTIPLE      Surgeons      * Ivette Maya - Primary    Resident/Fellow/Other Assistant:  Surgeon(s) and Role:     * Mohamud Jacobs MD - Assisting     * Raymond Grullon MD - Resident - Assisting    Procedure Summary  Anesthesia: General  ASA: III  Anesthesia Staff: Anesthesiologist: Andra Farris MD  Anesthesia Resident: Eloina Spears DO  Estimated Blood Loss: 10mL  Intra-op Medications:   Administrations occurring from 0715 to 1010 on 24:   Medication Name Total Dose   BUPivacaine HCl (Marcaine) 0.5 % (5 mg/mL) injection 20 mL              Anesthesia Record               Intraprocedure I/O Totals          Intake    lactated Ringer's 600.00 mL    Total Intake 600 mL       Output    Est. Blood Loss 5 mL    Total Output 5 mL       Net    Net Volume 595 mL          Specimen: No specimens collected     Staff:   Circulator: Lv Stovall RN  Relief Circulator: Gloria Jones RN  Scrub Person: Washington Dean          Findings: Small, right-sided incisional hernia. Diagnostic laparoscopy negative for other incisional hernias on left side.    Complications:  None; patient tolerated the procedure well.     Disposition: PACU - hemodynamically stable.  Condition: stable  Specimens Collected: No specimens collected  Attending Attestation:     Ivette Maya  Phone Number: [phone]

## 2024-01-29 NOTE — OP NOTE
Diagnostic laparoscopy, wound exploration, incisional hernia repair Operative Note     Date: 2024  OR Location: [facility] OR    Name: Karl Estevez, : 1970, Age: 53 y.o., MRN: [id number], Sex: male    Diagnosis  Pre-op Diagnosis     * Liver transplant recipient (CMS/HCC) [Z94.4] Post-op Diagnosis     * Liver transplant recipient (CMS/HCC) [Z94.4]     Procedures  Open incisional hernia repair (right) with placement of onlay mesh  Diagnostic laparoscopy    Surgeons      * Ivette Maya - Primary    Resident/Fellow/Other Assistant:  Surgeon(s) and Role:     * Mohamud Jacobs MD - Assisting     * Raymond Grullon MD - Resident - Assisting    Implants:  Implants       Type Name Action Serial No.      Surgical Mesh Sling Implant MESH, SOFTMESH 6 X 6 - [id number] Implanted               Findings: Fascial weakness with defect right subcostal incision. No defect appreciated left abdomen.     Indications: KARL Estevez is an 53 y.o. male who is having surgery for possible incisional hernia on the right, possible left.    The patient was seen in the preoperative area. The risks, benefits, complications, treatment options, non-operative alternatives, expected recovery and outcomes were discussed with the patient. The possibilities of reaction to medication, pulmonary aspiration, injury to surrounding structures, bleeding, recurrent infection, the need for additional procedures, failure to diagnose a condition, and creating a complication requiring transfusion or operation were discussed with the patient. The patient concurred with the proposed plan, giving informed consent.  The site of surgery was properly noted/marked if necessary per policy. The patient has been actively warmed in preoperative area. Preoperative antibiotics have been ordered and given within 1 hours of incision.     Patient was brought back to the operating room and placed in the supine position. He underwent general anesthesia and was  then prepped and draped in the standard surgical fashion. A time out was held to confirm correct patient and procedure. We started with an incision over the right abdominal incision. This was dissected down to the level of the fascia. There was evidence of laxity of the fascia but no definitive hernia sac. The prior prolene suture was elevated and cut. The abdomen entered. A 10 mm port was placed through this defect and a laparoscope inserted. The abdomen was surveyed. The liver was healthy without large amounts of adhesions. There were mild omental adheions to the xiphoid extension portion of the incision. The left abdomen appeared completely intact without evidence of fascial defect. The right abdomen had fascial weakness. There was evidence of the prior fascial suture pulling through. Decision was made to open the area of fascial disruption along the right side and repair with mesh.     The scope was removed. The attenuated fascia along the right was opened approximately 8 cm. This was trimmed back to healthy tissue. Lipo cutaneous flaps were raised. Anchoring prolene sutures were placed through the fascia at 8 points. The fascia was then closed with running 0 prolene in two layers. The incision was then irrigated with antibiotic irrigation. A lightweight prolene mesh was then anchored in placed using the previously placed prolene sutures with care to have mesh overlap > 3 cm in all directions. The edges of the mesh were additionally secured with 3-0 prolene to prevent rolling. The incision was again irrigated with antibiotic solution. Scarpas closed with running vicryl. Deep dermis closed with interrupted 3-0 vicryl. Skin closed with 4-0 Monocryl and covered with dermabond. All counts were correct x 2. Patient tolerated the procedure without issue.     Attending Attestation: I was present and scrubbed for the entire procedure.    Ivette Maya  Phone Number: [phone]

## 2024-01-30 ASSESSMENT — PAIN SCALES - GENERAL: PAINLEVEL_OUTOF10: 7

## 2024-01-31 ENCOUNTER — OFFICE VISIT (OUTPATIENT)
Dept: PRIMARY CARE | Facility: CLINIC | Age: 54
End: 2024-01-31
Payer: MEDICARE

## 2024-01-31 VITALS
HEIGHT: 70 IN | WEIGHT: 201 LBS | SYSTOLIC BLOOD PRESSURE: 124 MMHG | BODY MASS INDEX: 28.77 KG/M2 | OXYGEN SATURATION: 98 % | RESPIRATION RATE: 16 BRPM | TEMPERATURE: 97.1 F | HEART RATE: 95 BPM | DIASTOLIC BLOOD PRESSURE: 74 MMHG

## 2024-01-31 DIAGNOSIS — Z79.899 MEDICATION MANAGEMENT: ICD-10-CM

## 2024-01-31 DIAGNOSIS — E11.40 DIABETIC NEUROPATHY, PAINFUL (MULTI): Primary | ICD-10-CM

## 2024-01-31 DIAGNOSIS — M48.062 NEUROGENIC CLAUDICATION DUE TO LUMBAR SPINAL STENOSIS: ICD-10-CM

## 2024-01-31 DIAGNOSIS — I73.00 RAYNAUD'S PHENOMENON WITHOUT GANGRENE: ICD-10-CM

## 2024-01-31 DIAGNOSIS — D84.9 IMMUNOSUPPRESSION (MULTI): ICD-10-CM

## 2024-01-31 PROCEDURE — 3074F SYST BP LT 130 MM HG: CPT | Performed by: FAMILY MEDICINE

## 2024-01-31 PROCEDURE — 99214 OFFICE O/P EST MOD 30 MIN: CPT | Performed by: FAMILY MEDICINE

## 2024-01-31 PROCEDURE — 3044F HG A1C LEVEL LT 7.0%: CPT | Performed by: FAMILY MEDICINE

## 2024-01-31 PROCEDURE — 3008F BODY MASS INDEX DOCD: CPT | Performed by: FAMILY MEDICINE

## 2024-01-31 PROCEDURE — 3078F DIAST BP <80 MM HG: CPT | Performed by: FAMILY MEDICINE

## 2024-01-31 PROCEDURE — 3066F NEPHROPATHY DOC TX: CPT | Performed by: FAMILY MEDICINE

## 2024-01-31 PROCEDURE — 1036F TOBACCO NON-USER: CPT | Performed by: FAMILY MEDICINE

## 2024-01-31 RX ORDER — AMLODIPINE BESYLATE 5 MG/1
5 TABLET ORAL DAILY
Qty: 30 TABLET | Refills: 5 | Status: SHIPPED | OUTPATIENT
Start: 2024-01-31 | End: 2024-04-01

## 2024-01-31 NOTE — PROGRESS NOTES
Subjective   Patient ID: Karl Estevez is a 53 y.o. male who presents for Diabetes (3 month follow up ) and Diabetic Neuropathy.  HPI  53-year-old gent with a history of status post liver transplant, type 2 diabetes managed by endocrinology, peripheral diabetic neuropathy on both Neurontin and pain medicine is here to recheck progress.  He is on Mounjaro in addition to his diabetic medicine the sugars been good A1c has been down to 5.0 but still painful peripheral neuropathy.  Because of chronic low back discomfort he is holding any type of surgical intervention at this time.  Socially he is undergoing a dissolution at home.  We did change his contact to his mother accordingly.  The patient denies any chest pain short of breath or palpitations he did have his incisional hernia-ventral hernia repaired recently has done very well  Bowel movements been stable 's been stable  Chronic meds reviewed no reported side effects.  The patient was on a higher dose of oxycodone after his surgery and return back to his usual 5 3 times a day if needed  Safety is reviewed  Opioid template completed  Med agreement as well as urine tox screen performed  Call patient is losing weight has much less back pain accordingly which is good to see still needs Neurontin for peripheral nerve neuropathic pain  Is followed by liver transplant team in addition to his orthopedist and endocrinologist.  Been stable less than 1:30 in the morning  OARRS:  Jose Sol DO on 2/4/2024  7:26 PM  I have personally reviewed the OARRS report for Karl Estevez. I have considered the risks of abuse, dependence, addiction and diversion    Is the patient prescribed a combination of a benzodiazepine and opioid?  No    Last Urine Drug Screen / ordered today: Yes  Recent Results (from the past 8760 hour(s))   OPIATE/OPIOID/BENZO PRESCRIPTION COMPLIANCE    Collection Time: 02/27/23  8:42 AM   Result Value Ref Range    DRUG SCREEN COMMENT URINE SEE BELOW   "   Creatine, Urine 134.6 mg/dL    Amphetamine Screen, Urine PRESUMPTIVE NEGATIVE NEGATIVE    Barbiturate Screen, Urine PRESUMPTIVE NEGATIVE NEGATIVE    Cannabinoid Screen, Urine PRESUMPTIVE NEGATIVE NEGATIVE    Cocaine Screen, Urine PRESUMPTIVE NEGATIVE NEGATIVE    PCP Screen, Urine PRESUMPTIVE NEGATIVE NEGATIVE    7-Aminoclonazepam <25 Cutoff <25 ng/mL    Alpha-Hydroxyalprazolam <25 Cutoff <25 ng/mL    Alpha-Hydroxymidazolam <25 Cutoff <25 ng/mL    Alprazolam <25 Cutoff <25 ng/mL    Chlordiazepoxide <25 Cutoff <25 ng/mL    Clonazepam <25 Cutoff <25 ng/mL    Diazepam <25 Cutoff <25 ng/mL    Lorazepam <25 Cutoff <25 ng/mL    Midazolam <25 Cutoff <25 ng/mL    Nordiazepam <25 Cutoff <25 ng/mL    Oxazepam <25 Cutoff <25 ng/mL    Temazepam <25 Cutoff <25 ng/mL    Zolpidem <25 Cutoff <25 ng/mL    Zolpidem Metabolite (ZCA) <25 Cutoff <25 ng/mL    6-Acetylmorphine <25 Cutoff <25 ng/mL    Codeine <50 Cutoff <50 ng/mL    Hydrocodone <25 Cutoff <25 ng/mL    Hydromorphone <25 Cutoff <25 ng/mL    Morphine Urine <50 Cutoff <50 ng/mL    Norhydrocodone <25 Cutoff <25 ng/mL    Noroxycodone >1000 (A) Cutoff <25 ng/mL    Oxycodone 2,256 (A) Cutoff <25 ng/mL    Oxymorphone 1,831 (A) Cutoff <25 ng/mL    Tramadol <50 Cutoff <50 ng/mL    O-Desmethyltramadol <50 Cutoff <50 ng/mL    Fentanyl <2.5 Cutoff<2.5 ng/mL    Norfentanyl <2.5 Cutoff<2.5 ng/mL    METHADONE CONFIRMATION,URINE <25 Cutoff <25 ng/mL    EDDP <25 Cutoff <25 ng/mL     Results are as expected.         Controlled Substance Agreement:  Date of the Last Agreement: 01-  Reviewed Controlled Substance Agreement including but not limited to the benefits, risks, and alternatives to treatment with a Controlled Substance medication(s).    Opioids:  What is the patient's goal of therapy?  Ehly \"life control of nightly low back discomfort from his lumbar disease but also his peripheral neuropathy and involving his feet.  Benefit still outweighs the risk and no evidence of abuse " "side effects or divergence  Is this being achieved with current treatment? yes    I have calculated the patient's Morphine Dose Equivalent (MED):   I have considered referral to Pain Management and/or a specialist, and do not feel it is necessary at this time.    I feel that it is clinically indicated to continue this current medication regimen after consideration of alternative therapies, and other non-opioid treatment.    Opioid Risk Screening:  No data recorded    Pain Assessment:  No data recorded  Review of Systems   Constitutional:  Negative for fatigue, fever and unexpected weight change.   Eyes:  Negative for visual disturbance.   Respiratory:  Positive for cough. Negative for chest tightness, shortness of breath and wheezing.    Cardiovascular:  Negative for chest pain, palpitations and leg swelling.   Gastrointestinal:  Positive for abdominal pain. Negative for abdominal distention, blood in stool, nausea and vomiting.   Genitourinary:  Positive for frequency. Negative for dysuria and hematuria.   Musculoskeletal:  Positive for arthralgias, back pain and myalgias.   Skin:  Positive for color change (Benefit from low-dose Norvasc to help his Raynaud's affecting his fingers and we discussed increasing his dose.  Otherwise no ulcerations of the digits). Negative for rash.   Neurological:  Positive for numbness. Negative for weakness, light-headedness and headaches.   Hematological:  Negative for adenopathy.   Psychiatric/Behavioral:  Negative for behavioral problems, decreased concentration, sleep disturbance and suicidal ideas. The patient is nervous/anxious.        Objective   /74 (BP Location: Right arm, Patient Position: Sitting, BP Cuff Size: Large adult)   Pulse 95   Temp 36.2 °C (97.1 °F) (Temporal)   Resp 16   Ht 1.778 m (5' 10\")   Wt 91.2 kg (201 lb)   SpO2 98%   BMI 28.84 kg/m²    Contains abnormal data Comprehensive Metabolic Panel  Order: [id number]  Status: Final result       Visible " to patient: Yes (seen)       Dx: History of liver transplant (CMS/HCC)...    0 Result Notes               Component  Ref Range & Units 12 d ago  (1/23/24) 1 mo ago  (12/28/23) 2 mo ago  (11/27/23) 3 mo ago  (10/25/23) 4 mo ago  (9/25/23) 4 mo ago  (9/25/23) 5 mo ago  (8/24/23) 5 mo ago  (8/24/23)   Glucose  74 - 99 mg/dL 88 93 80 74 103 High   102 High     Sodium  136 - 145 mmol/L 135 Low  137 137 136 135 Low   135 Low     Potassium  3.5 - 5.3 mmol/L 4.4 3.9 4.5 4.0 3.8  4.3    Chloride  98 - 107 mmol/L 99 99 99 102 102  101    Bicarbonate  21 - 32 mmol/L 29 30 31 27 28  28    Anion Gap  10 - 20 mmol/L 11 12 12 11 9 Low   10    Urea Nitrogen  6 - 23 mg/dL 14 15 11 14 11  12    Creatinine  0.50 - 1.30 mg/dL 1.17 1.07 1.14 1.16 1.13  1.09    eGFR  >60 mL/min/1.73m*2 75 83 CM 77 CM 76 CM       Comment: Calculations of estimated GFR are performed using the 2021 CKD-EPI Study Refit equation without the race variable for the IDMS-Traceable creatinine methods.  https://jasn.asnjournals.org/content/early/2021/09/22/ASN.4309321224   Calcium  8.6 - 10.3 mg/dL 9.0 8.7 8.8 8.5 Low  8.2 Low   8.5 Low     Albumin  3.4 - 5.0 g/dL 4.2 4.1 4.1 4.0 3.9 CANCELED CM 4.0 CANCELED CM   Alkaline Phosphatase  33 - 120 U/L 76 62 73 74 66 CANCELED CM 75 CANCELED CM   Total Protein  6.4 - 8.2 g/dL 6.9 6.9 6.6 6.5 6.4 CANCELED CM 6.7 CANCELED CM   AST  9 - 39 U/L 9 9 7 Low  10 8 Low  CANCELED Low  CM 8 Low  CANCELED Low  CM   Bilirubin, Total  0.0 - 1.2 mg/dL 0.6 0.4 0.4 0.6 0.5 CANCELED CM 0.6 CANCELED CM   ALT  10 - 52 U/L 6 Low  5 Low  CM 5 Low  CM 5 Low  CM 5 Low  CM CANCELED Low  CM 6 Low  CM CANCELED Low  CM   Comment: Patients treated with Sulfasalazine may generate falsely decreased results for ALT.   Resulting Agency [facility]             itamin B12  Order: [id number]  Status: Final result       Visible to patient: Yes (seen)       Dx: Vitamin B 12 deficiency; Iron deficie...    0 Result Notes         Component  Ref Range & Units 12 d ago 3  mo ago 1 yr ago 4 yr ago   Vitamin B12  211 - 911 pg/mL 255 168 Low  127 Low  1,403 High    Resulting Agency [facility]                    Hemoglobin A1C  Order: [id number]  Status: Final result       Visible to patient: Yes (seen)       Dx: Type 2 diabetes mellitus with diabeti...    0 Result Notes       1  Topic            Component  Ref Range & Units 12 d ago  (1/23/24) 4 mo ago  (9/25/23) 6 mo ago  (7/21/23) 7 mo ago  (6/22/23) 10 mo ago  (3/13/23) 1 yr ago  (1/3/23) 1 yr ago  (9/12/22)   Hemoglobin A1C  see below % 5.0 <3.5 R, CM 4.1 R, CM 3.5 R, CM 3.7 R, CM 3.7 R, CM 4.7 R, CM   Estimated Average Glucose  Not Established mg/dL 97            fferential  Order: [id number]  Status: Final result       Visible to patient: Yes (seen)       Dx: History of liver transplant (CMS/HCC)...    0 Result Notes            Component  Ref Range & Units 12 d ago 1 mo ago 2 mo ago 3 mo ago 4 mo ago 5 mo ago 6 mo ago   WBC  4.4 - 11.3 x10*3/uL 5.0 5.8 4.3 Low  5.4 5.2 R 4.8 R 5.4 R   nRBC  0.0 - 0.0 /100 WBCs 0.0 0.0 0.0 0.0      RBC  4.50 - 5.90 x10*6/uL 5.34 5.14 4.95 4.11 Low  3.91 Low  R 4.59 R 4.76 R   Hemoglobin  13.5 - 17.5 g/dL 15.3 14.5 14.2 12.0 Low  12.2 Low  13.5 13.5   Hematocrit  41.0 - 52.0 % 45.1 43.1 43.0 37.7 Low  36.7 Low  40.6 Low  41.2   MCV  80 - 100 fL 85 84 87 92 94 88 87   MCH  26.0 - 34.0 pg 28.7 28.2 28.7 29.2      MCHC  32.0 - 36.0 g/dL 33.9 33.6 33.0 31.8 Low  33.2 33.3 32.8   RDW  11.5 - 14.5 % 13.1 12.4 11.9 13.2 14.0 13.4 12.8   Platelets  150 - 450 x10*3/uL 194 159 157 162 133 Low  R 160 R 147 Low  R   Neutrophils %  40.0 - 80.0 % 67.4 59.4 61.8 63.4 69.2 65.8 57.5   Immature Granulocytes %, Automated  0.0 - 0.9 % 0.2 0.                     Physical Exam  Vital signs reviewed and normal 1 to 2 pound weight loss in the last month and a half  General-inspection reveals a more relaxed individual in no acute distress  Neck neck is supple out masses adenopathy bruits rigidity  no JVD thyroid is normal  Chest-chest is clear without wheezing rales or rhonchi  Cardiovascular RSR without murmurs gallop or ectopy  Abdominal right-sided right upper quadrant incision is healing very nicely otherwise no ascites no distention bowel sounds are normal no CVA tenderness  Peripheral vascular mildly reduced sensation to vibration from ankle distally but otherwise motor fairly well-preserved no vascular deficits no symmetric asymmetric edema  Skin no rash petechiae jaundice  Neurological-cranial nerves are intact no new deficit the upper or lower extremities  Mood is more relaxed today no striking anxiety depression or cognitive issues  Lab reviewed with normal lites kidney functions and stable sugars excellent A1c and improved B12 up to normal now on supplemental B12 seen normal now      Assessment/Plan   Problem List Items Addressed This Visit    None  Continue follow-up with multiple subspecialist  Continue Neurontin for his neuropathic pain continue oxycodone 5 mg tablets 3 times a day use and side effects reviewed  Template for opioids completed  OARRS completed  Agreement and urine tox screen performed  Follow-up in 2 to 3 months otherwise continue healthy routines frequent small meals low-salt diet and certainly follow-up with his endocrinologist and liver transplant team that does the serial lab gratefully his bilirubin and liver functions are normal.  Will continue over-the-counter B12 otherwise updated his graphics.  With adequate response to low-dose Norvasc for Raynaud's, will opt now to 5 mg daily with breakfast  @discharge  The above diagnosis and treatment plan was discussed with the patient patient will continue appropriate diet and exercise as reviewed  Patient will recheck earlier if any interval problems of significance or clinical worsening of the above problems.  Agrees above surveillance.  All question were addressed regarding above meds

## 2024-02-01 ENCOUNTER — SPECIALTY PHARMACY (OUTPATIENT)
Dept: PHARMACY | Facility: CLINIC | Age: 54
End: 2024-02-01

## 2024-02-01 DIAGNOSIS — K21.9 GASTROESOPHAGEAL REFLUX DISEASE, UNSPECIFIED WHETHER ESOPHAGITIS PRESENT: ICD-10-CM

## 2024-02-01 PROCEDURE — RXMED WILLOW AMBULATORY MEDICATION CHARGE

## 2024-02-01 NOTE — TELEPHONE ENCOUNTER
Rx Refill Request     Name: Karl Estevez  :  1970   Medication Name:  ZOFRAN 4MG  Specific Pharmacy location:  [facility]  Date of last appointment:  2024   Date of next appointment:  2024   Best number to reach patient:  [phone]

## 2024-02-02 RX ORDER — ONDANSETRON 4 MG/1
4 TABLET, ORALLY DISINTEGRATING ORAL EVERY 8 HOURS PRN
Qty: 20 TABLET | Refills: 1 | Status: SHIPPED | OUTPATIENT
Start: 2024-02-02 | End: 2024-02-12 | Stop reason: SDUPTHER

## 2024-02-04 PROBLEM — N18.30 CKD (CHRONIC KIDNEY DISEASE) STAGE 3, GFR 30-59 ML/MIN (MULTI): Status: RESOLVED | Noted: 2023-03-02 | Resolved: 2024-02-04

## 2024-02-04 PROBLEM — Z79.899 MEDICATION MANAGEMENT: Status: ACTIVE | Noted: 2024-02-04

## 2024-02-04 ASSESSMENT — ENCOUNTER SYMPTOMS
COLOR CHANGE: 1
SHORTNESS OF BREATH: 0
PALPITATIONS: 0
DYSURIA: 0
VOMITING: 0
FATIGUE: 0
ABDOMINAL DISTENTION: 0
NAUSEA: 0
BACK PAIN: 1
COUGH: 1
DECREASED CONCENTRATION: 0
HEADACHES: 0
SLEEP DISTURBANCE: 0
HEMATURIA: 0
FREQUENCY: 1
CHEST TIGHTNESS: 0
ABDOMINAL PAIN: 1
WHEEZING: 0
BLOOD IN STOOL: 0
LIGHT-HEADEDNESS: 0
MYALGIAS: 1
NUMBNESS: 1
ARTHRALGIAS: 1
NERVOUS/ANXIOUS: 1
ADENOPATHY: 0
FEVER: 0
UNEXPECTED WEIGHT CHANGE: 0
WEAKNESS: 0

## 2024-02-07 ENCOUNTER — PHARMACY VISIT (OUTPATIENT)
Dept: PHARMACY | Facility: CLINIC | Age: 54
End: 2024-02-07
Payer: MEDICARE

## 2024-02-08 DIAGNOSIS — M48.062 NEUROGENIC CLAUDICATION DUE TO LUMBAR SPINAL STENOSIS: ICD-10-CM

## 2024-02-08 NOTE — TELEPHONE ENCOUNTER
Patient is not due for refill of the medication requested since 30 days was dispensed on 01/10/2024 and 7 days was dispensed on 01/30/2024. Patient will be due on 02/13/2024.

## 2024-02-08 NOTE — TELEPHONE ENCOUNTER
Rx Refill Request Telephone Encounter    Name:  Karl BEST Zenaida  :  [id number]  Medication Name:  oxycodone (Roxicodone) 5 mg   Specific Pharmacy location:  [facility] [place]  Date of last appointment:    Date of next appointment:    Best number to reach patient:  [phone]

## 2024-02-12 ENCOUNTER — FOLLOW-UP (OUTPATIENT)
Dept: TRANSPLANT | Facility: HOSPITAL | Age: 54
End: 2024-02-12
Payer: MEDICARE

## 2024-02-12 VITALS
DIASTOLIC BLOOD PRESSURE: 83 MMHG | TEMPERATURE: 97.2 F | BODY MASS INDEX: 27.81 KG/M2 | WEIGHT: 193.8 LBS | SYSTOLIC BLOOD PRESSURE: 131 MMHG | HEART RATE: 97 BPM | OXYGEN SATURATION: 99 %

## 2024-02-12 DIAGNOSIS — E21.3 HYPERPARATHYROIDISM (MULTI): ICD-10-CM

## 2024-02-12 DIAGNOSIS — N30.00 ACUTE CYSTITIS WITHOUT HEMATURIA: ICD-10-CM

## 2024-02-12 DIAGNOSIS — Z12.5 SCREENING FOR PROSTATE CANCER: ICD-10-CM

## 2024-02-12 DIAGNOSIS — E55.9 VITAMIN D DEFICIENCY: ICD-10-CM

## 2024-02-12 DIAGNOSIS — N18.2 STAGE 2 CHRONIC KIDNEY DISEASE: Primary | ICD-10-CM

## 2024-02-12 DIAGNOSIS — U07.1 COVID-19: ICD-10-CM

## 2024-02-12 DIAGNOSIS — K21.9 GASTROESOPHAGEAL REFLUX DISEASE, UNSPECIFIED WHETHER ESOPHAGITIS PRESENT: ICD-10-CM

## 2024-02-12 PROCEDURE — 99214 OFFICE O/P EST MOD 30 MIN: CPT | Performed by: INTERNAL MEDICINE

## 2024-02-12 RX ORDER — MINERAL OIL
180 ENEMA (ML) RECTAL DAILY
Qty: 30 TABLET | Refills: 0 | Status: SHIPPED | OUTPATIENT
Start: 2024-02-12 | End: 2025-02-11

## 2024-02-12 RX ORDER — OXYCODONE HYDROCHLORIDE 5 MG/1
5 TABLET ORAL EVERY 8 HOURS PRN
Qty: 90 TABLET | Refills: 0 | Status: SHIPPED | OUTPATIENT
Start: 2024-02-13 | End: 2024-03-18 | Stop reason: SDUPTHER

## 2024-02-12 RX ORDER — LEVOFLOXACIN 750 MG/1
750 TABLET ORAL
Qty: 14 TABLET | Refills: 0 | Status: SHIPPED | OUTPATIENT
Start: 2024-02-12 | End: 2024-02-26

## 2024-02-12 RX ORDER — ONDANSETRON 4 MG/1
4 TABLET, ORALLY DISINTEGRATING ORAL EVERY 8 HOURS PRN
Qty: 30 TABLET | Refills: 3 | Status: SHIPPED | OUTPATIENT
Start: 2024-02-12

## 2024-02-12 RX ORDER — ACETAMINOPHEN 325 MG/1
325 TABLET ORAL EVERY 6 HOURS PRN
Qty: 50 TABLET | Refills: 0 | Status: SHIPPED | OUTPATIENT
Start: 2024-02-12

## 2024-02-12 ASSESSMENT — PAIN SCALES - GENERAL: PAINLEVEL: 0-NO PAIN

## 2024-02-12 NOTE — PROGRESS NOTES
OUTPATIENT NEPHROLOGY CONSULTATION :   CHRONIC KIDNEY DISEASE (CKD) MANAGEMENT        SERVICE DATE: 02/12/2024   SERVICE TIME:  4:42 PM    REASON FOR VISIT/CHIEF COMPLAINT:  CKD MANAGEMENT  BLOOD PRESSURE MANAGEMENT    HPI:    Mr. Estevez is a 53 y.o. male with past medical history significant for End Stage Liver Disease secondary to Alcoholic Cirrhosis who received an orthotopic liver transplant on 6/14/22. Operative course was uncomplicated. Hospital course was uncomplicated. He developed CKD post liver transplant.    The patient was referred to see me today for CKD management.    Patient is doing well overall. No new complaints. Denied chest pain, SOB, WALTON, Palpitation. Normal urination and bowel movement. Normal gait and no weakness of arms/legs. No cough, runny nose, sore throat, cold symptoms, or rash. No hearing loss. Normal vision.No problems with his sleep, mood and function. No recent infection, hospitalization, surgery or ER visits.      ROS:  Review of  14 systems was performed system by system. See HPI. Otherwise, the symptoms were negative.    PAST MEDICAL HISTORY:  Past Medical History:   Diagnosis Date    Abnormal result of other cardiovascular function study 02/24/2022    Abnormal stress test    Acute duodenal ulcer without hemorrhage or perforation 04/08/2020    Duodenal ulcer, acute    Acute maxillary sinusitis, unspecified 03/25/2020    Acute non-recurrent maxillary sinusitis    Acute maxillary sinusitis, unspecified 02/01/2022    Sinusitis, acute maxillary    Alcoholic cirrhosis of liver without ascites (CMS/HCC) 07/29/2022    Alcoholic cirrhosis    Candidiasis of skin and nail 01/17/2022    Candidal skin infection    Chronic kidney disease, stage 4 (severe) (CMS/HCC) 07/07/2022    Chronic kidney disease, stage 4 (severe)    Encounter for other preprocedural examination 12/08/2022    Encounter for pre-transplant evaluation for liver transplant    Hepatic encephalopathy (CMS/HCC)  04/18/2022    Hepatic encephalopathy    Hyperglycemia, unspecified 11/10/2020    Steroid-induced hyperglycemia    Hypo-osmolality and hyponatremia 03/25/2020    Dilutional hyponatremia    Insomnia, unspecified 06/19/2021    Insomnia    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 foot limited to breakdown of skin (CMS/HCC) 08/18/2020    Skin ulcer of toe of right foot, limited to breakdown of skin    Other diseases of stomach and duodenum     Reactive gastropathy    Other disturbances of smell and taste 02/25/2020    Metallic taste    Other specified abnormal findings of blood chemistry 11/09/2022    D-dimer, elevated    Other specified abnormal findings of blood chemistry 09/29/2021    Increased ammonia level    Personal history of other diseases of the respiratory system 07/11/2022    History of pleural effusion    Personal history of other drug therapy 02/21/2020    History of hepatitis B vaccination    Personal history of other endocrine, nutritional and metabolic disease 09/29/2022    History of hypokalemia    Personal history of other endocrine, nutritional and metabolic disease 07/28/2022    History of hyperkalemia    Personal history of other infectious and parasitic diseases     History of spontaneous bacterial peritonitis    Personal history of other infectious and parasitic diseases 02/17/2022    History of spontaneous bacterial peritonitis    Personal history of other specified conditions     History of heavy alcohol consumption    Personal history of pneumonia (recurrent) 09/23/2022    History of pneumonia    Personal history of pneumonia (recurrent) 07/13/2022    History of pneumonia    Shortness of breath 11/09/2022    Shortness of breath at rest        PAST SURGICAL HISTORY:  Past Surgical History:   Procedure Laterality Date    CT ABDOMEN PELVIS ANGIOGRAM W AND/OR WO IV CONTRAST  8/10/2020    CT ABDOMEN PELVIS ANGIOGRAM W AND/OR WO IV CONTRAST 8/10/2020 [facility]    CT ANGIO CORONARY  ART WITH HEARTFLOW IF SCORE >30%  1/5/2022    CT HEART CORONARY ANGIOGRAM 1/5/2022 [facility]GAC[facility]    OTHER SURGICAL HISTORY  02/17/2020    Complete colonoscopy    OTHER SURGICAL HISTORY  02/17/2020    Cardiac catheterization    OTHER SURGICAL HISTORY  02/17/2020    Biceps tenodesis procedure    OTHER SURGICAL HISTORY  12/30/2022    Hernia repair    OTHER SURGICAL HISTORY  12/30/2022    Liver transplantation    US GUIDED ABDOMINAL PARACENTESIS  11/26/2019    US GUIDED ABDOMINAL PARACENTESIS 11/26/2019 [facility] CL[facility]ICAL LEGACY    US GUIDED ABDOMINAL PARACENTESIS  12/17/2019    US GUIDED ABDOMINAL PARACENTESIS 12/17/2019 [facility] CLINICAL LEGACY    US GUIDED ABDOMINAL PARACENTESIS  12/24/2019    US GUIDED ABDOMINAL PARACENTESIS 12/24/2019 [facility]    US GUIDED ABDOMINAL PARACENTESIS  11/13/2019    US GUIDED ABDOMINAL PARACENTESIS 11/13/2019 [facility] CL[facility]ICAL LEGACY    US GUIDED ABDOMINAL PARACENTESIS  1/7/2020    US GUIDED ABDOMINAL PARACENTESIS 1/7/2020 [facility] CL[facility]ICAL LEGACY    US GUIDED ABDOMINAL PARACENTESIS  3/12/2020    US GUIDED ABDOMINAL PARACENTESIS 3/12/2020 [facility]    US GUIDED ABDOMINAL PARACENTESIS  5/29/2020    US GUIDED ABDOMINAL PARACENTESIS 5/29/2020 [facility]    US GUIDED ABDOMINAL PARACENTESIS  6/12/2020    US GUIDED ABDOMINAL PARACENTESIS 6/12/2020 [facility]    US GUIDED ABDOMINAL PARACENTESIS  6/23/2020    US GUIDED ABDOMINAL PARACENTESIS 6/23/2020 [facility] LEGACY    US GUIDED ABDOMINAL PARACENTESIS  7/9/2020    US GUIDED ABDOMINAL PARACENTESIS 7/9/2020 [facility]    US GUIDED ABDOMINAL PARACENTESIS  10/12/2020    US GUIDED ABDOMINAL PARACENTESIS 10/12/2020 [facility]    US GUIDED ABDOMINAL PARACENTESIS  10/22/2020    US GUIDED ABDOMINAL PARACENTESIS 10/22/2020 [facility]    US GUIDED ABDOMINAL PARACENTESIS  10/30/2020    US GUIDED ABDOMINAL PARACENTESIS 10/30/2020 [facility]    US GUIDED ABDOMINAL PARACENTESIS  11/6/2020    US GUIDED ABDOMINAL PARACENTESIS  11/6/2020 [facility]    US GUIDED ABDOMINAL PARACENTESIS  11/13/2020    US GUIDED ABDOMINAL PARACENTESIS 11/13/2020 [facility]    US GUIDED ABDOMINAL PARACENTESIS  11/20/2020    US GUIDED ABDOMINAL PARACENTESIS 11/20/2020 [facility]    US GUIDED ABDOMINAL PARACENTESIS  11/25/2020    US GUIDED ABDOMINAL PARACENTESIS 11/25/2020 [facility]    US GUIDED ABDOMINAL PARACENTESIS  12/1/2020    US GUIDED ABDOMINAL PARACENTESIS 12/1/2020 [facility]    US GUIDED ABDOMINAL PARACENTESIS  12/7/2020    US GUIDED ABDOMINAL PARACENTESIS 12/7/2020 [facility]    US GUIDED ABDOMINAL PARACENTESIS  12/14/2020    US GUIDED ABDOMINAL PARACENTESIS 12/14/2020 [facility]    US GUIDED ABDOMINAL PARACENTESIS  12/18/2020    US GUIDED ABDOMINAL PARACENTESIS 12/18/2020 [facility]    US GUIDED ABDOMINAL PARACENTESIS  12/23/2020    US GUIDED ABDOMINAL PARACENTESIS 12/23/2020 [facility]    US GUIDED ABDOMINAL PARACENTESIS  12/29/2020    US GUIDED ABDOMINAL PARACENTESIS 12/29/2020 [facility]    US GUIDED ABDOMINAL PARACENTESIS  1/7/2021    US GUIDED ABDOMINAL PARACENTESIS 1/7/2021 [facility]    US GUIDED ABDOMINAL PARACENTESIS  1/12/2021    US GUIDED ABDOMINAL PARACENTESIS 1/12/2021 [facility]    US GUIDED ABDOMINAL PARACENTESIS  1/20/2021    US GUIDED ABDOMINAL PARACENTESIS 1/20/2021 [facility]    US GUIDED ABDOMINAL PARACENTESIS  1/27/2021    US GUIDED ABDOMINAL PARACENTESIS 1/27/2021 [facility]    US GUIDED ABDOMINAL PARACENTESIS  2/3/2021    US GUIDED ABDOMINAL PARACENTESIS 2/3/2021 [facility]    US GUIDED ABDOMINAL PARACENTESIS  3/4/2021    US GUIDED ABDOMINAL PARACENTESIS 3/4/2021 [facility]    US GUIDED ABDOMINAL PARACENTESIS  4/29/2021    US GUIDED ABDOMINAL PARACENTESIS 4/29/2021 [facility]    US GUIDED ABDOMINAL PARACENTESIS  7/6/2021    US GUIDED ABDOMINAL PARACENTESIS 7/6/2021 [facility]    US GUIDED ABDOMINAL PARACENTESIS  7/23/2021    US GUIDED ABDOMINAL  PARACENTESIS 7/23/2021 [facility]    US GUIDED ABDOMINAL PARACENTESIS  8/5/2021    US GUIDED ABDOMINAL PARACENTESIS 8/5/2021 [facility]    US GUIDED ABDOMINAL PARACENTESIS  8/19/2021    US GUIDED ABDOMINAL PARACENTESIS 8/19/2021 [facility]    US GUIDED ABDOMINAL PARACENTESIS  8/30/2021    US GUIDED ABDOMINAL PARACENTESIS 8/30/2021 [facility]    US GUIDED ABDOMINAL PARACENTESIS  9/7/2021    US GUIDED ABDOMINAL PARACENTESIS 9/7/2021 [facility]    US GUIDED ABDOMINAL PARACENTESIS  9/10/2021    US GUIDED ABDOMINAL PARACENTESIS 9/10/2021 [facility]    US GUIDED ABDOMINAL PARACENTESIS  9/13/2021    US GUIDED ABDOMINAL PARACENTESIS 9/13/2021 [facility]    US GUIDED ABDOMINAL PARACENTESIS  9/16/2021    US GUIDED ABDOMINAL PARACENTESIS 9/16/2021 [facility]    US GUIDED ABDOMINAL PARACENTESIS  9/22/2021    US GUIDED ABDOMINAL PARACENTESIS 9/22/2021 [facility]    US GUIDED ABDOMINAL PARACENTESIS  10/11/2021    US GUIDED ABDOMINAL PARACENTESIS 10/11/2021 [facility]    US GUIDED ABDOMINAL PARACENTESIS  10/18/2021    US GUIDED ABDOMINAL PARACENTESIS 10/18/2021 [facility]    US GUIDED ABDOMINAL PARACENTESIS  10/26/2021    US GUIDED ABDOMINAL PARACENTESIS 10/26/2021 [facility]    US GUIDED ABDOMINAL PARACENTESIS  11/3/2021    US GUIDED ABDOMINAL PARACENTESIS 11/3/2021 [facility]    US GUIDED ABDOMINAL PARACENTESIS  11/8/2021    US GUIDED ABDOMINAL PARACENTESIS 11/8/2021 [facility]    US GUIDED ABDOMINAL PARACENTESIS  11/12/2021    US GUIDED ABDOMINAL PARACENTESIS 11/12/2021 [facility]    US GUIDED ABDOMINAL PARACENTESIS  11/16/2021    US GUIDED ABDOMINAL PARACENTESIS 11/16/2021 [facility]    US GUIDED ABDOMINAL PARACENTESIS  11/19/2021    US GUIDED ABDOMINAL PARACENTESIS 11/19/2021 [facility]    US GUIDED ABDOMINAL PARACENTESIS  11/23/2021    US GUIDED ABDOMINAL  PARACENTESIS 11/23/2021 [facility]    US GUIDED ABDOMINAL PARACENTESIS  11/26/2021    US GUIDED ABDOMINAL PARACENTESIS 11/26/2021 [facility]    US GUIDED ABDOMINAL PARACENTESIS  11/30/2021    US GUIDED ABDOMINAL PARACENTESIS 11/30/2021 [facility]    US GUIDED ABDOMINAL PARACENTESIS  12/3/2021    US GUIDED ABDOMINAL PARACENTESIS 12/3/2021 [facility]    US GUIDED ABDOMINAL PARACENTESIS  12/7/2021    US GUIDED ABDOMINAL PARACENTESIS 12/7/2021 [facility]    US GUIDED ABDOMINAL PARACENTESIS  12/10/2021    US GUIDED ABDOMINAL PARACENTESIS 12/10/2021 [facility]    US GUIDED ABDOMINAL PARACENTESIS  12/14/2021    US GUIDED ABDOMINAL PARACENTESIS 12/14/2021 [facility]    US GUIDED ABDOMINAL PARACENTESIS  12/17/2021    US GUIDED ABDOMINAL PARACENTESIS 12/17/2021 [facility]    US GUIDED ABDOMINAL PARACENTESIS  12/22/2021    US GUIDED ABDOMINAL PARACENTESIS 12/22/2021 [facility]    US GUIDED ABDOMINAL PARACENTESIS  12/27/2021    US GUIDED ABDOMINAL PARACENTESIS 12/27/2021 [facility]    US GUIDED ABDOMINAL PARACENTESIS  12/30/2021    US GUIDED ABDOMINAL PARACENTESIS 12/30/2021 [facility]    US GUIDED ABDOMINAL PARACENTESIS  1/3/2022    US GUIDED ABDOMINAL PARACENTESIS 1/3/2022 [facility]    US GUIDED ABDOMINAL PARACENTESIS  1/7/2022    US GUIDED ABDOMINAL PARACENTESIS 1/7/2022 [facility]    US GUIDED ABDOMINAL PARACENTESIS  1/11/2022    US GUIDED ABDOMINAL PARACENTESIS 1/11/2022 [facility]    US GUIDED ABDOMINAL PARACENTESIS  1/14/2022    US GUIDED ABDOMINAL PARACENTESIS 1/14/2022 [facility]    US GUIDED ABDOMINAL PARACENTESIS  1/20/2022    US GUIDED ABDOMINAL PARACENTESIS 1/20/2022 [facility]    US GUIDED ABDOMINAL PARACENTESIS  1/25/2022    US GUIDED ABDOMINAL PARACENTESIS 1/25/2022 [facility]    US GUIDED ABDOMINAL PARACENTESIS  1/28/2022    US GUIDED  ABDOMINAL PARACENTESIS 1/28/2022 [facility]    US GUIDED ABDOMINAL PARACENTESIS  2/1/2022    US GUIDED ABDOMINAL PARACENTESIS 2/1/2022 [facility]    US GUIDED ABDOMINAL PARACENTESIS  2/4/2022    US GUIDED ABDOMINAL PARACENTESIS 2/4/2022 [facility]    US GUIDED ABDOMINAL PARACENTESIS  2/11/2022    US GUIDED ABDOMINAL PARACENTESIS 2/11/2022 [facility]    US GUIDED ABDOMINAL PARACENTESIS  2/18/2022    US GUIDED ABDOMINAL PARACENTESIS 2/18/2022 [facility]    US GUIDED ABDOMINAL PARACENTESIS  2/22/2022    US GUIDED ABDOMINAL PARACENTESIS 2/22/2022 [facility]    US GUIDED ABDOMINAL PARACENTESIS  2/25/2022    US GUIDED ABDOMINAL PARACENTESIS 2/25/2022 [facility]    US GUIDED ABDOMINAL PARACENTESIS  3/1/2022    US GUIDED ABDOMINAL PARACENTESIS 3/1/2022 [facility]    US GUIDED ABDOMINAL PARACENTESIS  3/4/2022    US GUIDED ABDOMINAL PARACENTESIS 3/4/2022 [facility]    US GUIDED ABDOMINAL PARACENTESIS  3/8/2022    US GUIDED ABDOMINAL PARACENTESIS 3/8/2022 [facility]    US GUIDED ABDOMINAL PARACENTESIS  3/11/2022    US GUIDED ABDOMINAL PARACENTESIS 3/11/2022 [facility]    US GUIDED ABDOMINAL PARACENTESIS  3/15/2022    US GUIDED ABDOMINAL PARACENTESIS 3/15/2022 [facility]    US GUIDED ABDOMINAL PARACENTESIS  3/18/2022    US GUIDED ABDOMINAL PARACENTESIS 3/18/2022 [facility]    US GUIDED ABDOMINAL PARACENTESIS  3/22/2022    US GUIDED ABDOMINAL PARACENTESIS 3/22/2022 [facility]    US GUIDED ABDOMINAL PARACENTESIS  3/29/2022    US GUIDED ABDOMINAL PARACENTESIS 3/29/2022 [facility]    US GUIDED ABDOMINAL PARACENTESIS  3/25/2022    US GUIDED ABDOMINAL PARACENTESIS 3/25/2022 [facility]    US GUIDED ABDOMINAL PARACENTESIS  4/1/2022    US GUIDED ABDOMINAL PARACENTESIS 4/1/2022 [facility]    US GUIDED ABDOMINAL PARACENTESIS  4/5/2022    US GUIDED ABDOMINAL PARACENTESIS 4/5/2022 [facility]     US GUIDED ABDOMINAL PARACENTESIS  4/8/2022    US GUIDED ABDOMINAL PARACENTESIS 4/8/2022 [facility]    US GUIDED ABDOMINAL PARACENTESIS  4/12/2022    US GUIDED ABDOMINAL PARACENTESIS 4/12/2022 [facility]    US GUIDED ABDOMINAL PARACENTESIS  4/15/2022    US GUIDED ABDOMINAL PARACENTESIS 4/15/2022 [facility]    US GUIDED ABDOMINAL PARACENTESIS  4/19/2022    US GUIDED ABDOMINAL PARACENTESIS 4/19/2022 [facility]    US GUIDED ABDOMINAL PARACENTESIS  4/25/2022    US GUIDED ABDOMINAL PARACENTESIS 4/25/2022 [facility]    US GUIDED ABDOMINAL PARACENTESIS  4/22/2022    US GUIDED ABDOMINAL PARACENTESIS 4/22/2022 [facility]    US GUIDED ABDOMINAL PARACENTESIS  4/28/2022    US GUIDED ABDOMINAL PARACENTESIS 4/28/2022 [facility]    US GUIDED ABDOMINAL PARACENTESIS  5/3/2022    US GUIDED ABDOMINAL PARACENTESIS 5/3/2022 [facility]    US GUIDED ABDOMINAL PARACENTESIS  5/6/2022    US GUIDED ABDOMINAL PARACENTESIS 5/6/2022 [facility]    US GUIDED ABDOMINAL PARACENTESIS  5/10/2022    US GUIDED ABDOMINAL PARACENTESIS 5/10/2022 [facility]    US GUIDED ABDOMINAL PARACENTESIS  5/13/2022    US GUIDED ABDOMINAL PARACENTESIS 5/13/2022 [facility]    US GUIDED ABDOMINAL PARACENTESIS  5/17/2022    US GUIDED ABDOMINAL PARACENTESIS 5/17/2022 [facility]    US GUIDED ABDOMINAL PARACENTESIS  5/20/2022    US GUIDED ABDOMINAL PARACENTESIS 5/20/2022 [facility]    US GUIDED ABDOMINAL PARACENTESIS  5/27/2022    US GUIDED ABDOMINAL PARACENTESIS 5/27/2022 [facility]    US GUIDED ABDOMINAL PARACENTESIS  5/31/2022    US GUIDED ABDOMINAL PARACENTESIS 5/31/2022 [facility]    US GUIDED ABDOMINAL PARACENTESIS  6/3/2022    US GUIDED ABDOMINAL PARACENTESIS 6/3/2022 [facility]    US GUIDED ABDOMINAL PARACENTESIS  6/7/2022    US GUIDED ABDOMINAL PARACENTESIS 6/7/2022 [facility]    US GUIDED  ABDOMINAL PARACENTESIS  6/10/2022    US GUIDED ABDOMINAL PARACENTESIS 6/10/2022 [facility]        SOCIAL HISTORY:  Social History     Socioeconomic History    Marital status:      Spouse name: Not on file    Number of children: Not on file    Years of education: Not on file    Highest education level: Not on file   Occupational History    Not on file   Tobacco Use    Smoking status: Former     Packs/day: 1.00     Years: 30.00     Additional pack years: 0.00     Total pack years: 30.00     Types: Cigarettes     Quit date:      Years since quittin.1    Smokeless tobacco: Never   Substance and Sexual Activity    Alcohol use: Not Currently    Drug use: Not Currently    Sexual activity: Not on file   Other Topics Concern    Not on file   Social History Narrative    Not on file     Social Determinants of Health     Financial Resource Strain: Not on file   Food Insecurity: Not on file   Transportation Needs: Not on file   Physical Activity: Not on file   Stress: Not on file   Social Connections: Not on file   Intimate Partner Violence: Not on file   Housing Stability: Not on file       FAMILY HISTORY:  Family History   Problem Relation Name Age of Onset    Diabetes Father      Multiple myeloma Father         MEDICATION LIST:  Current Outpatient Medications   Medication Instructions    acetaminophen (TYLENOL) 325 mg, oral, Every 6 hours PRN    allopurinol (ZYLOPRIM) 100 mg, oral, Daily    amLODIPine (NORVASC) 5 mg, oral, Daily    Anoro Ellipta 62.5-25 mcg/actuation blister with device 1 puff, inhalation, Daily    aspirin 81 mg, oral, Daily    carvedilol (Coreg) 12.5 mg tablet 1 tablet, oral, 2 times daily    cholecalciferol (Vitamin D-3) 50 mcg (2,000 unit) capsule 2 capsules, oral, Daily    clobetasol (Temovate) 0.05 % cream 2 times daily, Apply to affected area on both ears, legs and abdomen     cyanocobalamin, vitamin B-12, 1,000 mcg lozenge Take one tablet every morning before breakfast     "dapagliflozin propanediol (FARXIGA) 10 mg, oral, Daily    ferrous sulfate 325 (65 Fe) MG tablet 1 tablet, oral, 3 times daily    fexofenadine (ALLEGRA) 180 mg, oral, Daily    gabapentin (Neurontin) 300 mg capsule Take 1 capsule with breakfast then take 2 capsules with lunch and 2 with dinner    glucagon 1 mg injection inject 1 milligram for 1 dose INTO THE THIGH UPPER ARM or BUTTOCK...    glucose 4 gram chewable tablet 1 tablet, oral    insulin aspart (NovoLOG) 100 unit/mL (3 mL) pen subcutaneous    levoFLOXacin (LEVAQUIN) 750 mg, oral, Every 24 hours scheduled    lidocaine (Lidoderm) 5 % patch 1 patch, transdermal, Daily, Apply to painful area 12 hours per day, remove for 12 hours.    metFORMIN XR (GLUCOPHAGE-XR) 1,000 mg, oral, 2 times daily with meals, Take 1 tablet by mouth twice daily with meals    methocarbamol (ROBAXIN) 750 mg, oral, 4 times daily    molnupiravir 800 mg, oral, Every 12 hours    Mounjaro 10 mg, subcutaneous, Weekly    nitroglycerin (Nitrostat) 0.4 mg SL tablet 1 tablet, sublingual, Every 5 min PRN    ondansetron ODT (ZOFRAN-ODT) 4 mg, oral, Every 8 hours PRN    pantoprazole (PROTONIX) 20 mg, oral, Daily    pen needle, diabetic 31 gauge x 5/16\" needle Injects 4x daily    tacrolimus (PROGRAF) 1 mg, oral, 2 times daily    tiZANidine (ZANAFLEX) 4 mg, oral, 3 times daily       ALLERGY  Allergies   Allergen Reactions    Morphine Nausea/vomiting    Nsaids (Non-Steroidal Anti-Inflammatory Drug) Unknown    Simvastatin Nausea/vomiting    Propoxyphene N-Acetaminophen Nausea Only, Nausea And Vomiting and Unknown     And vomiting    Darvocet    Sulfamethoxazole-Trimethoprim Rash and Other     Light sensitivity       PHYSICAL EXAM:    Visit Vitals  /83   Pulse 97   Temp 36.2 °C (97.2 °F) (Temporal)   Wt 87.9 kg (193 lb 12.8 oz)   SpO2 99%   BMI 27.81 kg/m²   Smoking Status Former   BSA 2.08 m²        Weight change:     Vital signs - reviewed. Acceptable BP at this office visit.   General Appearance - " NAD, Good speech, oriented and alert  HEENT - Supple. Not pale. No jaundice. No cervical lymphadenopathy. Pharynx and tonsils are not injected.  CVS - RRR. Normal S1/S2. No murmur, click , rub or gallop  Lungs- clear to auscultation bilaterally  Abdomen - soft , not tender, no guarding, no rigidity. No hepatosplenomegaly. Normal bowel sounds. No masses and ascites.   Musculoskeletal /Extremities - no edema. Full ROM. No joint tenderness.   Neuro/Psych - appropriate mood and affect. Motor power V/V all extremities. CN I -XII were grossly intact.  Skin - No visible rash      LABS:    Lab Results   Component Value Date    WBC 5.0 01/23/2024    HGB 15.3 01/23/2024    HCT 45.1 01/23/2024     01/23/2024    CHOL 129 10/25/2023    TRIG 103 10/25/2023    HDL 24.2 10/25/2023    ALT 6 (L) 01/23/2024    AST 9 01/23/2024     (L) 01/23/2024    K 4.4 01/23/2024    CL 99 01/23/2024    CREATININE 1.17 01/23/2024    BUN 14 01/23/2024    CO2 29 01/23/2024    TSH 0.95 02/13/2023    PSA 0.1 06/27/2020    INR 1.0 03/02/2023    HGBA1C 5.0 01/23/2024     par    ASSESSMENT AND PLAN:    Mr. Estevez is a 53 y.o. male  who is here for follow up on CKD.    1. chronic kidney disease Stage 2    - Creatinine had been around:  Lab Results   Component Value Date    CREATININE 1.17 01/23/2024     Creatinine clearance cannot be calculated (Patient's most recent lab result is older than the maximum 7 days allowed.)    - Avoid nephrotoxic agents, NSAIDs, IV contrast  - Patient education about CKD from [facility] was given to patient  - Medication list to avoid in CKD patient   was given to patient  - Check UA   - Check UPC ratio   - Kidney Imaging : 4/20/2023-no hydronephrosis, PVR 57 CC.     2. Blood pressure/Hypertension   Blood Pressures         2/12/2024  1526             BP: 131/83          - Goal average BP is <130/80 mmHg  - Per the patient , home BP had been acceptable  - continue current BP medications  - Monitor home BP  - Ask patient to  notify me if BP is above the goal  - Low salt diet    3. Electrolyte  Lab Results   Component Value Date    GLUCOSE 88 01/23/2024    CALCIUM 9.0 01/23/2024     (L) 01/23/2024    K 4.4 01/23/2024    CO2 29 01/23/2024    CL 99 01/23/2024    BUN 14 01/23/2024    CREATININE 1.17 01/23/2024     - Reviewed from last lab  -Acceptable    4. Bone and mineral disease/osteoporosis  -Check 25 -OH Vit D level and PTH with next lab    5. Nutrition  - CKD diet ( Low K, Low phosphorus, Low salt diet)  -Education material from [facility] and [facility] was given to patient    6. Anemia   Lab Results   Component Value Date    WBC 5.0 01/23/2024    HGB 15.3 01/23/2024    HCT 45.1 01/23/2024    MCV 85 01/23/2024     01/23/2024     -Check iron studies and ferritin  -consider CATHERINE as needed  -No indication for PRBC transfusion at this time      Next lab : after coming back from [place]    Reviewed recommended vaccination for travelling to [place]    Immunization History   Administered Date(s) Administered    Hepatitis B vaccine, adult (RECOMBIVAX, ENGERIX) 02/21/2020, 08/18/2020    Influenza, injectable, quadrivalent 10/23/2020, 10/26/2021    Moderna SARS-CoV-2 Vaccination 04/24/2021, 05/22/2021, 08/15/2021, 04/03/2022    Pfizer COVID-19 vaccine, bivalent, age 12 years and older (30 mcg/0.3 mL) 09/17/2022     Prescribed Molnupiravir and Levaquin prior to traveling     RTC  6 /2024      Romario Carter    Transplant Nephrology

## 2024-02-14 ENCOUNTER — APPOINTMENT (OUTPATIENT)
Dept: SURGERY | Facility: CLINIC | Age: 54
End: 2024-02-14
Payer: MEDICARE

## 2024-02-15 ENCOUNTER — APPOINTMENT (OUTPATIENT)
Dept: SURGERY | Facility: CLINIC | Age: 54
End: 2024-02-15
Payer: MEDICARE

## 2024-02-21 ENCOUNTER — OFFICE VISIT (OUTPATIENT)
Dept: SURGERY | Facility: CLINIC | Age: 54
End: 2024-02-21
Payer: MEDICARE

## 2024-02-21 VITALS
RESPIRATION RATE: 18 BRPM | BODY MASS INDEX: 28.9 KG/M2 | HEIGHT: 70 IN | DIASTOLIC BLOOD PRESSURE: 83 MMHG | SYSTOLIC BLOOD PRESSURE: 123 MMHG | WEIGHT: 201.9 LBS | HEART RATE: 81 BPM

## 2024-02-21 DIAGNOSIS — K43.2 INCISIONAL HERNIA, WITHOUT OBSTRUCTION OR GANGRENE: Primary | ICD-10-CM

## 2024-02-21 PROCEDURE — 1036F TOBACCO NON-USER: CPT | Performed by: STUDENT IN AN ORGANIZED HEALTH CARE EDUCATION/TRAINING PROGRAM

## 2024-02-21 PROCEDURE — 99213 OFFICE O/P EST LOW 20 MIN: CPT | Performed by: STUDENT IN AN ORGANIZED HEALTH CARE EDUCATION/TRAINING PROGRAM

## 2024-02-21 PROCEDURE — 3079F DIAST BP 80-89 MM HG: CPT | Performed by: STUDENT IN AN ORGANIZED HEALTH CARE EDUCATION/TRAINING PROGRAM

## 2024-02-21 PROCEDURE — 3008F BODY MASS INDEX DOCD: CPT | Performed by: STUDENT IN AN ORGANIZED HEALTH CARE EDUCATION/TRAINING PROGRAM

## 2024-02-21 PROCEDURE — 3044F HG A1C LEVEL LT 7.0%: CPT | Performed by: STUDENT IN AN ORGANIZED HEALTH CARE EDUCATION/TRAINING PROGRAM

## 2024-02-21 PROCEDURE — 3074F SYST BP LT 130 MM HG: CPT | Performed by: STUDENT IN AN ORGANIZED HEALTH CARE EDUCATION/TRAINING PROGRAM

## 2024-02-21 ASSESSMENT — PAIN SCALES - GENERAL: PAINLEVEL: 2

## 2024-02-21 NOTE — PROGRESS NOTES
Subjective   Karl Estevez is a 53 y.o. male who underwent OLT on 6/14/2022 (Liver).     He underwent incisional hernia repair 1-.    Being seen Today for his postoperative visit.    Doing well. Pain improving. Has noticed a little recurrent swelling over right flank.     Review of Systems     Objective    Exam  Gen: AAOx3, NAD  Card: Regular rate and rhythm  Resp: sat well room air, equal chest rise  Abd: incision c/d/I, no erythema or induration. Small amount of swelling over right flank incision. No valsalva  Ext: no lower extremity edema  Lab Results   Component Value Date    CREATININE 1.13 02/27/2024    K 4.1 02/27/2024    GLUCOSE 79 02/27/2024    HCT 42.3 02/27/2024    WBC 5.1 02/27/2024     02/27/2024    CALCIUM 8.9 02/27/2024     Assessment/Plan      54 y/o history of liver transplant.  Now status post incisional hernia repair of right aspect of subcostal incision 1/29/24.    Doing well.  Incision appearing well-healed.  He is concerned about swelling  over the repair site.  I do not feel recurrent hernia or a large collection.  Will plan for him to follow-up in 1 month for repeat exam.

## 2024-02-23 ENCOUNTER — SPECIALTY PHARMACY (OUTPATIENT)
Dept: PHARMACY | Facility: CLINIC | Age: 54
End: 2024-02-23

## 2024-02-27 ENCOUNTER — LAB (OUTPATIENT)
Dept: LAB | Facility: LAB | Age: 54
End: 2024-02-27
Payer: MEDICARE

## 2024-02-27 DIAGNOSIS — E11.42 TYPE 2 DIABETES MELLITUS WITH DIABETIC POLYNEUROPATHY, WITHOUT LONG-TERM CURRENT USE OF INSULIN (MULTI): ICD-10-CM

## 2024-02-27 DIAGNOSIS — Z94.4 HISTORY OF LIVER TRANSPLANT (MULTI): ICD-10-CM

## 2024-02-27 DIAGNOSIS — D84.9 IMMUNOSUPPRESSION (MULTI): ICD-10-CM

## 2024-02-27 DIAGNOSIS — N18.30 STAGE 3 CHRONIC KIDNEY DISEASE, UNSPECIFIED WHETHER STAGE 3A OR 3B CKD (MULTI): ICD-10-CM

## 2024-02-27 LAB
ALBUMIN SERPL BCP-MCNC: 4.2 G/DL (ref 3.4–5)
ALP SERPL-CCNC: 66 U/L (ref 33–120)
ALT SERPL W P-5'-P-CCNC: 5 U/L (ref 10–52)
ANION GAP SERPL CALC-SCNC: 9 MMOL/L (ref 10–20)
AST SERPL W P-5'-P-CCNC: 8 U/L (ref 9–39)
BASOPHILS # BLD AUTO: 0.04 X10*3/UL (ref 0–0.1)
BASOPHILS NFR BLD AUTO: 0.8 %
BILIRUB SERPL-MCNC: 0.6 MG/DL (ref 0–1.2)
BUN SERPL-MCNC: 14 MG/DL (ref 6–23)
CALCIUM SERPL-MCNC: 8.9 MG/DL (ref 8.6–10.3)
CHLORIDE SERPL-SCNC: 98 MMOL/L (ref 98–107)
CO2 SERPL-SCNC: 31 MMOL/L (ref 21–32)
CREAT SERPL-MCNC: 1.13 MG/DL (ref 0.5–1.3)
EGFRCR SERPLBLD CKD-EPI 2021: 78 ML/MIN/1.73M*2
EOSINOPHIL # BLD AUTO: 0.14 X10*3/UL (ref 0–0.7)
EOSINOPHIL NFR BLD AUTO: 2.8 %
ERYTHROCYTE [DISTWIDTH] IN BLOOD BY AUTOMATED COUNT: 13.5 % (ref 11.5–14.5)
EST. AVERAGE GLUCOSE BLD GHB EST-MCNC: 82 MG/DL
GLUCOSE SERPL-MCNC: 79 MG/DL (ref 74–99)
HBA1C MFR BLD: 4.5 %
HCT VFR BLD AUTO: 42.3 % (ref 41–52)
HGB BLD-MCNC: 14.3 G/DL (ref 13.5–17.5)
IMM GRANULOCYTES # BLD AUTO: 0.01 X10*3/UL (ref 0–0.7)
IMM GRANULOCYTES NFR BLD AUTO: 0.2 % (ref 0–0.9)
LYMPHOCYTES # BLD AUTO: 1.47 X10*3/UL (ref 1.2–4.8)
LYMPHOCYTES NFR BLD AUTO: 28.9 %
MCH RBC QN AUTO: 28.9 PG (ref 26–34)
MCHC RBC AUTO-ENTMCNC: 33.8 G/DL (ref 32–36)
MCV RBC AUTO: 86 FL (ref 80–100)
MONOCYTES # BLD AUTO: 0.38 X10*3/UL (ref 0.1–1)
MONOCYTES NFR BLD AUTO: 7.5 %
NEUTROPHILS # BLD AUTO: 3.05 X10*3/UL (ref 1.2–7.7)
NEUTROPHILS NFR BLD AUTO: 59.8 %
NRBC BLD-RTO: 0 /100 WBCS (ref 0–0)
PLATELET # BLD AUTO: 159 X10*3/UL (ref 150–450)
POTASSIUM SERPL-SCNC: 4.1 MMOL/L (ref 3.5–5.3)
PROT SERPL-MCNC: 6.6 G/DL (ref 6.4–8.2)
RBC # BLD AUTO: 4.95 X10*6/UL (ref 4.5–5.9)
SODIUM SERPL-SCNC: 134 MMOL/L (ref 136–145)
TACROLIMUS BLD-MCNC: 5.2 NG/ML
WBC # BLD AUTO: 5.1 X10*3/UL (ref 4.4–11.3)

## 2024-02-27 PROCEDURE — 83036 HEMOGLOBIN GLYCOSYLATED A1C: CPT

## 2024-02-27 PROCEDURE — 80197 ASSAY OF TACROLIMUS: CPT

## 2024-02-27 PROCEDURE — 80053 COMPREHEN METABOLIC PANEL: CPT

## 2024-02-27 PROCEDURE — 36415 COLL VENOUS BLD VENIPUNCTURE: CPT

## 2024-02-27 PROCEDURE — 85025 COMPLETE CBC W/AUTO DIFF WBC: CPT

## 2024-03-05 ENCOUNTER — TELEPHONE (OUTPATIENT)
Dept: TRANSPLANT | Facility: HOSPITAL | Age: 54
End: 2024-03-05
Payer: MEDICARE

## 2024-03-05 DIAGNOSIS — L76.32 POSTOPERATIVE HEMATOMA OF SKIN FOLLOWING NON-DERMATOLOGIC PROCEDURE: ICD-10-CM

## 2024-03-05 DIAGNOSIS — K43.2 INCISIONAL HERNIA, WITHOUT OBSTRUCTION OR GANGRENE: Primary | ICD-10-CM

## 2024-03-05 RX ORDER — CEPHALEXIN 500 MG/1
500 CAPSULE ORAL 4 TIMES DAILY
Qty: 28 CAPSULE | Refills: 0 | Status: SHIPPED | OUTPATIENT
Start: 2024-03-05 | End: 2024-03-12

## 2024-03-05 NOTE — TELEPHONE ENCOUNTER
PT is requesting call back from Dr. Maya - he says she did his hernia surgery, it is acting up. Red and painful. [phone]

## 2024-03-06 ENCOUNTER — OFFICE VISIT (OUTPATIENT)
Dept: SURGERY | Facility: CLINIC | Age: 54
End: 2024-03-06
Payer: MEDICARE

## 2024-03-06 ENCOUNTER — HOSPITAL ENCOUNTER (OUTPATIENT)
Dept: RADIOLOGY | Facility: HOSPITAL | Age: 54
Discharge: HOME | End: 2024-03-06
Payer: MEDICARE

## 2024-03-06 VITALS
DIASTOLIC BLOOD PRESSURE: 78 MMHG | RESPIRATION RATE: 16 BRPM | HEART RATE: 89 BPM | BODY MASS INDEX: 28.69 KG/M2 | HEIGHT: 71 IN | SYSTOLIC BLOOD PRESSURE: 107 MMHG | WEIGHT: 204.9 LBS

## 2024-03-06 DIAGNOSIS — K43.2 INCISIONAL HERNIA, WITHOUT OBSTRUCTION OR GANGRENE: Primary | ICD-10-CM

## 2024-03-06 DIAGNOSIS — L76.32 POSTOPERATIVE HEMATOMA OF SKIN FOLLOWING NON-DERMATOLOGIC PROCEDURE: ICD-10-CM

## 2024-03-06 DIAGNOSIS — T85.79XA INFECTED HERNIOPLASTY MESH, INITIAL ENCOUNTER (CMS-HCC): ICD-10-CM

## 2024-03-06 PROCEDURE — 1036F TOBACCO NON-USER: CPT | Performed by: STUDENT IN AN ORGANIZED HEALTH CARE EDUCATION/TRAINING PROGRAM

## 2024-03-06 PROCEDURE — 99213 OFFICE O/P EST LOW 20 MIN: CPT | Performed by: STUDENT IN AN ORGANIZED HEALTH CARE EDUCATION/TRAINING PROGRAM

## 2024-03-06 PROCEDURE — 3044F HG A1C LEVEL LT 7.0%: CPT | Performed by: STUDENT IN AN ORGANIZED HEALTH CARE EDUCATION/TRAINING PROGRAM

## 2024-03-06 PROCEDURE — 3074F SYST BP LT 130 MM HG: CPT | Performed by: STUDENT IN AN ORGANIZED HEALTH CARE EDUCATION/TRAINING PROGRAM

## 2024-03-06 PROCEDURE — 3078F DIAST BP <80 MM HG: CPT | Performed by: STUDENT IN AN ORGANIZED HEALTH CARE EDUCATION/TRAINING PROGRAM

## 2024-03-06 PROCEDURE — 74150 CT ABDOMEN W/O CONTRAST: CPT | Performed by: RADIOLOGY

## 2024-03-06 PROCEDURE — 3008F BODY MASS INDEX DOCD: CPT | Performed by: STUDENT IN AN ORGANIZED HEALTH CARE EDUCATION/TRAINING PROGRAM

## 2024-03-06 PROCEDURE — 74150 CT ABDOMEN W/O CONTRAST: CPT

## 2024-03-06 ASSESSMENT — PAIN SCALES - GENERAL: PAINLEVEL: 6

## 2024-03-06 NOTE — H&P (VIEW-ONLY)
Subjective   Karl Estevez is a 53 y.o. male who underwent OLT on 6/14/2022 (Liver).     He underwent incisional hernia repair 1-.    Being seen Today for his postoperative visit.    Having some redness and swelling over right lateral aspect of incision.   No fevers.     Review of Systems     Objective    Exam  Gen: AAOx3, NAD  Card: Regular rate and rhythm  Resp: sat well room air, equal chest rise  Abd: incision c/d/I, Small amount of swelling over right flank incision and blanching erythema, more so over superior aspect of incision.   Ext: no lower extremity edema  Lab Results   Component Value Date    CREATININE 1.13 02/27/2024    K 4.1 02/27/2024    GLUCOSE 79 02/27/2024    HCT 42.3 02/27/2024    WBC 5.1 02/27/2024     02/27/2024    CALCIUM 8.9 02/27/2024     Assessment/Plan      52 y/o history of liver transplant.  Now status post incisional hernia repair of right aspect of subcostal incision 1/29/24.  Concern for cellulitis vs mesh infection.      Plan CT ab/pelvis - discussed potential need for mesh explant pending imaging and persistent symptoms after antibiotics   Continue keflex

## 2024-03-07 PROBLEM — T85.79XA INFECTED HERNIOPLASTY MESH (CMS-HCC): Status: ACTIVE | Noted: 2024-03-06

## 2024-03-08 ENCOUNTER — ANESTHESIA EVENT (OUTPATIENT)
Dept: OPERATING ROOM | Facility: HOSPITAL | Age: 54
End: 2024-03-08
Payer: MEDICARE

## 2024-03-11 ENCOUNTER — PHARMACY VISIT (OUTPATIENT)
Dept: PHARMACY | Facility: CLINIC | Age: 54
End: 2024-03-11
Payer: COMMERCIAL

## 2024-03-11 ENCOUNTER — HOSPITAL ENCOUNTER (OUTPATIENT)
Facility: HOSPITAL | Age: 54
Setting detail: OUTPATIENT SURGERY
Discharge: HOME | End: 2024-03-11
Attending: STUDENT IN AN ORGANIZED HEALTH CARE EDUCATION/TRAINING PROGRAM | Admitting: STUDENT IN AN ORGANIZED HEALTH CARE EDUCATION/TRAINING PROGRAM
Payer: MEDICARE

## 2024-03-11 ENCOUNTER — ANESTHESIA (OUTPATIENT)
Dept: OPERATING ROOM | Facility: HOSPITAL | Age: 54
End: 2024-03-11
Payer: MEDICARE

## 2024-03-11 VITALS
OXYGEN SATURATION: 93 % | RESPIRATION RATE: 12 BRPM | WEIGHT: 199.08 LBS | TEMPERATURE: 96.8 F | DIASTOLIC BLOOD PRESSURE: 64 MMHG | BODY MASS INDEX: 28.5 KG/M2 | HEART RATE: 93 BPM | HEIGHT: 70 IN | SYSTOLIC BLOOD PRESSURE: 107 MMHG

## 2024-03-11 DIAGNOSIS — G89.18 POST-OP PAIN: Primary | ICD-10-CM

## 2024-03-11 DIAGNOSIS — T85.79XA INFECTED HERNIOPLASTY MESH, INITIAL ENCOUNTER (CMS-HCC): ICD-10-CM

## 2024-03-11 LAB
ABO GROUP (TYPE) IN BLOOD: NORMAL
ANION GAP SERPL CALC-SCNC: 15 MMOL/L (ref 10–20)
ANTIBODY SCREEN: NORMAL
BUN SERPL-MCNC: 13 MG/DL (ref 6–23)
CALCIUM SERPL-MCNC: 8.8 MG/DL (ref 8.6–10.6)
CHLORIDE SERPL-SCNC: 98 MMOL/L (ref 98–107)
CO2 SERPL-SCNC: 29 MMOL/L (ref 21–32)
CREAT SERPL-MCNC: 1.06 MG/DL (ref 0.5–1.3)
EGFRCR SERPLBLD CKD-EPI 2021: 84 ML/MIN/1.73M*2
ERYTHROCYTE [DISTWIDTH] IN BLOOD BY AUTOMATED COUNT: 13 % (ref 11.5–14.5)
GLUCOSE BLD MANUAL STRIP-MCNC: 95 MG/DL (ref 74–99)
GLUCOSE BLD MANUAL STRIP-MCNC: 99 MG/DL (ref 74–99)
GLUCOSE SERPL-MCNC: 90 MG/DL (ref 74–99)
HCT VFR BLD AUTO: 39.7 % (ref 41–52)
HGB BLD-MCNC: 14.3 G/DL (ref 13.5–17.5)
MCH RBC QN AUTO: 29.3 PG (ref 26–34)
MCHC RBC AUTO-ENTMCNC: 36 G/DL (ref 32–36)
MCV RBC AUTO: 81 FL (ref 80–100)
NRBC BLD-RTO: 0 /100 WBCS (ref 0–0)
PLATELET # BLD AUTO: 153 X10*3/UL (ref 150–450)
POTASSIUM SERPL-SCNC: 4.3 MMOL/L (ref 3.5–5.3)
RBC # BLD AUTO: 4.88 X10*6/UL (ref 4.5–5.9)
RH FACTOR (ANTIGEN D): NORMAL
SODIUM SERPL-SCNC: 138 MMOL/L (ref 136–145)
WBC # BLD AUTO: 5.2 X10*3/UL (ref 4.4–11.3)

## 2024-03-11 PROCEDURE — 2500000004 HC RX 250 GENERAL PHARMACY W/ HCPCS (ALT 636 FOR OP/ED): Mod: SE | Performed by: ANESTHESIOLOGIST ASSISTANT

## 2024-03-11 PROCEDURE — 7100000001 HC RECOVERY ROOM TIME - INITIAL BASE CHARGE: Performed by: STUDENT IN AN ORGANIZED HEALTH CARE EDUCATION/TRAINING PROGRAM

## 2024-03-11 PROCEDURE — 3600000008 HC OR TIME - EACH INCREMENTAL 1 MINUTE - PROCEDURE LEVEL THREE: Performed by: STUDENT IN AN ORGANIZED HEALTH CARE EDUCATION/TRAINING PROGRAM

## 2024-03-11 PROCEDURE — 88302 TISSUE EXAM BY PATHOLOGIST: CPT | Mod: TC,SUR | Performed by: STUDENT IN AN ORGANIZED HEALTH CARE EDUCATION/TRAINING PROGRAM

## 2024-03-11 PROCEDURE — 2500000005 HC RX 250 GENERAL PHARMACY W/O HCPCS: Mod: SE | Performed by: ANESTHESIOLOGIST ASSISTANT

## 2024-03-11 PROCEDURE — 87070 CULTURE OTHR SPECIMN AEROBIC: CPT | Performed by: STUDENT IN AN ORGANIZED HEALTH CARE EDUCATION/TRAINING PROGRAM

## 2024-03-11 PROCEDURE — 2500000001 HC RX 250 WO HCPCS SELF ADMINISTERED DRUGS (ALT 637 FOR MEDICARE OP): Mod: SE | Performed by: ANESTHESIOLOGY

## 2024-03-11 PROCEDURE — 2500000004 HC RX 250 GENERAL PHARMACY W/ HCPCS (ALT 636 FOR OP/ED): Mod: SE | Performed by: STUDENT IN AN ORGANIZED HEALTH CARE EDUCATION/TRAINING PROGRAM

## 2024-03-11 PROCEDURE — RXMED WILLOW AMBULATORY MEDICATION CHARGE

## 2024-03-11 PROCEDURE — 3700000001 HC GENERAL ANESTHESIA TIME - INITIAL BASE CHARGE: Performed by: STUDENT IN AN ORGANIZED HEALTH CARE EDUCATION/TRAINING PROGRAM

## 2024-03-11 PROCEDURE — 10121 INC&RMVL FB SUBQ TISS COMP: CPT | Performed by: STUDENT IN AN ORGANIZED HEALTH CARE EDUCATION/TRAINING PROGRAM

## 2024-03-11 PROCEDURE — 85027 COMPLETE CBC AUTOMATED: CPT | Performed by: STUDENT IN AN ORGANIZED HEALTH CARE EDUCATION/TRAINING PROGRAM

## 2024-03-11 PROCEDURE — 36415 COLL VENOUS BLD VENIPUNCTURE: CPT | Performed by: STUDENT IN AN ORGANIZED HEALTH CARE EDUCATION/TRAINING PROGRAM

## 2024-03-11 PROCEDURE — 3700000002 HC GENERAL ANESTHESIA TIME - EACH INCREMENTAL 1 MINUTE: Performed by: STUDENT IN AN ORGANIZED HEALTH CARE EDUCATION/TRAINING PROGRAM

## 2024-03-11 PROCEDURE — 86901 BLOOD TYPING SEROLOGIC RH(D): CPT | Performed by: STUDENT IN AN ORGANIZED HEALTH CARE EDUCATION/TRAINING PROGRAM

## 2024-03-11 PROCEDURE — 7100000009 HC PHASE TWO TIME - INITIAL BASE CHARGE: Performed by: STUDENT IN AN ORGANIZED HEALTH CARE EDUCATION/TRAINING PROGRAM

## 2024-03-11 PROCEDURE — 88302 TISSUE EXAM BY PATHOLOGIST: CPT | Performed by: STUDENT IN AN ORGANIZED HEALTH CARE EDUCATION/TRAINING PROGRAM

## 2024-03-11 PROCEDURE — 2500000005 HC RX 250 GENERAL PHARMACY W/O HCPCS: Mod: SE | Performed by: STUDENT IN AN ORGANIZED HEALTH CARE EDUCATION/TRAINING PROGRAM

## 2024-03-11 PROCEDURE — 3600000003 HC OR TIME - INITIAL BASE CHARGE - PROCEDURE LEVEL THREE: Performed by: STUDENT IN AN ORGANIZED HEALTH CARE EDUCATION/TRAINING PROGRAM

## 2024-03-11 PROCEDURE — 82947 ASSAY GLUCOSE BLOOD QUANT: CPT | Mod: 91

## 2024-03-11 PROCEDURE — 2500000004 HC RX 250 GENERAL PHARMACY W/ HCPCS (ALT 636 FOR OP/ED): Mod: SE | Performed by: ANESTHESIOLOGY

## 2024-03-11 PROCEDURE — 87102 FUNGUS ISOLATION CULTURE: CPT | Performed by: STUDENT IN AN ORGANIZED HEALTH CARE EDUCATION/TRAINING PROGRAM

## 2024-03-11 PROCEDURE — 2720000007 HC OR 272 NO HCPCS: Performed by: STUDENT IN AN ORGANIZED HEALTH CARE EDUCATION/TRAINING PROGRAM

## 2024-03-11 PROCEDURE — 7100000010 HC PHASE TWO TIME - EACH INCREMENTAL 1 MINUTE: Performed by: STUDENT IN AN ORGANIZED HEALTH CARE EDUCATION/TRAINING PROGRAM

## 2024-03-11 PROCEDURE — 80048 BASIC METABOLIC PNL TOTAL CA: CPT | Performed by: STUDENT IN AN ORGANIZED HEALTH CARE EDUCATION/TRAINING PROGRAM

## 2024-03-11 PROCEDURE — 7100000002 HC RECOVERY ROOM TIME - EACH INCREMENTAL 1 MINUTE: Performed by: STUDENT IN AN ORGANIZED HEALTH CARE EDUCATION/TRAINING PROGRAM

## 2024-03-11 RX ORDER — MIDAZOLAM HYDROCHLORIDE 1 MG/ML
INJECTION INTRAMUSCULAR; INTRAVENOUS AS NEEDED
Status: DISCONTINUED | OUTPATIENT
Start: 2024-03-11 | End: 2024-03-11

## 2024-03-11 RX ORDER — ONDANSETRON HYDROCHLORIDE 2 MG/ML
INJECTION, SOLUTION INTRAVENOUS AS NEEDED
Status: DISCONTINUED | OUTPATIENT
Start: 2024-03-11 | End: 2024-03-11

## 2024-03-11 RX ORDER — LIDOCAINE HYDROCHLORIDE 20 MG/ML
INJECTION, SOLUTION INFILTRATION; PERINEURAL AS NEEDED
Status: DISCONTINUED | OUTPATIENT
Start: 2024-03-11 | End: 2024-03-11

## 2024-03-11 RX ORDER — DROPERIDOL 2.5 MG/ML
0.62 INJECTION, SOLUTION INTRAMUSCULAR; INTRAVENOUS ONCE AS NEEDED
Status: DISCONTINUED | OUTPATIENT
Start: 2024-03-11 | End: 2024-03-11 | Stop reason: HOSPADM

## 2024-03-11 RX ORDER — DEXAMETHASONE SODIUM PHOSPHATE 4 MG/ML
INJECTION, SOLUTION INTRA-ARTICULAR; INTRALESIONAL; INTRAMUSCULAR; INTRAVENOUS; SOFT TISSUE AS NEEDED
Status: DISCONTINUED | OUTPATIENT
Start: 2024-03-11 | End: 2024-03-11

## 2024-03-11 RX ORDER — POLYETHYLENE GLYCOL 3350 17 G/17G
17 POWDER, FOR SOLUTION ORAL DAILY
Qty: 7 PACKET | Refills: 0 | Status: SHIPPED | OUTPATIENT
Start: 2024-03-11 | End: 2024-03-18

## 2024-03-11 RX ORDER — SODIUM CHLORIDE, SODIUM LACTATE, POTASSIUM CHLORIDE, CALCIUM CHLORIDE 600; 310; 30; 20 MG/100ML; MG/100ML; MG/100ML; MG/100ML
100 INJECTION, SOLUTION INTRAVENOUS CONTINUOUS
Status: DISCONTINUED | OUTPATIENT
Start: 2024-03-11 | End: 2024-03-11 | Stop reason: HOSPADM

## 2024-03-11 RX ORDER — OXYCODONE HYDROCHLORIDE 10 MG/1
10 TABLET ORAL EVERY 6 HOURS PRN
Qty: 10 TABLET | Refills: 0 | Status: CANCELLED | OUTPATIENT
Start: 2024-03-11 | End: 2024-03-16

## 2024-03-11 RX ORDER — METOPROLOL TARTRATE 1 MG/ML
INJECTION, SOLUTION INTRAVENOUS AS NEEDED
Status: DISCONTINUED | OUTPATIENT
Start: 2024-03-11 | End: 2024-03-11

## 2024-03-11 RX ORDER — OXYCODONE HYDROCHLORIDE 10 MG/1
10 TABLET ORAL EVERY 6 HOURS PRN
Qty: 10 TABLET | Refills: 0 | Status: SHIPPED | OUTPATIENT
Start: 2024-03-11 | End: 2024-03-13 | Stop reason: SDUPTHER

## 2024-03-11 RX ORDER — OXYCODONE HYDROCHLORIDE 5 MG/1
10 TABLET ORAL EVERY 6 HOURS PRN
Qty: 10 TABLET | Refills: 0 | Status: SHIPPED | OUTPATIENT
Start: 2024-03-11 | End: 2024-03-11 | Stop reason: HOSPADM

## 2024-03-11 RX ORDER — HYDROMORPHONE HYDROCHLORIDE 1 MG/ML
INJECTION, SOLUTION INTRAMUSCULAR; INTRAVENOUS; SUBCUTANEOUS AS NEEDED
Status: DISCONTINUED | OUTPATIENT
Start: 2024-03-11 | End: 2024-03-11

## 2024-03-11 RX ORDER — ONDANSETRON HYDROCHLORIDE 2 MG/ML
4 INJECTION, SOLUTION INTRAVENOUS ONCE AS NEEDED
Status: COMPLETED | OUTPATIENT
Start: 2024-03-11 | End: 2024-03-11

## 2024-03-11 RX ORDER — PROPOFOL 10 MG/ML
INJECTION, EMULSION INTRAVENOUS AS NEEDED
Status: DISCONTINUED | OUTPATIENT
Start: 2024-03-11 | End: 2024-03-11

## 2024-03-11 RX ORDER — BUPIVACAINE HYDROCHLORIDE 2.5 MG/ML
INJECTION, SOLUTION INFILTRATION; PERINEURAL AS NEEDED
Status: DISCONTINUED | OUTPATIENT
Start: 2024-03-11 | End: 2024-03-11 | Stop reason: HOSPADM

## 2024-03-11 RX ORDER — LIDOCAINE HYDROCHLORIDE 10 MG/ML
0.1 INJECTION INFILTRATION; PERINEURAL ONCE
Status: DISCONTINUED | OUTPATIENT
Start: 2024-03-11 | End: 2024-03-11 | Stop reason: HOSPADM

## 2024-03-11 RX ORDER — CEFAZOLIN SODIUM 2 G/100ML
2 INJECTION, SOLUTION INTRAVENOUS ONCE
Status: DISCONTINUED | OUTPATIENT
Start: 2024-03-11 | End: 2024-03-11 | Stop reason: HOSPADM

## 2024-03-11 RX ORDER — MEPERIDINE HYDROCHLORIDE 25 MG/ML
12.5 INJECTION INTRAMUSCULAR; INTRAVENOUS; SUBCUTANEOUS EVERY 10 MIN PRN
Status: DISCONTINUED | OUTPATIENT
Start: 2024-03-11 | End: 2024-03-11 | Stop reason: HOSPADM

## 2024-03-11 RX ORDER — ACETAMINOPHEN 325 MG/1
650 TABLET ORAL ONCE
Status: DISCONTINUED | OUTPATIENT
Start: 2024-03-11 | End: 2024-03-11 | Stop reason: HOSPADM

## 2024-03-11 RX ORDER — HYDROMORPHONE HYDROCHLORIDE 1 MG/ML
0.2 INJECTION, SOLUTION INTRAMUSCULAR; INTRAVENOUS; SUBCUTANEOUS EVERY 5 MIN PRN
Status: DISCONTINUED | OUTPATIENT
Start: 2024-03-11 | End: 2024-03-11 | Stop reason: HOSPADM

## 2024-03-11 RX ORDER — SODIUM CHLORIDE 9 MG/ML
50 INJECTION, SOLUTION INTRAVENOUS CONTINUOUS
Status: DISCONTINUED | OUTPATIENT
Start: 2024-03-11 | End: 2024-03-11 | Stop reason: HOSPADM

## 2024-03-11 RX ORDER — ROCURONIUM BROMIDE 10 MG/ML
INJECTION, SOLUTION INTRAVENOUS AS NEEDED
Status: DISCONTINUED | OUTPATIENT
Start: 2024-03-11 | End: 2024-03-11

## 2024-03-11 RX ORDER — OXYCODONE HYDROCHLORIDE 5 MG/1
5 TABLET ORAL EVERY 4 HOURS PRN
Status: DISCONTINUED | OUTPATIENT
Start: 2024-03-11 | End: 2024-03-11 | Stop reason: HOSPADM

## 2024-03-11 RX ORDER — CEFAZOLIN 1 G/1
INJECTION, POWDER, FOR SOLUTION INTRAVENOUS AS NEEDED
Status: DISCONTINUED | OUTPATIENT
Start: 2024-03-11 | End: 2024-03-11

## 2024-03-11 RX ORDER — HYDROMORPHONE HYDROCHLORIDE 1 MG/ML
0.5 INJECTION, SOLUTION INTRAMUSCULAR; INTRAVENOUS; SUBCUTANEOUS EVERY 5 MIN PRN
Status: DISCONTINUED | OUTPATIENT
Start: 2024-03-11 | End: 2024-03-11 | Stop reason: HOSPADM

## 2024-03-11 RX ORDER — METHOCARBAMOL 750 MG/1
750 TABLET, FILM COATED ORAL 4 TIMES DAILY
Qty: 56 TABLET | Refills: 0 | Status: SHIPPED | OUTPATIENT
Start: 2024-03-11 | End: 2024-04-16 | Stop reason: ALTCHOICE

## 2024-03-11 RX ADMIN — ONDANSETRON 4 MG: 2 INJECTION INTRAMUSCULAR; INTRAVENOUS at 09:33

## 2024-03-11 RX ADMIN — CEFAZOLIN 2 G: 330 INJECTION, POWDER, FOR SOLUTION INTRAMUSCULAR; INTRAVENOUS at 07:26

## 2024-03-11 RX ADMIN — SUGAMMADEX 50 MG: 100 INJECTION, SOLUTION INTRAVENOUS at 08:50

## 2024-03-11 RX ADMIN — HYDROMORPHONE HYDROCHLORIDE 0.25 MG: 1 INJECTION, SOLUTION INTRAMUSCULAR; INTRAVENOUS; SUBCUTANEOUS at 07:58

## 2024-03-11 RX ADMIN — HYDROMORPHONE HYDROCHLORIDE 0.5 MG: 1 INJECTION, SOLUTION INTRAMUSCULAR; INTRAVENOUS; SUBCUTANEOUS at 08:32

## 2024-03-11 RX ADMIN — MIDAZOLAM HYDROCHLORIDE 1 MG: 1 INJECTION, SOLUTION INTRAMUSCULAR; INTRAVENOUS at 07:15

## 2024-03-11 RX ADMIN — HYDROMORPHONE HYDROCHLORIDE 0.25 MG: 1 INJECTION, SOLUTION INTRAMUSCULAR; INTRAVENOUS; SUBCUTANEOUS at 07:28

## 2024-03-11 RX ADMIN — PROPOFOL 100 MG: 10 INJECTION, EMULSION INTRAVENOUS at 07:18

## 2024-03-11 RX ADMIN — LIDOCAINE HYDROCHLORIDE 100 MG: 20 INJECTION, SOLUTION INFILTRATION; PERINEURAL at 07:17

## 2024-03-11 RX ADMIN — HYDROMORPHONE HYDROCHLORIDE 0.5 MG: 1 INJECTION, SOLUTION INTRAMUSCULAR; INTRAVENOUS; SUBCUTANEOUS at 09:21

## 2024-03-11 RX ADMIN — MIDAZOLAM HYDROCHLORIDE 1 MG: 1 INJECTION, SOLUTION INTRAMUSCULAR; INTRAVENOUS at 07:10

## 2024-03-11 RX ADMIN — PROPOFOL 50 MG: 10 INJECTION, EMULSION INTRAVENOUS at 08:19

## 2024-03-11 RX ADMIN — ROCURONIUM BROMIDE 50 MG: 10 INJECTION INTRAVENOUS at 07:19

## 2024-03-11 RX ADMIN — SODIUM CHLORIDE, SODIUM LACTATE, POTASSIUM CHLORIDE, AND CALCIUM CHLORIDE: 600; 310; 30; 20 INJECTION, SOLUTION INTRAVENOUS at 06:44

## 2024-03-11 RX ADMIN — PROPOFOL 50 MG: 10 INJECTION, EMULSION INTRAVENOUS at 07:38

## 2024-03-11 RX ADMIN — DEXAMETHASONE SODIUM PHOSPHATE 4 MG: 4 INJECTION, SOLUTION INTRAMUSCULAR; INTRAVENOUS at 07:32

## 2024-03-11 RX ADMIN — ONDANSETRON 4 MG: 2 INJECTION INTRAMUSCULAR; INTRAVENOUS at 08:31

## 2024-03-11 RX ADMIN — SUGAMMADEX 150 MG: 100 INJECTION, SOLUTION INTRAVENOUS at 09:00

## 2024-03-11 RX ADMIN — HYDROMORPHONE HYDROCHLORIDE 0.5 MG: 1 INJECTION, SOLUTION INTRAMUSCULAR; INTRAVENOUS; SUBCUTANEOUS at 09:33

## 2024-03-11 RX ADMIN — OXYCODONE HYDROCHLORIDE 5 MG: 5 TABLET ORAL at 09:47

## 2024-03-11 RX ADMIN — METOPROLOL TARTRATE 5 MG: 1 INJECTION, SOLUTION INTRAVENOUS at 07:17

## 2024-03-11 ASSESSMENT — PAIN SCALES - GENERAL
PAINLEVEL_OUTOF10: 6
PAINLEVEL_OUTOF10: 4
PAINLEVEL_OUTOF10: 4
PAINLEVEL_OUTOF10: 7
PAINLEVEL_OUTOF10: 5 - MODERATE PAIN
PAINLEVEL_OUTOF10: 5 - MODERATE PAIN
PAINLEVEL_OUTOF10: 7
PAINLEVEL_OUTOF10: 7

## 2024-03-11 ASSESSMENT — PAIN - FUNCTIONAL ASSESSMENT
PAIN_FUNCTIONAL_ASSESSMENT: 0-10

## 2024-03-11 ASSESSMENT — COLUMBIA-SUICIDE SEVERITY RATING SCALE - C-SSRS
1. IN THE PAST MONTH, HAVE YOU WISHED YOU WERE DEAD OR WISHED YOU COULD GO TO SLEEP AND NOT WAKE UP?: NO
6. HAVE YOU EVER DONE ANYTHING, STARTED TO DO ANYTHING, OR PREPARED TO DO ANYTHING TO END YOUR LIFE?: NO
2. HAVE YOU ACTUALLY HAD ANY THOUGHTS OF KILLING YOURSELF?: NO

## 2024-03-11 NOTE — OP NOTE
Removal Therapeutic Implant Abdominal Cavity (R) Operative Note     Date: 3/11/2024  OR Location: [facility] OR    Name: Karl Estevez, : 1970, Age: 53 y.o., MRN: [id number], Sex: male    Diagnosis  Pre-op Diagnosis     * Infected hernioplasty mesh, initial encounter (CMS/[facility]) [T85.79XA] Post-op Diagnosis     * Infected hernioplasty mesh, initial encounter (CMS/[facility]) [T85.79XA]     Procedures  Removal Therapeutic Implant Abdominal Cavity  09574 - OH RMVL PROSTC MATRL/MESH ABDL WALL FOR INFECTION      Surgeons      * Ivette Maya - Primary         Anesthesia Record               Intraprocedure I/O Totals          Output    Est. Blood Loss 20 mL    Total Output 20 mL          Specimen:   ID Type Source Tests Collected by Time   1 : EXPLANT MESH Tissue MESH SURGICAL PATHOLOGY EXAM Ivette Maya MD 3/11/2024 0831   A : MESH CULTURE Swab MESH FUNGAL CULTURE/SMEAR, TISSUE/WOUND CULTURE/SMEAR Ivette Maya MD 3/11/2024 0832        Staff:   Circulator: Bev Cheng RN  Scrub Person: Adia Rowley    Findings: Moderately incorporated abdominal wall mesh.     Indications: KARL Estevez is an 53 y.o. male who is having surgery for hernia mesh removal. Had right incisional hernia repair with onlay mesh. Developed swelling and redness which only partially resolved with antibiotics therapy.     The patient was seen in the preoperative area. The risks, benefits, complications, treatment options, non-operative alternatives, expected recovery and outcomes were discussed with the patient. The possibilities of reaction to medication, pulmonary aspiration, injury to surrounding structures, bleeding, recurrent infection, the need for additional procedures, failure to diagnose a condition, and creating a complication requiring transfusion or operation were discussed with the patient. The patient concurred with the proposed plan, giving informed consent.  The site of surgery was properly noted/marked if necessary per  policy. The patient has been actively warmed in preoperative area. Preoperative antibiotics have been ordered and given within 1 hours of incision.     We began with an incision over the prior right abdominal incision. This was extended down to the level of the onlay mesh. A small sub mesh collection was encountered and cultured. No jd purulence.  The mesh was then grasped and elevated and peeled off the underlying fascia using a combination of blunt and cautery dissection.  There were portions of the mesh that were moderately well incorporated and the mesh required removal piecemeal.  The underlying fascia appeared intact.  A few 2.0 PDS interrupted sutures were placed along the fascia for reinforcement.  The cavity was then thoroughly irrigated with antibiotic irrigation.  Hemostasis was ensured.  A 15 Yoruba Chirag drain was placed through a separate stab incision and secured with a 2-0 Prolene.  Silvana's was closed with 3-0 Vicryl.  Deep dermis was closed with 3-0 Vicryl interrupted.  Skin was closed with 4-0 Monocryl subcuticular and covered with Dermabond.  The patient tolerated the procedure well and all counts were correct x 2 at the end of the case.  I was present and scrubbed throughout the entirety of the case.  Mesh and the submesh swab were sent for culture.     Complications:  None; patient tolerated the procedure well.    Disposition: PACU - hemodynamically stable.  Condition: stable     Attending Attestation: I was present and scrubbed for the entire procedure.    Ivette Maya  Phone Number: [phone]

## 2024-03-11 NOTE — ANESTHESIA PREPROCEDURE EVALUATION
Patient: Karl Estevez    Procedure Information       Date/Time: 03/11/24 0715    Procedure: Removal Therapeutic Implant Abdominal Cavity (Right)    Location: [facility] OR 15 / [facility] OR    Surgeons: Ivette Maya MD            Relevant Problems   Cardiovascular   (+) Benign essential HTN   (+) Hypertension   (+) Internal hemorrhoid, bleeding   (+) Unstable angina (CMS/HCC)      Endocrine   (+) Diabetic neuropathy, painful (CMS/HCC)   (+) Obesity due to excess calories, unspecified obesity severity   (+) Secondary hyperparathyroidism of renal origin (CMS/HCC)   (+) Type 2 diabetes mellitus with neurologic complication (CMS/HCC)      GI   (+) GERD (gastroesophageal reflux disease)   (+) Internal hemorrhoid, bleeding      /Renal   (+) CATA (acute kidney injury) (CMS/HCC)   (+) Alcoholic cirrhosis of liver with ascites (CMS/HCC)   (+) Fatty liver   (+) Nodule on liver      Neuro/Psych   (+) Anxiety   (+) Generalized anxiety disorder   (+) Lumbar radiculopathy, chronic   (+) Peripheral polyneuropathy      Pulmonary   (+) Pneumonia      GI/Hepatic   (+) Alcoholic cirrhosis of liver with ascites (CMS/HCC)   (+) Fatty liver      Hematology   (+) Anemia in chronic kidney disease   (+) Anemia, iron deficiency   (+) Macrocytic anemia      Musculoskeletal   (+) DJD (degenerative joint disease)      Infectious Disease   (+) Cytomegalovirus (CMV) viremia (CMS/HCC)   (+) EBV (Xena-Barr virus) viremia   (+) History of MRSA infection   (+) Infected hernioplasty mesh (CMS/HCC)   (+) Onychomycosis   (+) Wart viral      Other   (+) Adhesive capsulitis of right shoulder   (+) Charcot arthropathy of midfoot       Clinical information reviewed:   Tobacco  Allergies  Meds   Med Hx  Surg Hx   Fam Hx  Soc Hx        NPO Detail:  NPO/Void Status  Date of Last Liquid: 03/11/24  Time of Last Liquid: 0000  Date of Last Solid: 03/11/24  Time of Last Solid: 0000         PHYSICAL EXAM    Anesthesia Plan    History of  general anesthesia?: yes  History of complications of general anesthesia?: no    ASA 3     general     intravenous induction   Postoperative administration of opioids is intended.  Trial extubation is planned.  Anesthetic plan and risks discussed with patient.    Plan discussed with CAA.

## 2024-03-11 NOTE — ANESTHESIA PROCEDURE NOTES
Airway  Date/Time: 3/11/2024 7:21 AM  Urgency: elective    Airway not difficult    Staffing  Performed: DECLAN   Authorized by: Anoop Minor MD    Performed by: JANI Yates  Patient location during procedure: OR    Indications and Patient Condition  Indications for airway management: anesthesia  Spontaneous ventilation: present  Sedation level: deep  Preoxygenated: yes  Patient position: sniffing  Mask difficulty assessment: 1 - vent by mask  Planned trial extubation    Final Airway Details  Final airway type: endotracheal airway      Successful airway: ETT  Cuffed: yes   Successful intubation technique: direct laryngoscopy  Blade: Rhiannon  ETT size (mm): 7.5  Cormack-Lehane Classification: grade IIa - partial view of glottis  Placement verified by: chest auscultation and capnometry   Measured from: lips  ETT to lips (cm): 22  Number of attempts at approach: 1  Ventilation between attempts: BVM    Additional Comments  Lips & teeth in prep condition

## 2024-03-11 NOTE — INTERVAL H&P NOTE
Progress note from clinic reviewed. CT reviewed showing right anterolateral soft tissue stranding and thickening around area of prior incisional hernia repair with only mesh. The patient was examined and there are no changes. Plan for wound exploration and abdominal wall mesh explantation.      03/11/24 at 5:45 AM - Yuko Prather MD

## 2024-03-12 RX ORDER — CEPHALEXIN 500 MG/1
500 CAPSULE ORAL 4 TIMES DAILY
Qty: 28 CAPSULE | Refills: 0 | Status: SHIPPED | OUTPATIENT
Start: 2024-03-12 | End: 2024-03-19

## 2024-03-12 ASSESSMENT — PAIN SCALES - GENERAL: PAINLEVEL_OUTOF10: 6

## 2024-03-13 ENCOUNTER — TELEPHONE (OUTPATIENT)
Dept: TRANSPLANT | Facility: HOSPITAL | Age: 54
End: 2024-03-13
Payer: MEDICARE

## 2024-03-13 DIAGNOSIS — G89.18 POST-OP PAIN: Primary | ICD-10-CM

## 2024-03-13 LAB
BACTERIA SPEC CULT: NORMAL
GRAM STN SPEC: NORMAL
GRAM STN SPEC: NORMAL

## 2024-03-13 RX ORDER — OXYCODONE HYDROCHLORIDE 10 MG/1
10 TABLET ORAL EVERY 6 HOURS PRN
Qty: 20 TABLET | Refills: 0 | Status: SHIPPED | OUTPATIENT
Start: 2024-03-13 | End: 2024-03-18

## 2024-03-18 DIAGNOSIS — M48.062 NEUROGENIC CLAUDICATION DUE TO LUMBAR SPINAL STENOSIS: ICD-10-CM

## 2024-03-18 DIAGNOSIS — G89.18 POST-OP PAIN: ICD-10-CM

## 2024-03-18 RX ORDER — OXYCODONE HYDROCHLORIDE 5 MG/1
5 TABLET ORAL EVERY 8 HOURS PRN
Qty: 90 TABLET | Refills: 0 | Status: SHIPPED | OUTPATIENT
Start: 2024-03-18 | End: 2024-04-16 | Stop reason: SDUPTHER

## 2024-03-18 NOTE — TELEPHONE ENCOUNTER
Rx Refill Request     Name: Karl Estevez  :  1970   Medication Name:  oxyCODONE (Roxicodone) 5 mg immediate release tablet - Take 1 tablet (5 mg) by mouth every 8 hours if needed for severe pain.  Specific Pharmacy location:  [facility]  Date of last appointment:  2024   Date of next appointment:  2024   Best number to reach patient:  [phone]

## 2024-03-19 ENCOUNTER — SPECIALTY PHARMACY (OUTPATIENT)
Dept: PHARMACY | Facility: CLINIC | Age: 54
End: 2024-03-19

## 2024-03-20 ENCOUNTER — OFFICE VISIT (OUTPATIENT)
Dept: SURGERY | Facility: CLINIC | Age: 54
End: 2024-03-20
Payer: COMMERCIAL

## 2024-03-20 VITALS
WEIGHT: 207 LBS | DIASTOLIC BLOOD PRESSURE: 84 MMHG | RESPIRATION RATE: 16 BRPM | SYSTOLIC BLOOD PRESSURE: 119 MMHG | HEART RATE: 96 BPM | BODY MASS INDEX: 28.98 KG/M2 | HEIGHT: 71 IN

## 2024-03-20 DIAGNOSIS — K43.2 INCISIONAL HERNIA, WITHOUT OBSTRUCTION OR GANGRENE: Primary | ICD-10-CM

## 2024-03-20 PROCEDURE — 3074F SYST BP LT 130 MM HG: CPT | Performed by: STUDENT IN AN ORGANIZED HEALTH CARE EDUCATION/TRAINING PROGRAM

## 2024-03-20 PROCEDURE — 3008F BODY MASS INDEX DOCD: CPT | Performed by: STUDENT IN AN ORGANIZED HEALTH CARE EDUCATION/TRAINING PROGRAM

## 2024-03-20 PROCEDURE — 3044F HG A1C LEVEL LT 7.0%: CPT | Performed by: STUDENT IN AN ORGANIZED HEALTH CARE EDUCATION/TRAINING PROGRAM

## 2024-03-20 PROCEDURE — 3079F DIAST BP 80-89 MM HG: CPT | Performed by: STUDENT IN AN ORGANIZED HEALTH CARE EDUCATION/TRAINING PROGRAM

## 2024-03-20 PROCEDURE — 1036F TOBACCO NON-USER: CPT | Performed by: STUDENT IN AN ORGANIZED HEALTH CARE EDUCATION/TRAINING PROGRAM

## 2024-03-20 PROCEDURE — 99024 POSTOP FOLLOW-UP VISIT: CPT | Performed by: STUDENT IN AN ORGANIZED HEALTH CARE EDUCATION/TRAINING PROGRAM

## 2024-03-20 ASSESSMENT — PAIN SCALES - GENERAL: PAINLEVEL: 6

## 2024-03-20 NOTE — PROGRESS NOTES
Subjective   Kalr Estevez is a 53 y.o. male who underwent OLT on 6/14/2022 (Liver).     He underwent incisional hernia repair 1-.  Mesh explant 3/11/24  Completed keflex yesterday  RONEY with minimal output.  Swelling improved.   Intra-op cultures NGTD    Review of Systems     Objective    Exam  Gen: AAOx3, NAD  Card: Regular rate and rhythm  Resp: sat well room air, equal chest rise  Abd: incision c/d/I, RONEY in place w/ ss output  Ext: no lower extremity edema  Lab Results   Component Value Date    CREATININE 1.06 03/11/2024    K 4.3 03/11/2024    GLUCOSE 90 03/11/2024    HCT 39.7 (L) 03/11/2024    WBC 5.2 03/11/2024     03/11/2024    CALCIUM 8.8 03/11/2024     Assessment/Plan      54 y/o history of liver transplant.  Now status post incisional hernia repair of right aspect of subcostal incision 1/29/24.  S/p mesh explant 3/11/24    Doing well  Drain removed  Will see in clinic in 3 weeks

## 2024-03-27 DIAGNOSIS — E11.9 TYPE 2 DIABETES MELLITUS WITHOUT COMPLICATION, WITH LONG-TERM CURRENT USE OF INSULIN (MULTI): ICD-10-CM

## 2024-03-27 DIAGNOSIS — Z79.4 TYPE 2 DIABETES MELLITUS WITHOUT COMPLICATION, WITH LONG-TERM CURRENT USE OF INSULIN (MULTI): ICD-10-CM

## 2024-03-27 RX ORDER — TIRZEPATIDE 2.5 MG/.5ML
2.5 INJECTION, SOLUTION SUBCUTANEOUS
Qty: 8 ML | Refills: 0 | Status: SHIPPED | OUTPATIENT
Start: 2024-03-28 | End: 2024-04-16 | Stop reason: SDUPTHER

## 2024-03-28 ENCOUNTER — LAB (OUTPATIENT)
Dept: LAB | Facility: LAB | Age: 54
End: 2024-03-28
Payer: MEDICARE

## 2024-03-28 DIAGNOSIS — E11.42 TYPE 2 DIABETES MELLITUS WITH DIABETIC POLYNEUROPATHY, WITHOUT LONG-TERM CURRENT USE OF INSULIN (MULTI): ICD-10-CM

## 2024-03-28 DIAGNOSIS — N18.30 STAGE 3 CHRONIC KIDNEY DISEASE, UNSPECIFIED WHETHER STAGE 3A OR 3B CKD (MULTI): ICD-10-CM

## 2024-03-28 DIAGNOSIS — D84.9 IMMUNOSUPPRESSION (MULTI): ICD-10-CM

## 2024-03-28 DIAGNOSIS — Z94.4 HISTORY OF LIVER TRANSPLANT (MULTI): ICD-10-CM

## 2024-03-28 LAB
ALBUMIN SERPL BCP-MCNC: 4.3 G/DL (ref 3.4–5)
ALP SERPL-CCNC: 65 U/L (ref 33–120)
ALT SERPL W P-5'-P-CCNC: 7 U/L (ref 10–52)
ANION GAP SERPL CALC-SCNC: 11 MMOL/L (ref 10–20)
AST SERPL W P-5'-P-CCNC: 10 U/L (ref 9–39)
BASOPHILS # BLD AUTO: 0.06 X10*3/UL (ref 0–0.1)
BASOPHILS NFR BLD AUTO: 1.2 %
BILIRUB SERPL-MCNC: 0.6 MG/DL (ref 0–1.2)
BUN SERPL-MCNC: 13 MG/DL (ref 6–23)
CALCIUM SERPL-MCNC: 8.9 MG/DL (ref 8.6–10.3)
CHLORIDE SERPL-SCNC: 99 MMOL/L (ref 98–107)
CO2 SERPL-SCNC: 30 MMOL/L (ref 21–32)
CREAT SERPL-MCNC: 1.11 MG/DL (ref 0.5–1.3)
EGFRCR SERPLBLD CKD-EPI 2021: 80 ML/MIN/1.73M*2
EOSINOPHIL # BLD AUTO: 0.15 X10*3/UL (ref 0–0.7)
EOSINOPHIL NFR BLD AUTO: 3 %
ERYTHROCYTE [DISTWIDTH] IN BLOOD BY AUTOMATED COUNT: 13.2 % (ref 11.5–14.5)
EST. AVERAGE GLUCOSE BLD GHB EST-MCNC: 82 MG/DL
GLUCOSE SERPL-MCNC: 82 MG/DL (ref 74–99)
HBA1C MFR BLD: 4.5 %
HCT VFR BLD AUTO: 43.4 % (ref 41–52)
HGB BLD-MCNC: 14.6 G/DL (ref 13.5–17.5)
IMM GRANULOCYTES # BLD AUTO: 0.01 X10*3/UL (ref 0–0.7)
IMM GRANULOCYTES NFR BLD AUTO: 0.2 % (ref 0–0.9)
LYMPHOCYTES # BLD AUTO: 1.36 X10*3/UL (ref 1.2–4.8)
LYMPHOCYTES NFR BLD AUTO: 27.2 %
MCH RBC QN AUTO: 29 PG (ref 26–34)
MCHC RBC AUTO-ENTMCNC: 33.6 G/DL (ref 32–36)
MCV RBC AUTO: 86 FL (ref 80–100)
MONOCYTES # BLD AUTO: 0.35 X10*3/UL (ref 0.1–1)
MONOCYTES NFR BLD AUTO: 7 %
NEUTROPHILS # BLD AUTO: 3.07 X10*3/UL (ref 1.2–7.7)
NEUTROPHILS NFR BLD AUTO: 61.4 %
NRBC BLD-RTO: 0 /100 WBCS (ref 0–0)
PLATELET # BLD AUTO: 179 X10*3/UL (ref 150–450)
POTASSIUM SERPL-SCNC: 4.2 MMOL/L (ref 3.5–5.3)
PROT SERPL-MCNC: 6.8 G/DL (ref 6.4–8.2)
RBC # BLD AUTO: 5.04 X10*6/UL (ref 4.5–5.9)
SODIUM SERPL-SCNC: 136 MMOL/L (ref 136–145)
TACROLIMUS BLD-MCNC: 5 NG/ML
WBC # BLD AUTO: 5 X10*3/UL (ref 4.4–11.3)

## 2024-03-28 PROCEDURE — 80053 COMPREHEN METABOLIC PANEL: CPT

## 2024-03-28 PROCEDURE — 80197 ASSAY OF TACROLIMUS: CPT

## 2024-03-28 PROCEDURE — 85025 COMPLETE CBC W/AUTO DIFF WBC: CPT

## 2024-03-28 PROCEDURE — 83036 HEMOGLOBIN GLYCOSYLATED A1C: CPT

## 2024-03-28 PROCEDURE — 36415 COLL VENOUS BLD VENIPUNCTURE: CPT

## 2024-03-29 ENCOUNTER — PHARMACY VISIT (OUTPATIENT)
Dept: PHARMACY | Facility: CLINIC | Age: 54
End: 2024-03-29
Payer: MEDICARE

## 2024-03-29 PROCEDURE — RXMED WILLOW AMBULATORY MEDICATION CHARGE

## 2024-03-30 DIAGNOSIS — I73.00 RAYNAUD'S PHENOMENON WITHOUT GANGRENE: ICD-10-CM

## 2024-04-01 LAB
FUNGUS SPEC CULT: NORMAL
FUNGUS SPEC FUNGUS STN: NORMAL

## 2024-04-01 RX ORDER — AMLODIPINE BESYLATE 5 MG/1
5 TABLET ORAL DAILY
Qty: 90 TABLET | Refills: 3 | Status: SHIPPED | OUTPATIENT
Start: 2024-04-01

## 2024-04-02 LAB
LABORATORY COMMENT REPORT: NORMAL
PATH REPORT.FINAL DX SPEC: NORMAL
PATH REPORT.GROSS SPEC: NORMAL
PATH REPORT.RELEVANT HX SPEC: NORMAL
PATH REPORT.TOTAL CANCER: NORMAL

## 2024-04-10 ENCOUNTER — OFFICE VISIT (OUTPATIENT)
Dept: SURGERY | Facility: CLINIC | Age: 54
End: 2024-04-10
Payer: MEDICARE

## 2024-04-10 VITALS
RESPIRATION RATE: 16 BRPM | HEIGHT: 71 IN | HEART RATE: 78 BPM | DIASTOLIC BLOOD PRESSURE: 74 MMHG | SYSTOLIC BLOOD PRESSURE: 114 MMHG | WEIGHT: 209 LBS | BODY MASS INDEX: 29.26 KG/M2

## 2024-04-10 DIAGNOSIS — K43.2 INCISIONAL HERNIA, WITHOUT OBSTRUCTION OR GANGRENE: Primary | ICD-10-CM

## 2024-04-10 PROCEDURE — 99213 OFFICE O/P EST LOW 20 MIN: CPT | Performed by: STUDENT IN AN ORGANIZED HEALTH CARE EDUCATION/TRAINING PROGRAM

## 2024-04-10 PROCEDURE — 1036F TOBACCO NON-USER: CPT | Performed by: STUDENT IN AN ORGANIZED HEALTH CARE EDUCATION/TRAINING PROGRAM

## 2024-04-10 PROCEDURE — 3044F HG A1C LEVEL LT 7.0%: CPT | Performed by: STUDENT IN AN ORGANIZED HEALTH CARE EDUCATION/TRAINING PROGRAM

## 2024-04-10 PROCEDURE — 3008F BODY MASS INDEX DOCD: CPT | Performed by: STUDENT IN AN ORGANIZED HEALTH CARE EDUCATION/TRAINING PROGRAM

## 2024-04-10 PROCEDURE — 3074F SYST BP LT 130 MM HG: CPT | Performed by: STUDENT IN AN ORGANIZED HEALTH CARE EDUCATION/TRAINING PROGRAM

## 2024-04-10 PROCEDURE — 3078F DIAST BP <80 MM HG: CPT | Performed by: STUDENT IN AN ORGANIZED HEALTH CARE EDUCATION/TRAINING PROGRAM

## 2024-04-10 ASSESSMENT — PAIN SCALES - GENERAL: PAINLEVEL: 0-NO PAIN

## 2024-04-10 NOTE — PROGRESS NOTES
Subjective   Karl Estevez is a 53 y.o. male who underwent OLT on 6/14/2022 (Liver).     He underwent incisional hernia repair 1-.  Mesh explant 3/11/24    Doing well. Pain controlled. No swelling or complaints.     Review of Systems     Objective    Exam  Gen: AAOx3, NAD  Card: Regular rate and rhythm  Resp: sat well room air, equal chest rise  Abd: incision c/d/I, RONEY in place w/ ss output  Ext: no lower extremity edema  Lab Results   Component Value Date    CREATININE 1.11 03/28/2024    K 4.2 03/28/2024    GLUCOSE 82 03/28/2024    HCT 43.4 03/28/2024    WBC 5.0 03/28/2024     03/28/2024    CALCIUM 8.9 03/28/2024     Assessment/Plan      52 y/o history of liver transplant.  Now status post incisional hernia repair of right aspect of subcostal incision 1/29/24.  S/p mesh explant 3/11/24    Doing well  Can slowly resume his normal activity  See me as needed

## 2024-04-11 ENCOUNTER — SPECIALTY PHARMACY (OUTPATIENT)
Dept: PHARMACY | Facility: CLINIC | Age: 54
End: 2024-04-11

## 2024-04-11 DIAGNOSIS — D63.1 ANEMIA IN ESRD (END-STAGE RENAL DISEASE) (MULTI): Primary | ICD-10-CM

## 2024-04-11 DIAGNOSIS — N18.6 ANEMIA IN ESRD (END-STAGE RENAL DISEASE) (MULTI): Primary | ICD-10-CM

## 2024-04-11 RX ORDER — FERROUS SULFATE 325(65) MG
1 TABLET ORAL 3 TIMES DAILY
Qty: 270 TABLET | Refills: 3 | Status: SHIPPED | OUTPATIENT
Start: 2024-04-11 | End: 2025-04-11

## 2024-04-16 ENCOUNTER — OFFICE VISIT (OUTPATIENT)
Dept: ENDOCRINOLOGY | Facility: CLINIC | Age: 54
End: 2024-04-16
Payer: MEDICARE

## 2024-04-16 VITALS
WEIGHT: 205 LBS | BODY MASS INDEX: 29.35 KG/M2 | HEART RATE: 84 BPM | SYSTOLIC BLOOD PRESSURE: 121 MMHG | HEIGHT: 70 IN | DIASTOLIC BLOOD PRESSURE: 85 MMHG | OXYGEN SATURATION: 98 %

## 2024-04-16 DIAGNOSIS — Z79.4 TYPE 2 DIABETES MELLITUS WITHOUT COMPLICATION, WITH LONG-TERM CURRENT USE OF INSULIN (MULTI): Primary | ICD-10-CM

## 2024-04-16 DIAGNOSIS — M48.062 NEUROGENIC CLAUDICATION DUE TO LUMBAR SPINAL STENOSIS: ICD-10-CM

## 2024-04-16 DIAGNOSIS — E11.9 TYPE 2 DIABETES MELLITUS WITHOUT COMPLICATION, WITH LONG-TERM CURRENT USE OF INSULIN (MULTI): Primary | ICD-10-CM

## 2024-04-16 PROCEDURE — 99214 OFFICE O/P EST MOD 30 MIN: CPT | Performed by: PHYSICIAN ASSISTANT

## 2024-04-16 PROCEDURE — 3008F BODY MASS INDEX DOCD: CPT | Performed by: PHYSICIAN ASSISTANT

## 2024-04-16 PROCEDURE — 3079F DIAST BP 80-89 MM HG: CPT | Performed by: PHYSICIAN ASSISTANT

## 2024-04-16 PROCEDURE — 3074F SYST BP LT 130 MM HG: CPT | Performed by: PHYSICIAN ASSISTANT

## 2024-04-16 PROCEDURE — 95251 CONT GLUC MNTR ANALYSIS I&R: CPT | Performed by: PHYSICIAN ASSISTANT

## 2024-04-16 PROCEDURE — 3044F HG A1C LEVEL LT 7.0%: CPT | Performed by: PHYSICIAN ASSISTANT

## 2024-04-16 RX ORDER — INSULIN ASPART 100 [IU]/ML
INJECTION, SOLUTION INTRAVENOUS; SUBCUTANEOUS
Qty: 10 ML | Refills: 2 | Status: SHIPPED | OUTPATIENT
Start: 2024-04-16

## 2024-04-16 RX ORDER — METFORMIN HYDROCHLORIDE 500 MG/1
1000 TABLET, EXTENDED RELEASE ORAL
Qty: 360 TABLET | Refills: 3 | Status: SHIPPED | OUTPATIENT
Start: 2024-04-16 | End: 2025-04-16

## 2024-04-16 RX ORDER — PEN NEEDLE, DIABETIC 30 GX3/16"
NEEDLE, DISPOSABLE MISCELLANEOUS
Qty: 400 EACH | Refills: 1 | Status: SHIPPED | OUTPATIENT
Start: 2024-04-16

## 2024-04-16 RX ORDER — ONDANSETRON 4 MG/1
TABLET, FILM COATED ORAL
Qty: 90 TABLET | Refills: 0 | Status: SHIPPED | OUTPATIENT
Start: 2024-04-16

## 2024-04-16 RX ORDER — TIRZEPATIDE 2.5 MG/.5ML
2.5 INJECTION, SOLUTION SUBCUTANEOUS
Qty: 4 ML | Refills: 3 | Status: SHIPPED | OUTPATIENT
Start: 2024-04-16 | End: 2024-04-28 | Stop reason: ALTCHOICE

## 2024-04-16 RX ORDER — TIRZEPATIDE 2.5 MG/.5ML
2.5 INJECTION, SOLUTION SUBCUTANEOUS
Qty: 8 ML | Refills: 3 | Status: SHIPPED | OUTPATIENT
Start: 2024-04-16 | End: 2024-04-16

## 2024-04-16 RX ORDER — OXYCODONE HYDROCHLORIDE 5 MG/1
5 TABLET ORAL EVERY 8 HOURS PRN
Qty: 90 TABLET | Refills: 0 | Status: SHIPPED | OUTPATIENT
Start: 2024-04-16 | End: 2024-05-15 | Stop reason: SDUPTHER

## 2024-04-16 ASSESSMENT — PAIN SCALES - GENERAL: PAINLEVEL: 0-NO PAIN

## 2024-04-16 NOTE — PROGRESS NOTES
"Subjective   MELANIE Estevez is a 53 y.o. male who presents for follow-up of Type 2 diabetes mellitus. The initial diagnosis of diabetes was made  several years ago, prior to 2018 . The patient does have a known family history of diabetes.    Known complications due to diabetes included peripheral neuropathy, cardiovascular disease, and obesity    Cardiovascular risk factors include diabetes mellitus, hypertension, and male gender. The patient is not on an ACE inhibitor or angiotensin II receptor blocker.  The patient has not been previously hospitalized due to diabetic ketoacidosis.     Current symptoms/problems include none. His clinical course has improved.     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     Current diabetes regimen is as follows: Mounjaro 10 mg (also approved to be taken as 2.5mg 4x/weekly due to 10mg dose shortage), Farxiga 10 mg, metformin 1000 mg twice daily.     Pt sometimes gets nauseated with mounjaro and requests zofran refill.     The patient is currently checking the blood glucose 5+ times per day.    Patient is using: continuous glucose monitor -Dexcom G7        Hypoglycemia frequency: 0%  Hypoglycemia awareness: Yes     Exercise: intermittently   Meal panning: He is using avoidance of concentrated sweets.    Review of Systems   All other systems reviewed and are negative.      Objective   /85 (BP Location: Left arm, Patient Position: Sitting)   Pulse 84   Ht 1.778 m (5' 10\")   Wt 93 kg (205 lb)   SpO2 98%   BMI 29.41 kg/m²   Physical Exam  Constitutional:       Appearance: Normal appearance. He is normal weight.   HENT:      Head: Normocephalic and atraumatic.      Nose: Nose normal.      Mouth/Throat:      Mouth: Mucous membranes are dry.      Pharynx: Oropharynx is clear.   Eyes:      Extraocular Movements: Extraocular movements intact.      Conjunctiva/sclera: Conjunctivae normal. " "  Cardiovascular:      Rate and Rhythm: Normal rate.      Pulses: Normal pulses.   Pulmonary:      Effort: Pulmonary effort is normal.   Abdominal:      General: Abdomen is flat.      Palpations: Abdomen is soft.   Skin:     General: Skin is warm and dry.   Neurological:      General: No focal deficit present.      Mental Status: He is alert and oriented to person, place, and time. Mental status is at baseline.   Psychiatric:         Mood and Affect: Mood normal.         Behavior: Behavior normal.         Thought Content: Thought content normal.         Judgment: Judgment normal.         Lab Review  Glucose (mg/dL)   Date Value   03/28/2024 82   03/11/2024 90   02/27/2024 79     Hemoglobin A1C (%)   Date Value   03/28/2024 4.5   02/27/2024 4.5   01/23/2024 5.0     Bicarbonate (mmol/L)   Date Value   03/28/2024 30   03/11/2024 29   02/27/2024 31     Urea Nitrogen (mg/dL)   Date Value   03/28/2024 13   03/11/2024 13   02/27/2024 14     Creatinine (mg/dL)   Date Value   03/28/2024 1.11   03/11/2024 1.06   02/27/2024 1.13       Health Maintenance:   Foot Exam: Due for update  Eye Exam: Up-to-date with no abnormal findings as of June 1, 2023  Lipid Panel: Up-to-date and near goal of LDL less than 70 as of October 2023  Urine Albumin: Up-to-date and at goal of less than 30 as of April 2023    Assessment/Plan   Diagnoses and all orders for this visit:  Type 2 diabetes mellitus without complication, with long-term current use of insulin (Multi)  -     Referral to Endocrinology; Future  -     metFORMIN  mg 24 hr tablet; Take 2 tablets (1,000 mg) by mouth 2 times a day with meals. Take 1 tablet by mouth twice daily with meals  -     insulin aspart (NovoLOG) 100 unit/mL (3 mL) pen; Inject as per sliding scale for sugar >150mg/dL, expect up to 10 units daily  -     pen needle, diabetic 31 gauge x 5/16\" needle; Injects 4x daily  -     ondansetron (Zofran) 4 mg tablet; Take 1-2 tablets up to 3 times daily as needed for " nausea  -     tirzepatide (Mounjaro) 2.5 mg/0.5 mL pen injector; Inject 2.5 mg under the skin 4 times a week.    Type 2 diabetes mellitus, is at goal. A1C at target     RX changes: none   Education:  interpretation of lab results, blood sugar goals, and self-monitoring of blood glucose skills  Follow up: I recommend diabetes care be transfer care to new  endocrinologist

## 2024-04-16 NOTE — PATIENT INSTRUCTIONS
"Type 2 diabetes mellitus without complication, with long-term current use of insulin (Multi)  -     Referral to Endocrinology; Future  -     tirzepatide (Mounjaro) 2.5 mg/0.5 mL pen injector; Inject 2.5 mg under the skin 4 times a week.  -     metFORMIN  mg 24 hr tablet; Take 2 tablets (1,000 mg) by mouth 2 times a day with meals. Take 1 tablet by mouth twice daily with meals  -     insulin aspart (NovoLOG) 100 unit/mL (3 mL) pen; Inject as per sliding scale for sugar >150mg/dL, expect up to 10 units daily  -     pen needle, diabetic 31 gauge x 5/16\" needle; Injects 4x daily    Type 2 diabetes mellitus, is at goal. A1C at target     RX changes: none   Education:  interpretation of lab results, blood sugar goals, and self-monitoring of blood glucose skills  Follow up: I recommend diabetes care be transfer care to new  endocrinologist  "

## 2024-04-18 ENCOUNTER — PHARMACY VISIT (OUTPATIENT)
Dept: PHARMACY | Facility: CLINIC | Age: 54
End: 2024-04-18
Payer: MEDICARE

## 2024-04-18 ENCOUNTER — SPECIALTY PHARMACY (OUTPATIENT)
Dept: PHARMACY | Facility: CLINIC | Age: 54
End: 2024-04-18

## 2024-04-18 PROCEDURE — RXMED WILLOW AMBULATORY MEDICATION CHARGE

## 2024-04-19 NOTE — OP NOTE
"    Post Operative Note:     PreOp Diagnosis: Ventral Hernia   Post-Procedure Diagnosis: same   Procedure: 1. Open repair of ventral hernia with  mesh underlay 2\"X 2\" (symptomatic, non obstructed, reducible)  2. open core needle liver biopsy   Surgeon: Dr. Bradley   Resident/Fellow/Other Assistant: Barbara Granados, PGY  III rotator on transplant surgery   I.V. Fluids: refer to operative note   Estimated Blood Loss (mL): 10   Blood Replacement: none   Specimen: yes   Complications: none   Findings: mildine fascial defect with omentum adhered  to  hernia sac   Patient Returned To/Condition: PACU   Urine Output: not measured   Drains and/or Catheters: none     Operative Report Dictated:  Dictation: not applicable - note contains Operative  Report   Operative Report:    The patient was identified, taken to the operating room and placed supine on the operating table. A huddle and time out was performed and agreed upon by all present.  General endotracheal anesthesia was administered with no complications per the anesthesia report. Sequential compression devices were placed and functioning, preoperative antibiotics were administered and the patient was prepped and draped in the usual  sterile fashion.     An incision was made with #10 blade in the upper midline overlying the hernia. This was carried down through the skin using electrocautery. Immediately, omentum was encountered. This was cleared from the skin edges using electrocautery. The hernia sac  was identified and dissected free from the abdominal wall. All attachments were released and the omentum was able to be placed back into the abdominal cavity. All bleeding was controlled with cautery and 2-0 silk ties as needed. the omentum was able to  be tucked behind the liver and at this time a core needle liver biopsy was obtained from the left lobe edge of the liver and hemostasis obtained with argon. At this time, the fascia was mobilized from subcutaneous tissue and " "was able to be brought together  without tension after lateral relaxing incisions were made in the anterior rectus fascia on the left and right sides of the hernia defect. At this time, 2\" X 2\" piece  mesh was used to underlie an estimated 6x6cm hernia defect. the mesh was attached to  the fascia using several 0 prolene sutures. The fascia was then closed horizontally with 0 vicryl sutures over the mesh underlay. All bleeding points were cauterized. the wound was irrigated with sterile saline. The subcutaneous tissue was brought together  with 3-0 vicryl and skin was closed with staples. a sterile dressing was placed.    The left lobe of the liver was not bleeding at the biopsy site prior to closure.  the left lobe of the liver appeared normal as well.    All sponge, needle and instrument counts were correct x2. The patient tolerated the procedure well and transferred to PACU in stable condition. Dr. Bradley was present and scrubbed for the entire procedure.    Attestation:   Note Completion:  I am a: Resident/Fellow   Attending Attestation I was present for the entire procedure          Electronic Signatures:  Barbara Granados (Resident))  (Signed 12-Dec-2022 14:30)   Authored: Post Operative Note, Note Completion  Jet Bradley)  (Signed 12-Dec-2022 15:53)   Authored: Post Operative Note, Note Completion   Co-Signer: Post Operative Note, Note Completion      Last Updated: 12-Dec-2022 15:53 by Jet Bradley)   "

## 2024-04-23 ENCOUNTER — OFFICE VISIT (OUTPATIENT)
Dept: PRIMARY CARE | Facility: CLINIC | Age: 54
End: 2024-04-23
Payer: MEDICARE

## 2024-04-23 VITALS
OXYGEN SATURATION: 98 % | HEIGHT: 70 IN | WEIGHT: 205 LBS | BODY MASS INDEX: 29.35 KG/M2 | TEMPERATURE: 97.5 F | DIASTOLIC BLOOD PRESSURE: 70 MMHG | SYSTOLIC BLOOD PRESSURE: 118 MMHG | HEART RATE: 96 BPM | RESPIRATION RATE: 16 BRPM

## 2024-04-23 DIAGNOSIS — Z94.4 LIVER REPLACED BY TRANSPLANT (MULTI): ICD-10-CM

## 2024-04-23 DIAGNOSIS — M48.062 NEUROGENIC CLAUDICATION DUE TO LUMBAR SPINAL STENOSIS: Primary | ICD-10-CM

## 2024-04-23 DIAGNOSIS — I73.00 RAYNAUD'S PHENOMENON WITHOUT GANGRENE: ICD-10-CM

## 2024-04-23 DIAGNOSIS — J42 CHRONIC BRONCHITIS, UNSPECIFIED CHRONIC BRONCHITIS TYPE (MULTI): ICD-10-CM

## 2024-04-23 DIAGNOSIS — Z79.4 TYPE 2 DIABETES MELLITUS WITH DIABETIC POLYNEUROPATHY, WITH LONG-TERM CURRENT USE OF INSULIN (MULTI): ICD-10-CM

## 2024-04-23 DIAGNOSIS — Z79.899 MEDICATION MANAGEMENT: ICD-10-CM

## 2024-04-23 DIAGNOSIS — E11.40 DIABETIC NEUROPATHY, PAINFUL (MULTI): ICD-10-CM

## 2024-04-23 DIAGNOSIS — I10 BENIGN ESSENTIAL HTN: ICD-10-CM

## 2024-04-23 DIAGNOSIS — R25.2 MUSCLE CRAMPING: ICD-10-CM

## 2024-04-23 DIAGNOSIS — E11.42 TYPE 2 DIABETES MELLITUS WITH DIABETIC POLYNEUROPATHY, WITH LONG-TERM CURRENT USE OF INSULIN (MULTI): ICD-10-CM

## 2024-04-23 PROCEDURE — 80368 SEDATIVE HYPNOTICS: CPT

## 2024-04-23 PROCEDURE — 80346 BENZODIAZEPINES1-12: CPT

## 2024-04-23 PROCEDURE — 80365 DRUG SCREENING OXYCODONE: CPT

## 2024-04-23 PROCEDURE — 80361 OPIATES 1 OR MORE: CPT

## 2024-04-23 PROCEDURE — 80358 DRUG SCREENING METHADONE: CPT

## 2024-04-23 PROCEDURE — 3060F POS MICROALBUMINURIA REV: CPT | Performed by: FAMILY MEDICINE

## 2024-04-23 PROCEDURE — 99214 OFFICE O/P EST MOD 30 MIN: CPT | Performed by: FAMILY MEDICINE

## 2024-04-23 PROCEDURE — 80373 DRUG SCREENING TRAMADOL: CPT

## 2024-04-23 PROCEDURE — 3078F DIAST BP <80 MM HG: CPT | Performed by: FAMILY MEDICINE

## 2024-04-23 PROCEDURE — 82570 ASSAY OF URINE CREATININE: CPT

## 2024-04-23 PROCEDURE — 80307 DRUG TEST PRSMV CHEM ANLYZR: CPT

## 2024-04-23 PROCEDURE — 3044F HG A1C LEVEL LT 7.0%: CPT | Performed by: FAMILY MEDICINE

## 2024-04-23 PROCEDURE — 3008F BODY MASS INDEX DOCD: CPT | Performed by: FAMILY MEDICINE

## 2024-04-23 PROCEDURE — 80354 DRUG SCREENING FENTANYL: CPT

## 2024-04-23 PROCEDURE — 3074F SYST BP LT 130 MM HG: CPT | Performed by: FAMILY MEDICINE

## 2024-04-23 RX ORDER — PANTOPRAZOLE SODIUM 40 MG/1
40 TABLET, DELAYED RELEASE ORAL DAILY
Qty: 90 TABLET | Refills: 3 | Status: SHIPPED | OUTPATIENT
Start: 2024-04-23 | End: 2025-04-23

## 2024-04-23 RX ORDER — TIZANIDINE 4 MG/1
4 TABLET ORAL 3 TIMES DAILY
Qty: 90 TABLET | Refills: 1 | Status: SHIPPED | OUTPATIENT
Start: 2024-04-23

## 2024-04-23 NOTE — PROGRESS NOTES
Subjective   Patient ID: Karl Estevez is a 53 y.o. male who presents for Diabetic Neuropathy (3 month follow up ).  HPI  53-year-old gentleman with a history of chronic cirrhosis done very nicely status post liver transplant.  Is followed by transplant team orthopedics and endocrinology for his type 2 diabetes on insulin as well as Mounjaro and metformin  We did give him 3 times a day oxycodone because of painful back which is nonoperative at this time and also painful diabetic neuropathy.  Ortho performed.  Safety is reviewed  Template for opioids completed  Med agreement and urine tox screen performed today.  Lucio remains on his antihypertensive medicines as well is and also his cardiac in diabetic medicines.  Lucio remains active without exertional or resting chest pain short of palpitations.  He is done well with his hernia repair.  Neurology and pain clinic Endorsed the pain medicine since he is not agreeable to surgical intervention at this time  Can try and observe a low-salt low carbohydrate low-fat diet well  OARRS:  No data recorded 4-  I have personally reviewed the OARRS report for Karl Estevez. I have considered the risks of abuse, dependence, addiction and diversion    Is the patient prescribed a combination of a benzodiazepine and opioid?  No    Last Urine Drug Screen / ordered today: Yes  Recent Results (from the past 8760 hour(s))   Screen Opiate/Opioid/Benzo Prescription Compliance    Collection Time: 04/23/24  9:14 AM   Result Value Ref Range    Creatinine, Urine Random 134.6 20.0 - 370.0 mg/dL    Amphetamine Screen, Urine Presumptive Negative Presumptive Negative    Barbiturate Screen, Urine Presumptive Negative Presumptive Negative    Cannabinoid Screen, Urine Presumptive Negative Presumptive Negative    Cocaine Metabolite Screen, Urine Presumptive Negative Presumptive Negative    PCP Screen, Urine Presumptive Negative Presumptive Negative     Results are as expected.          Controlled Substance Agreement:  Date of the Last Agreement: 4-  Reviewed Controlled Substance Agreement including but not limited to the benefits, risks, and alternatives to treatment with a Controlled Substance medication(s).    Opioids:  What is the patient's goal of therapy?  Ehly quality of life control of back pain secondary to lumbar canal stenosis and also peripheral painful diabetic neuropathy.  Benefit still outweighs the risk and no evidence abuse side effects or divergence  Is this being achieved with current treatment? yes    I have calculated the patient's Morphine Dose Equivalent (MED):   I have considered referral to Pain Management and/or a specialist, and do not feel it is necessary at this time.    I feel that it is clinically indicated to continue this current medication regimen after consideration of alternative therapies, and other non-opioid treatment.    Opioid Risk Screening:  No data recorded    Pain Assessment:  No data recorded  Review of Systems   Constitutional:  Negative for fatigue, fever and unexpected weight change.   Eyes:  Negative for visual disturbance.   Respiratory:  Negative for cough, chest tightness and shortness of breath.    Cardiovascular:  Negative for chest pain, palpitations and leg swelling.   Gastrointestinal:  Negative for abdominal distention, abdominal pain, blood in stool, nausea and vomiting.   Genitourinary:  Positive for frequency. Negative for dysuria and hematuria.   Musculoskeletal:  Positive for arthralgias, back pain and myalgias. Negative for gait problem.   Skin:  Negative for rash.   Neurological:  Positive for numbness. Negative for weakness, light-headedness and headaches.   Hematological:  Negative for adenopathy.   Psychiatric/Behavioral:  Positive for sleep disturbance. Negative for behavioral problems, confusion and suicidal ideas. The patient is nervous/anxious.        Objective   /70 (BP Location: Right arm, Patient  "Position: Sitting, BP Cuff Size: Large adult)   Pulse 96   Temp 36.4 °C (97.5 °F) (Temporal)   Resp 16   Ht 1.778 m (5' 10\")   Wt 93 kg (205 lb)   SpO2 98%   BMI 29.41 kg/m²   Hemoglobin A1C  Order: [id number]   Status: Final result       Visible to patient: Yes (seen)       Dx: Type 2 diabetes mellitus with diabeti...    0 Result Notes       1  Topic            Component  Ref Range & Units 1 mo ago 2 mo ago 3 mo ago 7 mo ago 9 mo ago 10 mo ago 1 yr ago   Hemoglobin A1C  see below % 4.5 4.5 5.0 <3.5 R, CM 4.1 R, CM 3.5 R, CM 3.7 R, CM   Estimated Average Glucose  Not Established mg/dL 8            ensive Metabolic Panel  Order: [id number]   Status: Final result       Visible to patient: Yes (seen)       Dx: History of liver transplant (Multi); ...    0 Result Notes            Component  Ref Range & Units 1 mo ago  (3/28/24) 1 mo ago  (3/11/24) 2 mo ago  (2/27/24) 3 mo ago  (1/23/24) 4 mo ago  (12/28/23) 5 mo ago  (11/27/23) 6 mo ago  (10/25/23)   Glucose  74 - 99 mg/dL 82 90 79 88 93 80 74   Sodium  136 - 145 mmol/L 136 138 134 Low  135 Low  137 137 136   Potassium  3.5 - 5.3 mmol/L 4.2 4.3 4.1 4.4 3.9 4.5 4.0   Chloride  98 - 107 mmol/L 99 98 98 99 99 99 102   Bicarbonate  21 - 32 mmol/L 30 29 31 29 30 31 27   Anion Gap  10 - 20 mmol/L 11 15 9 Low  11 12 12 11   Urea Nitrogen  6 - 23 mg/dL 13 13 14 14 15 11 14   Creatinine  0.50 - 1.30 mg/dL 1.11 1.06 1.13 1.17 1.07 1.14 1.16   eGFR  >60 mL/min/1.73m*2 80 84 CM 78 CM 75 CM 83 CM 77 CM 76 CM   Comment: Calculations of estimated GFR are performed using the 2021 CKD-EPI Study Refit equation without the race variable for the IDMS-Traceable creatinine methods.  https://jasn.paolojournals.org/content/early/2021/09/22/ASN.4466687152   Calcium  8.6 - 10.3 mg/dL 8.9 8.8 R 8.9 9.0 8.7 8.8 8.5 Low    Albumin  3.4 - 5.0 g/dL 4.3  4.2 4.2 4.1 4.1 4.0   Alkaline Phosphatase  33 - 120 U/L 65  66 76 62 73 74   Total Protein  6.4 - 8.2 g/dL 6.8  6.6 6.9 6.9 6.6 6.5   AST  9 - " 39 U/L 10  8 Low  9 9 7 Low  10   Bilirubin, Total  0.0 - 1.2 mg/dL 0.6  0.6 0.6 0.4 0.4 0.6   ALT  10 - 52 U/L 7 Low               ponent  Ref Range & Units 1 mo ago  (3/28/24) 1 mo ago  (3/11/24) 2 mo ago  (2/27/24) 3 mo ago  (1/23/24) 4 mo ago  (12/28/23) 5 mo ago  (11/27/23) 6 mo ago  (10/25/23)   WBC  4.4 - 11.3 x10*3/uL 5.0 5.2 5.1 5.0 5.8 4.3 Low  5.4   nRBC  0.0 - 0.0 /100 WBCs 0.0 0.0 0.0 0.0 0.0 0.0 0.0   RBC  4.50 - 5.90 x10*6/uL 5.04 4.88 4.95 5.34 5.14 4.95 4.11 Low    Hemoglobin  13.5 - 17.5 g/dL 14.6 14.3 14.3 15.3 14.5 14.2 12.0 Low    Hematocrit  41.0 - 52.0 % 43.4 39.7 Low  42.3 45.1 43.1 43.0 37.7 Low    MCV  80 - 100 fL 86 81 86 85 84 87 92   MCH  26.0 - 34.0 pg 29.0 29.3 28.9 28.7 28.2 28.7 29.2   MCHC  32.0 - 36.0 g/dL 3                 Physical Exam  Vital signs reviewed and normal  General inspection reveals a mildly overweight individual in no acute distress mood is good  Neck neck is supple without mass adenopathy bruits or rigidity  Pulmonary-chest is clear without any wheezing rales or rhonchi.  Cardiovascular RSR without significant murmurs gallop or ectopy  Abdominal no distention and no organomegaly masses or rebound specifically no liver tenderness bowel sounds are normal no CVA tenderness  Peripheral vascular no symmetric or asymmetric edema mild sensory deficit to vibration otherwise motor and vascular tone is normal  Skin-no rash petechiae jaundice  Mood mood is stable with less anxiety depression and no cognitive issues  Neurological no new cranial nerve deficits.  No new deficits the upper and lower extremities  Reviewed earlier labs with excellent A1c through [facility] endocrinology.  Also reviewed normal liver functions through his liver transplant team      Assessment/Plan   Problem List Items Addressed This Visit       Muscle cramping     Other Visit Diagnoses       Liver replaced by transplant (Multi)            For back pain from lumbar canal stenosis and diabetic  peripheral neuropathy, will renew his 5 mg oxycodone 3 times a day.  The urine tox screen as well as med agreement performed  Template for opioids completed-- safety is reviewed  Continue low-salt low carbohydrate low-fat diet  Continue follow-up with orthopedist and cardiology and liver transplant team and endocrinology  Follow-up in 3 months or earlier if interval problems  @discharge  The above diagnosis and treatment plan was discussed with the patient patient will continue appropriate diet and exercise as reviewed  Patient will recheck earlier if any interval problems of significance or clinical worsening of the above problems.  Agrees above surveillance.  All question were addressed regarding above meds

## 2024-04-24 LAB
AMPHETAMINES UR QL SCN: NORMAL
BARBITURATES UR QL SCN: NORMAL
BZE UR QL SCN: NORMAL
CANNABINOIDS UR QL SCN: NORMAL
CREAT UR-MCNC: 134.6 MG/DL (ref 20–370)
PCP UR QL SCN: NORMAL

## 2024-04-28 PROBLEM — K76.6 PORTAL HYPERTENSION (MULTI): Status: RESOLVED | Noted: 2023-03-02 | Resolved: 2024-04-28

## 2024-04-28 PROBLEM — T85.79XA INFECTED HERNIOPLASTY MESH (CMS-HCC): Status: RESOLVED | Noted: 2024-03-06 | Resolved: 2024-04-28

## 2024-04-28 PROBLEM — D50.9 ANEMIA, IRON DEFICIENCY: Status: RESOLVED | Noted: 2023-03-02 | Resolved: 2024-04-28

## 2024-04-28 PROBLEM — Z94.4 LIVER REPLACED BY TRANSPLANT (MULTI): Status: ACTIVE | Noted: 2024-04-28

## 2024-04-28 PROBLEM — E83.51 HYPOCALCEMIA: Status: RESOLVED | Noted: 2023-08-17 | Resolved: 2024-04-28

## 2024-04-28 PROBLEM — E87.6 HYPOKALEMIA: Status: RESOLVED | Noted: 2023-03-02 | Resolved: 2024-04-28

## 2024-04-28 PROBLEM — B27.00 EBV (EPSTEIN-BARR VIRUS) VIREMIA: Status: RESOLVED | Noted: 2023-08-17 | Resolved: 2024-04-28

## 2024-04-28 ASSESSMENT — ENCOUNTER SYMPTOMS
CHEST TIGHTNESS: 0
BLOOD IN STOOL: 0
DYSURIA: 0
SHORTNESS OF BREATH: 0
ARTHRALGIAS: 1
SLEEP DISTURBANCE: 1
FEVER: 0
FREQUENCY: 1
BACK PAIN: 1
PALPITATIONS: 0
FATIGUE: 0
HEMATURIA: 0
ABDOMINAL PAIN: 0
CONFUSION: 0
LIGHT-HEADEDNESS: 0
NAUSEA: 0
WEAKNESS: 0
ADENOPATHY: 0
COUGH: 0
HEADACHES: 0
UNEXPECTED WEIGHT CHANGE: 0
ABDOMINAL DISTENTION: 0
MYALGIAS: 1
NUMBNESS: 1
VOMITING: 0
NERVOUS/ANXIOUS: 1

## 2024-04-29 ENCOUNTER — LAB (OUTPATIENT)
Dept: LAB | Facility: LAB | Age: 54
End: 2024-04-29
Payer: MEDICARE

## 2024-04-29 ENCOUNTER — OFFICE VISIT (OUTPATIENT)
Dept: TRANSPLANT | Facility: HOSPITAL | Age: 54
End: 2024-04-29
Payer: MEDICARE

## 2024-04-29 VITALS
WEIGHT: 202 LBS | DIASTOLIC BLOOD PRESSURE: 74 MMHG | SYSTOLIC BLOOD PRESSURE: 109 MMHG | TEMPERATURE: 97.2 F | OXYGEN SATURATION: 100 % | HEART RATE: 85 BPM | HEIGHT: 70 IN | BODY MASS INDEX: 28.92 KG/M2

## 2024-04-29 DIAGNOSIS — Z94.4 HISTORY OF LIVER TRANSPLANT (MULTI): ICD-10-CM

## 2024-04-29 DIAGNOSIS — D84.9 IMMUNOSUPPRESSION (MULTI): ICD-10-CM

## 2024-04-29 DIAGNOSIS — Z94.4 HISTORY OF LIVER TRANSPLANT (MULTI): Primary | ICD-10-CM

## 2024-04-29 DIAGNOSIS — N18.30 STAGE 3 CHRONIC KIDNEY DISEASE, UNSPECIFIED WHETHER STAGE 3A OR 3B CKD (MULTI): ICD-10-CM

## 2024-04-29 LAB
ALBUMIN SERPL BCP-MCNC: 4.3 G/DL (ref 3.4–5)
ALP SERPL-CCNC: 66 U/L (ref 33–120)
ALT SERPL W P-5'-P-CCNC: 7 U/L (ref 10–52)
ANION GAP SERPL CALC-SCNC: 11 MMOL/L (ref 10–20)
AST SERPL W P-5'-P-CCNC: 10 U/L (ref 9–39)
BASOPHILS # BLD AUTO: 0.04 X10*3/UL (ref 0–0.1)
BASOPHILS NFR BLD AUTO: 0.9 %
BILIRUB SERPL-MCNC: 0.5 MG/DL (ref 0–1.2)
BUN SERPL-MCNC: 12 MG/DL (ref 6–23)
CALCIUM SERPL-MCNC: 8.7 MG/DL (ref 8.6–10.3)
CHLORIDE SERPL-SCNC: 99 MMOL/L (ref 98–107)
CO2 SERPL-SCNC: 30 MMOL/L (ref 21–32)
CREAT SERPL-MCNC: 1.09 MG/DL (ref 0.5–1.3)
EGFRCR SERPLBLD CKD-EPI 2021: 82 ML/MIN/1.73M*2
EOSINOPHIL # BLD AUTO: 0.1 X10*3/UL (ref 0–0.7)
EOSINOPHIL NFR BLD AUTO: 2.3 %
ERYTHROCYTE [DISTWIDTH] IN BLOOD BY AUTOMATED COUNT: 12.5 % (ref 11.5–14.5)
GLUCOSE SERPL-MCNC: 90 MG/DL (ref 74–99)
HCT VFR BLD AUTO: 43 % (ref 41–52)
HGB BLD-MCNC: 14.8 G/DL (ref 13.5–17.5)
IMM GRANULOCYTES # BLD AUTO: 0.01 X10*3/UL (ref 0–0.7)
IMM GRANULOCYTES NFR BLD AUTO: 0.2 % (ref 0–0.9)
LYMPHOCYTES # BLD AUTO: 1.28 X10*3/UL (ref 1.2–4.8)
LYMPHOCYTES NFR BLD AUTO: 29 %
MCH RBC QN AUTO: 28.8 PG (ref 26–34)
MCHC RBC AUTO-ENTMCNC: 34.4 G/DL (ref 32–36)
MCV RBC AUTO: 84 FL (ref 80–100)
MONOCYTES # BLD AUTO: 0.3 X10*3/UL (ref 0.1–1)
MONOCYTES NFR BLD AUTO: 6.8 %
NEUTROPHILS # BLD AUTO: 2.68 X10*3/UL (ref 1.2–7.7)
NEUTROPHILS NFR BLD AUTO: 60.8 %
NRBC BLD-RTO: 0 /100 WBCS (ref 0–0)
PLATELET # BLD AUTO: 183 X10*3/UL (ref 150–450)
POTASSIUM SERPL-SCNC: 4.5 MMOL/L (ref 3.5–5.3)
PROT SERPL-MCNC: 6.7 G/DL (ref 6.4–8.2)
RBC # BLD AUTO: 5.13 X10*6/UL (ref 4.5–5.9)
SODIUM SERPL-SCNC: 135 MMOL/L (ref 136–145)
TACROLIMUS BLD-MCNC: 7.1 NG/ML
WBC # BLD AUTO: 4.4 X10*3/UL (ref 4.4–11.3)

## 2024-04-29 PROCEDURE — 3074F SYST BP LT 130 MM HG: CPT | Performed by: INTERNAL MEDICINE

## 2024-04-29 PROCEDURE — 3078F DIAST BP <80 MM HG: CPT | Performed by: INTERNAL MEDICINE

## 2024-04-29 PROCEDURE — 80197 ASSAY OF TACROLIMUS: CPT

## 2024-04-29 PROCEDURE — 3008F BODY MASS INDEX DOCD: CPT | Performed by: INTERNAL MEDICINE

## 2024-04-29 PROCEDURE — 99214 OFFICE O/P EST MOD 30 MIN: CPT | Performed by: INTERNAL MEDICINE

## 2024-04-29 PROCEDURE — 3060F POS MICROALBUMINURIA REV: CPT | Performed by: INTERNAL MEDICINE

## 2024-04-29 PROCEDURE — 85025 COMPLETE CBC W/AUTO DIFF WBC: CPT

## 2024-04-29 PROCEDURE — 36415 COLL VENOUS BLD VENIPUNCTURE: CPT

## 2024-04-29 PROCEDURE — 80053 COMPREHEN METABOLIC PANEL: CPT

## 2024-04-29 PROCEDURE — 3044F HG A1C LEVEL LT 7.0%: CPT | Performed by: INTERNAL MEDICINE

## 2024-04-29 NOTE — PROGRESS NOTES
Subjective   Patient ID: Karl Estevez is a 53 y.o. male who presents for Liver Post Transplant Follow-up.  HPIdoing well on tacro monotherapy.  No problems.      Review of Systems  Eating well.   Working in strenuous job spreading concrete.     Objective   Physical Exam  Physical Exam  Constitutional:       Appearance: Normal appearance.   Eyes:      General: No scleral icterus.  Cardiovascular:      Rate and Rhythm: Normal rate and regular rhythm.      Heart sounds: No murmur heard.     No gallop.   Pulmonary:      Effort: Pulmonary effort is normal.      Breath sounds: Normal breath sounds.   Abdominal:      General: Abdomen is flat. Bowel sounds are normal. There is no distension.      Palpations: Abdomen is soft. There is no fluid wave, hepatomegaly or splenomegaly.      Tenderness: There is no abdominal tenderness.   Musculoskeletal:      Cervical back: Normal range of motion. No tenderness.      Right lower leg: No edema.      Left lower leg: No edema.   Lymphadenopathy:      Cervical: No cervical adenopathy.   Skin:     Coloration: Skin is not jaundiced.   Neurological:      General: No focal deficit present.      Mental Status: She is alert.       Assessment/Plan     Doing well continue current immune supression.   Hernia repairs successful.   Follow up in 6 months.           Guanako Gonsalez MD 04/29/24 10:36 AM

## 2024-05-01 ENCOUNTER — SPECIALTY PHARMACY (OUTPATIENT)
Dept: PHARMACY | Facility: CLINIC | Age: 54
End: 2024-05-01

## 2024-05-01 LAB
1OH-MIDAZOLAM UR CFM-MCNC: <25 NG/ML
6MAM UR CFM-MCNC: <25 NG/ML
7AMINOCLONAZEPAM UR CFM-MCNC: <25 NG/ML
A-OH ALPRAZ UR CFM-MCNC: <25 NG/ML
ALPRAZ UR CFM-MCNC: <25 NG/ML
CHLORDIAZEP UR CFM-MCNC: <25 NG/ML
CLONAZEPAM UR CFM-MCNC: <25 NG/ML
CODEINE UR CFM-MCNC: <50 NG/ML
DIAZEPAM UR CFM-MCNC: <25 NG/ML
EDDP UR CFM-MCNC: <25 NG/ML
FENTANYL UR CFM-MCNC: <2.5 NG/ML
HYDROCODONE CTO UR CFM-MCNC: <25 NG/ML
HYDROMORPHONE UR CFM-MCNC: <25 NG/ML
LORAZEPAM UR CFM-MCNC: <25 NG/ML
METHADONE UR CFM-MCNC: <25 NG/ML
MIDAZOLAM UR CFM-MCNC: <25 NG/ML
MORPHINE UR CFM-MCNC: <50 NG/ML
NORDIAZEPAM UR CFM-MCNC: <25 NG/ML
NORFENTANYL UR CFM-MCNC: <2.5 NG/ML
NORHYDROCODONE UR CFM-MCNC: <25 NG/ML
NOROXYCODONE UR CFM-MCNC: >1000 NG/ML
NORTRAMADOL UR-MCNC: <50 NG/ML
OXAZEPAM UR CFM-MCNC: <25 NG/ML
OXYCODONE UR CFM-MCNC: 1439 NG/ML
OXYMORPHONE UR CFM-MCNC: 1506 NG/ML
TEMAZEPAM UR CFM-MCNC: <25 NG/ML
TRAMADOL UR CFM-MCNC: <50 NG/ML
ZOLPIDEM UR CFM-MCNC: <25 NG/ML
ZOLPIDEM UR-MCNC: <25 NG/ML

## 2024-05-01 PROCEDURE — RXMED WILLOW AMBULATORY MEDICATION CHARGE

## 2024-05-02 ENCOUNTER — SPECIALTY PHARMACY (OUTPATIENT)
Dept: PHARMACY | Facility: CLINIC | Age: 54
End: 2024-05-02

## 2024-05-02 ENCOUNTER — PHARMACY VISIT (OUTPATIENT)
Dept: PHARMACY | Facility: CLINIC | Age: 54
End: 2024-05-02
Payer: MEDICARE

## 2024-05-02 PROCEDURE — RXMED WILLOW AMBULATORY MEDICATION CHARGE

## 2024-05-15 ENCOUNTER — TELEPHONE (OUTPATIENT)
Dept: PRIMARY CARE | Facility: CLINIC | Age: 54
End: 2024-05-15
Payer: MEDICARE

## 2024-05-15 DIAGNOSIS — M48.062 NEUROGENIC CLAUDICATION DUE TO LUMBAR SPINAL STENOSIS: ICD-10-CM

## 2024-05-15 RX ORDER — OXYCODONE HYDROCHLORIDE 5 MG/1
5 TABLET ORAL EVERY 8 HOURS PRN
Qty: 90 TABLET | Refills: 0 | Status: SHIPPED | OUTPATIENT
Start: 2024-05-15 | End: 2024-06-14

## 2024-05-15 NOTE — TELEPHONE ENCOUNTER
Rx Refill Request     Name: Karl Estevez  :  1970   Medication Name:  oxyCODONE (Roxicodone) 5 mg immediate release tablet - Take 1 tablet  (5 mg) by mouth every 8 hours if needed for severe pain (7- 10).  Specific Pharmacy location:  [facility]  Date of last appointment:  2024   Date of next appointment:  2024   Best number to reach patient:  [phone]

## 2024-05-26 NOTE — ANESTHESIA POSTPROCEDURE EVALUATION
Patient: Karl Estevez    Procedure Summary       Date: 03/11/24 Room / Location: [facility] OR 15 / Virtual [facility] OR    Anesthesia Start: 0713 Anesthesia Stop: 0916    Procedure: Removal Therapeutic Implant Abdominal Cavity (Right: Abdomen) Diagnosis:       Infected hernioplasty mesh, initial encounter (CMS/[facility])      (Infected hernioplasty mesh, initial encounter (CMS/[facility]) [T85.79XA])    Surgeons: Ivette Maya MD Responsible Provider: Anoop Minor MD    Anesthesia Type: general ASA Status: 3            Anesthesia Type: general    Vitals Value Taken Time   /80 03/11/24 0916   Temp 36.1 03/11/24 0916   Pulse 83 03/11/24 0916   Resp 13 03/11/24 0916   SpO2 99 03/11/24 0916       Anesthesia Post Evaluation    Patient location during evaluation: PACU  Patient participation: complete - patient participated  Level of consciousness: sleepy but conscious  Pain management: adequate  Airway patency: patent  Cardiovascular status: acceptable  Respiratory status: acceptable  Hydration status: acceptable  Postoperative Nausea and Vomiting: none  Comments: Patient SV on 8 L/min O2 w/SFM.  VSS.        No notable events documented.     bed rails

## 2024-06-04 ENCOUNTER — SPECIALTY PHARMACY (OUTPATIENT)
Dept: PHARMACY | Facility: CLINIC | Age: 54
End: 2024-06-04

## 2024-06-06 ENCOUNTER — TELEPHONE (OUTPATIENT)
Dept: PRIMARY CARE | Facility: CLINIC | Age: 54
End: 2024-06-06

## 2024-06-06 ENCOUNTER — LAB (OUTPATIENT)
Dept: LAB | Facility: LAB | Age: 54
End: 2024-06-06
Payer: MEDICARE

## 2024-06-06 DIAGNOSIS — Z12.5 SCREENING FOR PROSTATE CANCER: ICD-10-CM

## 2024-06-06 DIAGNOSIS — N18.30 STAGE 3 CHRONIC KIDNEY DISEASE, UNSPECIFIED WHETHER STAGE 3A OR 3B CKD (MULTI): ICD-10-CM

## 2024-06-06 DIAGNOSIS — D84.9 IMMUNOSUPPRESSION (MULTI): ICD-10-CM

## 2024-06-06 DIAGNOSIS — E55.9 VITAMIN D DEFICIENCY: ICD-10-CM

## 2024-06-06 DIAGNOSIS — J01.01 ACUTE RECURRENT MAXILLARY SINUSITIS: Primary | ICD-10-CM

## 2024-06-06 DIAGNOSIS — N18.2 STAGE 2 CHRONIC KIDNEY DISEASE: ICD-10-CM

## 2024-06-06 DIAGNOSIS — E21.3 HYPERPARATHYROIDISM (MULTI): ICD-10-CM

## 2024-06-06 DIAGNOSIS — K21.9 GASTROESOPHAGEAL REFLUX DISEASE, UNSPECIFIED WHETHER ESOPHAGITIS PRESENT: ICD-10-CM

## 2024-06-06 DIAGNOSIS — Z94.4 HISTORY OF LIVER TRANSPLANT (MULTI): ICD-10-CM

## 2024-06-06 LAB
25(OH)D3 SERPL-MCNC: 39 NG/ML (ref 30–100)
ALBUMIN SERPL BCP-MCNC: 3.9 G/DL (ref 3.4–5)
ALP SERPL-CCNC: 52 U/L (ref 33–120)
ALT SERPL W P-5'-P-CCNC: 10 U/L (ref 10–52)
ANION GAP SERPL CALC-SCNC: 12 MMOL/L (ref 10–20)
AST SERPL W P-5'-P-CCNC: 12 U/L (ref 9–39)
BASOPHILS # BLD AUTO: 0.03 X10*3/UL (ref 0–0.1)
BASOPHILS NFR BLD AUTO: 0.7 %
BILIRUB SERPL-MCNC: 1 MG/DL (ref 0–1.2)
BUN SERPL-MCNC: 17 MG/DL (ref 6–23)
CALCIUM SERPL-MCNC: 8.4 MG/DL (ref 8.6–10.3)
CHLORIDE SERPL-SCNC: 102 MMOL/L (ref 98–107)
CO2 SERPL-SCNC: 28 MMOL/L (ref 21–32)
CREAT SERPL-MCNC: 1.32 MG/DL (ref 0.5–1.3)
CREAT UR-MCNC: 124.2 MG/DL (ref 20–370)
EGFRCR SERPLBLD CKD-EPI 2021: 65 ML/MIN/1.73M*2
EOSINOPHIL # BLD AUTO: 0.34 X10*3/UL (ref 0–0.7)
EOSINOPHIL NFR BLD AUTO: 8 %
ERYTHROCYTE [DISTWIDTH] IN BLOOD BY AUTOMATED COUNT: 14.9 % (ref 11.5–14.5)
GLUCOSE SERPL-MCNC: 92 MG/DL (ref 74–99)
HCT VFR BLD AUTO: 38.9 % (ref 41–52)
HGB BLD-MCNC: 12.4 G/DL (ref 13.5–17.5)
IMM GRANULOCYTES # BLD AUTO: 0.01 X10*3/UL (ref 0–0.7)
IMM GRANULOCYTES NFR BLD AUTO: 0.2 % (ref 0–0.9)
LYMPHOCYTES # BLD AUTO: 0.8 X10*3/UL (ref 1.2–4.8)
LYMPHOCYTES NFR BLD AUTO: 18.7 %
MAGNESIUM SERPL-MCNC: 1.49 MG/DL (ref 1.6–2.4)
MCH RBC QN AUTO: 29.3 PG (ref 26–34)
MCHC RBC AUTO-ENTMCNC: 31.9 G/DL (ref 32–36)
MCV RBC AUTO: 92 FL (ref 80–100)
MONOCYTES # BLD AUTO: 0.41 X10*3/UL (ref 0.1–1)
MONOCYTES NFR BLD AUTO: 9.6 %
NEUTROPHILS # BLD AUTO: 2.68 X10*3/UL (ref 1.2–7.7)
NEUTROPHILS NFR BLD AUTO: 62.8 %
NRBC BLD-RTO: 0 /100 WBCS (ref 0–0)
PHOSPHATE SERPL-MCNC: 3.4 MG/DL (ref 2.5–4.9)
PLATELET # BLD AUTO: 120 X10*3/UL (ref 150–450)
POTASSIUM SERPL-SCNC: 4.6 MMOL/L (ref 3.5–5.3)
PROT SERPL-MCNC: 5.9 G/DL (ref 6.4–8.2)
PROT UR-ACNC: 52 MG/DL (ref 5–25)
PROT/CREAT UR: 0.42 MG/MG CREAT (ref 0–0.17)
PTH-INTACT SERPL-MCNC: 32.4 PG/ML (ref 18.5–88)
RBC # BLD AUTO: 4.23 X10*6/UL (ref 4.5–5.9)
SODIUM SERPL-SCNC: 137 MMOL/L (ref 136–145)
TACROLIMUS BLD-MCNC: 5 NG/ML
WBC # BLD AUTO: 4.3 X10*3/UL (ref 4.4–11.3)

## 2024-06-06 PROCEDURE — 84100 ASSAY OF PHOSPHORUS: CPT

## 2024-06-06 PROCEDURE — G0103 PSA SCREENING: HCPCS

## 2024-06-06 PROCEDURE — 80053 COMPREHEN METABOLIC PANEL: CPT

## 2024-06-06 PROCEDURE — 80197 ASSAY OF TACROLIMUS: CPT

## 2024-06-06 PROCEDURE — 83735 ASSAY OF MAGNESIUM: CPT

## 2024-06-06 PROCEDURE — 36415 COLL VENOUS BLD VENIPUNCTURE: CPT

## 2024-06-06 PROCEDURE — 82306 VITAMIN D 25 HYDROXY: CPT

## 2024-06-06 PROCEDURE — 82570 ASSAY OF URINE CREATININE: CPT

## 2024-06-06 PROCEDURE — 83970 ASSAY OF PARATHORMONE: CPT

## 2024-06-06 PROCEDURE — 84154 ASSAY OF PSA FREE: CPT

## 2024-06-06 PROCEDURE — 85025 COMPLETE CBC W/AUTO DIFF WBC: CPT

## 2024-06-06 PROCEDURE — 84156 ASSAY OF PROTEIN URINE: CPT

## 2024-06-06 RX ORDER — AMOXICILLIN 500 MG/1
500 CAPSULE ORAL 3 TIMES DAILY
Qty: 30 CAPSULE | Refills: 0 | Status: SHIPPED | OUTPATIENT
Start: 2024-06-06 | End: 2024-06-16

## 2024-06-06 NOTE — TELEPHONE ENCOUNTER
The patient has been experiencing symptoms of a sinus infection since Monday that has been worsening since.  He has a sore throat, nasal congestion, runny nose and a beginning to a cough with raspiness as well as the start of chest congestion.  He would like something called in to the local pharmacy listed below.     [facility]- [address] [place].    Please advise.  Thank you.

## 2024-06-08 LAB
PSA FREE MFR SERPL: 33 %
PSA FREE SERPL-MCNC: 0.1 NG/ML
PSA SERPL IA-MCNC: 0.3 NG/ML (ref 0–4)

## 2024-06-10 ENCOUNTER — FOLLOW-UP (OUTPATIENT)
Dept: TRANSPLANT | Facility: HOSPITAL | Age: 54
End: 2024-06-10
Payer: MEDICARE

## 2024-06-10 VITALS
WEIGHT: 206 LBS | TEMPERATURE: 97.4 F | OXYGEN SATURATION: 95 % | HEART RATE: 90 BPM | DIASTOLIC BLOOD PRESSURE: 74 MMHG | SYSTOLIC BLOOD PRESSURE: 121 MMHG | BODY MASS INDEX: 29.56 KG/M2

## 2024-06-10 DIAGNOSIS — Z94.4 LIVER REPLACED BY TRANSPLANT (MULTI): ICD-10-CM

## 2024-06-10 DIAGNOSIS — I10 ESSENTIAL HYPERTENSION: ICD-10-CM

## 2024-06-10 DIAGNOSIS — T45.1X5A CALCINEURIN INHIBITOR CAUSING TOXICITY IN THERAPEUTIC USE: ICD-10-CM

## 2024-06-10 DIAGNOSIS — N18.31 STAGE 3A CHRONIC KIDNEY DISEASE (MULTI): Primary | ICD-10-CM

## 2024-06-10 DIAGNOSIS — N18.2 CKD (CHRONIC KIDNEY DISEASE) STAGE 2, GFR 60-89 ML/MIN: ICD-10-CM

## 2024-06-10 DIAGNOSIS — R79.89 ELEVATED SERUM CREATININE: ICD-10-CM

## 2024-06-10 DIAGNOSIS — Z94.0 KIDNEY REPLACED BY TRANSPLANT (HHS-HCC): ICD-10-CM

## 2024-06-10 PROCEDURE — 99214 OFFICE O/P EST MOD 30 MIN: CPT | Performed by: INTERNAL MEDICINE

## 2024-06-10 ASSESSMENT — PAIN SCALES - GENERAL: PAINLEVEL: 0-NO PAIN

## 2024-06-10 NOTE — PROGRESS NOTES
OUTPATIENT NEPHROLOGY CONSULTATION :   CHRONIC KIDNEY DISEASE (CKD) MANAGEMENT        SERVICE DATE: 06/10/2024   SERVICE TIME:  3:41 PM    REASON FOR VISIT/CHIEF COMPLAINT:  CKD MANAGEMENT  BLOOD PRESSURE MANAGEMENT    HPI:    Mr. Estevez is a 53 y.o. male with past medical history significant for End Stage Liver Disease secondary to Alcoholic Cirrhosis who received an orthotopic liver transplant on 6/14/22. Operative course was uncomplicated. Hospital course was uncomplicated. He developed CKD post liver transplant.     The patient was referred to see me today for CKD management.    He just got back from [place] and did lab last week. Cr was elevated to 1.3. We will hydrate and repeat lab.    Patient is doing well overall. No new complaints. Denied chest pain, SOB, WALTON, Palpitation. Normal urination and bowel movement. Normal gait and no weakness of arms/legs. No cough, runny nose, sore throat, cold symptoms, or rash. No hearing loss. Normal vision.No problems with his sleep, mood and function. No recent infection, hospitalization, surgery or ER visits.      ROS:  Review of  14 systems was performed system by system. See HPI. Otherwise, the symptoms were negative.    PAST MEDICAL HISTORY:  Past Medical History:   Diagnosis Date    Abnormal result of other cardiovascular function study 02/24/2022    Abnormal stress test    Acute duodenal ulcer without hemorrhage or perforation 04/08/2020    Duodenal ulcer, acute    Acute maxillary sinusitis, unspecified 03/25/2020    Acute non-recurrent maxillary sinusitis    Acute maxillary sinusitis, unspecified 02/01/2022    Sinusitis, acute maxillary    Alcoholic cirrhosis of liver without ascites (Multi) 07/29/2022    Alcoholic cirrhosis    Candidiasis of skin and nail 01/17/2022    Candidal skin infection    Chronic kidney disease, stage 4 (severe) (Multi) 07/07/2022    Chronic kidney disease, stage 4 (severe)    Encounter for other preprocedural examination  12/08/2022    Encounter for pre-transplant evaluation for liver transplant    Hepatic encephalopathy (Multi) 04/18/2022    Hepatic encephalopathy    Hyperglycemia, unspecified 11/10/2020    Steroid-induced hyperglycemia    Hypo-osmolality and hyponatremia 03/25/2020    Dilutional hyponatremia    Insomnia, unspecified 06/19/2021    Insomnia    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 foot limited to breakdown of skin (Multi) 08/18/2020    Skin ulcer of toe of right foot, limited to breakdown of skin    Other diseases of stomach and duodenum     Reactive gastropathy    Other disturbances of smell and taste 02/25/2020    Metallic taste    Other specified abnormal findings of blood chemistry 11/09/2022    D-dimer, elevated    Other specified abnormal findings of blood chemistry 09/29/2021    Increased ammonia level    Personal history of other diseases of the respiratory system 07/11/2022    History of pleural effusion    Personal history of other drug therapy 02/21/2020    History of hepatitis B vaccination    Personal history of other endocrine, nutritional and metabolic disease 09/29/2022    History of hypokalemia    Personal history of other endocrine, nutritional and metabolic disease 07/28/2022    History of hyperkalemia    Personal history of other infectious and parasitic diseases     History of spontaneous bacterial peritonitis    Personal history of other infectious and parasitic diseases 02/17/2022    History of spontaneous bacterial peritonitis    Personal history of other specified conditions     History of heavy alcohol consumption    Personal history of pneumonia (recurrent) 09/23/2022    History of pneumonia    Personal history of pneumonia (recurrent) 07/13/2022    History of pneumonia    Shortness of breath 11/09/2022    Shortness of breath at rest        PAST SURGICAL HISTORY:  Past Surgical History:   Procedure Laterality Date    CT ABDOMEN PELVIS ANGIOGRAM W AND/OR WO IV CONTRAST  8/10/2020     CT ABDOMEN PELVIS ANGIOGRAM W AND/OR WO IV CONTRAST 8/10/2020 [facility] CL[facility]ICAL LEGACY    CT ANGIO CORONARY ART WITH HEARTFLOW IF SCORE >30%  1/5/2022    CT HEART CORONARY ANGIOGRAM 1/5/2022 [facility]CILLARY LEGACY    OTHER SURGICAL HISTORY  02/17/2020    Complete colonoscopy    OTHER SURGICAL HISTORY  02/17/2020    Cardiac catheterization    OTHER SURGICAL HISTORY  02/17/2020    Biceps tenodesis procedure    OTHER SURGICAL HISTORY  12/30/2022    Hernia repair    OTHER SURGICAL HISTORY  12/30/2022    Liver transplantation    US GUIDED ABDOMINAL PARACENTESIS  11/26/2019    US GUIDED ABDOMINAL PARACENTESIS 11/26/2019 [facility] CLINICAL LEGACY    US GUIDED ABDOMINAL PARACENTESIS  12/17/2019    US GUIDED ABDOMINAL PARACENTESIS 12/17/2019 [facility] CLINICAL LEGACY    US GUIDED ABDOMINAL PARACENTESIS  12/24/2019    US GUIDED ABDOMINAL PARACENTESIS 12/24/2019 [facility]    US GUIDED ABDOMINAL PARACENTESIS  11/13/2019    US GUIDED ABDOMINAL PARACENTESIS 11/13/2019 [facility] CLINICAL LEGACY    US GUIDED ABDOMINAL PARACENTESIS  1/7/2020    US GUIDED ABDOMINAL PARACENTESIS 1/7/2020 [facility] CLINICAL LEGACY    US GUIDED ABDOMINAL PARACENTESIS  3/12/2020    US GUIDED ABDOMINAL PARACENTESIS 3/12/2020 [facility]    US GUIDED ABDOMINAL PARACENTESIS  5/29/2020    US GUIDED ABDOMINAL PARACENTESIS 5/29/2020 [facility]    US GUIDED ABDOMINAL PARACENTESIS  6/12/2020    US GUIDED ABDOMINAL PARACENTESIS 6/12/2020 [facility]    US GUIDED ABDOMINAL PARACENTESIS  6/23/2020    US GUIDED ABDOMINAL PARACENTESIS 6/23/2020 [facility] CLINICAL LEGACY    US GUIDED ABDOMINAL PARACENTESIS  7/9/2020    US GUIDED ABDOMINAL PARACENTESIS 7/9/2020 [facility]    US GUIDED ABDOMINAL PARACENTESIS  10/12/2020    US GUIDED ABDOMINAL PARACENTESIS 10/12/2020 [facility]    US GUIDED ABDOMINAL PARACENTESIS  10/22/2020    US GUIDED ABDOMINAL PARACENTESIS 10/22/2020 [facility]    US GUIDED ABDOMINAL PARACENTESIS  10/30/2020    US GUIDED ABDOMINAL PARACENTESIS  10/30/2020 [facility]    US GUIDED ABDOMINAL PARACENTESIS  11/6/2020    US GUIDED ABDOMINAL PARACENTESIS 11/6/2020 [facility]    US GUIDED ABDOMINAL PARACENTESIS  11/13/2020    US GUIDED ABDOMINAL PARACENTESIS 11/13/2020 [facility]    US GUIDED ABDOMINAL PARACENTESIS  11/20/2020    US GUIDED ABDOMINAL PARACENTESIS 11/20/2020 [facility]    US GUIDED ABDOMINAL PARACENTESIS  11/25/2020    US GUIDED ABDOMINAL PARACENTESIS 11/25/2020 [facility]    US GUIDED ABDOMINAL PARACENTESIS  12/1/2020    US GUIDED ABDOMINAL PARACENTESIS 12/1/2020 [facility]    US GUIDED ABDOMINAL PARACENTESIS  12/7/2020    US GUIDED ABDOMINAL PARACENTESIS 12/7/2020 [facility]    US GUIDED ABDOMINAL PARACENTESIS  12/14/2020    US GUIDED ABDOMINAL PARACENTESIS 12/14/2020 [facility]    US GUIDED ABDOMINAL PARACENTESIS  12/18/2020    US GUIDED ABDOMINAL PARACENTESIS 12/18/2020 [facility]    US GUIDED ABDOMINAL PARACENTESIS  12/23/2020    US GUIDED ABDOMINAL PARACENTESIS 12/23/2020 [facility]    US GUIDED ABDOMINAL PARACENTESIS  12/29/2020    US GUIDED ABDOMINAL PARACENTESIS 12/29/2020 [facility]    US GUIDED ABDOMINAL PARACENTESIS  1/7/2021    US GUIDED ABDOMINAL PARACENTESIS 1/7/2021 [facility]    US GUIDED ABDOMINAL PARACENTESIS  1/12/2021    US GUIDED ABDOMINAL PARACENTESIS 1/12/2021 [facility]    US GUIDED ABDOMINAL PARACENTESIS  1/20/2021    US GUIDED ABDOMINAL PARACENTESIS 1/20/2021 [facility]    US GUIDED ABDOMINAL PARACENTESIS  1/27/2021    US GUIDED ABDOMINAL PARACENTESIS 1/27/2021 [facility]    US GUIDED ABDOMINAL PARACENTESIS  2/3/2021    US GUIDED ABDOMINAL PARACENTESIS 2/3/2021 [facility]    US GUIDED ABDOMINAL PARACENTESIS  3/4/2021    US GUIDED ABDOMINAL PARACENTESIS 3/4/2021 [facility]    US GUIDED ABDOMINAL PARACENTESIS  4/29/2021    US GUIDED ABDOMINAL PARACENTESIS 4/29/2021 [facility]    US GUIDED ABDOMINAL PARACENTESIS  7/6/2021    US GUIDED ABDOMINAL  PARACENTESIS 7/6/2021 [facility]    US GUIDED ABDOMINAL PARACENTESIS  7/23/2021    US GUIDED ABDOMINAL PARACENTESIS 7/23/2021 [facility]    US GUIDED ABDOMINAL PARACENTESIS  8/5/2021    US GUIDED ABDOMINAL PARACENTESIS 8/5/2021 [facility]    US GUIDED ABDOMINAL PARACENTESIS  8/19/2021    US GUIDED ABDOMINAL PARACENTESIS 8/19/2021 [facility]    US GUIDED ABDOMINAL PARACENTESIS  8/30/2021    US GUIDED ABDOMINAL PARACENTESIS 8/30/2021 [facility]    US GUIDED ABDOMINAL PARACENTESIS  9/7/2021    US GUIDED ABDOMINAL PARACENTESIS 9/7/2021 [facility]    US GUIDED ABDOMINAL PARACENTESIS  9/10/2021    US GUIDED ABDOMINAL PARACENTESIS 9/10/2021 [facility]    US GUIDED ABDOMINAL PARACENTESIS  9/13/2021    US GUIDED ABDOMINAL PARACENTESIS 9/13/2021 [facility]    US GUIDED ABDOMINAL PARACENTESIS  9/16/2021    US GUIDED ABDOMINAL PARACENTESIS 9/16/2021 [facility]    US GUIDED ABDOMINAL PARACENTESIS  9/22/2021    US GUIDED ABDOMINAL PARACENTESIS 9/22/2021 [facility]    US GUIDED ABDOMINAL PARACENTESIS  10/11/2021    US GUIDED ABDOMINAL PARACENTESIS 10/11/2021 [facility]    US GUIDED ABDOMINAL PARACENTESIS  10/18/2021    US GUIDED ABDOMINAL PARACENTESIS 10/18/2021 [facility]    US GUIDED ABDOMINAL PARACENTESIS  10/26/2021    US GUIDED ABDOMINAL PARACENTESIS 10/26/2021 [facility]    US GUIDED ABDOMINAL PARACENTESIS  11/3/2021    US GUIDED ABDOMINAL PARACENTESIS 11/3/2021 [facility]    US GUIDED ABDOMINAL PARACENTESIS  11/8/2021    US GUIDED ABDOMINAL PARACENTESIS 11/8/2021 [facility]    US GUIDED ABDOMINAL PARACENTESIS  11/12/2021    US GUIDED ABDOMINAL PARACENTESIS 11/12/2021 [facility]    US GUIDED ABDOMINAL PARACENTESIS  11/16/2021    US GUIDED ABDOMINAL PARACENTESIS 11/16/2021 [facility]    US GUIDED ABDOMINAL PARACENTESIS  11/19/2021    US GUIDED ABDOMINAL  PARACENTESIS 11/19/2021 [facility]    US GUIDED ABDOMINAL PARACENTESIS  11/23/2021    US GUIDED ABDOMINAL PARACENTESIS 11/23/2021 [facility]    US GUIDED ABDOMINAL PARACENTESIS  11/26/2021    US GUIDED ABDOMINAL PARACENTESIS 11/26/2021 [facility]    US GUIDED ABDOMINAL PARACENTESIS  11/30/2021    US GUIDED ABDOMINAL PARACENTESIS 11/30/2021 [facility]    US GUIDED ABDOMINAL PARACENTESIS  12/3/2021    US GUIDED ABDOMINAL PARACENTESIS 12/3/2021 [facility]    US GUIDED ABDOMINAL PARACENTESIS  12/7/2021    US GUIDED ABDOMINAL PARACENTESIS 12/7/2021 [facility]    US GUIDED ABDOMINAL PARACENTESIS  12/10/2021    US GUIDED ABDOMINAL PARACENTESIS 12/10/2021 [facility]    US GUIDED ABDOMINAL PARACENTESIS  12/14/2021    US GUIDED ABDOMINAL PARACENTESIS 12/14/2021 [facility]    US GUIDED ABDOMINAL PARACENTESIS  12/17/2021    US GUIDED ABDOMINAL PARACENTESIS 12/17/2021 [facility]    US GUIDED ABDOMINAL PARACENTESIS  12/22/2021    US GUIDED ABDOMINAL PARACENTESIS 12/22/2021 [facility]    US GUIDED ABDOMINAL PARACENTESIS  12/27/2021    US GUIDED ABDOMINAL PARACENTESIS 12/27/2021 [facility]    US GUIDED ABDOMINAL PARACENTESIS  12/30/2021    US GUIDED ABDOMINAL PARACENTESIS 12/30/2021 [facility]    US GUIDED ABDOMINAL PARACENTESIS  1/3/2022    US GUIDED ABDOMINAL PARACENTESIS 1/3/2022 [facility]    US GUIDED ABDOMINAL PARACENTESIS  1/7/2022    US GUIDED ABDOMINAL PARACENTESIS 1/7/2022 [facility]    US GUIDED ABDOMINAL PARACENTESIS  1/11/2022    US GUIDED ABDOMINAL PARACENTESIS 1/11/2022 [facility]    US GUIDED ABDOMINAL PARACENTESIS  1/14/2022    US GUIDED ABDOMINAL PARACENTESIS 1/14/2022 [facility]    US GUIDED ABDOMINAL PARACENTESIS  1/20/2022    US GUIDED ABDOMINAL PARACENTESIS 1/20/2022 [facility]    US GUIDED ABDOMINAL PARACENTESIS  1/25/2022    US GUIDED  ABDOMINAL PARACENTESIS 1/25/2022 [facility]    US GUIDED ABDOMINAL PARACENTESIS  1/28/2022    US GUIDED ABDOMINAL PARACENTESIS 1/28/2022 [facility]    US GUIDED ABDOMINAL PARACENTESIS  2/1/2022    US GUIDED ABDOMINAL PARACENTESIS 2/1/2022 [facility]    US GUIDED ABDOMINAL PARACENTESIS  2/4/2022    US GUIDED ABDOMINAL PARACENTESIS 2/4/2022 [facility]    US GUIDED ABDOMINAL PARACENTESIS  2/11/2022    US GUIDED ABDOMINAL PARACENTESIS 2/11/2022 [facility]    US GUIDED ABDOMINAL PARACENTESIS  2/18/2022    US GUIDED ABDOMINAL PARACENTESIS 2/18/2022 [facility]    US GUIDED ABDOMINAL PARACENTESIS  2/22/2022    US GUIDED ABDOMINAL PARACENTESIS 2/22/2022 [facility]    US GUIDED ABDOMINAL PARACENTESIS  2/25/2022    US GUIDED ABDOMINAL PARACENTESIS 2/25/2022 [facility]    US GUIDED ABDOMINAL PARACENTESIS  3/1/2022    US GUIDED ABDOMINAL PARACENTESIS 3/1/2022 [facility]    US GUIDED ABDOMINAL PARACENTESIS  3/4/2022    US GUIDED ABDOMINAL PARACENTESIS 3/4/2022 [facility]    US GUIDED ABDOMINAL PARACENTESIS  3/8/2022    US GUIDED ABDOMINAL PARACENTESIS 3/8/2022 [facility]    US GUIDED ABDOMINAL PARACENTESIS  3/11/2022    US GUIDED ABDOMINAL PARACENTESIS 3/11/2022 [facility]    US GUIDED ABDOMINAL PARACENTESIS  3/15/2022    US GUIDED ABDOMINAL PARACENTESIS 3/15/2022 [facility]    US GUIDED ABDOMINAL PARACENTESIS  3/18/2022    US GUIDED ABDOMINAL PARACENTESIS 3/18/2022 [facility]    US GUIDED ABDOMINAL PARACENTESIS  3/22/2022    US GUIDED ABDOMINAL PARACENTESIS 3/22/2022 [facility]    US GUIDED ABDOMINAL PARACENTESIS  3/29/2022    US GUIDED ABDOMINAL PARACENTESIS 3/29/2022 [facility]    US GUIDED ABDOMINAL PARACENTESIS  3/25/2022    US GUIDED ABDOMINAL PARACENTESIS 3/25/2022 [facility]    US GUIDED ABDOMINAL PARACENTESIS  4/1/2022    US GUIDED ABDOMINAL PARACENTESIS 4/1/2022 [facility]    US GUIDED ABDOMINAL PARACENTESIS  4/5/2022    US GUIDED ABDOMINAL PARACENTESIS 4/5/2022 [facility]    US GUIDED ABDOMINAL PARACENTESIS  4/8/2022    US GUIDED ABDOMINAL PARACENTESIS 4/8/2022 [facility]    US GUIDED ABDOMINAL PARACENTESIS  4/12/2022    US GUIDED ABDOMINAL PARACENTESIS 4/12/2022 [facility]    US GUIDED ABDOMINAL PARACENTESIS  4/15/2022    US GUIDED ABDOMINAL PARACENTESIS 4/15/2022 [facility]    US GUIDED ABDOMINAL PARACENTESIS  4/19/2022    US GUIDED ABDOMINAL PARACENTESIS 4/19/2022 [facility]    US GUIDED ABDOMINAL PARACENTESIS  4/25/2022    US GUIDED ABDOMINAL PARACENTESIS 4/25/2022 [facility]    US GUIDED ABDOMINAL PARACENTESIS  4/22/2022    US GUIDED ABDOMINAL PARACENTESIS 4/22/2022 [facility]    US GUIDED ABDOMINAL PARACENTESIS  4/28/2022    US GUIDED ABDOMINAL PARACENTESIS 4/28/2022 [facility]    US GUIDED ABDOMINAL PARACENTESIS  5/3/2022    US GUIDED ABDOMINAL PARACENTESIS 5/3/2022 [facility]    US GUIDED ABDOMINAL PARACENTESIS  5/6/2022    US GUIDED ABDOMINAL PARACENTESIS 5/6/2022 [facility]    US GUIDED ABDOMINAL PARACENTESIS  5/10/2022    US GUIDED ABDOMINAL PARACENTESIS 5/10/2022 [facility]    US GUIDED ABDOMINAL PARACENTESIS  5/13/2022    US GUIDED ABDOMINAL PARACENTESIS 5/13/2022 [facility]    US GUIDED ABDOMINAL PARACENTESIS  5/17/2022    US GUIDED ABDOMINAL PARACENTESIS 5/17/2022 [facility]    US GUIDED ABDOMINAL PARACENTESIS  5/20/2022    US GUIDED ABDOMINAL PARACENTESIS 5/20/2022 [facility]    US GUIDED ABDOMINAL PARACENTESIS  5/27/2022    US GUIDED ABDOMINAL PARACENTESIS 5/27/2022 [facility]    US GUIDED ABDOMINAL PARACENTESIS  5/31/2022    US GUIDED ABDOMINAL PARACENTESIS 5/31/2022 [facility]    US GUIDED ABDOMINAL PARACENTESIS  6/3/2022    US GUIDED ABDOMINAL PARACENTESIS 6/3/2022 [facility]    US GUIDED  ABDOMINAL PARACENTESIS  2022    US GUIDED ABDOMINAL PARACENTESIS 2022 [facility] ANCILLARY LEGAC[facility]    US GUIDED ABDOMINAL PARACENTESIS  6/10/2022    US GUIDED ABDOMINAL PARACENTESIS 6/10/2022 [facility] ANCILLARY LEGACY        SOCIAL HISTORY:  Social History     Socioeconomic History    Marital status:      Spouse name: Not on file    Number of children: Not on file    Years of education: Not on file    Highest education level: Not on file   Occupational History    Not on file   Tobacco Use    Smoking status: Former     Current packs/day: 0.00     Average packs/day: 1 pack/day for 30.0 years (30.0 ttl pk-yrs)     Types: Cigarettes     Start date:      Quit date:      Years since quittin.4    Smokeless tobacco: Never    Tobacco comments:     Quit 2019   Substance and Sexual Activity    Alcohol use: Not Currently     Comment: none since     Drug use: Not Currently    Sexual activity: Not on file   Other Topics Concern    Not on file   Social History Narrative    Not on file     Social Determinants of Health     Financial Resource Strain: Not on file   Food Insecurity: Not on file   Transportation Needs: Not on file   Physical Activity: Not on file   Stress: Not on file   Social Connections: Not on file   Intimate Partner Violence: Not on file   Housing Stability: Not on file       FAMILY HISTORY:  Family History   Problem Relation Name Age of Onset    Diabetes Father      Multiple myeloma Father         MEDICATION LIST:  Current Outpatient Medications   Medication Instructions    acetaminophen (TYLENOL) 325 mg, oral, Every 6 hours PRN    allopurinol (ZYLOPRIM) 100 mg, oral, Daily    amLODIPine (NORVASC) 5 mg, oral, Daily    amoxicillin (AMOXIL) 500 mg, oral, 3 times daily    aspirin 81 mg, oral, Daily    carvedilol (Coreg) 12.5 mg tablet 1 tablet, oral, 2 times daily    cholecalciferol (Vitamin D-3) 50 mcg (2,000 unit) capsule 2 capsules, oral, Daily    clobetasol (Temovate) 0.05 % cream 2 times  "daily, Apply to affected area on both ears, legs and abdomen     cyanocobalamin, vitamin B-12, 1,000 mcg lozenge Take one tablet every morning before breakfast    dapagliflozin propanediol (FARXIGA) 10 mg, oral, Daily    ferrous sulfate, 325 mg ferrous sulfate, tablet 1 tablet, oral, 3 times daily    fexofenadine (ALLEGRA) 180 mg, oral, Daily    gabapentin (Neurontin) 300 mg capsule Take 1 capsule with breakfast then take 2 capsules with lunch and 2 with dinner    glucagon 1 mg injection inject 1 milligram for 1 dose INTO THE THIGH UPPER ARM or BUTTOCK...    glucose 4 gram chewable tablet 1 tablet, oral    insulin aspart (NovoLOG) 100 unit/mL (3 mL) pen Inject as per sliding scale for sugar >150mg/dL, expect up to 10 units daily    metFORMIN XR (GLUCOPHAGE-XR) 1,000 mg, oral, 2 times daily (morning and late afternoon), Take 1 tablet by mouth twice daily with meals    molnupiravir (LAGEVRIO) 800 mg, oral, Every 12 hours    nitroglycerin (Nitrostat) 0.4 mg SL tablet 1 tablet, sublingual, Every 5 min PRN    ondansetron (Zofran) 4 mg tablet Take 1-2 tablets up to 3 times daily as needed for nausea    ondansetron ODT (ZOFRAN-ODT) 4 mg, oral, Every 8 hours PRN    oxyCODONE (ROXICODONE) 5 mg, oral, Every 8 hours PRN    pantoprazole (PROTONIX) 40 mg, oral, Daily    pen needle, diabetic 31 gauge x 5/16\" needle Injects 4x daily    tacrolimus (PROGRAF) 1 mg, oral, 2 times daily    tirzepatide (Mounjaro) 10 mg/0.5 mL pen injector Inject 10 mg (1 syringe) under the skin 1 (one) time per week.    tiZANidine (ZANAFLEX) 4 mg, oral, 3 times daily       ALLERGY  Allergies   Allergen Reactions    Morphine Nausea/vomiting    Nsaids (Non-Steroidal Anti-Inflammatory Drug) Unknown    Simvastatin Nausea/vomiting    Propoxyphene N-Acetaminophen Nausea Only, Nausea And Vomiting and Unknown     And vomiting    Darvocet    Sulfamethoxazole-Trimethoprim Rash and Other     Light sensitivity       PHYSICAL EXAM:    Visit Vitals  /74   Pulse " 90   Temp 36.3 °C (97.4 °F) (Temporal)   Wt 93.4 kg (206 lb)   SpO2 95%   BMI 29.56 kg/m²   Smoking Status Former   BSA 2.15 m²        Weight change:     Vital signs - reviewed. Acceptable BP at this office visit.   General Appearance - NAD, Good speech, oriented and alert  HEENT - Supple. Not pale. No jaundice. No cervical lymphadenopathy. Pharynx and tonsils are not injected.  CVS - RRR. Normal S1/S2. No murmur, click , rub or gallop  Lungs- clear to auscultation bilaterally  Abdomen - soft , not tender, no guarding, no rigidity. No hepatosplenomegaly. Normal bowel sounds. No masses and ascites.   Musculoskeletal /Extremities - no edema. Full ROM. No joint tenderness.   Neuro/Psych - appropriate mood and affect. Motor power V/V all extremities. CN I -XII were grossly intact.  Skin - No visible rash      LABS:    Lab Results   Component Value Date    WBC 4.3 (L) 06/06/2024    HGB 12.4 (L) 06/06/2024    HCT 38.9 (L) 06/06/2024     (L) 06/06/2024    CHOL 129 10/25/2023    TRIG 103 10/25/2023    HDL 24.2 10/25/2023    ALT 10 06/06/2024    AST 12 06/06/2024     06/06/2024    K 4.6 06/06/2024     06/06/2024    CREATININE 1.32 (H) 06/06/2024    BUN 17 06/06/2024    CO2 28 06/06/2024    TSH 0.95 02/13/2023    PSA 0.3 06/06/2024    INR 1.0 03/02/2023    HGBA1C 4.5 03/28/2024     par    ASSESSMENT AND PLAN:    Mr. Estevez is a 53 y.o. male  who is here for follow up on CKD.    1. chronic kidney disease Stage 2 :    - Creatinine had been around:  Lab Results   Component Value Date    CREATININE 1.32 (H) 06/06/2024     Serum creatinine: 1.32 mg/dL (H) 06/06/24 0651  Estimated creatinine clearance: 75.2 mL/min (A)    - Avoid nephrotoxic agents, NSAIDs, IV contrast  - Patient education about CKD from [facility] was given to patient  - Medication list to avoid in CKD patient   was given to patient  - Check UPC ratio again next Tuesday    2. Blood pressure/Hypertension   Blood Pressures         6/10/2024  0052              BP: 121/74          - Goal average BP is <130/80 mmHg  - Per the patient , home BP had been acceptable  - continue current BP medications  - Monitor home BP  - Ask patient to notify me if BP is above the goal  - Low salt diet    3. Electrolyte  Lab Results   Component Value Date    GLUCOSE 92 06/06/2024    CALCIUM 8.4 (L) 06/06/2024     06/06/2024    K 4.6 06/06/2024    CO2 28 06/06/2024     06/06/2024    BUN 17 06/06/2024    CREATININE 1.32 (H) 06/06/2024     - Reviewed from last lab  -Acceptable    4. Bone and mineral disease/osteoporosis  Lab Results   Component Value Date    PTH 32.4 06/06/2024    CALCIUM 8.4 (L) 06/06/2024    CAION 1.13 04/25/2023    PHOS 3.4 06/06/2024    VITD25 39 06/06/2024     -Check 25 -OH Vit D level and PTH with next lab    5. Nutrition  - CKD diet ( Low K, Low phosphorus, Low salt diet)  -[facility]    6. Anemia   Lab Results   Component Value Date    WBC 4.3 (L) 06/06/2024    HGB 12.4 (L) 06/06/2024    HCT 38.9 (L) 06/06/2024    MCV 92 06/06/2024     (L) 06/06/2024     -Check iron studies and ferritin  -consider CATHERINE as needed  -No indication for PRBC transfusion at this time      Hydrate and repeat lab on Tuesday  RTC 6 months      [place]    Transplant Nephrology

## 2024-06-13 ENCOUNTER — SPECIALTY PHARMACY (OUTPATIENT)
Dept: PHARMACY | Facility: CLINIC | Age: 54
End: 2024-06-13

## 2024-06-13 PROCEDURE — RXMED WILLOW AMBULATORY MEDICATION CHARGE

## 2024-06-14 ENCOUNTER — SPECIALTY PHARMACY (OUTPATIENT)
Dept: PHARMACY | Facility: CLINIC | Age: 54
End: 2024-06-14

## 2024-06-14 ENCOUNTER — TELEPHONE (OUTPATIENT)
Dept: PRIMARY CARE | Facility: CLINIC | Age: 54
End: 2024-06-14
Payer: MEDICARE

## 2024-06-14 DIAGNOSIS — M48.062 NEUROGENIC CLAUDICATION DUE TO LUMBAR SPINAL STENOSIS: ICD-10-CM

## 2024-06-14 RX ORDER — OXYCODONE HYDROCHLORIDE 5 MG/1
5 TABLET ORAL EVERY 8 HOURS PRN
Qty: 90 TABLET | Refills: 0 | Status: SHIPPED | OUTPATIENT
Start: 2024-06-14 | End: 2024-07-14

## 2024-06-17 ENCOUNTER — PHARMACY VISIT (OUTPATIENT)
Dept: PHARMACY | Facility: CLINIC | Age: 54
End: 2024-06-17
Payer: MEDICARE

## 2024-06-17 DIAGNOSIS — Z79.4 TYPE 2 DIABETES MELLITUS WITHOUT COMPLICATION, WITH LONG-TERM CURRENT USE OF INSULIN (MULTI): ICD-10-CM

## 2024-06-17 DIAGNOSIS — E11.9 TYPE 2 DIABETES MELLITUS WITHOUT COMPLICATION, WITH LONG-TERM CURRENT USE OF INSULIN (MULTI): ICD-10-CM

## 2024-06-17 RX ORDER — ONDANSETRON 4 MG/1
TABLET, FILM COATED ORAL
Qty: 90 TABLET | Refills: 1 | Status: SHIPPED | OUTPATIENT
Start: 2024-06-17 | End: 2024-06-21 | Stop reason: WASHOUT

## 2024-06-18 ENCOUNTER — LAB (OUTPATIENT)
Dept: LAB | Facility: LAB | Age: 54
End: 2024-06-18
Payer: MEDICARE

## 2024-06-18 DIAGNOSIS — N18.31 STAGE 3A CHRONIC KIDNEY DISEASE (MULTI): ICD-10-CM

## 2024-06-18 LAB
25(OH)D3 SERPL-MCNC: 42 NG/ML (ref 30–100)
ALBUMIN SERPL BCP-MCNC: 4.1 G/DL (ref 3.4–5)
ALP SERPL-CCNC: 62 U/L (ref 33–120)
ALT SERPL W P-5'-P-CCNC: 8 U/L (ref 10–52)
ANION GAP SERPL CALC-SCNC: 12 MMOL/L (ref 10–20)
AST SERPL W P-5'-P-CCNC: 13 U/L (ref 9–39)
BILIRUB SERPL-MCNC: 0.6 MG/DL (ref 0–1.2)
BUN SERPL-MCNC: 13 MG/DL (ref 6–23)
CALCIUM SERPL-MCNC: 8.7 MG/DL (ref 8.6–10.3)
CHLORIDE SERPL-SCNC: 101 MMOL/L (ref 98–107)
CO2 SERPL-SCNC: 27 MMOL/L (ref 21–32)
CREAT SERPL-MCNC: 1.01 MG/DL (ref 0.5–1.3)
CREAT UR-MCNC: 105.9 MG/DL (ref 20–370)
EGFRCR SERPLBLD CKD-EPI 2021: 89 ML/MIN/1.73M*2
GLUCOSE SERPL-MCNC: 101 MG/DL (ref 74–99)
MAGNESIUM SERPL-MCNC: 1.52 MG/DL (ref 1.6–2.4)
PHOSPHATE SERPL-MCNC: 3.5 MG/DL (ref 2.5–4.9)
POTASSIUM SERPL-SCNC: 4.2 MMOL/L (ref 3.5–5.3)
PROT SERPL-MCNC: 6.5 G/DL (ref 6.4–8.2)
PROT UR-ACNC: 13 MG/DL (ref 5–25)
PROT/CREAT UR: 0.12 MG/MG CREAT (ref 0–0.17)
PTH-INTACT SERPL-MCNC: 59 PG/ML (ref 18.5–88)
SODIUM SERPL-SCNC: 136 MMOL/L (ref 136–145)
URATE SERPL-MCNC: 5.8 MG/DL (ref 4–7.5)

## 2024-06-18 PROCEDURE — 82306 VITAMIN D 25 HYDROXY: CPT

## 2024-06-18 PROCEDURE — 82570 ASSAY OF URINE CREATININE: CPT

## 2024-06-18 PROCEDURE — 84550 ASSAY OF BLOOD/URIC ACID: CPT

## 2024-06-18 PROCEDURE — 80053 COMPREHEN METABOLIC PANEL: CPT

## 2024-06-18 PROCEDURE — 83970 ASSAY OF PARATHORMONE: CPT

## 2024-06-18 PROCEDURE — 36415 COLL VENOUS BLD VENIPUNCTURE: CPT

## 2024-06-18 PROCEDURE — 83735 ASSAY OF MAGNESIUM: CPT

## 2024-06-18 PROCEDURE — 84156 ASSAY OF PROTEIN URINE: CPT

## 2024-06-18 PROCEDURE — 84100 ASSAY OF PHOSPHORUS: CPT

## 2024-06-19 ENCOUNTER — SPECIALTY PHARMACY (OUTPATIENT)
Dept: PHARMACY | Facility: CLINIC | Age: 54
End: 2024-06-19

## 2024-06-19 DIAGNOSIS — E83.42 HYPOMAGNESEMIA: Primary | ICD-10-CM

## 2024-06-19 RX ORDER — CALCIUM CARBONATE 300MG(750)
400 TABLET,CHEWABLE ORAL DAILY
Qty: 90 TABLET | Refills: 3 | Status: SHIPPED | OUTPATIENT
Start: 2024-06-19 | End: 2025-06-19

## 2024-06-20 ENCOUNTER — TELEPHONE (OUTPATIENT)
Dept: PRIMARY CARE | Facility: CLINIC | Age: 54
End: 2024-06-20
Payer: MEDICARE

## 2024-06-20 DIAGNOSIS — E11.40 DIABETIC NEUROPATHY, PAINFUL (MULTI): ICD-10-CM

## 2024-06-20 DIAGNOSIS — M48.062 NEUROGENIC CLAUDICATION DUE TO LUMBAR SPINAL STENOSIS: ICD-10-CM

## 2024-06-20 NOTE — TELEPHONE ENCOUNTER
Rx Refill Request     Name: Karl Estevez  :  1970   Medication Name:  HANDICAP PLACARD  Specific Pharmacy location:  CALL WHEN READY PATIENT TO   Date of last appointment:  2024   Date of next appointment:  2024   Best number to reach patient:  [phone]

## 2024-06-21 DIAGNOSIS — K21.9 GASTROESOPHAGEAL REFLUX DISEASE, UNSPECIFIED WHETHER ESOPHAGITIS PRESENT: ICD-10-CM

## 2024-06-21 RX ORDER — ONDANSETRON 4 MG/1
4 TABLET, ORALLY DISINTEGRATING ORAL EVERY 8 HOURS PRN
Qty: 30 TABLET | Refills: 3 | Status: SHIPPED | OUTPATIENT
Start: 2024-06-21

## 2024-06-22 NOTE — PROGRESS NOTES
Pt requests dissolvable zofran tabs.     Jessie Disla PA-C   Endocrinology  Inpatient Diabetes Management Team

## 2024-07-02 ENCOUNTER — TELEPHONE (OUTPATIENT)
Dept: PRIMARY CARE | Facility: CLINIC | Age: 54
End: 2024-07-02
Payer: MEDICARE

## 2024-07-02 DIAGNOSIS — J01.01 ACUTE RECURRENT MAXILLARY SINUSITIS: Primary | ICD-10-CM

## 2024-07-02 RX ORDER — CEFDINIR 300 MG/1
300 CAPSULE ORAL 2 TIMES DAILY
Qty: 14 CAPSULE | Refills: 0 | Status: SHIPPED | OUTPATIENT
Start: 2024-07-02 | End: 2024-07-09

## 2024-07-02 NOTE — TELEPHONE ENCOUNTER
Patient came into office stating that he is still experiencing sinusitis and chest congestion. Patient is requesting new antibiotic to be called into pharmacy. Please advise. Thank you.

## 2024-07-09 DIAGNOSIS — Z94.4 LIVER TRANSPLANT RECIPIENT (MULTI): ICD-10-CM

## 2024-07-09 DIAGNOSIS — I15.9 SECONDARY HYPERTENSION: ICD-10-CM

## 2024-07-10 ENCOUNTER — SPECIALTY PHARMACY (OUTPATIENT)
Dept: PHARMACY | Facility: CLINIC | Age: 54
End: 2024-07-10

## 2024-07-10 PROCEDURE — RXMED WILLOW AMBULATORY MEDICATION CHARGE

## 2024-07-11 ENCOUNTER — PHARMACY VISIT (OUTPATIENT)
Dept: PHARMACY | Facility: CLINIC | Age: 54
End: 2024-07-11
Payer: MEDICARE

## 2024-07-11 RX ORDER — CARVEDILOL 12.5 MG/1
12.5 TABLET ORAL 2 TIMES DAILY
Qty: 180 TABLET | Refills: 3 | Status: SHIPPED | OUTPATIENT
Start: 2024-07-11 | End: 2025-07-11

## 2024-07-18 ENCOUNTER — TELEPHONE (OUTPATIENT)
Dept: PRIMARY CARE | Facility: CLINIC | Age: 54
End: 2024-07-18
Payer: MEDICARE

## 2024-07-18 NOTE — TELEPHONE ENCOUNTER
Patient requesting a script for an antibiotic says his cold got better but has now come back. He has an appt on 7/23/24. Pharmacy is [facility]

## 2024-07-19 DIAGNOSIS — J42 CHRONIC BRONCHITIS, UNSPECIFIED CHRONIC BRONCHITIS TYPE (MULTI): Primary | ICD-10-CM

## 2024-07-19 RX ORDER — DOXYCYCLINE 100 MG/1
100 CAPSULE ORAL
Qty: 20 CAPSULE | Refills: 0 | Status: SHIPPED | OUTPATIENT
Start: 2024-07-19 | End: 2024-07-29

## 2024-07-22 DIAGNOSIS — M48.062 NEUROGENIC CLAUDICATION DUE TO LUMBAR SPINAL STENOSIS: ICD-10-CM

## 2024-07-22 PROCEDURE — RXMED WILLOW AMBULATORY MEDICATION CHARGE

## 2024-07-22 RX ORDER — OXYCODONE HYDROCHLORIDE 5 MG/1
5 TABLET ORAL EVERY 8 HOURS PRN
Qty: 90 TABLET | Refills: 0 | Status: SHIPPED | OUTPATIENT
Start: 2024-07-22 | End: 2024-08-21

## 2024-07-22 NOTE — TELEPHONE ENCOUNTER
Rx Refill Request     Name: Karl Estevez  :  1970   Medication Name:  OXYCODONE 5MG  Specific Pharmacy location:  [facility]  Date of last appointment:  2024   Date of next appointment:  2024   Best number to reach patient:  [phone]

## 2024-07-23 ENCOUNTER — APPOINTMENT (OUTPATIENT)
Dept: PRIMARY CARE | Facility: CLINIC | Age: 54
End: 2024-07-23
Payer: MEDICARE

## 2024-07-23 ENCOUNTER — PHARMACY VISIT (OUTPATIENT)
Dept: PHARMACY | Facility: CLINIC | Age: 54
End: 2024-07-23
Payer: MEDICARE

## 2024-07-23 VITALS
OXYGEN SATURATION: 99 % | SYSTOLIC BLOOD PRESSURE: 104 MMHG | DIASTOLIC BLOOD PRESSURE: 70 MMHG | BODY MASS INDEX: 29.78 KG/M2 | HEIGHT: 70 IN | RESPIRATION RATE: 16 BRPM | TEMPERATURE: 97.3 F | HEART RATE: 99 BPM | WEIGHT: 208 LBS

## 2024-07-23 DIAGNOSIS — J20.9 BRONCHOSPASM WITH BRONCHITIS, ACUTE: Primary | ICD-10-CM

## 2024-07-23 DIAGNOSIS — E11.40 DIABETIC NEUROPATHY, PAINFUL (MULTI): ICD-10-CM

## 2024-07-23 DIAGNOSIS — I10 BENIGN ESSENTIAL HTN: ICD-10-CM

## 2024-07-23 DIAGNOSIS — M48.062 NEUROGENIC CLAUDICATION DUE TO LUMBAR SPINAL STENOSIS: ICD-10-CM

## 2024-07-23 PROCEDURE — 3078F DIAST BP <80 MM HG: CPT | Performed by: FAMILY MEDICINE

## 2024-07-23 PROCEDURE — 3061F NEG MICROALBUMINURIA REV: CPT | Performed by: FAMILY MEDICINE

## 2024-07-23 PROCEDURE — 3008F BODY MASS INDEX DOCD: CPT | Performed by: FAMILY MEDICINE

## 2024-07-23 PROCEDURE — 3074F SYST BP LT 130 MM HG: CPT | Performed by: FAMILY MEDICINE

## 2024-07-23 PROCEDURE — 3044F HG A1C LEVEL LT 7.0%: CPT | Performed by: FAMILY MEDICINE

## 2024-07-23 PROCEDURE — 99214 OFFICE O/P EST MOD 30 MIN: CPT | Performed by: FAMILY MEDICINE

## 2024-07-23 RX ORDER — PREDNISONE 20 MG/1
20 TABLET ORAL DAILY
Qty: 7 TABLET | Refills: 0 | Status: SHIPPED | OUTPATIENT
Start: 2024-07-23 | End: 2024-07-30

## 2024-07-23 NOTE — PROGRESS NOTES
Subjective   Patient ID: Karl Estevez is a 53 y.o. male who presents for Diabetic Neuropathy (3 month follow up ).  HPI  Patient here for 2 agendas first of the which is to her recheck is a intermittent bronchitis been on 2 different antibiotics with history of allergies dust exposure molds could well be manifestation of allergy induced asthma.  He does on doxycycline at this time otherwise does take antihistamines historically but has been off.  Still remains on his immunosuppressive therapy after liver transplant.  Is followed by a liver transplant team with multiple labs.  Does take oxycodone for the buffed medicines as template for opioids completed safety issues reviewed OARRS performed  The patient is followed by endocrinology and is on Mounjaro 10 mg daily as well as metformin and takes premeal rapid acting insulin if needed.  Otherwise no change in visual acuity  Does take pain because of painful diabetic neuropathy.  Been on different forms of gabapentin in the past.  Otherwise patient is returned to work but denies any chest pain shortness of breath or palpitations no new GI  issues.  Chronic meds reviewed.  No reported side effects  OARRS:  No data recorded  I have personally reviewed the OARRS report for Karl Estevez. I have considered the risks of abuse, dependence, addiction and diversion    Is the patient prescribed a combination of a benzodiazepine and opioid?  No    Last Urine Drug Screen / ordered today: Yes  Recent Results (from the past 8760 hour(s))   Confirmation Opiate/Opioid/Benzo Prescription Compliance    Collection Time: 04/23/24  9:14 AM   Result Value Ref Range    Clonazepam <25 <25 ng/mL    7-Aminoclonazepam <25 <25 ng/mL    Alprazolam <25 <25 ng/mL    Alpha-Hydroxyalprazolam <25 <25 ng/mL    Midazolam <25 <25 ng/mL    Alpha-Hydroxymidazolam <25 <25 ng/mL    Chlordiazepoxide <25 <25 ng/mL    Diazepam <25 <25 ng/mL    Nordiazepam <25 <25 ng/mL    Temazepam <25 <25 ng/mL     Oxazepam <25 <25 ng/mL    Lorazepam <25 <25 ng/mL    Methadone <25 <25 ng/mL    EDDP <25 <25 ng/mL    6-Acetylmorphine <25 <25 ng/mL    Codeine <50 <50 ng/mL    Hydrocodone <25 <25 ng/mL    Hydromorphone <25 <25 ng/mL    Morphine  <50 <50 ng/mL    Norhydrocodone <25 <25 ng/mL    Noroxycodone >1,000 (H) <25 ng/mL    Oxycodone 1,439 (H) <25 ng/mL    Oxymorphone 1,506 (H) <25 ng/mL    Fentanyl <2.5 <2.5 ng/mL    Norfentanyl <2.5 <2.5 ng/mL    Tramadol <50 <50 ng/mL    O-Desmethyltramadol <50 <50 ng/mL    Zolpidem <25 <25 ng/mL    Zolpidem Metabolite (ZCA) <25 <25 ng/mL   Screen Opiate/Opioid/Benzo Prescription Compliance    Collection Time: 04/23/24  9:14 AM   Result Value Ref Range    Creatinine, Urine Random 134.6 20.0 - 370.0 mg/dL    Amphetamine Screen, Urine Presumptive Negative Presumptive Negative    Barbiturate Screen, Urine Presumptive Negative Presumptive Negative    Cannabinoid Screen, Urine Presumptive Negative Presumptive Negative    Cocaine Metabolite Screen, Urine Presumptive Negative Presumptive Negative    PCP Screen, Urine Presumptive Negative Presumptive Negative     Results are as expected.         Controlled Substance Agreement:  Date of the Last Agreement: 4-  Reviewed Controlled Substance Agreement including but not limited to the benefits, risks, and alternatives to treatment with a Controlled Substance medication(s).    Opioids:  What is the patient's goal of therapy? Daily  of low back nonoperative disc disease as well as a painful diabetic peripheral neuropathy benefit still outweighs the risk.  No evidence abuse side effects or divergence  Is this being achieved with current treatment? yes    I have calculated the patient's Morphine Dose Equivalent (MED):   I have considered referral to Pain Management and/or a specialist, and do not feel it is necessary at this time.    I feel that it is clinically indicated to continue this current medication regimen after  "consideration of alternative therapies, and other non-opioid treatment.    Opioid Risk Screening:  No data recorded    Pain Assessment:  No data recorded  Review of Systems   Constitutional:  Negative for fatigue, fever and unexpected weight change.   HENT:  Positive for rhinorrhea and sinus pressure. Negative for ear pain, sore throat and voice change.    Eyes:  Negative for visual disturbance.   Respiratory:  Positive for cough. Negative for chest tightness, shortness of breath and wheezing.    Cardiovascular:  Negative for chest pain, palpitations and leg swelling.   Gastrointestinal:  Negative for abdominal distention, abdominal pain, blood in stool, nausea and vomiting.   Genitourinary:  Positive for frequency. Negative for dysuria and hematuria.   Musculoskeletal:  Positive for arthralgias, back pain and myalgias.   Skin:  Negative for rash.   Neurological:  Positive for numbness. Negative for weakness, light-headedness and headaches.   Hematological:  Negative for adenopathy.   Psychiatric/Behavioral:  Negative for behavioral problems, confusion, sleep disturbance and suicidal ideas.        Objective   /70 (BP Location: Right arm, Patient Position: Sitting, BP Cuff Size: Large adult)   Pulse 99   Temp 36.3 °C (97.3 °F) (Temporal)   Resp 16   Ht 1.778 m (5' 10\")   Wt 94.3 kg (208 lb)   SpO2 99%   BMI 29.84 kg/m²      Contains abnormal data Comprehensive Metabolic Panel  Order: [id number]   Status: Final result       Visible to patient: Yes (seen)       Dx: Stage 3a chronic kidney disease (Multi)    0 Result Notes            Component  Ref Range & Units 1 mo ago  (6/18/24) 1 mo ago  (6/6/24) 2 mo ago  (4/29/24) 3 mo ago  (3/28/24) 4 mo ago  (3/11/24) 4 mo ago  (2/27/24) 6 mo ago  (1/23/24)   Glucose  74 - 99 mg/dL 101 High  92 90 82 90 79 88   Sodium  136 - 145 mmol/L 136 137 135 Low  136 138 134 Low  135 Low    Potassium  3.5 - 5.3 mmol/L 4.2 4.6 4.5 4.2 4.3 4.1 4.4   Chloride  98 - 107 mmol/L 101 " 102 99 99 98 98 99   Bicarbonate  21 - 32 mmol/L 27 28 30 30 29 31 29   Anion Gap  10 - 20 mmol/L 12 12 11 11 15 9 Low  11   Urea Nitrogen  6 - 23 mg/dL 13 17 12 13 13 14 14   Creatinine  0.50 - 1.30 mg/dL 1.01 1.32 High  1.09 1.11 1.06 1.13 1.17   eGFR  >60 mL/min/1.73m*2 89 65 CM 82 CM 80 CM 84 CM 78 CM 75 CM   Comment: Calculations of estimated GFR are performed using the 2021 CKD-EPI Study Refit equation without the race variable for the IDMS-Traceable creatinine methods.  https://jasn.asnjournals.org/content/early/2021/09/22/ASN.6447481376   Calcium  8.6 - 10.3 mg/dL 8.7 8.4 Low  8.7 8.9 8.8 R 8.9 9.0   Albumin  3.4 - 5.0 g/dL 4.1 3.9 4.3 4.3  4.2 4.2   Alkaline Phosphatase  33 - 120 U/L 62 52 66 65  66 76   Total Protein  6.4 - 8.2 g/dL 6.5 5.9 Low  6.7 6.8  6.6 6.9   AST  9 - 39 U/L 13 12 10 10  8 Low  9   Bilirubin, Total  0.0 - 1.2 mg/dL 0.6 1.0 0.5 0.6  0.6 0.6   ALT  10 - 52 U/L 8 Low             PSA, Total and Free  Order: [id number]   Status: Final result       Visible to patient: Yes (seen)       Dx: Hyperparathyroidism (Multi); Screenin...    0 Result Notes       Component  Ref Range & Units 1 mo ago 4 yr ago   PSA  0.0 - 4.0 ng/mL 0.3 0.1 CM   Comment: INTERPRETIVE INFORMATION: Prostate Specific Antigen    The Roche PSA electrochemiluminescent immunoassay is used. Results  obtained with different test methods or kits cannot be used  interchangeably. The Roche PSA method is approved for use as an  aid in the detection of prostate cancer when used in conjunction  with a digital rectal exam in individuals with a prostate age 50  years and older. The Roche PSA is also indicated for the serial  measurement of PSA to aid in the prognosis and management of  prostate canc      Hemoglobin A1C  Order: [id number]   Status: Final result       Visible to patient: Yes (seen)       Dx: Type 2 diabetes mellitus with diabeti...    0 Result Notes       1 HM Topic            Component  Ref Range & Units 4 mo  ago  (3/28/24) 5 mo ago  (2/27/24) 6 mo ago  (1/23/24) 10 mo ago  (9/25/23) 1 yr ago  (7/21/23) 1 yr ago  (6/22/23) 1 yr ago  (3/13/23)   Hemoglobin A1C  see below % 4.5 4.5 5.0 <3.5 R, CM 4.1 R, CM 3.5 R, CM 3.7 R, CM   Estimated Average Glucose  Not Established mg/dL 8                  Physical Exam  Vital signs reviewed and normal weight is about 5 pounds this summer.  General-inspection reveals a mildly overweight individual in no acute distress  Neck neck is soft without mass adenopathy bruits or rigidity  ENT moderate watery rhinorrhea with with thick turbinates.  No sinus tenderness TMs retracted air-fluid level but no hyperemia canals patent oral exam is benign  Chest chest with very coarse rhonchi with forced expiration otherwise no wheezing and no bibasilar rales  Cardiovascular RSR without murmurs gallop or ectopy  Peripheral vascular slightly decreased sensation to vibration otherwise no symmetric asymmetric edema no vascular deficits  Musculoskeletal continued pain in the midline L3-L5 area especially the medial right SI joint more than left SI joint.  No acute synovitis the upper and lower extremities  Skin-no rashes petechiae or jaundice  Neurological besides above decreased vibration in the feet no new focal deficit upper or lower extremities  Mood mood is pleasant without anxiety depressive or cognitive issues    Assessment/Plan   Problem List Items Addressed This Visit    None  Template for opioids completed safety is reviewed OARRS performed  Continue his 3 times a day if needed oxycodone  Follow-up with endocrinology as well as follow-up with the liver transplant team as the lab is performed through these 2 offices.  Otherwise follow-up in 3 months healthy routines advised continue good diabetic diet low-salt diet low-fat dietHe will continue his doxycycline for sinusitis will place on just 5 days of prednisone to help potential allergic induced asthmatic bronchitis i.e. cough.  Continue  albuterol if needed call if interval worsening  Above chronic meds reviewed as well as dose side effects and use  @discharge  The above diagnosis and treatment plan was discussed with the patient patient will continue appropriate diet and exercise as reviewed  Patient will recheck earlier if any interval problems of significance or clinical worsening of the above problems.  Agrees above surveillance.  All question were addressed regarding above meds

## 2024-07-28 PROBLEM — I95.9 HYPOTENSION: Status: RESOLVED | Noted: 2023-03-02 | Resolved: 2024-07-28

## 2024-07-28 PROBLEM — E87.70 GENERALIZED EDEMA DUE TO FLUID OVERLOAD: Status: RESOLVED | Noted: 2023-08-17 | Resolved: 2024-07-28

## 2024-07-28 PROBLEM — E87.70 FLUID OVERLOAD, UNSPECIFIED: Status: RESOLVED | Noted: 2023-03-02 | Resolved: 2024-07-28

## 2024-07-28 PROBLEM — R60.1 GENERALIZED EDEMA DUE TO FLUID OVERLOAD: Status: RESOLVED | Noted: 2023-08-17 | Resolved: 2024-07-28

## 2024-07-28 ASSESSMENT — ENCOUNTER SYMPTOMS
FEVER: 0
CONFUSION: 0
ARTHRALGIAS: 1
MYALGIAS: 1
ABDOMINAL DISTENTION: 0
ADENOPATHY: 0
NUMBNESS: 1
ABDOMINAL PAIN: 0
SINUS PRESSURE: 1
HEADACHES: 0
WHEEZING: 0
CHEST TIGHTNESS: 0
FATIGUE: 0
LIGHT-HEADEDNESS: 0
NAUSEA: 0
PALPITATIONS: 0
VOICE CHANGE: 0
UNEXPECTED WEIGHT CHANGE: 0
DYSURIA: 0
SLEEP DISTURBANCE: 0
VOMITING: 0
WEAKNESS: 0
BACK PAIN: 1
SHORTNESS OF BREATH: 0
BLOOD IN STOOL: 0
SORE THROAT: 0
FREQUENCY: 1
COUGH: 1
RHINORRHEA: 1
HEMATURIA: 0

## 2024-08-02 ENCOUNTER — SPECIALTY PHARMACY (OUTPATIENT)
Dept: PHARMACY | Facility: CLINIC | Age: 54
End: 2024-08-02

## 2024-08-02 PROCEDURE — RXMED WILLOW AMBULATORY MEDICATION CHARGE

## 2024-08-06 ENCOUNTER — PHARMACY VISIT (OUTPATIENT)
Dept: PHARMACY | Facility: CLINIC | Age: 54
End: 2024-08-06
Payer: MEDICARE

## 2024-08-20 DIAGNOSIS — M48.062 NEUROGENIC CLAUDICATION DUE TO LUMBAR SPINAL STENOSIS: ICD-10-CM

## 2024-08-20 NOTE — TELEPHONE ENCOUNTER
Rx Refill Request Telephone Encounter    Name:  Karl Barclayfatou  :  [id number]  Medication Name:  oxyCODONE (Roxicodone) 5 mg immediate release tablet     Specific Pharmacy location:  [facility]  Date of last appointment:  24  Date of next appointment:  10/30/24  Best number to reach patient:  [phone]

## 2024-08-21 ENCOUNTER — SPECIALTY PHARMACY (OUTPATIENT)
Dept: PHARMACY | Facility: CLINIC | Age: 54
End: 2024-08-21

## 2024-08-21 ENCOUNTER — APPOINTMENT (OUTPATIENT)
Dept: ENDOCRINOLOGY | Facility: HOSPITAL | Age: 54
End: 2024-08-21
Payer: MEDICARE

## 2024-08-21 ENCOUNTER — APPOINTMENT (OUTPATIENT)
Dept: ENDOCRINOLOGY | Facility: CLINIC | Age: 54
End: 2024-08-21
Payer: MEDICARE

## 2024-08-21 VITALS
DIASTOLIC BLOOD PRESSURE: 83 MMHG | WEIGHT: 219 LBS | BODY MASS INDEX: 30.66 KG/M2 | HEIGHT: 71 IN | HEART RATE: 77 BPM | SYSTOLIC BLOOD PRESSURE: 119 MMHG

## 2024-08-21 DIAGNOSIS — E11.42 TYPE 2 DIABETES MELLITUS WITH DIABETIC POLYNEUROPATHY, WITHOUT LONG-TERM CURRENT USE OF INSULIN (MULTI): ICD-10-CM

## 2024-08-21 DIAGNOSIS — Z79.4 TYPE 2 DIABETES MELLITUS WITHOUT COMPLICATION, WITH LONG-TERM CURRENT USE OF INSULIN (MULTI): ICD-10-CM

## 2024-08-21 DIAGNOSIS — K21.9 GASTROESOPHAGEAL REFLUX DISEASE, UNSPECIFIED WHETHER ESOPHAGITIS PRESENT: ICD-10-CM

## 2024-08-21 DIAGNOSIS — E11.9 TYPE 2 DIABETES MELLITUS WITHOUT COMPLICATION, WITH LONG-TERM CURRENT USE OF INSULIN (MULTI): ICD-10-CM

## 2024-08-21 LAB — POC HEMOGLOBIN A1C: 5.3 % (ref 4.2–6.5)

## 2024-08-21 PROCEDURE — 3079F DIAST BP 80-89 MM HG: CPT | Performed by: STUDENT IN AN ORGANIZED HEALTH CARE EDUCATION/TRAINING PROGRAM

## 2024-08-21 PROCEDURE — 3061F NEG MICROALBUMINURIA REV: CPT | Performed by: STUDENT IN AN ORGANIZED HEALTH CARE EDUCATION/TRAINING PROGRAM

## 2024-08-21 PROCEDURE — 83036 HEMOGLOBIN GLYCOSYLATED A1C: CPT | Performed by: STUDENT IN AN ORGANIZED HEALTH CARE EDUCATION/TRAINING PROGRAM

## 2024-08-21 PROCEDURE — 3074F SYST BP LT 130 MM HG: CPT | Performed by: STUDENT IN AN ORGANIZED HEALTH CARE EDUCATION/TRAINING PROGRAM

## 2024-08-21 PROCEDURE — 3008F BODY MASS INDEX DOCD: CPT | Performed by: STUDENT IN AN ORGANIZED HEALTH CARE EDUCATION/TRAINING PROGRAM

## 2024-08-21 PROCEDURE — 3044F HG A1C LEVEL LT 7.0%: CPT | Performed by: STUDENT IN AN ORGANIZED HEALTH CARE EDUCATION/TRAINING PROGRAM

## 2024-08-21 PROCEDURE — 99214 OFFICE O/P EST MOD 30 MIN: CPT | Performed by: STUDENT IN AN ORGANIZED HEALTH CARE EDUCATION/TRAINING PROGRAM

## 2024-08-21 PROCEDURE — 95251 CONT GLUC MNTR ANALYSIS I&R: CPT | Performed by: STUDENT IN AN ORGANIZED HEALTH CARE EDUCATION/TRAINING PROGRAM

## 2024-08-21 RX ORDER — OXYCODONE HYDROCHLORIDE 5 MG/1
5 TABLET ORAL EVERY 8 HOURS PRN
Qty: 90 TABLET | Refills: 0 | Status: SHIPPED | OUTPATIENT
Start: 2024-08-21 | End: 2024-09-20

## 2024-08-21 RX ORDER — TIRZEPATIDE 12.5 MG/.5ML
12.5 INJECTION, SOLUTION SUBCUTANEOUS
Qty: 2 ML | Refills: 3 | Status: SHIPPED | OUTPATIENT
Start: 2024-08-21 | End: 2024-08-21 | Stop reason: SDUPTHER

## 2024-08-21 RX ORDER — TIRZEPATIDE 12.5 MG/.5ML
12.5 INJECTION, SOLUTION SUBCUTANEOUS
Qty: 2 ML | Refills: 3 | Status: SHIPPED | OUTPATIENT
Start: 2024-08-21

## 2024-08-21 RX ORDER — DAPAGLIFLOZIN 10 MG/1
10 TABLET, FILM COATED ORAL DAILY
Qty: 90 TABLET | Refills: 3 | Status: SHIPPED | OUTPATIENT
Start: 2024-08-21 | End: 2025-08-21

## 2024-08-21 RX ORDER — ONDANSETRON 4 MG/1
4 TABLET, ORALLY DISINTEGRATING ORAL EVERY 8 HOURS PRN
Qty: 30 TABLET | Refills: 3 | Status: SHIPPED | OUTPATIENT
Start: 2024-08-21

## 2024-08-21 RX ORDER — METFORMIN HYDROCHLORIDE 500 MG/1
1000 TABLET, EXTENDED RELEASE ORAL
Qty: 360 TABLET | Refills: 3 | Status: SHIPPED | OUTPATIENT
Start: 2024-08-21 | End: 2025-08-21

## 2024-08-21 RX ORDER — GABAPENTIN 300 MG/1
CAPSULE ORAL
Qty: 150 CAPSULE | Refills: 11 | Status: SHIPPED | OUTPATIENT
Start: 2024-08-21

## 2024-08-21 ASSESSMENT — PAIN SCALES - GENERAL: PAINLEVEL: 0-NO PAIN

## 2024-08-21 NOTE — PROGRESS NOTES
"Patient coming in for follow up for T2DM    Subjective   Karl Estevez is a 53 y.o. male who presents for follow up for Type 2 diabetes mellitus.   The initial diagnosis of diabetes was made  20 years .   Lab Results   Component Value Date    HGBA1C 4.5 03/28/2024        Interval hx:  Mounjaro 10 mg once weekly.   Metformin 1000 mg BID  Gabapentin 300-600-600 mg   Farxiga 10 mg OD  Hasn't been taking Novolog   Received prednisone and BG was higher 170-180. Took some novolog      Hypoglycemia frequency: lowest 69  Hypoglycemia awareness: Yes     Diet:  Eats once a day  If eats breakfast has a toast  Lunch: does not eat  Dinner: Pork chops, sweet corn, hamburger  Snack: home made chex mix with peanuts, candy now and there.  Beverages: Regular soda  June 14 2022 had a liver transplant . Tacrolimus. No steroids.    Known complications due to diabetes included nephropathy and peripheral neuropathy    Cardiovascular risk factors include diabetes mellitus, male gender, and obesity (BMI >= 30 kg/m2). The patient is not on an ACE inhibitor or angiotensin II receptor blocker.      Foot Exam: Neuropathy. Wear braces has Charcoal in left foot. Follow with podiatry Dr. Rosa bonilla   Eye Exam: No retinopathy. In 2024.   Lipid Panel: Oct 2023 LDL: 84.  Urine Albumin: CKD stage 3. Farxiga.     No abdominal pain  No nausea or vomiting  No constipation or diarrhea  No frequency urgency or dysuria    Review of Systems  all pertinent systems reviewed and are otherwise negative   Objective   /83   Pulse 77   Ht 1.803 m (5' 11\")   Wt 99.3 kg (219 lb)   BMI 30.54 kg/m²   Physical Exam  Constitutional:       General: He is not in acute distress.     Appearance: Normal appearance.   Eyes:      Extraocular Movements: Extraocular movements intact.      Pupils: Pupils are equal, round, and reactive to light.   Cardiovascular:      Rate and Rhythm: Normal rate and regular rhythm.   Pulmonary:      Effort: Pulmonary effort " "is normal. No respiratory distress.      Breath sounds: Normal breath sounds.   Abdominal:      General: Bowel sounds are normal.      Palpations: Abdomen is soft.      Tenderness: There is no abdominal tenderness.   Skin:     Coloration: Skin is not jaundiced or pale.      Findings: No erythema or rash.   Neurological:      General: No focal deficit present.      Mental Status: He is alert and oriented to person, place, and time.      Deep Tendon Reflexes: Reflexes normal.   Psychiatric:         Mood and Affect: Mood normal.         Behavior: Behavior normal.         Lab Review  Glucose (mg/dL)   Date Value   06/18/2024 101 (H)   06/06/2024 92   04/29/2024 90     Hemoglobin A1C (%)   Date Value   03/28/2024 4.5   02/27/2024 4.5   01/23/2024 5.0     Bicarbonate (mmol/L)   Date Value   06/18/2024 27   06/06/2024 28   04/29/2024 30     Urea Nitrogen (mg/dL)   Date Value   06/18/2024 13   06/06/2024 17   04/29/2024 12     Creatinine (mg/dL)   Date Value   06/18/2024 1.01   06/06/2024 1.32 (H)   04/29/2024 1.09     Lab Results   Component Value Date    CHOL 129 10/25/2023    CHOL 109 10/26/2020    CHOL 164 06/05/2019     Lab Results   Component Value Date    HDL 24.2 10/25/2023    HDL 29.9 (A) 10/26/2020    HDL 23.9 (A) 06/05/2019     Lab Results   Component Value Date    LDLCALC 84 10/25/2023     Lab Results   Component Value Date    TRIG 103 10/25/2023    TRIG 93 10/26/2020    TRIG 186 (H) 06/05/2019     No components found for: \"CHOLHDL\"   Lab Results   Component Value Date    TSH 0.95 02/13/2023     No results found for: \"ALBUR\", \"[id number]\"     Health Maintenance:       Assessment/Plan   Diagnoses and all orders for this visit:  Type 2 diabetes mellitus without complication, with long-term current use of insulin (Multi)  -     Referral to Endocrinology  Karl Estevez is a 53 y.o. male who presents for follow up for Type 2 diabetes mellitus.   The initial diagnosis of diabetes was made 20 years.   A1c 5.3% this " visit  Interval hx:  Mounjaro 10 mg once weekly.   Metformin 1000 mg BID  Gabapentin 300-600-600 mg   Farxiga 10 mg OD  Hasn't been taking Novolog   AVG   TIR 99%  Received prednisone and BG was higher 170-180. Took some novolog  Hypoglycemia frequency: lowest 69  Hypoglycemia awareness: Yes   June 14 2022 had a liver transplant . Tacrolimus. No steroids.    Known complications due to diabetes included nephropathy and peripheral neuropathy  Foot Exam: Neuropathy. Wear braces has Charcoal in left foot. Follow with podiatry Dr. Rosa bonilla   Eye Exam: No retinopathy. In 2024.   Lipid Panel: Oct 2023 LDL: 84.  Urine Albumin: CKD stage 3. Farxiga.   Plan:  Increase Mounjaro to 12.5 mg once weekly.  In case you can;t find it let me know I will add the 2.5 mg  Monitor BG in case lower decrease metformin to 500 mg twice daily  Continue farxiga  Continue gabapentin  Follow with ophthalmology and podiatry    Lipid panel with the next blood draw    RTC in 6 months  Assessment & Plan  Type 2 diabetes mellitus without complication, with long-term current use of insulin (Multi)    Orders:    Referral to Endocrinology    metFORMIN  mg 24 hr tablet; Take 2 tablets (1,000 mg) by mouth 2 times daily (morning and late afternoon). Take 1 tablet by mouth twice daily with meals    Lipid Panel; Future    POCT glycosylated hemoglobin (Hb A1C) manually resulted    tirzepatide (Mounjaro) 12.5 mg/0.5 mL pen injector; Inject 12.5 mg under the skin every 7 days.    Gastroesophageal reflux disease, unspecified whether esophagitis present    Orders:    ondansetron ODT (Zofran-ODT) 4 mg disintegrating tablet; Take 1 tablet (4 mg) by mouth every 8 hours if needed for nausea or vomiting.    Type 2 diabetes mellitus with diabetic polyneuropathy, without long-term current use of insulin (Multi)    Orders:    gabapentin (Neurontin) 300 mg capsule; Take 1 capsule with breakfast then take 2 capsules with lunch and 2 with dinner     dapagliflozin propanediol (Farxiga) 10 mg; Take 1 tablet (10 mg) by mouth once daily.

## 2024-08-21 NOTE — PATIENT INSTRUCTIONS
Increase Mounjaro to 12.5 mg once weekly.  In case you can;t find it let me know I will add the 2.5 mg  Monitor BG in case lower decrease metformin to 500 mg twice daily  Continue farxiga  Continue gabapentin  Follow with ophthalmology and podiatry    Lipid panel with the next blood draw    RTC in 6 months

## 2024-08-29 NOTE — ASSESSMENT & PLAN NOTE
Orders:    gabapentin (Neurontin) 300 mg capsule; Take 1 capsule with breakfast then take 2 capsules with lunch and 2 with dinner    dapagliflozin propanediol (Farxiga) 10 mg; Take 1 tablet (10 mg) by mouth once daily.

## 2024-08-29 NOTE — ASSESSMENT & PLAN NOTE
Orders:    ondansetron ODT (Zofran-ODT) 4 mg disintegrating tablet; Take 1 tablet (4 mg) by mouth every 8 hours if needed for nausea or vomiting.

## 2024-09-10 PROCEDURE — RXMED WILLOW AMBULATORY MEDICATION CHARGE

## 2024-09-11 ENCOUNTER — SPECIALTY PHARMACY (OUTPATIENT)
Dept: PHARMACY | Facility: CLINIC | Age: 54
End: 2024-09-11

## 2024-09-11 ENCOUNTER — LAB (OUTPATIENT)
Dept: LAB | Facility: LAB | Age: 54
End: 2024-09-11
Payer: MEDICARE

## 2024-09-11 DIAGNOSIS — Z94.4 HISTORY OF LIVER TRANSPLANT (MULTI): ICD-10-CM

## 2024-09-11 DIAGNOSIS — E11.9 TYPE 2 DIABETES MELLITUS WITHOUT COMPLICATION, WITH LONG-TERM CURRENT USE OF INSULIN (MULTI): ICD-10-CM

## 2024-09-11 DIAGNOSIS — Z79.4 TYPE 2 DIABETES MELLITUS WITHOUT COMPLICATION, WITH LONG-TERM CURRENT USE OF INSULIN (MULTI): ICD-10-CM

## 2024-09-11 DIAGNOSIS — N18.31 STAGE 3A CHRONIC KIDNEY DISEASE (MULTI): ICD-10-CM

## 2024-09-11 DIAGNOSIS — N18.30 STAGE 3 CHRONIC KIDNEY DISEASE, UNSPECIFIED WHETHER STAGE 3A OR 3B CKD (MULTI): ICD-10-CM

## 2024-09-11 DIAGNOSIS — D84.9 IMMUNOSUPPRESSION (MULTI): ICD-10-CM

## 2024-09-11 LAB
25(OH)D3 SERPL-MCNC: 41 NG/ML (ref 30–100)
ALBUMIN SERPL BCP-MCNC: 4.3 G/DL (ref 3.4–5)
ALP SERPL-CCNC: 56 U/L (ref 33–120)
ALT SERPL W P-5'-P-CCNC: 11 U/L (ref 10–52)
ANION GAP SERPL CALC-SCNC: 11 MMOL/L (ref 10–20)
AST SERPL W P-5'-P-CCNC: 12 U/L (ref 9–39)
BASOPHILS # BLD AUTO: 0.03 X10*3/UL (ref 0–0.1)
BASOPHILS NFR BLD AUTO: 0.6 %
BILIRUB SERPL-MCNC: 0.5 MG/DL (ref 0–1.2)
BUN SERPL-MCNC: 17 MG/DL (ref 6–23)
CALCIUM SERPL-MCNC: 8.8 MG/DL (ref 8.6–10.3)
CHLORIDE SERPL-SCNC: 102 MMOL/L (ref 98–107)
CHOLEST SERPL-MCNC: 135 MG/DL (ref 0–199)
CHOLESTEROL/HDL RATIO: 4.4
CO2 SERPL-SCNC: 28 MMOL/L (ref 21–32)
CREAT SERPL-MCNC: 1.27 MG/DL (ref 0.5–1.3)
CREAT UR-MCNC: 180.2 MG/DL (ref 20–370)
EGFRCR SERPLBLD CKD-EPI 2021: 68 ML/MIN/1.73M*2
EOSINOPHIL # BLD AUTO: 0.22 X10*3/UL (ref 0–0.7)
EOSINOPHIL NFR BLD AUTO: 4.6 %
ERYTHROCYTE [DISTWIDTH] IN BLOOD BY AUTOMATED COUNT: 12.6 % (ref 11.5–14.5)
GLUCOSE SERPL-MCNC: 91 MG/DL (ref 74–99)
HCT VFR BLD AUTO: 42.5 % (ref 41–52)
HDLC SERPL-MCNC: 30.4 MG/DL
HGB BLD-MCNC: 14.3 G/DL (ref 13.5–17.5)
IMM GRANULOCYTES # BLD AUTO: 0.02 X10*3/UL (ref 0–0.7)
IMM GRANULOCYTES NFR BLD AUTO: 0.4 % (ref 0–0.9)
LDLC SERPL CALC-MCNC: 76 MG/DL
LYMPHOCYTES # BLD AUTO: 1.4 X10*3/UL (ref 1.2–4.8)
LYMPHOCYTES NFR BLD AUTO: 29.2 %
MCH RBC QN AUTO: 29.7 PG (ref 26–34)
MCHC RBC AUTO-ENTMCNC: 33.6 G/DL (ref 32–36)
MCV RBC AUTO: 88 FL (ref 80–100)
MONOCYTES # BLD AUTO: 0.36 X10*3/UL (ref 0.1–1)
MONOCYTES NFR BLD AUTO: 7.5 %
NEUTROPHILS # BLD AUTO: 2.77 X10*3/UL (ref 1.2–7.7)
NEUTROPHILS NFR BLD AUTO: 57.7 %
NON HDL CHOLESTEROL: 105 MG/DL (ref 0–149)
NRBC BLD-RTO: 0 /100 WBCS (ref 0–0)
PLATELET # BLD AUTO: 167 X10*3/UL (ref 150–450)
POTASSIUM SERPL-SCNC: 4.1 MMOL/L (ref 3.5–5.3)
PROT SERPL-MCNC: 6.6 G/DL (ref 6.4–8.2)
PROT UR-ACNC: 18 MG/DL (ref 5–25)
PROT/CREAT UR: 0.1 MG/MG CREAT (ref 0–0.17)
PTH-INTACT SERPL-MCNC: 61.1 PG/ML (ref 18.5–88)
RBC # BLD AUTO: 4.82 X10*6/UL (ref 4.5–5.9)
SODIUM SERPL-SCNC: 137 MMOL/L (ref 136–145)
TACROLIMUS BLD-MCNC: 6.1 NG/ML
TRIGL SERPL-MCNC: 143 MG/DL (ref 0–149)
VLDL: 29 MG/DL (ref 0–40)
WBC # BLD AUTO: 4.8 X10*3/UL (ref 4.4–11.3)

## 2024-09-11 PROCEDURE — 80197 ASSAY OF TACROLIMUS: CPT

## 2024-09-11 PROCEDURE — 82570 ASSAY OF URINE CREATININE: CPT

## 2024-09-11 PROCEDURE — 84156 ASSAY OF PROTEIN URINE: CPT

## 2024-09-11 PROCEDURE — 82306 VITAMIN D 25 HYDROXY: CPT

## 2024-09-11 PROCEDURE — 80053 COMPREHEN METABOLIC PANEL: CPT

## 2024-09-11 PROCEDURE — 80061 LIPID PANEL: CPT

## 2024-09-11 PROCEDURE — 85025 COMPLETE CBC W/AUTO DIFF WBC: CPT

## 2024-09-11 PROCEDURE — 83970 ASSAY OF PARATHORMONE: CPT

## 2024-09-12 ENCOUNTER — PHARMACY VISIT (OUTPATIENT)
Dept: PHARMACY | Facility: CLINIC | Age: 54
End: 2024-09-12
Payer: MEDICARE

## 2024-09-14 ENCOUNTER — TELEPHONE (OUTPATIENT)
Dept: TRANSPLANT | Facility: HOSPITAL | Age: 54
End: 2024-09-14
Payer: MEDICARE

## 2024-09-14 DIAGNOSIS — N18.2 CKD (CHRONIC KIDNEY DISEASE) STAGE 2, GFR 60-89 ML/MIN: Primary | ICD-10-CM

## 2024-09-14 DIAGNOSIS — N18.30 STAGE 3 CHRONIC KIDNEY DISEASE, UNSPECIFIED WHETHER STAGE 3A OR 3B CKD (MULTI): ICD-10-CM

## 2024-09-23 DIAGNOSIS — M48.062 NEUROGENIC CLAUDICATION DUE TO LUMBAR SPINAL STENOSIS: ICD-10-CM

## 2024-09-23 RX ORDER — OXYCODONE HYDROCHLORIDE 5 MG/1
5 TABLET ORAL EVERY 8 HOURS PRN
Qty: 90 TABLET | Refills: 0 | Status: SHIPPED | OUTPATIENT
Start: 2024-09-23 | End: 2024-10-23

## 2024-09-27 DIAGNOSIS — N18.31 STAGE 3A CHRONIC KIDNEY DISEASE (MULTI): ICD-10-CM

## 2024-10-02 ENCOUNTER — APPOINTMENT (OUTPATIENT)
Dept: SURGERY | Facility: CLINIC | Age: 54
End: 2024-10-02
Payer: MEDICARE

## 2024-10-11 ENCOUNTER — SPECIALTY PHARMACY (OUTPATIENT)
Dept: PHARMACY | Facility: CLINIC | Age: 54
End: 2024-10-11

## 2024-10-11 DIAGNOSIS — Z94.4 LIVER REPLACED BY TRANSPLANT (MULTI): ICD-10-CM

## 2024-10-12 RX ORDER — TACROLIMUS 1 MG/1
1 CAPSULE ORAL 2 TIMES DAILY
Qty: 180 CAPSULE | Refills: 3 | Status: SHIPPED | OUTPATIENT
Start: 2024-10-12 | End: 2025-10-12

## 2024-10-15 PROCEDURE — RXMED WILLOW AMBULATORY MEDICATION CHARGE

## 2024-10-22 ENCOUNTER — LAB (OUTPATIENT)
Dept: LAB | Facility: LAB | Age: 54
End: 2024-10-22
Payer: MEDICARE

## 2024-10-22 DIAGNOSIS — N18.30 STAGE 3 CHRONIC KIDNEY DISEASE, UNSPECIFIED WHETHER STAGE 3A OR 3B CKD (MULTI): ICD-10-CM

## 2024-10-22 DIAGNOSIS — D84.9 IMMUNOSUPPRESSION: ICD-10-CM

## 2024-10-22 DIAGNOSIS — Z94.4 HISTORY OF LIVER TRANSPLANT (MULTI): ICD-10-CM

## 2024-10-22 DIAGNOSIS — N18.31 STAGE 3A CHRONIC KIDNEY DISEASE (MULTI): ICD-10-CM

## 2024-10-22 LAB
25(OH)D3 SERPL-MCNC: 45 NG/ML (ref 30–100)
ALBUMIN SERPL BCP-MCNC: 4.4 G/DL (ref 3.4–5)
ALP SERPL-CCNC: 57 U/L (ref 33–120)
ALT SERPL W P-5'-P-CCNC: 11 U/L (ref 10–52)
ANION GAP SERPL CALC-SCNC: 12 MMOL/L (ref 10–20)
AST SERPL W P-5'-P-CCNC: 13 U/L (ref 9–39)
BASOPHILS # BLD AUTO: 0.03 X10*3/UL (ref 0–0.1)
BASOPHILS NFR BLD AUTO: 0.5 %
BILIRUB SERPL-MCNC: 0.6 MG/DL (ref 0–1.2)
BUN SERPL-MCNC: 18 MG/DL (ref 6–23)
CALCIUM SERPL-MCNC: 8.8 MG/DL (ref 8.6–10.3)
CHLORIDE SERPL-SCNC: 99 MMOL/L (ref 98–107)
CO2 SERPL-SCNC: 30 MMOL/L (ref 21–32)
CREAT SERPL-MCNC: 1.43 MG/DL (ref 0.5–1.3)
CREAT UR-MCNC: 280.3 MG/DL (ref 20–370)
EGFRCR SERPLBLD CKD-EPI 2021: 59 ML/MIN/1.73M*2
EOSINOPHIL # BLD AUTO: 0.14 X10*3/UL (ref 0–0.7)
EOSINOPHIL NFR BLD AUTO: 2.3 %
ERYTHROCYTE [DISTWIDTH] IN BLOOD BY AUTOMATED COUNT: 13.1 % (ref 11.5–14.5)
GLUCOSE SERPL-MCNC: 106 MG/DL (ref 74–99)
HCT VFR BLD AUTO: 44.7 % (ref 41–52)
HGB BLD-MCNC: 14.8 G/DL (ref 13.5–17.5)
IMM GRANULOCYTES # BLD AUTO: 0.01 X10*3/UL (ref 0–0.7)
IMM GRANULOCYTES NFR BLD AUTO: 0.2 % (ref 0–0.9)
LYMPHOCYTES # BLD AUTO: 1.31 X10*3/UL (ref 1.2–4.8)
LYMPHOCYTES NFR BLD AUTO: 21.1 %
MCH RBC QN AUTO: 28.8 PG (ref 26–34)
MCHC RBC AUTO-ENTMCNC: 33.1 G/DL (ref 32–36)
MCV RBC AUTO: 87 FL (ref 80–100)
MONOCYTES # BLD AUTO: 0.46 X10*3/UL (ref 0.1–1)
MONOCYTES NFR BLD AUTO: 7.4 %
NEUTROPHILS # BLD AUTO: 4.27 X10*3/UL (ref 1.2–7.7)
NEUTROPHILS NFR BLD AUTO: 68.5 %
NRBC BLD-RTO: 0 /100 WBCS (ref 0–0)
PHOSPHATE SERPL-MCNC: 4 MG/DL (ref 2.5–4.9)
PLATELET # BLD AUTO: 184 X10*3/UL (ref 150–450)
POTASSIUM SERPL-SCNC: 4.2 MMOL/L (ref 3.5–5.3)
PROT SERPL-MCNC: 6.9 G/DL (ref 6.4–8.2)
PROT UR-ACNC: 20 MG/DL (ref 5–25)
PROT/CREAT UR: 0.07 MG/MG CREAT (ref 0–0.17)
PTH-INTACT SERPL-MCNC: 41.7 PG/ML (ref 18.5–88)
RBC # BLD AUTO: 5.14 X10*6/UL (ref 4.5–5.9)
SODIUM SERPL-SCNC: 137 MMOL/L (ref 136–145)
TACROLIMUS BLD-MCNC: 6.2 NG/ML
WBC # BLD AUTO: 6.2 X10*3/UL (ref 4.4–11.3)

## 2024-10-22 PROCEDURE — 82306 VITAMIN D 25 HYDROXY: CPT

## 2024-10-22 PROCEDURE — 84156 ASSAY OF PROTEIN URINE: CPT

## 2024-10-22 PROCEDURE — 83970 ASSAY OF PARATHORMONE: CPT

## 2024-10-22 PROCEDURE — 80197 ASSAY OF TACROLIMUS: CPT

## 2024-10-22 PROCEDURE — 84100 ASSAY OF PHOSPHORUS: CPT

## 2024-10-22 PROCEDURE — 85025 COMPLETE CBC W/AUTO DIFF WBC: CPT

## 2024-10-22 PROCEDURE — 82570 ASSAY OF URINE CREATININE: CPT

## 2024-10-22 PROCEDURE — 80053 COMPREHEN METABOLIC PANEL: CPT

## 2024-10-22 PROCEDURE — 36415 COLL VENOUS BLD VENIPUNCTURE: CPT

## 2024-10-23 DIAGNOSIS — M48.062 NEUROGENIC CLAUDICATION DUE TO LUMBAR SPINAL STENOSIS: ICD-10-CM

## 2024-10-23 PROCEDURE — RXMED WILLOW AMBULATORY MEDICATION CHARGE

## 2024-10-23 RX ORDER — OXYCODONE HYDROCHLORIDE 5 MG/1
5 TABLET ORAL EVERY 8 HOURS PRN
Qty: 90 TABLET | Refills: 0 | Status: SHIPPED | OUTPATIENT
Start: 2024-10-23 | End: 2024-11-22

## 2024-10-23 NOTE — TELEPHONE ENCOUNTER
Rx Refill Request     Name: Karl Estevez  :  1970   Medication Name:  OXYCODONE 5MG  Specific Pharmacy location:  [facility]  Date of last appointment:  2024   Date of next appointment:  Visit date not found   Best number to reach patient:  [phone]

## 2024-10-26 ENCOUNTER — TELEPHONE (OUTPATIENT)
Dept: TRANSPLANT | Facility: HOSPITAL | Age: 54
End: 2024-10-26
Payer: MEDICARE

## 2024-10-26 DIAGNOSIS — N18.30 STAGE 3 CHRONIC KIDNEY DISEASE, UNSPECIFIED WHETHER STAGE 3A OR 3B CKD (MULTI): Primary | ICD-10-CM

## 2024-10-26 DIAGNOSIS — Z12.5 SCREENING FOR PROSTATE CANCER: ICD-10-CM

## 2024-10-27 ENCOUNTER — SPECIALTY PHARMACY (OUTPATIENT)
Dept: PHARMACY | Facility: CLINIC | Age: 54
End: 2024-10-27

## 2024-10-28 ENCOUNTER — LAB (OUTPATIENT)
Dept: LAB | Facility: LAB | Age: 54
End: 2024-10-28
Payer: MEDICARE

## 2024-10-28 ENCOUNTER — OFFICE VISIT (OUTPATIENT)
Dept: TRANSPLANT | Facility: HOSPITAL | Age: 54
End: 2024-10-28
Payer: MEDICARE

## 2024-10-28 VITALS
BODY MASS INDEX: 30.47 KG/M2 | OXYGEN SATURATION: 98 % | HEART RATE: 96 BPM | WEIGHT: 218.5 LBS | DIASTOLIC BLOOD PRESSURE: 82 MMHG | SYSTOLIC BLOOD PRESSURE: 111 MMHG | TEMPERATURE: 97.3 F

## 2024-10-28 DIAGNOSIS — Z94.4 LIVER REPLACED BY TRANSPLANT (MULTI): ICD-10-CM

## 2024-10-28 DIAGNOSIS — N18.30 STAGE 3 CHRONIC KIDNEY DISEASE, UNSPECIFIED WHETHER STAGE 3A OR 3B CKD (MULTI): ICD-10-CM

## 2024-10-28 DIAGNOSIS — N18.31 STAGE 3A CHRONIC KIDNEY DISEASE (MULTI): ICD-10-CM

## 2024-10-28 DIAGNOSIS — Z12.5 SCREENING FOR PROSTATE CANCER: ICD-10-CM

## 2024-10-28 DIAGNOSIS — Z94.0 KIDNEY REPLACED BY TRANSPLANT (HHS-HCC): Primary | ICD-10-CM

## 2024-10-28 DIAGNOSIS — D84.9 IMMUNOSUPPRESSION: ICD-10-CM

## 2024-10-28 DIAGNOSIS — Z94.4 HISTORY OF LIVER TRANSPLANT (MULTI): ICD-10-CM

## 2024-10-28 LAB
25(OH)D3 SERPL-MCNC: 62 NG/ML (ref 30–100)
ALBUMIN SERPL BCP-MCNC: 4.3 G/DL (ref 3.4–5)
ALP SERPL-CCNC: 59 U/L (ref 33–120)
ALT SERPL W P-5'-P-CCNC: 11 U/L (ref 10–52)
ANION GAP SERPL CALC-SCNC: 13 MMOL/L (ref 10–20)
AST SERPL W P-5'-P-CCNC: 11 U/L (ref 9–39)
BASOPHILS # BLD AUTO: 0.04 X10*3/UL (ref 0–0.1)
BASOPHILS NFR BLD AUTO: 0.7 %
BILIRUB SERPL-MCNC: 0.7 MG/DL (ref 0–1.2)
BUN SERPL-MCNC: 11 MG/DL (ref 6–23)
CALCIUM SERPL-MCNC: 8.6 MG/DL (ref 8.6–10.6)
CHLORIDE SERPL-SCNC: 102 MMOL/L (ref 98–107)
CO2 SERPL-SCNC: 29 MMOL/L (ref 21–32)
CREAT SERPL-MCNC: 1.26 MG/DL (ref 0.5–1.3)
CREAT UR-MCNC: 250.4 MG/DL (ref 20–370)
EGFRCR SERPLBLD CKD-EPI 2021: 69 ML/MIN/1.73M*2
EOSINOPHIL # BLD AUTO: 0.17 X10*3/UL (ref 0–0.7)
EOSINOPHIL NFR BLD AUTO: 3.1 %
ERYTHROCYTE [DISTWIDTH] IN BLOOD BY AUTOMATED COUNT: 13.1 % (ref 11.5–14.5)
GLUCOSE SERPL-MCNC: 103 MG/DL (ref 74–99)
HCT VFR BLD AUTO: 46.3 % (ref 41–52)
HGB BLD-MCNC: 15.2 G/DL (ref 13.5–17.5)
IMM GRANULOCYTES # BLD AUTO: 0.02 X10*3/UL (ref 0–0.7)
IMM GRANULOCYTES NFR BLD AUTO: 0.4 % (ref 0–0.9)
LYMPHOCYTES # BLD AUTO: 1.27 X10*3/UL (ref 1.2–4.8)
LYMPHOCYTES NFR BLD AUTO: 23 %
MCH RBC QN AUTO: 29.1 PG (ref 26–34)
MCHC RBC AUTO-ENTMCNC: 32.8 G/DL (ref 32–36)
MCV RBC AUTO: 89 FL (ref 80–100)
MONOCYTES # BLD AUTO: 0.39 X10*3/UL (ref 0.1–1)
MONOCYTES NFR BLD AUTO: 7.1 %
NEUTROPHILS # BLD AUTO: 3.63 X10*3/UL (ref 1.2–7.7)
NEUTROPHILS NFR BLD AUTO: 65.7 %
NRBC BLD-RTO: 0 /100 WBCS (ref 0–0)
PHOSPHATE SERPL-MCNC: 3.5 MG/DL (ref 2.5–4.9)
PLATELET # BLD AUTO: 208 X10*3/UL (ref 150–450)
POTASSIUM SERPL-SCNC: 4.9 MMOL/L (ref 3.5–5.3)
PROT SERPL-MCNC: 6.9 G/DL (ref 6.4–8.2)
PROT UR-ACNC: 23 MG/DL (ref 5–25)
PROT/CREAT UR: 0.09 MG/MG CREAT (ref 0–0.17)
PTH-INTACT SERPL-MCNC: 48.4 PG/ML (ref 18.5–88)
RBC # BLD AUTO: 5.23 X10*6/UL (ref 4.5–5.9)
SODIUM SERPL-SCNC: 139 MMOL/L (ref 136–145)
TACROLIMUS BLD-MCNC: 7.7 NG/ML
WBC # BLD AUTO: 5.5 X10*3/UL (ref 4.4–11.3)

## 2024-10-28 PROCEDURE — 3061F NEG MICROALBUMINURIA REV: CPT | Performed by: INTERNAL MEDICINE

## 2024-10-28 PROCEDURE — 84100 ASSAY OF PHOSPHORUS: CPT

## 2024-10-28 PROCEDURE — 3079F DIAST BP 80-89 MM HG: CPT | Performed by: INTERNAL MEDICINE

## 2024-10-28 PROCEDURE — 84156 ASSAY OF PROTEIN URINE: CPT

## 2024-10-28 PROCEDURE — 82570 ASSAY OF URINE CREATININE: CPT

## 2024-10-28 PROCEDURE — 83970 ASSAY OF PARATHORMONE: CPT

## 2024-10-28 PROCEDURE — G0103 PSA SCREENING: HCPCS

## 2024-10-28 PROCEDURE — 84154 ASSAY OF PSA FREE: CPT

## 2024-10-28 PROCEDURE — 82306 VITAMIN D 25 HYDROXY: CPT

## 2024-10-28 PROCEDURE — 80053 COMPREHEN METABOLIC PANEL: CPT

## 2024-10-28 PROCEDURE — 3074F SYST BP LT 130 MM HG: CPT | Performed by: INTERNAL MEDICINE

## 2024-10-28 PROCEDURE — 80197 ASSAY OF TACROLIMUS: CPT

## 2024-10-28 PROCEDURE — 36415 COLL VENOUS BLD VENIPUNCTURE: CPT

## 2024-10-28 PROCEDURE — 99214 OFFICE O/P EST MOD 30 MIN: CPT | Performed by: INTERNAL MEDICINE

## 2024-10-28 PROCEDURE — 85025 COMPLETE CBC W/AUTO DIFF WBC: CPT

## 2024-10-28 PROCEDURE — 3048F LDL-C <100 MG/DL: CPT | Performed by: INTERNAL MEDICINE

## 2024-10-28 PROCEDURE — 3044F HG A1C LEVEL LT 7.0%: CPT | Performed by: INTERNAL MEDICINE

## 2024-10-28 ASSESSMENT — PAIN SCALES - GENERAL: PAINLEVEL_OUTOF10: 0-NO PAIN

## 2024-10-29 DIAGNOSIS — D84.9 IMMUNOSUPPRESSION: ICD-10-CM

## 2024-10-29 DIAGNOSIS — N18.30 STAGE 3 CHRONIC KIDNEY DISEASE, UNSPECIFIED WHETHER STAGE 3A OR 3B CKD (MULTI): ICD-10-CM

## 2024-10-29 DIAGNOSIS — Z94.4 HISTORY OF LIVER TRANSPLANT (MULTI): ICD-10-CM

## 2024-10-30 ENCOUNTER — APPOINTMENT (OUTPATIENT)
Dept: PRIMARY CARE | Facility: CLINIC | Age: 54
End: 2024-10-30
Payer: MEDICARE

## 2024-10-30 ENCOUNTER — PHARMACY VISIT (OUTPATIENT)
Dept: PHARMACY | Facility: CLINIC | Age: 54
End: 2024-10-30
Payer: MEDICARE

## 2024-10-30 LAB
PSA FREE MFR SERPL: 33 %
PSA FREE SERPL-MCNC: 0.2 NG/ML
PSA SERPL IA-MCNC: 0.6 NG/ML (ref 0–4)

## 2024-11-01 ENCOUNTER — HOSPITAL ENCOUNTER (OUTPATIENT)
Dept: RADIOLOGY | Facility: CLINIC | Age: 54
Discharge: HOME | End: 2024-11-01
Payer: MEDICARE

## 2024-11-01 DIAGNOSIS — N18.30 STAGE 3 CHRONIC KIDNEY DISEASE, UNSPECIFIED WHETHER STAGE 3A OR 3B CKD (MULTI): ICD-10-CM

## 2024-11-01 PROCEDURE — 76770 US EXAM ABDO BACK WALL COMP: CPT

## 2024-11-08 ENCOUNTER — OFFICE VISIT (OUTPATIENT)
Dept: SURGERY | Facility: CLINIC | Age: 54
End: 2024-11-08
Payer: MEDICARE

## 2024-11-08 VITALS
DIASTOLIC BLOOD PRESSURE: 73 MMHG | HEART RATE: 79 BPM | SYSTOLIC BLOOD PRESSURE: 104 MMHG | WEIGHT: 215 LBS | HEIGHT: 71 IN | RESPIRATION RATE: 16 BRPM | BODY MASS INDEX: 30.1 KG/M2

## 2024-11-08 DIAGNOSIS — K43.9 VENTRAL HERNIA WITHOUT OBSTRUCTION OR GANGRENE: Primary | ICD-10-CM

## 2024-11-08 PROCEDURE — 3078F DIAST BP <80 MM HG: CPT | Performed by: STUDENT IN AN ORGANIZED HEALTH CARE EDUCATION/TRAINING PROGRAM

## 2024-11-08 PROCEDURE — 99214 OFFICE O/P EST MOD 30 MIN: CPT | Performed by: STUDENT IN AN ORGANIZED HEALTH CARE EDUCATION/TRAINING PROGRAM

## 2024-11-08 PROCEDURE — 3044F HG A1C LEVEL LT 7.0%: CPT | Performed by: STUDENT IN AN ORGANIZED HEALTH CARE EDUCATION/TRAINING PROGRAM

## 2024-11-08 PROCEDURE — 3008F BODY MASS INDEX DOCD: CPT | Performed by: STUDENT IN AN ORGANIZED HEALTH CARE EDUCATION/TRAINING PROGRAM

## 2024-11-08 PROCEDURE — 3061F NEG MICROALBUMINURIA REV: CPT | Performed by: STUDENT IN AN ORGANIZED HEALTH CARE EDUCATION/TRAINING PROGRAM

## 2024-11-08 PROCEDURE — 3048F LDL-C <100 MG/DL: CPT | Performed by: STUDENT IN AN ORGANIZED HEALTH CARE EDUCATION/TRAINING PROGRAM

## 2024-11-08 PROCEDURE — 3074F SYST BP LT 130 MM HG: CPT | Performed by: STUDENT IN AN ORGANIZED HEALTH CARE EDUCATION/TRAINING PROGRAM

## 2024-11-08 ASSESSMENT — PAIN SCALES - GENERAL: PAINLEVEL_OUTOF10: 2

## 2024-11-12 NOTE — PROGRESS NOTES
Subjective   Karl Estevez is a 54 y.o. male who underwent OLT on 6/14/2022 (Liver).     He underwent incisional hernia repair 1-.  Mesh explant 3/11/24    Representing with new small hernia medial to his last repair.     Review of Systems     Objective    Exam  Gen: AAOx3, NAD  Card: Regular rate and rhythm  Resp: sat well room air, equal chest rise  Abd: incision c/d/I, 1 cm fascial defect near midline of chevron incision.   Ext: no lower extremity edema  Lab Results   Component Value Date    CREATININE 1.26 10/28/2024    K 4.9 10/28/2024    GLUCOSE 103 (H) 10/28/2024    HCT 46.3 10/28/2024    WBC 5.5 10/28/2024     10/28/2024    CALCIUM 8.6 10/28/2024     Assessment/Plan      52 y/o history of liver transplant.  Now status post incisional hernia repair of right aspect of subcostal incision 1/29/24.  S/p mesh explant 3/11/24    Has small defect distal to where his last repair was.   Will plan for open incisional hernia repair.   Discussed this will likely be with mesh again and may be larger than his last repair given the attenuation of his fascia seen on the last surgery.   He would like to proceed early December.

## 2024-11-18 ENCOUNTER — APPOINTMENT (OUTPATIENT)
Dept: PRIMARY CARE | Facility: CLINIC | Age: 54
End: 2024-11-18
Payer: MEDICARE

## 2024-11-19 DIAGNOSIS — K21.9 GASTROESOPHAGEAL REFLUX DISEASE, UNSPECIFIED WHETHER ESOPHAGITIS PRESENT: ICD-10-CM

## 2024-11-19 RX ORDER — ONDANSETRON 4 MG/1
4 TABLET, ORALLY DISINTEGRATING ORAL EVERY 8 HOURS PRN
Qty: 30 TABLET | Refills: 3 | Status: SHIPPED | OUTPATIENT
Start: 2024-11-19

## 2024-11-20 DIAGNOSIS — M48.062 NEUROGENIC CLAUDICATION DUE TO LUMBAR SPINAL STENOSIS: ICD-10-CM

## 2024-11-20 RX ORDER — OXYCODONE HYDROCHLORIDE 5 MG/1
5 TABLET ORAL EVERY 8 HOURS PRN
Qty: 18 TABLET | Refills: 0 | Status: SHIPPED | OUTPATIENT
Start: 2024-11-20 | End: 2024-11-21 | Stop reason: SDUPTHER

## 2024-11-20 NOTE — TELEPHONE ENCOUNTER
Rx Refill Request     Name: Karl Estevez  :  1970   Medication Name:  OXYCODONE 5MG  Specific Pharmacy location:  [facility]  Date of last appointment:  2024   Date of next appointment:  2024   Best number to reach patient:  [phone]

## 2024-11-21 ENCOUNTER — TELEPHONE (OUTPATIENT)
Dept: PRIMARY CARE | Facility: CLINIC | Age: 54
End: 2024-11-21
Payer: MEDICARE

## 2024-11-21 DIAGNOSIS — M48.062 NEUROGENIC CLAUDICATION DUE TO LUMBAR SPINAL STENOSIS: ICD-10-CM

## 2024-11-21 RX ORDER — OXYCODONE HYDROCHLORIDE 5 MG/1
5 TABLET ORAL EVERY 8 HOURS PRN
Qty: 90 TABLET | Refills: 0 | Status: SHIPPED | OUTPATIENT
Start: 2024-11-21 | End: 2024-12-21

## 2024-11-21 NOTE — TELEPHONE ENCOUNTER
Rx Refill Request     Name: Karl Estevez  :  1970   Medication Name:    oxyCODONE (Roxicodone) 5 mg immediate release tablet - Take 1 tablet ( 5 mg) by mouth every 8 hours if needed for severe pain (7-10).  Specific Pharmacy location:  [facility]  Date of last appointment:  2024   Date of next appointment:  2024   Best number to reach patient:  [phone]       PLEASE NOTE THAT THE RX SENT TO THE PHARMACY WILL NEED TO BE CANCELED FOR THE #18 (which was temporary).   HE IS REQUESTING A CORRECTION TO A #90 SENT TO [facility].

## 2024-11-27 ENCOUNTER — APPOINTMENT (OUTPATIENT)
Dept: PRIMARY CARE | Facility: CLINIC | Age: 54
End: 2024-11-27
Payer: MEDICARE

## 2024-11-29 ENCOUNTER — APPOINTMENT (OUTPATIENT)
Dept: PRIMARY CARE | Facility: CLINIC | Age: 54
End: 2024-11-29
Payer: MEDICARE

## 2024-11-29 VITALS
HEIGHT: 71 IN | BODY MASS INDEX: 30.1 KG/M2 | HEART RATE: 82 BPM | WEIGHT: 215 LBS | TEMPERATURE: 95.9 F | RESPIRATION RATE: 16 BRPM | DIASTOLIC BLOOD PRESSURE: 78 MMHG | SYSTOLIC BLOOD PRESSURE: 122 MMHG | OXYGEN SATURATION: 98 %

## 2024-11-29 DIAGNOSIS — N25.81 SECONDARY HYPERPARATHYROIDISM OF RENAL ORIGIN (MULTI): ICD-10-CM

## 2024-11-29 DIAGNOSIS — I10 BENIGN ESSENTIAL HTN: ICD-10-CM

## 2024-11-29 DIAGNOSIS — Z94.4 LIVER REPLACED BY TRANSPLANT (MULTI): ICD-10-CM

## 2024-11-29 DIAGNOSIS — Z00.00 MEDICARE ANNUAL WELLNESS VISIT, SUBSEQUENT: ICD-10-CM

## 2024-11-29 DIAGNOSIS — I85.10 ESOPHAGEAL VARICES IN CIRRHOSIS (MULTI): ICD-10-CM

## 2024-11-29 DIAGNOSIS — K74.60 ESOPHAGEAL VARICES IN CIRRHOSIS (MULTI): ICD-10-CM

## 2024-11-29 DIAGNOSIS — E11.42 TYPE 2 DIABETES MELLITUS WITH DIABETIC POLYNEUROPATHY, WITH LONG-TERM CURRENT USE OF INSULIN: ICD-10-CM

## 2024-11-29 DIAGNOSIS — Z00.00 ROUTINE GENERAL MEDICAL EXAMINATION AT HEALTH CARE FACILITY: Primary | ICD-10-CM

## 2024-11-29 DIAGNOSIS — Z79.4 TYPE 2 DIABETES MELLITUS WITH DIABETIC POLYNEUROPATHY, WITH LONG-TERM CURRENT USE OF INSULIN: ICD-10-CM

## 2024-11-29 DIAGNOSIS — M48.062 NEUROGENIC CLAUDICATION DUE TO LUMBAR SPINAL STENOSIS: ICD-10-CM

## 2024-11-29 DIAGNOSIS — E11.40 DIABETIC NEUROPATHY, PAINFUL (MULTI): ICD-10-CM

## 2024-11-29 DIAGNOSIS — D84.9 IMMUNOSUPPRESSION: ICD-10-CM

## 2024-11-29 PROCEDURE — 1036F TOBACCO NON-USER: CPT | Performed by: FAMILY MEDICINE

## 2024-11-29 PROCEDURE — G0439 PPPS, SUBSEQ VISIT: HCPCS | Performed by: FAMILY MEDICINE

## 2024-11-29 PROCEDURE — 99212 OFFICE O/P EST SF 10 MIN: CPT | Performed by: FAMILY MEDICINE

## 2024-11-29 PROCEDURE — 3008F BODY MASS INDEX DOCD: CPT | Performed by: FAMILY MEDICINE

## 2024-11-29 PROCEDURE — 3078F DIAST BP <80 MM HG: CPT | Performed by: FAMILY MEDICINE

## 2024-11-29 PROCEDURE — 3074F SYST BP LT 130 MM HG: CPT | Performed by: FAMILY MEDICINE

## 2024-11-29 PROCEDURE — 3061F NEG MICROALBUMINURIA REV: CPT | Performed by: FAMILY MEDICINE

## 2024-11-29 PROCEDURE — 3048F LDL-C <100 MG/DL: CPT | Performed by: FAMILY MEDICINE

## 2024-11-29 PROCEDURE — 3044F HG A1C LEVEL LT 7.0%: CPT | Performed by: FAMILY MEDICINE

## 2024-11-29 ASSESSMENT — ACTIVITIES OF DAILY LIVING (ADL)
MANAGING_FINANCES: INDEPENDENT
DRESSING: INDEPENDENT
GROCERY_SHOPPING: INDEPENDENT
BATHING: INDEPENDENT
DOING_HOUSEWORK: INDEPENDENT
TAKING_MEDICATION: INDEPENDENT

## 2024-11-29 ASSESSMENT — ENCOUNTER SYMPTOMS
DEPRESSION: 0
OCCASIONAL FEELINGS OF UNSTEADINESS: 0
LOSS OF SENSATION IN FEET: 0

## 2024-11-29 ASSESSMENT — PATIENT HEALTH QUESTIONNAIRE - PHQ9
1. LITTLE INTEREST OR PLEASURE IN DOING THINGS: NOT AT ALL
2. FEELING DOWN, DEPRESSED OR HOPELESS: NOT AT ALL
SUM OF ALL RESPONSES TO PHQ9 QUESTIONS 1 AND 2: 0

## 2024-11-29 NOTE — PROGRESS NOTES
Subjective   Reason for Visit: Karl Estevez is an 54 y.o. male here for a Medicare Wellness visit.          Reviewed all medications by prescribing practitioner or clinical pharmacist (such as prescriptions, OTCs, herbal therapies and supplements) and documented in the medical record.    HPI  54-year-old gentleman status post liver transplant doing quite well is following multiple labs through his liver transplant team  He also is followed by endocrinology takes his metformin and Mounjaro and Farxiga.  Otherwise sugars been good attention to low-salt low carbohydrate and low-fat diet well  Does take beta-blocker Coreg as well as Norvasc for hypertension  Does take Percocet i.e. oxycodone 3 a day for his claudication from lumbar degenerative disc disease and diffuse arthritis.  Also take the for painful diabetic neuropathy of the feet.  6 B12 for B12 deficiency and supplemental vitamin D for vitamin D deficiency.  He is smoke against advice but otherwise is completely off alcohol.  Denies any recent chest pain short of breath cough palpitations.  No change GI/ bowel movements.  Good he has have a small incisional hernia that may be worked on his upper abdomen.  No significant weight loss no high fever shaking chills headache or worrisome neurological red flags.  GI red flags after careful review  PSA is stable colonoscopy roughly 3 to 4 years ago was normal.  Immunizations are approved through the liver transplant team's and he is on immunosuppressive drugs  Labs from liver transplant team reviewed in detail Graefe liver function tests as well as bilirubin has been stable.  He is also been stable  OARRS:  No data recorded  I have personally reviewed the OARRS report for Karl Estevez. I have considered the risks of abuse, dependence, addiction and diversion    Is the patient prescribed a combination of a benzodiazepine and opioid?  No    Last Urine Drug Screen / ordered today: Yes  Recent Results (from the  "past 8760 hours)   Confirmation Opiate/Opioid/Benzo Prescription Compliance    Collection Time: 04/23/24  9:14 AM   Result Value Ref Range    Clonazepam <25 <25 ng/mL    7-Aminoclonazepam <25 <25 ng/mL    Alprazolam <25 <25 ng/mL    Alpha-Hydroxyalprazolam <25 <25 ng/mL    Midazolam <25 <25 ng/mL    Alpha-Hydroxymidazolam <25 <25 ng/mL    Chlordiazepoxide <25 <25 ng/mL    Diazepam <25 <25 ng/mL    Nordiazepam <25 <25 ng/mL    Temazepam <25 <25 ng/mL    Oxazepam <25 <25 ng/mL    Lorazepam <25 <25 ng/mL    Methadone <25 <25 ng/mL    EDDP <25 <25 ng/mL    6-Acetylmorphine <25 <25 ng/mL    Codeine <50 <50 ng/mL    Hydrocodone <25 <25 ng/mL    Hydromorphone <25 <25 ng/mL    Morphine  <50 <50 ng/mL    Norhydrocodone <25 <25 ng/mL    Noroxycodone >1,000 (H) <25 ng/mL    Oxycodone 1,439 (H) <25 ng/mL    Oxymorphone 1,506 (H) <25 ng/mL    Fentanyl <2.5 <2.5 ng/mL    Norfentanyl <2.5 <2.5 ng/mL    Tramadol <50 <50 ng/mL    O-Desmethyltramadol <50 <50 ng/mL    Zolpidem <25 <25 ng/mL    Zolpidem Metabolite (ZCA) <25 <25 ng/mL   Screen Opiate/Opioid/Benzo Prescription Compliance    Collection Time: 04/23/24  9:14 AM   Result Value Ref Range    Creatinine, Urine Random 134.6 20.0 - 370.0 mg/dL    Amphetamine Screen, Urine Presumptive Negative Presumptive Negative    Barbiturate Screen, Urine Presumptive Negative Presumptive Negative    Cannabinoid Screen, Urine Presumptive Negative Presumptive Negative    Cocaine Metabolite Screen, Urine Presumptive Negative Presumptive Negative    PCP Screen, Urine Presumptive Negative Presumptive Negative     Results are as expected.         Controlled Substance Agreement:  Date of the Last Agreement: 4-  Reviewed Controlled Substance Agreement including but not limited to the benefits, risks, and alternatives to treatment with a Controlled Substance medication(s).    Opioids:  What is the patient's goal of therapy?  Daily \"life control of painful diabetic neuropathy of the feet as well " as chronic low back discomfort preoperatively from lumbar intervertebral disc disease as well as diffuse arthritis.  But I still always a risk much less upper abdominal pain from his ventral hernia but benefits still always a risk.  No evidence abuse side effects or divergence  Is this being achieved with current treatment? yes    I have calculated the patient's Morphine Dose Equivalent (MED):   I have considered referral to Pain Management and/or a specialist, and do not feel it is necessary at this time.    I feel that it is clinically indicated to continue this current medication regimen after consideration of alternative therapies, and other non-opioid treatment.    Opioid Risk Screening:  No data recorded    Pain Assessment:  No data recorded  Patient Care Team:  Jose Sol DO as PCP - General  Jose Sol DO as PCP - [employer] Advantage PCP  Alayna Smith RN as Transplant Coordinator (Transplant)     Review of Systems   Constitutional:  Negative for fatigue, fever and unexpected weight change.   HENT:  Positive for rhinorrhea. Negative for sinus pressure and sinus pain.    Eyes:  Negative for visual disturbance.   Respiratory:  Positive for cough. Negative for chest tightness, shortness of breath and wheezing.    Cardiovascular:  Negative for chest pain, palpitations and leg swelling.   Gastrointestinal:  Positive for abdominal pain. Negative for abdominal distention, blood in stool, constipation, diarrhea, nausea and vomiting.   Genitourinary:  Positive for frequency. Negative for dysuria, flank pain and hematuria.   Musculoskeletal:  Positive for arthralgias, back pain and myalgias.   Skin:  Negative for rash.   Neurological:  Positive for numbness. Negative for syncope, weakness, light-headedness and headaches.   Hematological:  Negative for adenopathy.   Psychiatric/Behavioral:  Positive for sleep disturbance. Negative for behavioral problems, confusion and suicidal ideas.        Objective  "  Vitals:  Resp 16   Ht 1.803 m (5' 11\")   Wt 97.5 kg (215 lb)   BMI 29.99 kg/m²     Notes            Component  Ref Range & Units 1 mo ago  (10/28/24) 1 mo ago  (10/22/24) 2 mo ago  (9/11/24) 5 mo ago  (6/18/24) 5 mo ago  (6/6/24) 7 mo ago  (4/29/24) 8 mo ago  (3/28/24)   Glucose  74 - 99 mg/dL 103 High  106 High  91 101 High  92 90 82   Sodium  136 - 145 mmol/L 139 137 137 136 137 135 Low  136   Potassium  3.5 - 5.3 mmol/L 4.9 4.2 4.1 4.2 4.6 4.5 4.2   Chloride  98 - 107 mmol/L 102 99 102 101 102 99 99   Bicarbonate  21 - 32 mmol/L 29 30 28 27 28 30 30   Anion Gap  10 - 20 mmol/L 13 12 11 12 12 11 11   Urea Nitrogen  6 - 23 mg/dL 11 18 17 13 17 12 13   Creatinine  0.50 - 1.30 mg/dL 1.26 1.43 High  1.27 1.01 1.32 High  1.09 1.11   eGFR  >60 mL/min/1.73m*2 69 59 Low  CM 68 CM 89 CM 65 CM 82 CM 80 CM   Comment: Calculations of estimated GFR are performed using the 2021 CKD-EPI Study Refit equation without the race variable for the IDMS-Traceable creatinine methods.  https://jasn.asnjournals.org/content/early/2021/09/22/ASN.1090551027   Calcium  8.6 - 10.6 mg/dL 8.6 8.8 R 8.8 R 8.7 R 8.4 Low  R 8.7 R 8.9 R   Albumin  3.4 - 5.0 g/dL 4.3 4.4 4.3 4.1 3.9 4.3 4.3   Alkaline Phosphatase  33 - 120 U/L 59 57 56 62 52 66 65   Total Protein  6.4 - 8.2 g/dL 6.9 6.9 6.6 6.5 5.9 Low  6.7 6.8   AST  9 - 39 U/L 11 13 12 13 12 10 10   Bilirubin, Total  0.0 - 1.2 mg/dL 0.7 0.6 0.5 0.6 1.0 0.5 0.6   ALT  10 - 52 U/L 11 11 CM 11 CM 8 Low  CM 10 CM 7 Low  CM 7 Low  CM   Comment: Patients tr      PSA, Total and Free  Order: [id number]   Status: Final result       Visible to patient: Yes (seen)       Dx: Screening for prostate cancer    0 Result Notes        Component  Ref Range & Units 1 mo ago 6 mo ago 4 yr ago   PSA  0.0 - 4.0 ng/mL 0.6 0.3 CM 0.1 CM   Comment: INTERPRETIVE INFORMATION: Prostate Specific Antigen    The Roche PSA electrochemiluminescent immunoassay is used. Results        Physical Exam  Vital signs are normal weight " is stable  Neck neck is supple no mass adenopathy bruits rigidity  Cardiovascular RSR without significant murmurs gallop or ectopy  Pulmonary chest clear bronchial breathing noted otherwise no wheezing rales or rhonchi  Abdominal small incisional his hernia noted to the right subcostal area otherwise incision is healing nicely.  No lower abdominal tenderness or rebound no CVA tenderness  Assessment & Plan  Esophageal varices in cirrhosis (Multi)  No bleeding and stable egd         Secondary hyperparathyroidism of renal origin (Multi)  Stable levels         Type 2 diabetes mellitus with diabetic polyneuropathy, with long-term current use of insulin         Neurogenic claudication due to lumbar spinal stenosis         Benign essential HTN         Diabetic neuropathy, painful (Multi)         Immunosuppression         Liver replaced by transplant (Multi)         Medicare annual wellness visit, subsequent                 Has done well status post liver transplant and alcohol abstinence  Trying to wean off smoking  Continue above diabetic medicine follow-up endocrinology  Continue immunosuppression follow-up with liver transplant team  Continue his pain medicine for both abdominal pain for pain as well as low back pain.  Has been endorsed by his neurosurgeon and orthopedic surgeon.  Continue supplemental vitamin D and supplemental B12.  Continue to antihypertensive medicines and certainly maintain his low-salt low carbohydrate low-fat diet  Template for opioids completed.  Safety is reviewed.  OARRS performed.  Recheck in 3 months call earlier if interval problems all questions were addressed regarding his multiple medicines.  Recheck colonoscopy in 1 to 2 years  @discharge  The above diagnosis and treatment plan was discussed with the patient patient will continue appropriate diet and exercise as reviewed  Patient will recheck earlier if any interval problems of significance or clinical worsening of the above problems.   Agrees above surveillance.  All question were addressed regarding above meds

## 2024-12-03 DIAGNOSIS — N18.32 CHRONIC KIDNEY DISEASE (CKD) STAGE G3B/A3, MODERATELY DECREASED GLOMERULAR FILTRATION RATE (GFR) BETWEEN 30-44 ML/MIN/1.73 SQUARE METER AND ALBUMINURIA CREATININE RATIO GREATER THAN 300 MG/G * (MULTI): ICD-10-CM

## 2024-12-03 PROBLEM — Z00.00 MEDICARE ANNUAL WELLNESS VISIT, SUBSEQUENT: Status: ACTIVE | Noted: 2024-12-03

## 2024-12-03 RX ORDER — ALLOPURINOL 100 MG/1
100 TABLET ORAL DAILY
Qty: 90 TABLET | Refills: 3 | Status: SHIPPED | OUTPATIENT
Start: 2024-12-03 | End: 2025-12-03

## 2024-12-03 ASSESSMENT — ENCOUNTER SYMPTOMS
FEVER: 0
ADENOPATHY: 0
FLANK PAIN: 0
ARTHRALGIAS: 1
SHORTNESS OF BREATH: 0
NUMBNESS: 1
CHEST TIGHTNESS: 0
FATIGUE: 0
DIARRHEA: 0
MYALGIAS: 1
ABDOMINAL PAIN: 1
BLOOD IN STOOL: 0
WEAKNESS: 0
CONFUSION: 0
COUGH: 1
HEADACHES: 0
BACK PAIN: 1
NAUSEA: 0
SINUS PRESSURE: 0
DYSURIA: 0
SLEEP DISTURBANCE: 1
FREQUENCY: 1
WHEEZING: 0
RHINORRHEA: 1
SINUS PAIN: 0
HEMATURIA: 0
CONSTIPATION: 0
LIGHT-HEADEDNESS: 0
VOMITING: 0
UNEXPECTED WEIGHT CHANGE: 0
ABDOMINAL DISTENTION: 0
PALPITATIONS: 0

## 2024-12-17 ENCOUNTER — TELEPHONE (OUTPATIENT)
Dept: PRIMARY CARE | Facility: CLINIC | Age: 54
End: 2024-12-17
Payer: MEDICARE

## 2024-12-17 DIAGNOSIS — J01.01 ACUTE RECURRENT MAXILLARY SINUSITIS: Primary | ICD-10-CM

## 2024-12-17 RX ORDER — AMOXICILLIN AND CLAVULANATE POTASSIUM 875; 125 MG/1; MG/1
875 TABLET, FILM COATED ORAL
Qty: 20 TABLET | Refills: 0 | Status: SHIPPED | OUTPATIENT
Start: 2024-12-17 | End: 2024-12-27

## 2024-12-17 NOTE — TELEPHONE ENCOUNTER
Patient called in stating he has been having sinus pressure and drainage for 3-4 days. He stated he is scheduled for surgery in 13 days and is worried about being sick. Patient wondering if Dr. Sol could call in an antibiotic to help?  [facility]   Please Advise

## 2024-12-26 ENCOUNTER — SPECIALTY PHARMACY (OUTPATIENT)
Dept: PHARMACY | Facility: CLINIC | Age: 54
End: 2024-12-26

## 2024-12-30 ENCOUNTER — ANESTHESIA EVENT (OUTPATIENT)
Dept: OPERATING ROOM | Facility: HOSPITAL | Age: 54
End: 2024-12-30
Payer: MEDICARE

## 2024-12-30 ENCOUNTER — ANESTHESIA (OUTPATIENT)
Dept: OPERATING ROOM | Facility: HOSPITAL | Age: 54
End: 2024-12-30
Payer: MEDICARE

## 2024-12-30 ENCOUNTER — HOSPITAL ENCOUNTER (OUTPATIENT)
Facility: HOSPITAL | Age: 54
Setting detail: OUTPATIENT SURGERY
Discharge: HOME | End: 2024-12-30
Attending: STUDENT IN AN ORGANIZED HEALTH CARE EDUCATION/TRAINING PROGRAM | Admitting: STUDENT IN AN ORGANIZED HEALTH CARE EDUCATION/TRAINING PROGRAM
Payer: MEDICARE

## 2024-12-30 VITALS
BODY MASS INDEX: 30.02 KG/M2 | SYSTOLIC BLOOD PRESSURE: 113 MMHG | RESPIRATION RATE: 16 BRPM | WEIGHT: 214.4 LBS | OXYGEN SATURATION: 96 % | HEART RATE: 86 BPM | TEMPERATURE: 99 F | DIASTOLIC BLOOD PRESSURE: 64 MMHG | HEIGHT: 71 IN

## 2024-12-30 DIAGNOSIS — G89.18 ACUTE POSTOPERATIVE PAIN: Primary | ICD-10-CM

## 2024-12-30 DIAGNOSIS — K43.2 INCISIONAL HERNIA, WITHOUT OBSTRUCTION OR GANGRENE: ICD-10-CM

## 2024-12-30 LAB
ABO GROUP (TYPE) IN BLOOD: NORMAL
ANTIBODY SCREEN: NORMAL
GLUCOSE BLD MANUAL STRIP-MCNC: 107 MG/DL (ref 74–99)
RH FACTOR (ANTIGEN D): NORMAL

## 2024-12-30 PROCEDURE — 2500000004 HC RX 250 GENERAL PHARMACY W/ HCPCS (ALT 636 FOR OP/ED): Performed by: ANESTHESIOLOGY

## 2024-12-30 PROCEDURE — 49593 RPR AA HRN 1ST 3-10 RDC: CPT | Performed by: STUDENT IN AN ORGANIZED HEALTH CARE EDUCATION/TRAINING PROGRAM

## 2024-12-30 PROCEDURE — A49593 PR RPR AA HERNIA 1ST 3-10 CM REDUCIBLE: Performed by: NURSE ANESTHETIST, CERTIFIED REGISTERED

## 2024-12-30 PROCEDURE — 2500000005 HC RX 250 GENERAL PHARMACY W/O HCPCS: Performed by: STUDENT IN AN ORGANIZED HEALTH CARE EDUCATION/TRAINING PROGRAM

## 2024-12-30 PROCEDURE — 76942 ECHO GUIDE FOR BIOPSY: CPT

## 2024-12-30 PROCEDURE — 64488 TAP BLOCK BI INJECTION: CPT

## 2024-12-30 PROCEDURE — 36415 COLL VENOUS BLD VENIPUNCTURE: CPT | Performed by: STUDENT IN AN ORGANIZED HEALTH CARE EDUCATION/TRAINING PROGRAM

## 2024-12-30 PROCEDURE — 2500000004 HC RX 250 GENERAL PHARMACY W/ HCPCS (ALT 636 FOR OP/ED): Performed by: NURSE ANESTHETIST, CERTIFIED REGISTERED

## 2024-12-30 PROCEDURE — 2500000004 HC RX 250 GENERAL PHARMACY W/ HCPCS (ALT 636 FOR OP/ED)

## 2024-12-30 PROCEDURE — 3700000002 HC GENERAL ANESTHESIA TIME - EACH INCREMENTAL 1 MINUTE: Performed by: STUDENT IN AN ORGANIZED HEALTH CARE EDUCATION/TRAINING PROGRAM

## 2024-12-30 PROCEDURE — 3600000008 HC OR TIME - EACH INCREMENTAL 1 MINUTE - PROCEDURE LEVEL THREE: Performed by: STUDENT IN AN ORGANIZED HEALTH CARE EDUCATION/TRAINING PROGRAM

## 2024-12-30 PROCEDURE — 3700000001 HC GENERAL ANESTHESIA TIME - INITIAL BASE CHARGE: Performed by: STUDENT IN AN ORGANIZED HEALTH CARE EDUCATION/TRAINING PROGRAM

## 2024-12-30 PROCEDURE — 7100000002 HC RECOVERY ROOM TIME - EACH INCREMENTAL 1 MINUTE: Performed by: STUDENT IN AN ORGANIZED HEALTH CARE EDUCATION/TRAINING PROGRAM

## 2024-12-30 PROCEDURE — 82947 ASSAY GLUCOSE BLOOD QUANT: CPT

## 2024-12-30 PROCEDURE — 7100000009 HC PHASE TWO TIME - INITIAL BASE CHARGE: Performed by: STUDENT IN AN ORGANIZED HEALTH CARE EDUCATION/TRAINING PROGRAM

## 2024-12-30 PROCEDURE — 7100000010 HC PHASE TWO TIME - EACH INCREMENTAL 1 MINUTE: Performed by: STUDENT IN AN ORGANIZED HEALTH CARE EDUCATION/TRAINING PROGRAM

## 2024-12-30 PROCEDURE — 7100000001 HC RECOVERY ROOM TIME - INITIAL BASE CHARGE: Performed by: STUDENT IN AN ORGANIZED HEALTH CARE EDUCATION/TRAINING PROGRAM

## 2024-12-30 PROCEDURE — 99223 1ST HOSP IP/OBS HIGH 75: CPT | Performed by: STUDENT IN AN ORGANIZED HEALTH CARE EDUCATION/TRAINING PROGRAM

## 2024-12-30 PROCEDURE — 2500000001 HC RX 250 WO HCPCS SELF ADMINISTERED DRUGS (ALT 637 FOR MEDICARE OP): Performed by: ANESTHESIOLOGY

## 2024-12-30 PROCEDURE — 64420 NJX AA&/STRD NTRCOST NRV 1: CPT

## 2024-12-30 PROCEDURE — C1781 MESH (IMPLANTABLE): HCPCS | Performed by: STUDENT IN AN ORGANIZED HEALTH CARE EDUCATION/TRAINING PROGRAM

## 2024-12-30 PROCEDURE — A49593 PR RPR AA HERNIA 1ST 3-10 CM REDUCIBLE: Performed by: ANESTHESIOLOGY

## 2024-12-30 PROCEDURE — 99222 1ST HOSP IP/OBS MODERATE 55: CPT | Performed by: STUDENT IN AN ORGANIZED HEALTH CARE EDUCATION/TRAINING PROGRAM

## 2024-12-30 PROCEDURE — 3600000003 HC OR TIME - INITIAL BASE CHARGE - PROCEDURE LEVEL THREE: Performed by: STUDENT IN AN ORGANIZED HEALTH CARE EDUCATION/TRAINING PROGRAM

## 2024-12-30 PROCEDURE — 2720000007 HC OR 272 NO HCPCS: Performed by: STUDENT IN AN ORGANIZED HEALTH CARE EDUCATION/TRAINING PROGRAM

## 2024-12-30 PROCEDURE — 86901 BLOOD TYPING SEROLOGIC RH(D): CPT | Performed by: STUDENT IN AN ORGANIZED HEALTH CARE EDUCATION/TRAINING PROGRAM

## 2024-12-30 PROCEDURE — 2780000003 HC OR 278 NO HCPCS: Performed by: STUDENT IN AN ORGANIZED HEALTH CARE EDUCATION/TRAINING PROGRAM

## 2024-12-30 DEVICE — VENTRALIGHT ST MESH, 8" (20.3 CM), CIRCLE
Type: IMPLANTABLE DEVICE | Site: ABDOMEN | Status: FUNCTIONAL
Brand: VENTRALIGHT

## 2024-12-30 RX ORDER — ROCURONIUM BROMIDE 10 MG/ML
INJECTION, SOLUTION INTRAVENOUS
Status: COMPLETED
Start: 2024-12-30 | End: 2024-12-30

## 2024-12-30 RX ORDER — OXYCODONE HYDROCHLORIDE 5 MG/1
5 TABLET ORAL ONCE
Status: COMPLETED | OUTPATIENT
Start: 2024-12-30 | End: 2024-12-30

## 2024-12-30 RX ORDER — FENTANYL CITRATE 50 UG/ML
INJECTION, SOLUTION INTRAMUSCULAR; INTRAVENOUS AS NEEDED
Status: DISCONTINUED | OUTPATIENT
Start: 2024-12-30 | End: 2024-12-30

## 2024-12-30 RX ORDER — SODIUM CHLORIDE, SODIUM LACTATE, POTASSIUM CHLORIDE, CALCIUM CHLORIDE 600; 310; 30; 20 MG/100ML; MG/100ML; MG/100ML; MG/100ML
INJECTION, SOLUTION INTRAVENOUS
Status: COMPLETED
Start: 2024-12-30 | End: 2024-12-30

## 2024-12-30 RX ORDER — PROPOFOL 10 MG/ML
INJECTION, EMULSION INTRAVENOUS AS NEEDED
Status: DISCONTINUED | OUTPATIENT
Start: 2024-12-30 | End: 2024-12-30

## 2024-12-30 RX ORDER — GLYCOPYRROLATE 0.2 MG/ML
INJECTION INTRAMUSCULAR; INTRAVENOUS AS NEEDED
Status: DISCONTINUED | OUTPATIENT
Start: 2024-12-30 | End: 2024-12-30

## 2024-12-30 RX ORDER — HYDROMORPHONE HYDROCHLORIDE 1 MG/ML
INJECTION, SOLUTION INTRAMUSCULAR; INTRAVENOUS; SUBCUTANEOUS
Status: COMPLETED
Start: 2024-12-30 | End: 2024-12-30

## 2024-12-30 RX ORDER — NEOSTIGMINE METHYLSULFATE 1 MG/ML
INJECTION INTRAVENOUS
Status: COMPLETED
Start: 2024-12-30 | End: 2024-12-30

## 2024-12-30 RX ORDER — CYCLOBENZAPRINE HCL 5 MG
5 TABLET ORAL 3 TIMES DAILY PRN
Qty: 42 TABLET | Refills: 0 | Status: SHIPPED | OUTPATIENT
Start: 2024-12-30 | End: 2024-12-30 | Stop reason: HOSPADM

## 2024-12-30 RX ORDER — HYDROMORPHONE HYDROCHLORIDE 0.2 MG/ML
0.2 INJECTION INTRAMUSCULAR; INTRAVENOUS; SUBCUTANEOUS EVERY 5 MIN PRN
Status: DISCONTINUED | OUTPATIENT
Start: 2024-12-30 | End: 2024-12-30 | Stop reason: HOSPADM

## 2024-12-30 RX ORDER — LIDOCAINE HYDROCHLORIDE 10 MG/ML
0.1 INJECTION, SOLUTION INFILTRATION; PERINEURAL ONCE
Status: DISCONTINUED | OUTPATIENT
Start: 2024-12-30 | End: 2024-12-30 | Stop reason: HOSPADM

## 2024-12-30 RX ORDER — ROPIVACAINE HYDROCHLORIDE 5 MG/ML
INJECTION, SOLUTION EPIDURAL; INFILTRATION; PERINEURAL AS NEEDED
Status: DISCONTINUED | OUTPATIENT
Start: 2024-12-30 | End: 2024-12-30

## 2024-12-30 RX ORDER — GLYCOPYRROLATE 0.2 MG/ML
INJECTION INTRAMUSCULAR; INTRAVENOUS
Status: COMPLETED
Start: 2024-12-30 | End: 2024-12-30

## 2024-12-30 RX ORDER — FENTANYL CITRATE 50 UG/ML
INJECTION, SOLUTION INTRAMUSCULAR; INTRAVENOUS
Status: COMPLETED
Start: 2024-12-30 | End: 2024-12-30

## 2024-12-30 RX ORDER — PROPOFOL 10 MG/ML
INJECTION, EMULSION INTRAVENOUS
Status: COMPLETED
Start: 2024-12-30 | End: 2024-12-30

## 2024-12-30 RX ORDER — CEFAZOLIN 1 G/1
INJECTION, POWDER, FOR SOLUTION INTRAVENOUS
Status: DISCONTINUED
Start: 2024-12-30 | End: 2024-12-30 | Stop reason: HOSPADM

## 2024-12-30 RX ORDER — ROCURONIUM BROMIDE 10 MG/ML
INJECTION, SOLUTION INTRAVENOUS AS NEEDED
Status: DISCONTINUED | OUTPATIENT
Start: 2024-12-30 | End: 2024-12-30

## 2024-12-30 RX ORDER — ONDANSETRON HYDROCHLORIDE 2 MG/ML
4 INJECTION, SOLUTION INTRAVENOUS ONCE AS NEEDED
Status: COMPLETED | OUTPATIENT
Start: 2024-12-30 | End: 2024-12-30

## 2024-12-30 RX ORDER — SODIUM CHLORIDE, SODIUM LACTATE, POTASSIUM CHLORIDE, CALCIUM CHLORIDE 600; 310; 30; 20 MG/100ML; MG/100ML; MG/100ML; MG/100ML
50 INJECTION, SOLUTION INTRAVENOUS CONTINUOUS
Status: DISCONTINUED | OUTPATIENT
Start: 2024-12-30 | End: 2024-12-30 | Stop reason: HOSPADM

## 2024-12-30 RX ORDER — ONDANSETRON HYDROCHLORIDE 2 MG/ML
INJECTION, SOLUTION INTRAVENOUS
Status: COMPLETED
Start: 2024-12-30 | End: 2024-12-30

## 2024-12-30 RX ORDER — POLYMYXIN B 500000 [USP'U]/1
INJECTION, POWDER, LYOPHILIZED, FOR SOLUTION INTRAMUSCULAR; INTRATHECAL; INTRAVENOUS; OPHTHALMIC AS NEEDED
Status: DISCONTINUED | OUTPATIENT
Start: 2024-12-30 | End: 2024-12-30 | Stop reason: HOSPADM

## 2024-12-30 RX ORDER — OXYCODONE HYDROCHLORIDE 10 MG/1
10 TABLET ORAL EVERY 6 HOURS PRN
Qty: 28 TABLET | Refills: 0 | Status: SHIPPED | OUTPATIENT
Start: 2024-12-30 | End: 2025-01-06 | Stop reason: SDUPTHER

## 2024-12-30 RX ORDER — SEVOFLURANE 250 ML/250ML
LIQUID RESPIRATORY (INHALATION)
Status: DISCONTINUED
Start: 2024-12-30 | End: 2024-12-30 | Stop reason: HOSPADM

## 2024-12-30 RX ORDER — MIDAZOLAM HYDROCHLORIDE 1 MG/ML
INJECTION INTRAMUSCULAR; INTRAVENOUS AS NEEDED
Status: DISCONTINUED | OUTPATIENT
Start: 2024-12-30 | End: 2024-12-30

## 2024-12-30 RX ORDER — HYDROMORPHONE HYDROCHLORIDE 1 MG/ML
INJECTION, SOLUTION INTRAMUSCULAR; INTRAVENOUS; SUBCUTANEOUS AS NEEDED
Status: DISCONTINUED | OUTPATIENT
Start: 2024-12-30 | End: 2024-12-30

## 2024-12-30 RX ORDER — NEOSTIGMINE METHYLSULFATE 1 MG/ML
INJECTION INTRAVENOUS AS NEEDED
Status: DISCONTINUED | OUTPATIENT
Start: 2024-12-30 | End: 2024-12-30

## 2024-12-30 RX ORDER — METOPROLOL TARTRATE 1 MG/ML
INJECTION, SOLUTION INTRAVENOUS AS NEEDED
Status: DISCONTINUED | OUTPATIENT
Start: 2024-12-30 | End: 2024-12-30

## 2024-12-30 RX ORDER — METHOCARBAMOL 500 MG/1
500 TABLET, FILM COATED ORAL EVERY 6 HOURS PRN
Qty: 56 TABLET | Refills: 0 | Status: SHIPPED | OUTPATIENT
Start: 2024-12-30 | End: 2025-01-06 | Stop reason: SDUPTHER

## 2024-12-30 RX ORDER — CEFAZOLIN 1 G/1
INJECTION, POWDER, FOR SOLUTION INTRAVENOUS
Status: COMPLETED
Start: 2024-12-30 | End: 2024-12-30

## 2024-12-30 RX ORDER — SODIUM CHLORIDE, SODIUM LACTATE, POTASSIUM CHLORIDE, CALCIUM CHLORIDE 600; 310; 30; 20 MG/100ML; MG/100ML; MG/100ML; MG/100ML
INJECTION, SOLUTION INTRAVENOUS CONTINUOUS PRN
Status: DISCONTINUED | OUTPATIENT
Start: 2024-12-30 | End: 2024-12-30

## 2024-12-30 RX ORDER — LIDOCAINE HCL/PF 100 MG/5ML
SYRINGE (ML) INTRAVENOUS
Status: DISCONTINUED
Start: 2024-12-30 | End: 2024-12-30 | Stop reason: HOSPADM

## 2024-12-30 RX ORDER — MIDAZOLAM HYDROCHLORIDE 1 MG/ML
INJECTION INTRAMUSCULAR; INTRAVENOUS
Status: COMPLETED
Start: 2024-12-30 | End: 2024-12-30

## 2024-12-30 RX ORDER — LIDOCAINE HYDROCHLORIDE 20 MG/ML
INJECTION, SOLUTION INFILTRATION; PERINEURAL AS NEEDED
Status: DISCONTINUED | OUTPATIENT
Start: 2024-12-30 | End: 2024-12-30

## 2024-12-30 RX ORDER — SODIUM CHLORIDE 0.9 G/100ML
IRRIGANT IRRIGATION AS NEEDED
Status: DISCONTINUED | OUTPATIENT
Start: 2024-12-30 | End: 2024-12-30 | Stop reason: HOSPADM

## 2024-12-30 RX ORDER — ONDANSETRON HYDROCHLORIDE 2 MG/ML
INJECTION, SOLUTION INTRAVENOUS AS NEEDED
Status: DISCONTINUED | OUTPATIENT
Start: 2024-12-30 | End: 2024-12-30

## 2024-12-30 RX ORDER — METOPROLOL TARTRATE 1 MG/ML
INJECTION, SOLUTION INTRAVENOUS
Status: COMPLETED
Start: 2024-12-30 | End: 2024-12-30

## 2024-12-30 RX ORDER — CEFAZOLIN 1 G/1
INJECTION, POWDER, FOR SOLUTION INTRAVENOUS AS NEEDED
Status: DISCONTINUED | OUTPATIENT
Start: 2024-12-30 | End: 2024-12-30

## 2024-12-30 RX ORDER — OXYCODONE HYDROCHLORIDE 5 MG/1
5 TABLET ORAL EVERY 6 HOURS PRN
Qty: 20 TABLET | Refills: 0 | Status: SHIPPED | OUTPATIENT
Start: 2024-12-30 | End: 2024-12-30 | Stop reason: HOSPADM

## 2024-12-30 RX ADMIN — ROCURONIUM BROMIDE 10 MG: 10 INJECTION INTRAVENOUS at 08:44

## 2024-12-30 RX ADMIN — ROCURONIUM BROMIDE 10 MG: 10 INJECTION INTRAVENOUS at 08:19

## 2024-12-30 RX ADMIN — ONDANSETRON 4 MG: 2 INJECTION INTRAMUSCULAR; INTRAVENOUS at 08:50

## 2024-12-30 RX ADMIN — METOPROLOL TARTRATE 5 MG: 1 INJECTION, SOLUTION INTRAVENOUS at 07:26

## 2024-12-30 RX ADMIN — ONDANSETRON 4 MG: 2 INJECTION INTRAMUSCULAR; INTRAVENOUS at 11:05

## 2024-12-30 RX ADMIN — HYDROMORPHONE HYDROCHLORIDE 1 MG: 1 INJECTION, SOLUTION INTRAMUSCULAR; INTRAVENOUS; SUBCUTANEOUS at 09:37

## 2024-12-30 RX ADMIN — FENTANYL CITRATE 100 MCG: 50 INJECTION, SOLUTION INTRAMUSCULAR; INTRAVENOUS at 07:20

## 2024-12-30 RX ADMIN — HYDROMORPHONE HYDROCHLORIDE 0.5 MG: 1 INJECTION, SOLUTION INTRAMUSCULAR; INTRAVENOUS; SUBCUTANEOUS at 10:11

## 2024-12-30 RX ADMIN — HYDROMORPHONE HYDROCHLORIDE 1 MG: 1 INJECTION, SOLUTION INTRAMUSCULAR; INTRAVENOUS; SUBCUTANEOUS at 08:49

## 2024-12-30 RX ADMIN — ROCURONIUM BROMIDE 50 MG: 10 INJECTION INTRAVENOUS at 07:20

## 2024-12-30 RX ADMIN — CEFAZOLIN 2 G: 1 INJECTION, POWDER, FOR SOLUTION INTRAMUSCULAR; INTRAVENOUS at 07:29

## 2024-12-30 RX ADMIN — DEXAMETHASONE SODIUM PHOSPHATE 4 MG: 4 INJECTION INTRA-ARTICULAR; INTRALESIONAL; INTRAMUSCULAR; INTRAVENOUS; SOFT TISSUE at 07:45

## 2024-12-30 RX ADMIN — ROPIVACAINE HYDROCHLORIDE 15 ML: 5 INJECTION, SOLUTION EPIDURAL; INFILTRATION; PERINEURAL at 10:53

## 2024-12-30 RX ADMIN — ROCURONIUM BROMIDE 10 MG: 10 INJECTION INTRAVENOUS at 08:56

## 2024-12-30 RX ADMIN — PROPOFOL 200 MG: 10 INJECTION, EMULSION INTRAVENOUS at 07:20

## 2024-12-30 RX ADMIN — GLYCOPYRROLATE 0.6 MG: 0.2 INJECTION, SOLUTION INTRAMUSCULAR; INTRAVENOUS at 09:42

## 2024-12-30 RX ADMIN — SODIUM CHLORIDE, POTASSIUM CHLORIDE, SODIUM LACTATE AND CALCIUM CHLORIDE: 600; 310; 30; 20 INJECTION, SOLUTION INTRAVENOUS at 07:14

## 2024-12-30 RX ADMIN — LIDOCAINE HYDROCHLORIDE 40 MG: 20 INJECTION, SOLUTION INFILTRATION; PERINEURAL at 07:20

## 2024-12-30 RX ADMIN — OXYCODONE HYDROCHLORIDE 5 MG: 5 TABLET ORAL at 11:29

## 2024-12-30 RX ADMIN — ROPIVACAINE HYDROCHLORIDE 15 ML: 5 INJECTION, SOLUTION EPIDURAL; INFILTRATION; PERINEURAL at 11:02

## 2024-12-30 RX ADMIN — ROCURONIUM BROMIDE 20 MG: 10 INJECTION INTRAVENOUS at 08:04

## 2024-12-30 RX ADMIN — NEOSTIGMINE METHYLSULFATE 4 MG: 1 INJECTION INTRAVENOUS at 09:42

## 2024-12-30 RX ADMIN — MIDAZOLAM HYDROCHLORIDE 2 MG: 1 INJECTION, SOLUTION INTRAMUSCULAR; INTRAVENOUS at 07:20

## 2024-12-30 SDOH — HEALTH STABILITY: MENTAL HEALTH: CURRENT SMOKER: 0

## 2024-12-30 ASSESSMENT — PAIN SCALES - GENERAL
PAINLEVEL_OUTOF10: 7
PAINLEVEL_OUTOF10: 7
PAINLEVEL_OUTOF10: 5 - MODERATE PAIN
PAINLEVEL_OUTOF10: 0 - NO PAIN
PAINLEVEL_OUTOF10: 7
PAINLEVEL_OUTOF10: 8
PAINLEVEL_OUTOF10: 5 - MODERATE PAIN
PAINLEVEL_OUTOF10: 7
PAINLEVEL_OUTOF10: 7
PAINLEVEL_OUTOF10: 8
PAINLEVEL_OUTOF10: 8

## 2024-12-30 ASSESSMENT — COLUMBIA-SUICIDE SEVERITY RATING SCALE - C-SSRS
2. HAVE YOU ACTUALLY HAD ANY THOUGHTS OF KILLING YOURSELF?: NO
6. HAVE YOU EVER DONE ANYTHING, STARTED TO DO ANYTHING, OR PREPARED TO DO ANYTHING TO END YOUR LIFE?: NO
1. IN THE PAST MONTH, HAVE YOU WISHED YOU WERE DEAD OR WISHED YOU COULD GO TO SLEEP AND NOT WAKE UP?: NO

## 2024-12-30 ASSESSMENT — PAIN - FUNCTIONAL ASSESSMENT
PAIN_FUNCTIONAL_ASSESSMENT: 0-10

## 2024-12-30 ASSESSMENT — PAIN DESCRIPTION - ORIENTATION: ORIENTATION: MID

## 2024-12-30 ASSESSMENT — PAIN DESCRIPTION - LOCATION: LOCATION: ABDOMEN

## 2024-12-30 NOTE — BRIEF OP NOTE
Date: 2024  OR Location: [facility] OR    Name: Karl Estevez, : 1970, Age: 54 y.o., MRN: [id number], Sex: male    Diagnosis  Pre-op Diagnosis      * Ventral hernia without obstruction or gangrene [K43.9] Post-op Diagnosis     * Ventral hernia without obstruction or gangrene [K43.9]     Procedures  Repair Ventral Hernia  80258 - [place] RPR AA HERNIA 1ST 3-10 CM REDUCIBLE      Surgeons      * Ivette Maya - Primary    Resident/Fellow/Other Assistant:  Surgeons and Role:     * Giovanni Treviño MD - Resident - Assisting    Staff:   Circulator: Verónica Mercer Person: Beatriz    Anesthesia Staff: Anesthesiologist: Chrissy Cook MD  CRNA: JT Perez-CRNA  C-AA: JANI Yates    Procedure Summary  Anesthesia: General  ASA: III  Estimated Blood Loss: 20mL  Intra-op Medications:   Administrations occurring from 0700 to 0945 on 24:   Medication Name Total Dose   sodium chloride 0.9 % irrigation solution 1,000 mL   polymyxin B injection 50,000 Units   ceFAZolin (Ancef) vial 1 g 2 g   dexAMETHasone (Decadron) injection 4 mg/mL 4 mg   fentaNYL (Sublimaze) injection 50 mcg/mL 100 mcg   glycopyrrolate (Robinul) injection 0.6 mg   HYDROmorphone (Dilaudid) injection 1 mg/mL 2 mg   lactated Ringer's infusion Cannot be calculated   lidocaine (Xylocaine) injection 2 % 40 mg   metoprolol tartrate (Lopressor) injection 5 mg   midazolam PF (Versed) injection 1 mg/mL 2 mg   neostigmine (Bloxiverz) 10 mg/10 mL injection 4 mg   ondansetron (Zofran) 2 mg/mL injection 4 mg   propofol (Diprivan) injection 10 mg/mL 200 mg   rocuronium (ZeMuron) 50 mg/5 mL injection 100 mg   ceFAZolin (Ancef) injection 1 gram  - Omnicell Override Pull Cannot be calculated   dexAMETHasone (Decadron) injection 4 mg/mL  - Omnicell Override Pull Cannot be calculated   fentaNYL PF (Sublimaze) injection 50 mcg/mL  - Omnicell Override Pull Cannot be calculated   glycopyrrolate (Robinul) injection 200 mcg/mL  - Omnicell Override  Pull Cannot be calculated   HYDROmorphone (Dilaudid) injection 1 mg/mL  - Omnicell Override Pull Cannot be calculated   HYDROmorphone (Dilaudid) injection 1 mg/mL  - Omnicell Override Pull Cannot be calculated   lactated Ringer's infusion  - Omnicell Override Pull Cannot be calculated   metoprolol tartrate (Lopressor) injection 5 mg/5 mL  - Omnicell Override Pull Cannot be calculated   midazolam (Versed) injection 1 mg/mL  - Omnicell Override Pull Cannot be calculated   neostigmine (Bloxiverz) injection 1 mg/mL  - Omnicell Override Pull Cannot be calculated   ondansetron (Zofran) injection 4 mg/2 mL  - Omnicell Override Pull Cannot be calculated   propofol (Diprivan) injection 10 mg/mL  - Omnicell Override Pull Cannot be calculated   rocuronium (ZeMuron) injection 10 mg/mL  - Omnicell Override Pull Cannot be calculated   rocuronium (ZeMuron) injection 10 mg/mL  - Omnicell Override Pull Cannot be calculated              Anesthesia Record               Intraprocedure I/O Totals          Output    Urine 200 mL    Total Output 200 mL          Specimen: No specimens collected               Findings: attenuated fascia with small defect. Underlay mesh with prolene fixation. Fascia closed with PDS.    Complications:  None; patient tolerated the procedure well.     Disposition: PACU - hemodynamically stable.  Condition: stable  Specimens Collected: No specimens collected  Attending Attestation:     Ivette Maya  Phone Number: [phone]

## 2024-12-30 NOTE — CONSULTS
Reason For Consult  Post operative pain    Past Medical History    Karl Estevez is a 54 y.o. year old male patient who presents for Procedure(s):  Repair Ventral Hernia with Ivette Maya MD on 12/30/2024.  Acute Pain consulted for assistance with pain control.     Anticipated Postop Pain Issues -   Palliative: typically relieved with IV analgesics and regional local anesthetics  Provocative: typically with movement  Quality: typically burning and aching  Radiation: typically none  Severity: typically severe 8-10/10  Timing: typically constant    Past Medical History:   Diagnosis Date    Abnormal result of other cardiovascular function study 02/24/2022    Abnormal stress test    Acute duodenal ulcer without hemorrhage or perforation 04/08/2020    Duodenal ulcer, acute    Acute maxillary sinusitis, unspecified 03/25/2020    Acute non-recurrent maxillary sinusitis    Acute maxillary sinusitis, unspecified 02/01/2022    Sinusitis, acute maxillary    Alcoholic cirrhosis of liver without ascites (Multi) 07/29/2022    Alcoholic cirrhosis    Candidiasis of skin and nail 01/17/2022    Candidal skin infection    Chronic kidney disease, stage 4 (severe) (Multi) 07/07/2022    Chronic kidney disease, stage 4 (severe)    CKD (chronic kidney disease)     Encounter for other preprocedural examination 12/08/2022    Encounter for pre-transplant evaluation for liver transplant    Hepatic encephalopathy 04/18/2022    Hepatic encephalopathy    Hyperglycemia, unspecified 11/10/2020    Steroid-induced hyperglycemia    Hypo-osmolality and hyponatremia 03/25/2020    Dilutional hyponatremia    Immunocompromised     Insomnia, unspecified 06/19/2021    Insomnia    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 foot limited to breakdown of skin 08/18/2020    Skin ulcer of toe of right foot, limited to breakdown of skin    Other diseases of stomach and duodenum     Reactive gastropathy    Other disturbances of smell and taste 02/25/2020     Metallic taste    Other specified abnormal findings of blood chemistry 11/09/2022    D-dimer, elevated    Other specified abnormal findings of blood chemistry 09/29/2021    Increased ammonia level    Personal history of other diseases of the respiratory system 07/11/2022    History of pleural effusion    Personal history of other drug therapy 02/21/2020    History of hepatitis B vaccination    Personal history of other endocrine, nutritional and metabolic disease 09/29/2022    History of hypokalemia    Personal history of other endocrine, nutritional and metabolic disease 07/28/2022    History of hyperkalemia    Personal history of other infectious and parasitic diseases     History of spontaneous bacterial peritonitis    Personal history of other infectious and parasitic diseases 02/17/2022    History of spontaneous bacterial peritonitis    Personal history of other specified conditions     History of heavy alcohol consumption    Personal history of pneumonia (recurrent) 09/23/2022    History of pneumonia    Personal history of pneumonia (recurrent) 07/13/2022    History of pneumonia    Shortness of breath 11/09/2022    Shortness of breath at rest    Type 2 diabetes mellitus         Past Surgical History:   Procedure Laterality Date    CT ABDOMEN PELVIS ANGIOGRAM W AND/OR WO IV CONTRAST  8/10/2020    CT ABDOMEN PELVIS ANGIOGRAM W AND/OR WO IV CONTRAST 8/10/2020 [facility] CLINICAL LEGACY    CT ANGIO CORONARY ART WITH HEARTFLOW IF SCORE >30%  1/5/2022    CT HEART CORONARY ANGIOGRAM 1/5/2022 [facility]LL[facility] LEGACY    OTHER SURGICAL HISTORY  02/17/2020    Complete colonoscopy    OTHER SURGICAL HISTORY  02/17/2020    Cardiac catheterization    OTHER SURGICAL HISTORY  02/17/2020    Biceps tenodesis procedure    OTHER SURGICAL HISTORY  12/30/2022    Hernia repair    OTHER SURGICAL HISTORY  12/30/2022    Liver transplantation    US GUIDED ABDOMINAL PARACENTESIS  11/26/2019    US GUIDED ABDOMINAL PARACENTESIS 11/26/2019 [facility]  CLINICAL LEGACY    US GUIDED ABDOMINAL PARACENTESIS  12/17/2019    US GUIDED ABDOMINAL PARACENTESIS 12/17/2019 [facility] CLINICAL LEGACY    US GUIDED ABDOMINAL PARACENTESIS  12/24/2019    US GUIDED ABDOMINAL PARACENTESIS 12/24/2019 [facility]    US GUIDED ABDOMINAL PARACENTESIS  11/13/2019    US GUIDED ABDOMINAL PARACENTESIS 11/13/2019 [facility] CLINICAL LEGACY    US GUIDED ABDOMINAL PARACENTESIS  1/7/2020    US GUIDED ABDOMINAL PARACENTESIS 1/7/2020 [facility] CLINICAL LEGACY    US GUIDED ABDOMINAL PARACENTESIS  3/12/2020    US GUIDED ABDOMINAL PARACENTESIS 3/12/2020 [facility]    US GUIDED ABDOMINAL PARACENTESIS  5/29/2020    US GUIDED ABDOMINAL PARACENTESIS 5/29/2020 [facility]    US GUIDED ABDOMINAL PARACENTESIS  6/12/2020    US GUIDED ABDOMINAL PARACENTESIS 6/12/2020 [facility]    US GUIDED ABDOMINAL PARACENTESIS  6/23/2020    US GUIDED ABDOMINAL PARACENTESIS 6/23/2020 [facility] CLINICAL LEGACY    US GUIDED ABDOMINAL PARACENTESIS  7/9/2020    US GUIDED ABDOMINAL PARACENTESIS 7/9/2020 [facility]    US GUIDED ABDOMINAL PARACENTESIS  10/12/2020    US GUIDED ABDOMINAL PARACENTESIS 10/12/2020 [facility]    US GUIDED ABDOMINAL PARACENTESIS  10/22/2020    US GUIDED ABDOMINAL PARACENTESIS 10/22/2020 [facility]    US GUIDED ABDOMINAL PARACENTESIS  10/30/2020    US GUIDED ABDOMINAL PARACENTESIS 10/30/2020 [facility]    US GUIDED ABDOMINAL PARACENTESIS  11/6/2020    US GUIDED ABDOMINAL PARACENTESIS 11/6/2020 [facility]    US GUIDED ABDOMINAL PARACENTESIS  11/13/2020    US GUIDED ABDOMINAL PARACENTESIS 11/13/2020 [facility]    US GUIDED ABDOMINAL PARACENTESIS  11/20/2020    US GUIDED ABDOMINAL PARACENTESIS 11/20/2020 [facility]    US GUIDED ABDOMINAL PARACENTESIS  11/25/2020    US GUIDED ABDOMINAL PARACENTESIS 11/25/2020 [facility]    US GUIDED ABDOMINAL PARACENTESIS  12/1/2020    US GUIDED ABDOMINAL PARACENTESIS 12/1/2020 [facility]    US GUIDED ABDOMINAL PARACENTESIS  12/7/2020    US  GUIDED ABDOMINAL PARACENTESIS 12/7/2020 [facility]    US GUIDED ABDOMINAL PARACENTESIS  12/14/2020    US GUIDED ABDOMINAL PARACENTESIS 12/14/2020 [facility]    US GUIDED ABDOMINAL PARACENTESIS  12/18/2020    US GUIDED ABDOMINAL PARACENTESIS 12/18/2020 [facility]    US GUIDED ABDOMINAL PARACENTESIS  12/23/2020    US GUIDED ABDOMINAL PARACENTESIS 12/23/2020 [facility]    US GUIDED ABDOMINAL PARACENTESIS  12/29/2020    US GUIDED ABDOMINAL PARACENTESIS 12/29/2020 [facility]    US GUIDED ABDOMINAL PARACENTESIS  1/7/2021    US GUIDED ABDOMINAL PARACENTESIS 1/7/2021 [facility]    US GUIDED ABDOMINAL PARACENTESIS  1/12/2021    US GUIDED ABDOMINAL PARACENTESIS 1/12/2021 [facility]    US GUIDED ABDOMINAL PARACENTESIS  1/20/2021    US GUIDED ABDOMINAL PARACENTESIS 1/20/2021 [facility]    US GUIDED ABDOMINAL PARACENTESIS  1/27/2021    US GUIDED ABDOMINAL PARACENTESIS 1/27/2021 [facility]    US GUIDED ABDOMINAL PARACENTESIS  2/3/2021    US GUIDED ABDOMINAL PARACENTESIS 2/3/2021 [facility]    US GUIDED ABDOMINAL PARACENTESIS  3/4/2021    US GUIDED ABDOMINAL PARACENTESIS 3/4/2021 [facility]    US GUIDED ABDOMINAL PARACENTESIS  4/29/2021    US GUIDED ABDOMINAL PARACENTESIS 4/29/2021 [facility]    US GUIDED ABDOMINAL PARACENTESIS  7/6/2021    US GUIDED ABDOMINAL PARACENTESIS 7/6/2021 [facility]    US GUIDED ABDOMINAL PARACENTESIS  7/23/2021    US GUIDED ABDOMINAL PARACENTESIS 7/23/2021 [facility]    US GUIDED ABDOMINAL PARACENTESIS  8/5/2021    US GUIDED ABDOMINAL PARACENTESIS 8/5/2021 [facility]    US GUIDED ABDOMINAL PARACENTESIS  8/19/2021    US GUIDED ABDOMINAL PARACENTESIS 8/19/2021 [facility]    US GUIDED ABDOMINAL PARACENTESIS  8/30/2021    US GUIDED ABDOMINAL PARACENTESIS 8/30/2021 [facility]    US GUIDED ABDOMINAL PARACENTESIS  9/7/2021    US GUIDED ABDOMINAL PARACENTESIS 9/7/2021 [facility]    US GUIDED ABDOMINAL  PARACENTESIS  9/10/2021    US GUIDED ABDOMINAL PARACENTESIS 9/10/2021 [facility]    US GUIDED ABDOMINAL PARACENTESIS  9/13/2021    US GUIDED ABDOMINAL PARACENTESIS 9/13/2021 [facility]    US GUIDED ABDOMINAL PARACENTESIS  9/16/2021    US GUIDED ABDOMINAL PARACENTESIS 9/16/2021 [facility]    US GUIDED ABDOMINAL PARACENTESIS  9/22/2021    US GUIDED ABDOMINAL PARACENTESIS 9/22/2021 [facility]    US GUIDED ABDOMINAL PARACENTESIS  10/11/2021    US GUIDED ABDOMINAL PARACENTESIS 10/11/2021 [facility]    US GUIDED ABDOMINAL PARACENTESIS  10/18/2021    US GUIDED ABDOMINAL PARACENTESIS 10/18/2021 [facility]    US GUIDED ABDOMINAL PARACENTESIS  10/26/2021    US GUIDED ABDOMINAL PARACENTESIS 10/26/2021 [facility]    US GUIDED ABDOMINAL PARACENTESIS  11/3/2021    US GUIDED ABDOMINAL PARACENTESIS 11/3/2021 [facility]    US GUIDED ABDOMINAL PARACENTESIS  11/8/2021    US GUIDED ABDOMINAL PARACENTESIS 11/8/2021 [facility]    US GUIDED ABDOMINAL PARACENTESIS  11/12/2021    US GUIDED ABDOMINAL PARACENTESIS 11/12/2021 [facility]    US GUIDED ABDOMINAL PARACENTESIS  11/16/2021    US GUIDED ABDOMINAL PARACENTESIS 11/16/2021 [facility]    US GUIDED ABDOMINAL PARACENTESIS  11/19/2021    US GUIDED ABDOMINAL PARACENTESIS 11/19/2021 [facility]    US GUIDED ABDOMINAL PARACENTESIS  11/23/2021    US GUIDED ABDOMINAL PARACENTESIS 11/23/2021 [facility]    US GUIDED ABDOMINAL PARACENTESIS  11/26/2021    US GUIDED ABDOMINAL PARACENTESIS 11/26/2021 [facility]    US GUIDED ABDOMINAL PARACENTESIS  11/30/2021    US GUIDED ABDOMINAL PARACENTESIS 11/30/2021 [facility]    US GUIDED ABDOMINAL PARACENTESIS  12/3/2021    US GUIDED ABDOMINAL PARACENTESIS 12/3/2021 [facility]    US GUIDED ABDOMINAL PARACENTESIS  12/7/2021    US GUIDED ABDOMINAL PARACENTESIS 12/7/2021 [facility]    US GUIDED ABDOMINAL  PARACENTESIS  12/10/2021    US GUIDED ABDOMINAL PARACENTESIS 12/10/2021 [facility]    US GUIDED ABDOMINAL PARACENTESIS  12/14/2021    US GUIDED ABDOMINAL PARACENTESIS 12/14/2021 [facility]    US GUIDED ABDOMINAL PARACENTESIS  12/17/2021    US GUIDED ABDOMINAL PARACENTESIS 12/17/2021 [facility]    US GUIDED ABDOMINAL PARACENTESIS  12/22/2021    US GUIDED ABDOMINAL PARACENTESIS 12/22/2021 [facility]    US GUIDED ABDOMINAL PARACENTESIS  12/27/2021    US GUIDED ABDOMINAL PARACENTESIS 12/27/2021 [facility]    US GUIDED ABDOMINAL PARACENTESIS  12/30/2021    US GUIDED ABDOMINAL PARACENTESIS 12/30/2021 [facility]    US GUIDED ABDOMINAL PARACENTESIS  1/3/2022    US GUIDED ABDOMINAL PARACENTESIS 1/3/2022 [facility]    US GUIDED ABDOMINAL PARACENTESIS  1/7/2022    US GUIDED ABDOMINAL PARACENTESIS 1/7/2022 [facility]    US GUIDED ABDOMINAL PARACENTESIS  1/11/2022    US GUIDED ABDOMINAL PARACENTESIS 1/11/2022 [facility]    US GUIDED ABDOMINAL PARACENTESIS  1/14/2022    US GUIDED ABDOMINAL PARACENTESIS 1/14/2022 [facility]    US GUIDED ABDOMINAL PARACENTESIS  1/20/2022    US GUIDED ABDOMINAL PARACENTESIS 1/20/2022 [facility]    US GUIDED ABDOMINAL PARACENTESIS  1/25/2022    US GUIDED ABDOMINAL PARACENTESIS 1/25/2022 [facility]    US GUIDED ABDOMINAL PARACENTESIS  1/28/2022    US GUIDED ABDOMINAL PARACENTESIS 1/28/2022 [facility]    US GUIDED ABDOMINAL PARACENTESIS  2/1/2022    US GUIDED ABDOMINAL PARACENTESIS 2/1/2022 [facility]    US GUIDED ABDOMINAL PARACENTESIS  2/4/2022    US GUIDED ABDOMINAL PARACENTESIS 2/4/2022 [facility]    US GUIDED ABDOMINAL PARACENTESIS  2/11/2022    US GUIDED ABDOMINAL PARACENTESIS 2/11/2022 [facility]    US GUIDED ABDOMINAL PARACENTESIS  2/18/2022    US GUIDED ABDOMINAL PARACENTESIS 2/18/2022 [facility]    US GUIDED ABDOMINAL PARACENTESIS   2/22/2022    US GUIDED ABDOMINAL PARACENTESIS 2/22/2022 [facility]    US GUIDED ABDOMINAL PARACENTESIS  2/25/2022    US GUIDED ABDOMINAL PARACENTESIS 2/25/2022 [facility]    US GUIDED ABDOMINAL PARACENTESIS  3/1/2022    US GUIDED ABDOMINAL PARACENTESIS 3/1/2022 [facility]    US GUIDED ABDOMINAL PARACENTESIS  3/4/2022    US GUIDED ABDOMINAL PARACENTESIS 3/4/2022 [facility]    US GUIDED ABDOMINAL PARACENTESIS  3/8/2022    US GUIDED ABDOMINAL PARACENTESIS 3/8/2022 [facility]    US GUIDED ABDOMINAL PARACENTESIS  3/11/2022    US GUIDED ABDOMINAL PARACENTESIS 3/11/2022 [facility]    US GUIDED ABDOMINAL PARACENTESIS  3/15/2022    US GUIDED ABDOMINAL PARACENTESIS 3/15/2022 [facility]    US GUIDED ABDOMINAL PARACENTESIS  3/18/2022    US GUIDED ABDOMINAL PARACENTESIS 3/18/2022 [facility]    US GUIDED ABDOMINAL PARACENTESIS  3/22/2022    US GUIDED ABDOMINAL PARACENTESIS 3/22/2022 [facility]    US GUIDED ABDOMINAL PARACENTESIS  3/29/2022    US GUIDED ABDOMINAL PARACENTESIS 3/29/2022 [facility]    US GUIDED ABDOMINAL PARACENTESIS  3/25/2022    US GUIDED ABDOMINAL PARACENTESIS 3/25/2022 [facility]    US GUIDED ABDOMINAL PARACENTESIS  4/1/2022    US GUIDED ABDOMINAL PARACENTESIS 4/1/2022 [facility]    US GUIDED ABDOMINAL PARACENTESIS  4/5/2022    US GUIDED ABDOMINAL PARACENTESIS 4/5/2022 [facility]    US GUIDED ABDOMINAL PARACENTESIS  4/8/2022    US GUIDED ABDOMINAL PARACENTESIS 4/8/2022 [facility]    US GUIDED ABDOMINAL PARACENTESIS  4/12/2022    US GUIDED ABDOMINAL PARACENTESIS 4/12/2022 [facility]    US GUIDED ABDOMINAL PARACENTESIS  4/15/2022    US GUIDED ABDOMINAL PARACENTESIS 4/15/2022 [facility]    US GUIDED ABDOMINAL PARACENTESIS  4/19/2022    US GUIDED ABDOMINAL PARACENTESIS 4/19/2022 [facility]    US GUIDED ABDOMINAL PARACENTESIS  4/25/2022    US GUIDED ABDOMINAL  PARACENTESIS 4/25/2022 [facility]    US GUIDED ABDOMINAL PARACENTESIS  4/22/2022    US GUIDED ABDOMINAL PARACENTESIS 4/22/2022 [facility]    US GUIDED ABDOMINAL PARACENTESIS  4/28/2022    US GUIDED ABDOMINAL PARACENTESIS 4/28/2022 [facility]    US GUIDED ABDOMINAL PARACENTESIS  5/3/2022    US GUIDED ABDOMINAL PARACENTESIS 5/3/2022 [facility]    US GUIDED ABDOMINAL PARACENTESIS  5/6/2022    US GUIDED ABDOMINAL PARACENTESIS 5/6/2022 [facility]    US GUIDED ABDOMINAL PARACENTESIS  5/10/2022    US GUIDED ABDOMINAL PARACENTESIS 5/10/2022 [facility]    US GUIDED ABDOMINAL PARACENTESIS  5/13/2022    US GUIDED ABDOMINAL PARACENTESIS 5/13/2022 [facility]    US GUIDED ABDOMINAL PARACENTESIS  5/17/2022    US GUIDED ABDOMINAL PARACENTESIS 5/17/2022 [facility]    US GUIDED ABDOMINAL PARACENTESIS  5/20/2022    US GUIDED ABDOMINAL PARACENTESIS 5/20/2022 [facility]    US GUIDED ABDOMINAL PARACENTESIS  5/27/2022    US GUIDED ABDOMINAL PARACENTESIS 5/27/2022 [facility]    US GUIDED ABDOMINAL PARACENTESIS  5/31/2022    US GUIDED ABDOMINAL PARACENTESIS 5/31/2022 [facility]    US GUIDED ABDOMINAL PARACENTESIS  6/3/2022    US GUIDED ABDOMINAL PARACENTESIS 6/3/2022 [facility]    US GUIDED ABDOMINAL PARACENTESIS  6/7/2022    US GUIDED ABDOMINAL PARACENTESIS 6/7/2022 [facility]    US GUIDED ABDOMINAL PARACENTESIS  6/10/2022    US GUIDED ABDOMINAL PARACENTESIS 6/10/2022 [facility]        Family History   Problem Relation Name Age of Onset    Diabetes Father      Multiple myeloma Father          Social History     Socioeconomic History    Marital status:      Spouse name: Not on file    Number of children: Not on file    Years of education: Not on file    Highest education level: Not on file   Occupational History    Not on file   Tobacco Use    Smoking status: Former     Current packs/day: 0.00      Average packs/day: 1 pack/day for 30.0 years (30.0 ttl pk-yrs)     Types: Cigarettes     Start date:      Quit date: 2020     Years since quittin.0    Smokeless tobacco: Never    Tobacco comments:     Quit 2019   Substance and Sexual Activity    Alcohol use: Not Currently     Comment: none since 2019    Drug use: Not Currently    Sexual activity: Not on file   Other Topics Concern    Not on file   Social History Narrative    Not on file     Social Drivers of Health     Financial Resource Strain: Not on file   Food Insecurity: Not on file   Transportation Needs: Not on file   Physical Activity: Not on file   Stress: Not on file   Social Connections: Not on file   Intimate Partner Violence: Not on file   Housing Stability: Not on file        Allergies   Allergen Reactions    Morphine Nausea/vomiting    Nsaids (Non-Steroidal Anti-Inflammatory Drug) Unknown    Simvastatin Nausea/vomiting    Propoxyphene N-Acetaminophen Nausea Only, Nausea And Vomiting and Unknown     And vomiting    Darvocet    Sulfamethoxazole-Trimethoprim Rash and Other     Light sensitivity         Review of Systems  Gen: No fatigue, anorexia, insomnia, fever.   Eyes: No vision loss, double vision, drainage, eye pain.   ENT: No pharyngitis, dry mouth, no hearing changes or ear discharge  Cardiac: No chest pain, palpitations, syncope, near syncope.   Pulmonary: No shortness of breath, cough, hemoptysis.   Heme/lymph: No swollen glands, fever, bleeding.   GI: No abdominal pain, change in bowel habits, melena, hematemesis, hematochezia, nausea, vomiting, diarrhea.   : No discharge, dysuria, frequency, urgency, hematuria.  Endo: No polyuria or weight loss.   Musculoskeletal: Negative for any pain or loss of ROM/weakness  Skin: No rashes or lesions  Neuro: Normal speech, no numbness or weakness. No gait difficulties  Review of systems is otherwise negative unless stated above or in history of present illness.    Physical  Exam:  Constitutional:  no distress, alert and cooperative  Eyes: clear sclera  Head/Neck: No apparent injury, trachea midline  Respiratory/Thorax: Patent airways, thorax symmetric, breathing comfortably  Cardiovascular: no pitting edema  Gastrointestinal: Nondistended  Musculoskeletal: ROM intact  Extremities: no clubbing  Neurological: alert, denise x4  Psychological: Appropriate affect    Results for orders placed or performed during the hospital encounter of 12/30/24 (from the past 24 hours)   Type And Screen   Result Value Ref Range    ABO TYPE A     Rh TYPE POS     ANTIBODY SCREEN NEG    POCT GLUCOSE   Result Value Ref Range    POCT Glucose 107 (H) 74 - 99 mg/dL     *Note: Due to a large number of results and/or encounters for the requested time period, some results have not been displayed. A complete set of results can be found in Results Review.        Karl Estevez is a 54 y.o. year old male patient who presents for Procedure(s):  Repair Ventral Hernia with Ivette Maya MD on 12/30/2024. Acute Pain consulted for assistance with pain control.     Plan:    - Bilateral single shot TAP blocks performed pre-operatively 12/30/24  - Pain medications per primary team  - Will see on POD1 if inpatient    Acute Pain Resident  pg [phone] ph [phone]     Hilary Doran MD

## 2024-12-30 NOTE — ANESTHESIA PROCEDURE NOTES
Peripheral Block    Patient location during procedure: OR  Start time: 12/30/2024 10:45 AM  End time: 12/30/2024 11:03 AM  Reason for block: at surgeon's request and post-op pain management  Staffing  Performed: attending   Authorized by: Uyen Owusu MD    Performed by: Laine Moon MD  Preanesthetic Checklist  Completed: patient identified, IV checked, site marked, risks and benefits discussed, surgical consent, monitors and equipment checked, pre-op evaluation and timeout performed   Timeout performed at: 12/30/2024 10:36 AM  Peripheral Block  Patient position: laying flat  Prep: ChloraPrep  Patient monitoring: heart rate, continuous pulse ox and cardiac monitor  Block type: TAP and subcostal  Laterality: B/L  Injection technique: single-shot  Guidance: ultrasound guided  Infiltration strength: 0.5 %  Dose: 30 mL  Needle  Needle type: short-bevel   Needle gauge: 22 G  Needle length: 8 cm  Needle localization: ultrasound guidance     image stored in chart  Assessment  Injection assessment: negative aspiration for heme, no paresthesia on injection, incremental injection and local visualized surrounding nerve on ultrasound  Heart rate change: no  Slow fractionated injection: yes  Additional Notes  Subcostal TAP single shot. informed consent obtained. risks and benefits discussed. ASA monitors placed and timeout performed. Pt positioned, prepped with chlorhexidine, draped with sterile towels.     Ultrasound guidance used with visualization of the needle throughout duration of the procedure. Aspiration was negative. A total of ropi 0.5 % 30 ml, dexamethasone 4mg, epinephrine 1/200,000 was divided and injected on left and right side.

## 2024-12-30 NOTE — ANESTHESIA PREPROCEDURE EVALUATION
Patient: Karl Estevez    Procedure Information       Date/Time: 12/30/24 0700    Procedure: Repair Ventral Hernia    [facility] [place] [facility] [place] [facility]    Surgeons: Ivette Maya MD            Relevant Problems   Cardiac   (+) Benign essential HTN   (+) Hypertension   (+) Unstable angina (Multi)      Pulmonary   (+) Pneumonia      Neuro   (+) Anxiety   (+) Generalized anxiety disorder   (+) Lumbar radiculopathy, chronic   (+) Peripheral polyneuropathy      GI   (+) Esophageal dysphagia   (+) Esophageal varices in cirrhosis (Multi)   (+) GERD (gastroesophageal reflux disease)   (+) Internal hemorrhoid, bleeding      /Renal   (+) BPH (benign prostatic hyperplasia)      Liver   (+) Alcoholic cirrhosis of liver with ascites (Multi)   (+) Fatty liver   (+) History of liver transplant (Multi)   (+) Nodule on liver      Endocrine   (+) Diabetic neuropathy, painful (Multi)   (+) Obesity due to excess calories, unspecified obesity severity   (+) Secondary hyperparathyroidism of renal origin (Multi)   (+) Type 2 diabetes mellitus with neurologic complication      Hematology   (+) Anemia in chronic kidney disease   (+) Macrocytic anemia      Musculoskeletal   (+) DJD (degenerative joint disease)      ID   (+) Cytomegalovirus (CMV) viremia (Multi)   (+) History of MRSA infection   (+) Onychomycosis   (+) Pneumonia   (+) Wart viral       Clinical information reviewed:   Tobacco  Allergies  Meds   Med Hx  Surg Hx   Fam Hx  Soc Hx        NPO Detail:  NPO/Void Status  Carbohydrate Drink Given Prior to Surgery? : N  Date of Last Liquid: 12/29/24  Time of Last Liquid: 2100  Date of Last Solid: 12/29/24  Time of Last Solid: 2100  Last Intake Type: Clear fluids  Time of Last Void: 0500         Physical Exam    Airway  Mallampati: II  TM distance: >3 FB  Neck ROM: full     Cardiovascular - normal exam     Dental - normal exam     Pulmonary - normal exam     Abdominal - normal exam             Anesthesia  Plan    History of general anesthesia?: yes  History of complications of general anesthesia?: no    ASA 3     general   (Bilateral TAP blocks for postop analgesia consented by the patient to be done postop.)  The patient is not a current smoker.  Patient was not previously instructed to abstain from smoking on day of procedure.  Patient did not smoke on day of procedure.    intravenous induction   Anesthetic plan and risks discussed with patient.  Use of blood products discussed with patient who.    Plan discussed with CRNA and CAA.

## 2024-12-30 NOTE — ANESTHESIA PROCEDURE NOTES
Peripheral IV  Date/Time: 12/30/2024 7:26 AM  Inserted by: JT Perez-ROBERT    Placement  Needle size: 16 G  Laterality: left  Location: hand  Local anesthetic: none  Site prep: alcohol  Technique: anatomical landmarks  Attempts: 1

## 2024-12-30 NOTE — H&P
Karl Estevez is a 54 y.o. male who underwent OLT on 6/14/2022 (Liver). He underwent incisional hernia repair 1-.  Mesh explant 3/11/24. Representing with new small hernia medial to his last repair.     Past Medical History:   Diagnosis Date    Abnormal result of other cardiovascular function study 02/24/2022    Abnormal stress test    Acute duodenal ulcer without hemorrhage or perforation 04/08/2020    Duodenal ulcer, acute    Acute maxillary sinusitis, unspecified 03/25/2020    Acute non-recurrent maxillary sinusitis    Acute maxillary sinusitis, unspecified 02/01/2022    Sinusitis, acute maxillary    Alcoholic cirrhosis of liver without ascites (Multi) 07/29/2022    Alcoholic cirrhosis    Candidiasis of skin and nail 01/17/2022    Candidal skin infection    Chronic kidney disease, stage 4 (severe) (Multi) 07/07/2022    Chronic kidney disease, stage 4 (severe)    CKD (chronic kidney disease)     Encounter for other preprocedural examination 12/08/2022    Encounter for pre-transplant evaluation for liver transplant    Hepatic encephalopathy 04/18/2022    Hepatic encephalopathy    Hyperglycemia, unspecified 11/10/2020    Steroid-induced hyperglycemia    Hypo-osmolality and hyponatremia 03/25/2020    Dilutional hyponatremia    Immunocompromised     Insomnia, unspecified 06/19/2021    Insomnia    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 foot limited to breakdown of skin 08/18/2020    Skin ulcer of toe of right foot, limited to breakdown of skin    Other diseases of stomach and duodenum     Reactive gastropathy    Other disturbances of smell and taste 02/25/2020    Metallic taste    Other specified abnormal findings of blood chemistry 11/09/2022    D-dimer, elevated    Other specified abnormal findings of blood chemistry 09/29/2021    Increased ammonia level    Personal history of other diseases of the respiratory system 07/11/2022    History of pleural effusion    Personal history of other drug  therapy 02/21/2020    History of hepatitis B vaccination    Personal history of other endocrine, nutritional and metabolic disease 09/29/2022    History of hypokalemia    Personal history of other endocrine, nutritional and metabolic disease 07/28/2022    History of hyperkalemia    Personal history of other infectious and parasitic diseases     History of spontaneous bacterial peritonitis    Personal history of other infectious and parasitic diseases 02/17/2022    History of spontaneous bacterial peritonitis    Personal history of other specified conditions     History of heavy alcohol consumption    Personal history of pneumonia (recurrent) 09/23/2022    History of pneumonia    Personal history of pneumonia (recurrent) 07/13/2022    History of pneumonia    Shortness of breath 11/09/2022    Shortness of breath at rest    Type 2 diabetes mellitus      Past Surgical History:   Procedure Laterality Date    CT ABDOMEN PELVIS ANGIOGRAM W AND/OR WO IV CONTRAST  8/10/2020    CT ABDOMEN PELVIS ANGIOGRAM W AND/OR WO IV CONTRAST 8/10/2020 [facility]GAC[facility]    CT ANGIO CORONARY ART WITH HEARTFLOW IF SCORE >30%  1/5/2022    CT HEART CORONARY ANGIOGRAM 1/5/2022 [facility]LL[facility]GAC[facility]    OTHER SURGICAL HISTORY  02/17/2020    Complete colonoscopy    OTHER SURGICAL HISTORY  02/17/2020    Cardiac catheterization    OTHER SURGICAL HISTORY  02/17/2020    Biceps tenodesis procedure    OTHER SURGICAL HISTORY  12/30/2022    Hernia repair    OTHER SURGICAL HISTORY  12/30/2022    Liver transplantation    US GUIDED ABDOMINAL PARACENTESIS  11/26/2019    US GUIDED ABDOMINAL PARACENTESIS 11/26/2019 [facility] CLINICAL LEGACY    US GUIDED ABDOMINAL PARACENTESIS  12/17/2019    US GUIDED ABDOMINAL PARACENTESIS 12/17/2019 [facility] CLINICAL LEGACY    US GUIDED ABDOMINAL PARACENTESIS  12/24/2019    US GUIDED ABDOMINAL PARACENTESIS 12/24/2019 [facility]    US GUIDED ABDOMINAL PARACENTESIS  11/13/2019    US GUIDED ABDOMINAL PARACENTESIS 11/13/2019  [facility] CLINICAL LEGACY    US GUIDED ABDOMINAL PARACENTESIS  1/7/2020    US GUIDED ABDOMINAL PARACENTESIS 1/7/2020 [facility] CLINICAL LEGACY    US GUIDED ABDOMINAL PARACENTESIS  3/12/2020    US GUIDED ABDOMINAL PARACENTESIS 3/12/2020 [facility]    US GUIDED ABDOMINAL PARACENTESIS  5/29/2020    US GUIDED ABDOMINAL PARACENTESIS 5/29/2020 [facility]    US GUIDED ABDOMINAL PARACENTESIS  6/12/2020    US GUIDED ABDOMINAL PARACENTESIS 6/12/2020 [facility]    US GUIDED ABDOMINAL PARACENTESIS  6/23/2020    US GUIDED ABDOMINAL PARACENTESIS 6/23/2020 [facility] CL[facility]ICAL LEGACY    US GUIDED ABDOMINAL PARACENTESIS  7/9/2020    US GUIDED ABDOMINAL PARACENTESIS 7/9/2020 [facility]    US GUIDED ABDOMINAL PARACENTESIS  10/12/2020    US GUIDED ABDOMINAL PARACENTESIS 10/12/2020 [facility]    US GUIDED ABDOMINAL PARACENTESIS  10/22/2020    US GUIDED ABDOMINAL PARACENTESIS 10/22/2020 [facility]    US GUIDED ABDOMINAL PARACENTESIS  10/30/2020    US GUIDED ABDOMINAL PARACENTESIS 10/30/2020 [facility]    US GUIDED ABDOMINAL PARACENTESIS  11/6/2020    US GUIDED ABDOMINAL PARACENTESIS 11/6/2020 [facility]    US GUIDED ABDOMINAL PARACENTESIS  11/13/2020    US GUIDED ABDOMINAL PARACENTESIS 11/13/2020 [facility]    US GUIDED ABDOMINAL PARACENTESIS  11/20/2020    US GUIDED ABDOMINAL PARACENTESIS 11/20/2020 [facility]    US GUIDED ABDOMINAL PARACENTESIS  11/25/2020    US GUIDED ABDOMINAL PARACENTESIS 11/25/2020 [facility]    US GUIDED ABDOMINAL PARACENTESIS  12/1/2020    US GUIDED ABDOMINAL PARACENTESIS 12/1/2020 [facility]    US GUIDED ABDOMINAL PARACENTESIS  12/7/2020    US GUIDED ABDOMINAL PARACENTESIS 12/7/2020 [facility]    US GUIDED ABDOMINAL PARACENTESIS  12/14/2020    US GUIDED ABDOMINAL PARACENTESIS 12/14/2020 [facility]    US GUIDED ABDOMINAL PARACENTESIS  12/18/2020    US GUIDED ABDOMINAL PARACENTESIS 12/18/2020 [facility]    US GUIDED ABDOMINAL PARACENTESIS  12/23/2020    US GUIDED  ABDOMINAL PARACENTESIS 12/23/2020 [facility]    US GUIDED ABDOMINAL PARACENTESIS  12/29/2020    US GUIDED ABDOMINAL PARACENTESIS 12/29/2020 [facility]    US GUIDED ABDOMINAL PARACENTESIS  1/7/2021    US GUIDED ABDOMINAL PARACENTESIS 1/7/2021 [facility]    US GUIDED ABDOMINAL PARACENTESIS  1/12/2021    US GUIDED ABDOMINAL PARACENTESIS 1/12/2021 [facility]    US GUIDED ABDOMINAL PARACENTESIS  1/20/2021    US GUIDED ABDOMINAL PARACENTESIS 1/20/2021 [facility]    US GUIDED ABDOMINAL PARACENTESIS  1/27/2021    US GUIDED ABDOMINAL PARACENTESIS 1/27/2021 [facility]    US GUIDED ABDOMINAL PARACENTESIS  2/3/2021    US GUIDED ABDOMINAL PARACENTESIS 2/3/2021 [facility]    US GUIDED ABDOMINAL PARACENTESIS  3/4/2021    US GUIDED ABDOMINAL PARACENTESIS 3/4/2021 [facility]    US GUIDED ABDOMINAL PARACENTESIS  4/29/2021    US GUIDED ABDOMINAL PARACENTESIS 4/29/2021 [facility]    US GUIDED ABDOMINAL PARACENTESIS  7/6/2021    US GUIDED ABDOMINAL PARACENTESIS 7/6/2021 [facility]    US GUIDED ABDOMINAL PARACENTESIS  7/23/2021    US GUIDED ABDOMINAL PARACENTESIS 7/23/2021 [facility]    US GUIDED ABDOMINAL PARACENTESIS  8/5/2021    US GUIDED ABDOMINAL PARACENTESIS 8/5/2021 [facility]    US GUIDED ABDOMINAL PARACENTESIS  8/19/2021    US GUIDED ABDOMINAL PARACENTESIS 8/19/2021 [facility]    US GUIDED ABDOMINAL PARACENTESIS  8/30/2021    US GUIDED ABDOMINAL PARACENTESIS 8/30/2021 [facility]    US GUIDED ABDOMINAL PARACENTESIS  9/7/2021    US GUIDED ABDOMINAL PARACENTESIS 9/7/2021 [facility]    US GUIDED ABDOMINAL PARACENTESIS  9/10/2021    US GUIDED ABDOMINAL PARACENTESIS 9/10/2021 [facility]    US GUIDED ABDOMINAL PARACENTESIS  9/13/2021    US GUIDED ABDOMINAL PARACENTESIS 9/13/2021 [facility]    US GUIDED ABDOMINAL PARACENTESIS  9/16/2021    US GUIDED ABDOMINAL PARACENTESIS 9/16/2021 [facility]    US GUIDED  ABDOMINAL PARACENTESIS  9/22/2021    US GUIDED ABDOMINAL PARACENTESIS 9/22/2021 [facility]    US GUIDED ABDOMINAL PARACENTESIS  10/11/2021    US GUIDED ABDOMINAL PARACENTESIS 10/11/2021 [facility]    US GUIDED ABDOMINAL PARACENTESIS  10/18/2021    US GUIDED ABDOMINAL PARACENTESIS 10/18/2021 [facility]    US GUIDED ABDOMINAL PARACENTESIS  10/26/2021    US GUIDED ABDOMINAL PARACENTESIS 10/26/2021 [facility]    US GUIDED ABDOMINAL PARACENTESIS  11/3/2021    US GUIDED ABDOMINAL PARACENTESIS 11/3/2021 [facility]    US GUIDED ABDOMINAL PARACENTESIS  11/8/2021    US GUIDED ABDOMINAL PARACENTESIS 11/8/2021 [facility]    US GUIDED ABDOMINAL PARACENTESIS  11/12/2021    US GUIDED ABDOMINAL PARACENTESIS 11/12/2021 [facility]    US GUIDED ABDOMINAL PARACENTESIS  11/16/2021    US GUIDED ABDOMINAL PARACENTESIS 11/16/2021 [facility]    US GUIDED ABDOMINAL PARACENTESIS  11/19/2021    US GUIDED ABDOMINAL PARACENTESIS 11/19/2021 [facility]    US GUIDED ABDOMINAL PARACENTESIS  11/23/2021    US GUIDED ABDOMINAL PARACENTESIS 11/23/2021 [facility]    US GUIDED ABDOMINAL PARACENTESIS  11/26/2021    US GUIDED ABDOMINAL PARACENTESIS 11/26/2021 [facility]    US GUIDED ABDOMINAL PARACENTESIS  11/30/2021    US GUIDED ABDOMINAL PARACENTESIS 11/30/2021 [facility]    US GUIDED ABDOMINAL PARACENTESIS  12/3/2021    US GUIDED ABDOMINAL PARACENTESIS 12/3/2021 [facility]    US GUIDED ABDOMINAL PARACENTESIS  12/7/2021    US GUIDED ABDOMINAL PARACENTESIS 12/7/2021 [facility]    US GUIDED ABDOMINAL PARACENTESIS  12/10/2021    US GUIDED ABDOMINAL PARACENTESIS 12/10/2021 [facility]    US GUIDED ABDOMINAL PARACENTESIS  12/14/2021    US GUIDED ABDOMINAL PARACENTESIS 12/14/2021 [facility]    US GUIDED ABDOMINAL PARACENTESIS  12/17/2021    US GUIDED ABDOMINAL PARACENTESIS 12/17/2021 [facility]    US  GUIDED ABDOMINAL PARACENTESIS  12/22/2021    US GUIDED ABDOMINAL PARACENTESIS 12/22/2021 [facility]    US GUIDED ABDOMINAL PARACENTESIS  12/27/2021    US GUIDED ABDOMINAL PARACENTESIS 12/27/2021 [facility]    US GUIDED ABDOMINAL PARACENTESIS  12/30/2021    US GUIDED ABDOMINAL PARACENTESIS 12/30/2021 [facility]    US GUIDED ABDOMINAL PARACENTESIS  1/3/2022    US GUIDED ABDOMINAL PARACENTESIS 1/3/2022 [facility]    US GUIDED ABDOMINAL PARACENTESIS  1/7/2022    US GUIDED ABDOMINAL PARACENTESIS 1/7/2022 [facility]    US GUIDED ABDOMINAL PARACENTESIS  1/11/2022    US GUIDED ABDOMINAL PARACENTESIS 1/11/2022 [facility]    US GUIDED ABDOMINAL PARACENTESIS  1/14/2022    US GUIDED ABDOMINAL PARACENTESIS 1/14/2022 [facility]    US GUIDED ABDOMINAL PARACENTESIS  1/20/2022    US GUIDED ABDOMINAL PARACENTESIS 1/20/2022 [facility]    US GUIDED ABDOMINAL PARACENTESIS  1/25/2022    US GUIDED ABDOMINAL PARACENTESIS 1/25/2022 [facility]    US GUIDED ABDOMINAL PARACENTESIS  1/28/2022    US GUIDED ABDOMINAL PARACENTESIS 1/28/2022 [facility]    US GUIDED ABDOMINAL PARACENTESIS  2/1/2022    US GUIDED ABDOMINAL PARACENTESIS 2/1/2022 [facility]    US GUIDED ABDOMINAL PARACENTESIS  2/4/2022    US GUIDED ABDOMINAL PARACENTESIS 2/4/2022 [facility]    US GUIDED ABDOMINAL PARACENTESIS  2/11/2022    US GUIDED ABDOMINAL PARACENTESIS 2/11/2022 [facility]    US GUIDED ABDOMINAL PARACENTESIS  2/18/2022    US GUIDED ABDOMINAL PARACENTESIS 2/18/2022 [facility]    US GUIDED ABDOMINAL PARACENTESIS  2/22/2022    US GUIDED ABDOMINAL PARACENTESIS 2/22/2022 [facility]    US GUIDED ABDOMINAL PARACENTESIS  2/25/2022    US GUIDED ABDOMINAL PARACENTESIS 2/25/2022 [facility]    US GUIDED ABDOMINAL PARACENTESIS  3/1/2022    US GUIDED ABDOMINAL PARACENTESIS 3/1/2022 [facility]    US GUIDED ABDOMINAL PARACENTESIS  3/4/2022     US GUIDED ABDOMINAL PARACENTESIS 3/4/2022 [facility]    US GUIDED ABDOMINAL PARACENTESIS  3/8/2022    US GUIDED ABDOMINAL PARACENTESIS 3/8/2022 [facility]    US GUIDED ABDOMINAL PARACENTESIS  3/11/2022    US GUIDED ABDOMINAL PARACENTESIS 3/11/2022 [facility]    US GUIDED ABDOMINAL PARACENTESIS  3/15/2022    US GUIDED ABDOMINAL PARACENTESIS 3/15/2022 [facility]    US GUIDED ABDOMINAL PARACENTESIS  3/18/2022    US GUIDED ABDOMINAL PARACENTESIS 3/18/2022 [facility]    US GUIDED ABDOMINAL PARACENTESIS  3/22/2022    US GUIDED ABDOMINAL PARACENTESIS 3/22/2022 [facility]    US GUIDED ABDOMINAL PARACENTESIS  3/29/2022    US GUIDED ABDOMINAL PARACENTESIS 3/29/2022 [facility]    US GUIDED ABDOMINAL PARACENTESIS  3/25/2022    US GUIDED ABDOMINAL PARACENTESIS 3/25/2022 [facility]    US GUIDED ABDOMINAL PARACENTESIS  4/1/2022    US GUIDED ABDOMINAL PARACENTESIS 4/1/2022 [facility]    US GUIDED ABDOMINAL PARACENTESIS  4/5/2022    US GUIDED ABDOMINAL PARACENTESIS 4/5/2022 [facility]    US GUIDED ABDOMINAL PARACENTESIS  4/8/2022    US GUIDED ABDOMINAL PARACENTESIS 4/8/2022 [facility]    US GUIDED ABDOMINAL PARACENTESIS  4/12/2022    US GUIDED ABDOMINAL PARACENTESIS 4/12/2022 [facility]    US GUIDED ABDOMINAL PARACENTESIS  4/15/2022    US GUIDED ABDOMINAL PARACENTESIS 4/15/2022 [facility]    US GUIDED ABDOMINAL PARACENTESIS  4/19/2022    US GUIDED ABDOMINAL PARACENTESIS 4/19/2022 [facility]    US GUIDED ABDOMINAL PARACENTESIS  4/25/2022    US GUIDED ABDOMINAL PARACENTESIS 4/25/2022 [facility]    US GUIDED ABDOMINAL PARACENTESIS  4/22/2022    US GUIDED ABDOMINAL PARACENTESIS 4/22/2022 [facility]    US GUIDED ABDOMINAL PARACENTESIS  4/28/2022    US GUIDED ABDOMINAL PARACENTESIS 4/28/2022 [facility]    US GUIDED ABDOMINAL PARACENTESIS  5/3/2022    US GUIDED ABDOMINAL  PARACENTESIS 5/3/2022 [facility]    US GUIDED ABDOMINAL PARACENTESIS  5/6/2022    US GUIDED ABDOMINAL PARACENTESIS 5/6/2022 [facility]    US GUIDED ABDOMINAL PARACENTESIS  5/10/2022    US GUIDED ABDOMINAL PARACENTESIS 5/10/2022 [facility]    US GUIDED ABDOMINAL PARACENTESIS  5/13/2022    US GUIDED ABDOMINAL PARACENTESIS 5/13/2022 [facility]    US GUIDED ABDOMINAL PARACENTESIS  5/17/2022    US GUIDED ABDOMINAL PARACENTESIS 5/17/2022 [facility]    US GUIDED ABDOMINAL PARACENTESIS  5/20/2022    US GUIDED ABDOMINAL PARACENTESIS 5/20/2022 [facility]    US GUIDED ABDOMINAL PARACENTESIS  5/27/2022    US GUIDED ABDOMINAL PARACENTESIS 5/27/2022 [facility]    US GUIDED ABDOMINAL PARACENTESIS  5/31/2022    US GUIDED ABDOMINAL PARACENTESIS 5/31/2022 [facility]    US GUIDED ABDOMINAL PARACENTESIS  6/3/2022    US GUIDED ABDOMINAL PARACENTESIS 6/3/2022 [facility]    US GUIDED ABDOMINAL PARACENTESIS  6/7/2022    US GUIDED ABDOMINAL PARACENTESIS 6/7/2022 [facility]    US GUIDED ABDOMINAL PARACENTESIS  6/10/2022    US GUIDED ABDOMINAL PARACENTESIS 6/10/2022 [facility]        Review of Systems    12 systems reviewed and negative except as noted in the HPI.     Objective  Exam  Gen: AAOx3, NAD  Card: Regular rate and rhythm  Resp: sat well room air, equal chest rise  Abd: incision c/d/I, 1 cm fascial defect near midline of chevron incision.   Ext: no lower extremity edema     No current facility-administered medications on file prior to encounter.     Current Outpatient Medications on File Prior to Encounter   Medication Sig Dispense Refill    acetaminophen (Tylenol) 325 mg tablet Take 1 tablet (325 mg) by mouth every 6 hours if needed for mild pain (1 - 3). 50 tablet 0    amLODIPine (Norvasc) 5 mg tablet take 1 tablet by mouth once daily 90 tablet 3    carvedilol (Coreg) 12.5 mg tablet Take 1 tablet (12.5 mg) by mouth 2 times  "a day. 180 tablet 3    cholecalciferol (Vitamin D-3) 50 mcg (2,000 unit) capsule Take 2 capsules (100 mcg) by mouth once daily.      clobetasol (Temovate) 0.05 % cream 2 times a day. Apply to affected area on both ears, legs and abdomen      cyanocobalamin, vitamin B-12, 1,000 mcg lozenge Take one tablet every morning before breakfast 90 lozenge 3    dapagliflozin propanediol (Farxiga) 10 mg Take 1 tablet (10 mg) by mouth once daily. 90 tablet 3    ferrous sulfate, 325 mg ferrous sulfate, tablet Take 1 tablet by mouth 3 times a day. 270 tablet 3    gabapentin (Neurontin) 300 mg capsule Take 1 capsule with breakfast then take 2 capsules with lunch and 2 with dinner 150 capsule 11    magnesium oxide (Mag-Ox) 400 mg tablet Take 1 tablet (400 mg) by mouth once daily. 90 tablet 3    metFORMIN  mg 24 hr tablet Take 2 tablets (1,000 mg) by mouth 2 times daily (morning and late afternoon). Take 1 tablet by mouth twice daily with meals 360 tablet 3    pantoprazole (ProtoNix) 40 mg EC tablet Take 1 tablet (40 mg) by mouth once daily. 90 tablet 3    tacrolimus (Prograf) 1 mg capsule Take 1 capsule (1 mg) by mouth 2 times a day. 180 capsule 3    tiZANidine (Zanaflex) 4 mg tablet Take 1 tablet (4 mg) by mouth 3 times a day. 90 tablet 1    aspirin 81 mg EC tablet Take 1 tablet (81 mg) by mouth once daily. 90 tablet 3    glucagon 1 mg injection inject 1 milligram for 1 dose INTO THE THIGH UPPER ARM or BUTTOCK...      glucose 4 gram chewable tablet Chew 1 tablet (4 g).      insulin aspart (NovoLOG) 100 unit/mL (3 mL) pen Inject as per sliding scale for sugar >150mg/dL, expect up to 10 units daily 10 mL 2    nitroglycerin (Nitrostat) 0.4 mg SL tablet Place 1 tablet (0.4 mg) under the tongue every 5 minutes if needed for chest pain (UP TO 3 DOSES).      pen needle, diabetic 31 gauge x 5/16\" needle Injects 4x daily 400 each 1    tirzepatide (Mounjaro) 12.5 mg/0.5 mL pen injector Inject 12.5 mg under the skin every 7 days. 2 mL 3 "       Assessment/Plan  54 y/o history of liver transplant.  Now status post incisional hernia repair of right aspect of subcostal incision 1/29/24.  S/p mesh explant 3/11/24     Has small defect distal to where his last repair was.   Will plan for open incisional hernia repair.     Giovanni Treviño MD

## 2024-12-30 NOTE — ANESTHESIA PROCEDURE NOTES
Airway  Date/Time: 12/30/2024 7:23 AM  Urgency: elective    Airway not difficult    Staffing  Performed: CRNA   Authorized by: Sina Saleem MD    Performed by: GUSTAVO Perez  Patient location during procedure: OR    Indications and Patient Condition  Indications for airway management: anesthesia  Spontaneous Ventilation: absent  Sedation level: deep  Preoxygenated: yes  Patient position: sniffing  Mask difficulty assessment: 1 - vent by mask    Final Airway Details  Final airway type: endotracheal airway      Successful airway: ETT  Cuffed: yes   Successful intubation technique: direct laryngoscopy  Facilitating devices/methods: intubating stylet  Endotracheal tube insertion site: oral  Blade: Rhiannon  Blade size: #4  ETT size (mm): 7.5  Cormack-Lehane Classification: grade IIa - partial view of glottis  Placement verified by: chest auscultation and capnometry   Measured from: teeth  ETT to teeth (cm): 23  Number of attempts at approach: 1

## 2024-12-30 NOTE — PROGRESS NOTES
H+P    HPI  Patient is a 52-year-old male who is well-known to  the transplant service.  He had a liver transplant over a year ago.  He has done very well since then without major issues.  He has lost a significant amount of weight due to active lifestyle and feeling well after transplantation.  Posttransplantation he had developed an incisional hernia in the midline status post repair with mesh with Dr. Bradley last year.      He then developed hernia along the right lateral aspect of his bisubcostal incision which was repaired by myself. Unfortunately he now has small hernia along the trifurcation and left lateral aspect of his incision.    He otherwise is doing well. No complaints.      Review of Systems  Constitutional: no fever and no chills.   Eyes: no eye problems.   ENT: no ear symptoms and no nasal symptoms.   Cardiovascular: no chest pain.   Respiratory: no cough and no shortness of breath.   Gastrointestinal: no constipation, no vomiting, no diarrhea and no nausea.   Genitourinary. no dysuria.   Musculoskeletal: no myalgias.   Integumentary: no skin lesions.   Neurological no headache and no dizziness.   Psychiatric: has stable social support system.   Endocrine: not diabetic.   All other systems have been reviewed and are negative for complaint.     Objective   /74   Pulse 78   Temp 36.3 °C (97.3 °F)   SpO2 97%      Physical Exam  Constitutional - General appearance: In no acute distress, well appearing and well nourished.   Eyes Conjunctiva and lids: No erythema, swelling or discharge.    Ears, Nose, Mouth, and Throat - External inspection of ears and nose: Normal.   Neck - Exam: Appearance of the neck was normal. No neck masses observed   Pulmonary - Respiratory effort: Normal respiration.   Cardiovascular - Examination of extremities for edema and/or varicosities: No peripheral edema   Abdomen - Abdomen: Non-tender, no abdominal masses   Mild bulge and tenderness in the left lateral segment  of incision   Musculoskeletal - normal palpation of joints, bones, and muscles.   Skin - skin and subcutaneous tissue: normal without rashes or lesions.   Neurologic - Coordination: normal gait, Focal deficit: no focal deficit, moves all extremities   Psychiatric - Orientation to person, place, and time: Normal, Mood and affect: Normal     Assessment/Plan      54 y/o M s/p OLT and midline hernia s/p repair presenting with new left sided incisional pain.      Will plan for open repair with mesh of the midline and left aspect of his incisional hernia.     Procedure, risks and benefits explained and informed consent obtained.   Will discuss with anesthesia possible block as goal is home today after surgery.    Ivette Maya MD

## 2024-12-30 NOTE — ANESTHESIA POSTPROCEDURE EVALUATION
Patient: Karl Estevez    Procedure Summary       Date: 12/30/24 Room / Location: [facility] OR 14 / [facility] OR    Anesthesia Start: 0714 Anesthesia Stop: 0957    Procedure: Repair Ventral Hernia Diagnosis:       Ventral hernia without obstruction or gangrene      (Ventral hernia without obstruction or gangrene [K43.9])    Surgeons: Ivette Maya MD Responsible Provider: Chrissy Cook MD    Anesthesia Type: general ASA Status: 3            Anesthesia Type: general    Vitals Value Taken Time   /63 12/30/24 1045   Temp 36.7 °C (98.1 °F) 12/30/24 0952   Pulse 89 12/30/24 1048   Resp 5 12/30/24 1048   SpO2 92 % 12/30/24 1048   Vitals shown include unfiled device data.    Anesthesia Post Evaluation    Patient location during evaluation: PACU  Patient participation: complete - patient participated  Level of consciousness: awake  Pain management: adequate  Airway patency: patent  Cardiovascular status: acceptable  Respiratory status: acceptable  Hydration status: acceptable  Postoperative Nausea and Vomiting: none        No notable events documented.

## 2025-01-02 ENCOUNTER — TELEPHONE (OUTPATIENT)
Dept: TRANSPLANT | Facility: HOSPITAL | Age: 55
End: 2025-01-02
Payer: MEDICARE

## 2025-01-02 NOTE — TELEPHONE ENCOUNTER
PT called asking for a message to be sent to Dr. Maya - he had hernia repair, and he has questions. It's still really hard, pain seems to be getting worse, and when he puts his hand on the site it's really warm. Would like a call back. [phone]

## 2025-01-06 ENCOUNTER — APPOINTMENT (OUTPATIENT)
Dept: SURGERY | Facility: CLINIC | Age: 55
End: 2025-01-06
Payer: MEDICARE

## 2025-01-06 ENCOUNTER — LAB (OUTPATIENT)
Dept: LAB | Facility: LAB | Age: 55
End: 2025-01-06
Payer: MEDICARE

## 2025-01-06 VITALS
DIASTOLIC BLOOD PRESSURE: 86 MMHG | WEIGHT: 226 LBS | HEIGHT: 71 IN | RESPIRATION RATE: 16 BRPM | BODY MASS INDEX: 31.64 KG/M2 | HEART RATE: 101 BPM | SYSTOLIC BLOOD PRESSURE: 118 MMHG

## 2025-01-06 DIAGNOSIS — Z94.4 HISTORY OF LIVER TRANSPLANT (MULTI): ICD-10-CM

## 2025-01-06 DIAGNOSIS — K43.2 INCISIONAL HERNIA, WITHOUT OBSTRUCTION OR GANGRENE: ICD-10-CM

## 2025-01-06 DIAGNOSIS — N18.30 STAGE 3 CHRONIC KIDNEY DISEASE, UNSPECIFIED WHETHER STAGE 3A OR 3B CKD (MULTI): ICD-10-CM

## 2025-01-06 DIAGNOSIS — D84.9 IMMUNOSUPPRESSION: ICD-10-CM

## 2025-01-06 DIAGNOSIS — N18.31 STAGE 3A CHRONIC KIDNEY DISEASE (MULTI): ICD-10-CM

## 2025-01-06 LAB
25(OH)D3 SERPL-MCNC: 56 NG/ML (ref 30–100)
ALBUMIN SERPL BCP-MCNC: 4 G/DL (ref 3.4–5)
ALP SERPL-CCNC: 74 U/L (ref 33–120)
ALT SERPL W P-5'-P-CCNC: 16 U/L (ref 10–52)
ANION GAP SERPL CALC-SCNC: 11 MMOL/L (ref 10–20)
AST SERPL W P-5'-P-CCNC: 8 U/L (ref 9–39)
BASOPHILS # BLD AUTO: 0.03 X10*3/UL (ref 0–0.1)
BASOPHILS NFR BLD AUTO: 0.5 %
BILIRUB DIRECT SERPL-MCNC: 0.1 MG/DL (ref 0–0.3)
BILIRUB SERPL-MCNC: 0.4 MG/DL (ref 0–1.2)
BUN SERPL-MCNC: 13 MG/DL (ref 6–23)
CALCIUM SERPL-MCNC: 9.6 MG/DL (ref 8.6–10.6)
CHLORIDE SERPL-SCNC: 96 MMOL/L (ref 98–107)
CO2 SERPL-SCNC: 34 MMOL/L (ref 21–32)
CREAT SERPL-MCNC: 1.34 MG/DL (ref 0.5–1.3)
CREAT UR-MCNC: 93.2 MG/DL (ref 20–370)
EGFRCR SERPLBLD CKD-EPI 2021: 63 ML/MIN/1.73M*2
EOSINOPHIL # BLD AUTO: 0.27 X10*3/UL (ref 0–0.7)
EOSINOPHIL NFR BLD AUTO: 4.7 %
ERYTHROCYTE [DISTWIDTH] IN BLOOD BY AUTOMATED COUNT: 12.9 % (ref 11.5–14.5)
GLUCOSE SERPL-MCNC: 90 MG/DL (ref 74–99)
HCT VFR BLD AUTO: 42.6 % (ref 41–52)
HGB BLD-MCNC: 14.1 G/DL (ref 13.5–17.5)
IMM GRANULOCYTES # BLD AUTO: 0.01 X10*3/UL (ref 0–0.7)
IMM GRANULOCYTES NFR BLD AUTO: 0.2 % (ref 0–0.9)
LYMPHOCYTES # BLD AUTO: 1.1 X10*3/UL (ref 1.2–4.8)
LYMPHOCYTES NFR BLD AUTO: 19.1 %
MCH RBC QN AUTO: 28.9 PG (ref 26–34)
MCHC RBC AUTO-ENTMCNC: 33.1 G/DL (ref 32–36)
MCV RBC AUTO: 87 FL (ref 80–100)
MONOCYTES # BLD AUTO: 0.53 X10*3/UL (ref 0.1–1)
MONOCYTES NFR BLD AUTO: 9.2 %
NEUTROPHILS # BLD AUTO: 3.82 X10*3/UL (ref 1.2–7.7)
NEUTROPHILS NFR BLD AUTO: 66.3 %
NRBC BLD-RTO: 0 /100 WBCS (ref 0–0)
PHOSPHATE SERPL-MCNC: 4 MG/DL (ref 2.5–4.9)
PLATELET # BLD AUTO: 224 X10*3/UL (ref 150–450)
POTASSIUM SERPL-SCNC: 5.1 MMOL/L (ref 3.5–5.3)
PROT SERPL-MCNC: 6.4 G/DL (ref 6.4–8.2)
PROT UR-ACNC: 8 MG/DL (ref 5–25)
PROT/CREAT UR: 0.09 MG/MG CREAT (ref 0–0.17)
PTH-INTACT SERPL-MCNC: 15.5 PG/ML (ref 18.5–88)
RBC # BLD AUTO: 4.88 X10*6/UL (ref 4.5–5.9)
SODIUM SERPL-SCNC: 136 MMOL/L (ref 136–145)
TACROLIMUS BLD-MCNC: 6.5 NG/ML
WBC # BLD AUTO: 5.8 X10*3/UL (ref 4.4–11.3)

## 2025-01-06 PROCEDURE — 84156 ASSAY OF PROTEIN URINE: CPT

## 2025-01-06 PROCEDURE — 82248 BILIRUBIN DIRECT: CPT

## 2025-01-06 PROCEDURE — 85025 COMPLETE CBC W/AUTO DIFF WBC: CPT

## 2025-01-06 PROCEDURE — 82306 VITAMIN D 25 HYDROXY: CPT

## 2025-01-06 PROCEDURE — 82570 ASSAY OF URINE CREATININE: CPT

## 2025-01-06 PROCEDURE — 80197 ASSAY OF TACROLIMUS: CPT

## 2025-01-06 PROCEDURE — 80053 COMPREHEN METABOLIC PANEL: CPT

## 2025-01-06 PROCEDURE — 83970 ASSAY OF PARATHORMONE: CPT

## 2025-01-06 PROCEDURE — 84100 ASSAY OF PHOSPHORUS: CPT

## 2025-01-06 RX ORDER — OXYCODONE HYDROCHLORIDE 10 MG/1
10 TABLET ORAL EVERY 6 HOURS PRN
Qty: 28 TABLET | Refills: 0 | Status: SHIPPED | OUTPATIENT
Start: 2025-01-06 | End: 2025-01-13

## 2025-01-06 RX ORDER — METHOCARBAMOL 500 MG/1
500 TABLET, FILM COATED ORAL EVERY 6 HOURS PRN
Qty: 56 TABLET | Refills: 0 | Status: SHIPPED | OUTPATIENT
Start: 2025-01-06 | End: 2025-01-20

## 2025-01-06 RX ORDER — CYCLOBENZAPRINE HCL 5 MG
5 TABLET ORAL 3 TIMES DAILY PRN
COMMUNITY
Start: 2024-12-30

## 2025-01-06 ASSESSMENT — PAIN SCALES - GENERAL: PAINLEVEL_OUTOF10: 8

## 2025-01-06 NOTE — PROGRESS NOTES
Subjective   Karl Estevez is a 54 y.o. male who underwent OLT on 6/14/2022 (Liver).     He underwent incisional hernia repair 1-.  Mesh explant 3/11/24  Repair left incisional hernia 12/30/24    Has been having abdominal distension since surgery. + burping and reflux. Is taking GLP-1 inhibitor and intermittently on zofran at baseline. Having regular bowel movements. Ran out of pin medications. Does take oxy at baseline   No fevers.     Review of Systems     Objective    Exam  Gen: AAOx3, NAD  Card: Regular rate and rhythm  Resp: sat well room air, equal chest rise  Abd: incision c/d/I. + abdominal distention  Ext: mild lower extremity edema  Lab Results   Component Value Date    CREATININE 1.26 10/28/2024    K 4.9 10/28/2024    GLUCOSE 103 (H) 10/28/2024    HCT 46.3 10/28/2024    WBC 5.5 10/28/2024     10/28/2024    CALCIUM 8.6 10/28/2024     Assessment/Plan      52 y/o history of liver transplant.  Now status post incisional hernia repair of right aspect of subcostal incision 1/29/24.  S/p mesh explant 3/11/24  Right subcostal incisional hernia repair 12/30    Appears to have mild post operative ileus - distension starting to improve and having bowel movements/krishan PO.   Will order base line labs to ensure no dehydration.   Refill pain medications - did discuss how these can worsen ileus and he is taking stool softeners  See me 1 week  Will check in with him later this week - if symptoms not improving will consider imaging.      Form given to Dr. Avina.

## 2025-01-06 NOTE — OP NOTE
Repair Ventral Hernia Operative Note     Date: 2024  OR Location: [facility] OR    Name: Karl Estevez, : 1970, Age: 54 y.o., MRN: [id number], Sex: male    Diagnosis  Pre-op Diagnosis      * Ventral hernia without obstruction or gangrene [K43.9] Post-op Diagnosis     * Ventral hernia without obstruction or gangrene [K43.9]     Procedures  Repair Ventral Hernia  92029 - OK RPR AA HERNIA 1ST 3-10 CM REDUCIBLE      Surgeons      * Ivette Maya - Primary    Resident/Fellow/Other Assistant:  Surgeons and Role:     * Giovanni Treviño MD - Resident - Assisting    Staff:   Circulator: Verónica Mercer Person: Beatriz    Anesthesia Staff: Anesthesiologist: Chrissy Cook MD  CRNA: JT Perez-CRNA  C-AA: JANI Yates    Procedure Summary  Anesthesia: General  ASA: III  Estimated Blood Loss: 50 mL  Intra-op Medications:    Implants:  Implants       Type Name Action Serial No.      Surgical Mesh Sling Implant PATCH, VENTRALEX ST MESH, 8 IN [place] - [id number] Implanted             Findings: Fascial weakness and attenuation along bisubcostal incision, small Swiss cheese defects.  Intact long right subcostal and subxiphoid    Indications: Karl Estevez is an 54 y.o. male who is having surgery for Ventral hernia without obstruction or gangrene [K43.9].  He has a history of a liver transplant with recurrent incisional hernias along his by subcostal incision.  Had a subxiphoid repair with mesh by Dr. Bradley.  Then had a right subcostal repair by myself.  Now with discomfort and small hernia defect palpable along the midline and left subcostal.    The patient was seen in the preoperative area. The risks, benefits, complications, treatment options, non-operative alternatives, expected recovery and outcomes were discussed with the patient. The possibilities of reaction to medication, pulmonary aspiration, injury to surrounding structures, bleeding, recurrent infection, the need for additional  procedures, failure to diagnose a condition, and creating a complication requiring transfusion or operation were discussed with the patient. The patient concurred with the proposed plan, giving informed consent.  The site of surgery was properly noted/marked if necessary per policy. The patient has been actively warmed in preoperative area. Preoperative antibiotics have been ordered and given within 1 hours of incision. Venous thrombosis prophylaxis have been ordered including bilateral sequential compression devices.    Patient was brought back to the operating room placed in the supine position.  He then underwent general sedation by anesthesia.  A Paulino was placed under sterile conditions.  Patient was then prepped and draped in the standard surgical fashion.  A timeout was held at this point to confirm patient, procedure, appropriate antibiotics were administered.  An incision was made over the prior left and midline subcostal incision.  This was dissected down to the level of the fascia.  There were small Swiss cheese defects along the fascia in addition to fascial attenuation and weakness.  The right sided fascia was intact and the subxiphoid fascia and mesh were left in place.  Given the extent of the defect decision was made to proceed with a underlay.  Lipo cutaneous flaps were raised.  A 8 in Ventralight mesh was tacked in all corners with 2-0 Prolene suture in underlay fashion.  Care was made to ensure no opening large enough circumferentially to prevent bowel herniation.  From here the fascial edges were closed using running PDS suture.  Incision was irrigated with antibiotic solution.  Subcutaneous Silvana's was closed with 3-0 Vicryl's.  The dermis was reapproximated with 3-0 Vicryl interrupted sutures.  Skin was closed with running 4-0 Monocryl suture and covered with Dermabond.  Patient tolerated the procedure well and all counts were correct x 2 at the end the case.    Procedure Details: abdominal  incisional hernia repair with underlay mesh  Complications:  None; patient tolerated the procedure well.    Disposition: PACU - hemodynamically stable.  Condition: stable        Attending Attestation: I was present and scrubbed for the entire procedure.    Ivette Maya  Phone Number: [phone]

## 2025-01-09 ENCOUNTER — APPOINTMENT (OUTPATIENT)
Dept: SURGERY | Facility: CLINIC | Age: 55
End: 2025-01-09
Payer: MEDICARE

## 2025-01-14 DIAGNOSIS — K43.2 INCISIONAL HERNIA, WITHOUT OBSTRUCTION OR GANGRENE: ICD-10-CM

## 2025-01-14 RX ORDER — OXYCODONE HYDROCHLORIDE 5 MG/1
5 TABLET ORAL EVERY 8 HOURS PRN
Qty: 90 TABLET | Refills: 0 | Status: SHIPPED | OUTPATIENT
Start: 2025-01-14 | End: 2025-02-13

## 2025-01-14 NOTE — TELEPHONE ENCOUNTER
Rx Refill Request     Name: Karl Estevez  :  1970   Medication Name:    oxyCODONE (Roxicodone) 5 mg immediate release tablet - Take 1 tablet (5 mg) by mouth every 8 hours if needed for severe pain (7-10).  Specific Pharmacy location:    [facility]  Date of last appointment:  2024   Date of next appointment:  3/5/2025   Best number to reach patient:  [phone]

## 2025-01-16 ENCOUNTER — LAB (OUTPATIENT)
Dept: LAB | Facility: LAB | Age: 55
End: 2025-01-16
Payer: MEDICARE

## 2025-01-16 ENCOUNTER — APPOINTMENT (OUTPATIENT)
Dept: SURGERY | Facility: CLINIC | Age: 55
End: 2025-01-16
Payer: MEDICARE

## 2025-01-16 VITALS
BODY MASS INDEX: 30.82 KG/M2 | DIASTOLIC BLOOD PRESSURE: 84 MMHG | RESPIRATION RATE: 18 BRPM | SYSTOLIC BLOOD PRESSURE: 122 MMHG | HEART RATE: 79 BPM | WEIGHT: 221 LBS

## 2025-01-16 DIAGNOSIS — Z94.4 LIVER TRANSPLANT RECIPIENT (MULTI): ICD-10-CM

## 2025-01-16 LAB
ANION GAP SERPL CALC-SCNC: 16 MMOL/L (ref 10–20)
BUN SERPL-MCNC: 14 MG/DL (ref 6–23)
CALCIUM SERPL-MCNC: 9.3 MG/DL (ref 8.6–10.6)
CHLORIDE SERPL-SCNC: 99 MMOL/L (ref 98–107)
CO2 SERPL-SCNC: 29 MMOL/L (ref 21–32)
CREAT SERPL-MCNC: 1.08 MG/DL (ref 0.5–1.3)
EGFRCR SERPLBLD CKD-EPI 2021: 82 ML/MIN/1.73M*2
ERYTHROCYTE [DISTWIDTH] IN BLOOD BY AUTOMATED COUNT: 13 % (ref 11.5–14.5)
GLUCOSE SERPL-MCNC: 109 MG/DL (ref 74–99)
HCT VFR BLD AUTO: 43.7 % (ref 41–52)
HGB BLD-MCNC: 14.6 G/DL (ref 13.5–17.5)
MCH RBC QN AUTO: 28.9 PG (ref 26–34)
MCHC RBC AUTO-ENTMCNC: 33.4 G/DL (ref 32–36)
MCV RBC AUTO: 87 FL (ref 80–100)
NRBC BLD-RTO: 0 /100 WBCS (ref 0–0)
PLATELET # BLD AUTO: 237 X10*3/UL (ref 150–450)
POTASSIUM SERPL-SCNC: 4.4 MMOL/L (ref 3.5–5.3)
RBC # BLD AUTO: 5.05 X10*6/UL (ref 4.5–5.9)
SODIUM SERPL-SCNC: 140 MMOL/L (ref 136–145)
WBC # BLD AUTO: 6.4 X10*3/UL (ref 4.4–11.3)

## 2025-01-16 PROCEDURE — 80048 BASIC METABOLIC PNL TOTAL CA: CPT

## 2025-01-16 PROCEDURE — 3061F NEG MICROALBUMINURIA REV: CPT | Performed by: STUDENT IN AN ORGANIZED HEALTH CARE EDUCATION/TRAINING PROGRAM

## 2025-01-16 PROCEDURE — 85027 COMPLETE CBC AUTOMATED: CPT

## 2025-01-16 PROCEDURE — 99213 OFFICE O/P EST LOW 20 MIN: CPT | Performed by: STUDENT IN AN ORGANIZED HEALTH CARE EDUCATION/TRAINING PROGRAM

## 2025-01-16 PROCEDURE — 3074F SYST BP LT 130 MM HG: CPT | Performed by: STUDENT IN AN ORGANIZED HEALTH CARE EDUCATION/TRAINING PROGRAM

## 2025-01-16 PROCEDURE — 3079F DIAST BP 80-89 MM HG: CPT | Performed by: STUDENT IN AN ORGANIZED HEALTH CARE EDUCATION/TRAINING PROGRAM

## 2025-01-16 ASSESSMENT — PAIN SCALES - GENERAL: PAINLEVEL_OUTOF10: 7

## 2025-01-16 NOTE — PROGRESS NOTES
Subjective   Karl Estevez is a 54 y.o. male who underwent OLT on 6/14/2022 (Liver).     He underwent incisional hernia repair 1-.  Mesh explant 3/11/24  Repair left incisional hernia 12/30/24    Feeling a bit better overall. Does have some new/worsening pain x 4 days at left side of incision.   Possibly feeling warm at night  Distension and reflux much improved    Review of Systems     Objective    Exam  Gen: AAOx3, NAD  Card: Regular rate and rhythm  Resp: sat well room air, equal chest rise  Abd: incision c/d/I. + mild abdominal distention  Ext: mild lower extremity edema  Lab Results   Component Value Date    CREATININE 1.34 (H) 01/06/2025    K 5.1 01/06/2025    GLUCOSE 90 01/06/2025    HCT 42.6 01/06/2025    WBC 5.8 01/06/2025     01/06/2025    CALCIUM 9.6 01/06/2025     Assessment/Plan      52 y/o history of liver transplant.  Now status post incisional hernia repair of right aspect of subcostal incision 1/29/24.  S/p mesh explant 3/11/24  Right subcostal incisional hernia repair 12/30    Feeling a bit better but now with pain at far left of incision x4 days  Has been taking pain medications only at night    Will send CBC/BMP - fi wbc up with do CT  Suspect more musculoskeletal. No sign of recurrence or infection on exam  Will trial routine robaxin for now and reassess in clinic next week  If feeling better he can push appointment back x 2 weeks

## 2025-01-23 ENCOUNTER — APPOINTMENT (OUTPATIENT)
Dept: SURGERY | Facility: CLINIC | Age: 55
End: 2025-01-23
Payer: MEDICARE

## 2025-01-24 ENCOUNTER — OFFICE VISIT (OUTPATIENT)
Dept: DERMATOLOGY | Facility: CLINIC | Age: 55
End: 2025-01-24
Payer: MEDICARE

## 2025-01-24 DIAGNOSIS — L82.1 SEBORRHEIC KERATOSIS: ICD-10-CM

## 2025-01-24 DIAGNOSIS — L85.3 XEROSIS CUTIS: ICD-10-CM

## 2025-01-24 DIAGNOSIS — L73.9 FOLLICULITIS: ICD-10-CM

## 2025-01-24 DIAGNOSIS — L81.4 LENTIGO: ICD-10-CM

## 2025-01-24 DIAGNOSIS — D22.9 NEVUS: Primary | ICD-10-CM

## 2025-01-24 DIAGNOSIS — Z94.9 HISTORY OF ORGAN TRANSPLANTATION: ICD-10-CM

## 2025-01-24 DIAGNOSIS — Z12.83 SCREENING EXAM FOR SKIN CANCER: ICD-10-CM

## 2025-01-24 PROCEDURE — 3061F NEG MICROALBUMINURIA REV: CPT | Performed by: NURSE PRACTITIONER

## 2025-01-24 PROCEDURE — 99203 OFFICE O/P NEW LOW 30 MIN: CPT | Performed by: NURSE PRACTITIONER

## 2025-01-24 RX ORDER — KETOCONAZOLE 20 MG/G
CREAM TOPICAL 2 TIMES DAILY
Qty: 60 G | Refills: 3 | Status: SHIPPED | OUTPATIENT
Start: 2025-01-24

## 2025-01-24 RX ORDER — BENZOYL PEROXIDE 100 MG/ML
1 LIQUID TOPICAL DAILY
Qty: 237 G | Refills: 11 | Status: SHIPPED | OUTPATIENT
Start: 2025-01-24 | End: 2025-01-24 | Stop reason: ALTCHOICE

## 2025-01-24 RX ORDER — CLINDAMYCIN PHOSPHATE 10 UG/ML
LOTION TOPICAL 2 TIMES DAILY
Qty: 60 ML | Refills: 1 | Status: SHIPPED | OUTPATIENT
Start: 2025-01-24 | End: 2025-01-24 | Stop reason: ALTCHOICE

## 2025-01-24 NOTE — PROGRESS NOTES
Dictation #1  MRN:[id number]  CSN:[id number] Subjective     MELANIE Estevez is a 54 y.o. male who presents for the following: Rash (Rash on chest -been there 4-5 yrs -post left ear --non pruritic --have tried [id number]  lotion did not help BP wash pt states no improvement --pt is a liver transplant ..06/14/2022 0 complications ).           Review of Systems:  No other skin or systemic complaints other than what is documented elsewhere in the note.    The following portions of the chart were reviewed this encounter and updated as appropriate:       Skin Cancer History  No skin cancer on file.    Specialty Problems          Dermatology Problems    Achrochordon    Wart viral    Onychomycosis     Past Medical History:  MELANIE Estevez  has a past medical history of Abnormal result of other cardiovascular function study (02/24/2022), Acute duodenal ulcer without hemorrhage or perforation (04/08/2020), Acute maxillary sinusitis, unspecified (03/25/2020), Acute maxillary sinusitis, unspecified (02/01/2022), Alcoholic cirrhosis of liver without ascites (Multi) (07/29/2022), Candidiasis of skin and nail (01/17/2022), Chronic kidney disease, stage 4 (severe) (Multi) (07/07/2022), CKD (chronic kidney disease), Encounter for other preprocedural examination (12/08/2022), Hepatic encephalopathy (04/18/2022), Hyperglycemia, unspecified (11/10/2020), Hypo-osmolality and hyponatremia (03/25/2020), Immunocompromised, Insomnia, unspecified (06/19/2021), 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 foot limited to breakdown of skin (08/18/2020), Other diseases of stomach and duodenum, Other disturbances of smell and taste (02/25/2020), Other specified abnormal findings of blood chemistry (11/09/2022), Other specified abnormal findings of blood chemistry (09/29/2021), Personal history of other diseases of the respiratory system (07/11/2022), Personal history of other drug therapy (02/21/2020), Personal history of other  endocrine, nutritional and metabolic disease (09/29/2022), Personal history of other endocrine, nutritional and metabolic disease (07/28/2022), Personal history of other infectious and parasitic diseases, Personal history of other infectious and parasitic diseases (02/17/2022), Personal history of other specified conditions, Personal history of pneumonia (recurrent) (09/23/2022), Personal history of pneumonia (recurrent) (07/13/2022), Shortness of breath (11/09/2022), and Type 2 diabetes mellitus.    Past Surgical History:  MELANIE Estevez  has a past surgical history that includes Other surgical history (02/17/2020); Other surgical history (02/17/2020); Other surgical history (02/17/2020); Other surgical history (12/30/2022); Other surgical history (12/30/2022); US guided abdominal paracentesis (11/26/2019); US guided abdominal paracentesis (12/17/2019); US guided abdominal paracentesis (12/24/2019); US guided abdominal paracentesis (11/13/2019); US guided abdominal paracentesis (1/7/2020); US guided abdominal paracentesis (3/12/2020); US guided abdominal paracentesis (5/29/2020); US guided abdominal paracentesis (6/12/2020); US guided abdominal paracentesis (6/23/2020); US guided abdominal paracentesis (7/9/2020); CT angio abdomen pelvis w and or wo IV IV contrast (8/10/2020); US guided abdominal paracentesis (10/12/2020); US guided abdominal paracentesis (10/22/2020); US guided abdominal paracentesis (10/30/2020); US guided abdominal paracentesis (11/6/2020); US guided abdominal paracentesis (11/13/2020); US guided abdominal paracentesis (11/20/2020); US guided abdominal paracentesis (11/25/2020); US guided abdominal paracentesis (12/1/2020); US guided abdominal paracentesis (12/7/2020); US guided abdominal paracentesis (12/14/2020); US guided abdominal paracentesis (12/18/2020); US guided abdominal paracentesis (12/23/2020); US guided abdominal paracentesis (12/29/2020); US guided abdominal paracentesis  (1/7/2021); US guided abdominal paracentesis (1/12/2021); US guided abdominal paracentesis (1/20/2021); US guided abdominal paracentesis (1/27/2021); US guided abdominal paracentesis (2/3/2021); US guided abdominal paracentesis (3/4/2021); US guided abdominal paracentesis (4/29/2021); US guided abdominal paracentesis (7/6/2021); US guided abdominal paracentesis (7/23/2021); US guided abdominal paracentesis (8/5/2021); US guided abdominal paracentesis (8/19/2021); US guided abdominal paracentesis (8/30/2021); US guided abdominal paracentesis (9/7/2021); US guided abdominal paracentesis (9/10/2021); US guided abdominal paracentesis (9/13/2021); US guided abdominal paracentesis (9/16/2021); US guided abdominal paracentesis (9/22/2021); US guided abdominal paracentesis (10/11/2021); US guided abdominal paracentesis (10/18/2021); US guided abdominal paracentesis (10/26/2021); US guided abdominal paracentesis (11/3/2021); US guided abdominal paracentesis (11/8/2021); US guided abdominal paracentesis (11/12/2021); US guided abdominal paracentesis (11/16/2021); US guided abdominal paracentesis (11/19/2021); US guided abdominal paracentesis (11/23/2021); US guided abdominal paracentesis (11/26/2021); US guided abdominal paracentesis (11/30/2021); US guided abdominal paracentesis (12/3/2021); US guided abdominal paracentesis (12/7/2021); US guided abdominal paracentesis (12/10/2021); US guided abdominal paracentesis (12/14/2021); US guided abdominal paracentesis (12/17/2021); US guided abdominal paracentesis (12/22/2021); US guided abdominal paracentesis (12/27/2021); US guided abdominal paracentesis (12/30/2021); US guided abdominal paracentesis (1/3/2022); CT angio coronary art with heartflow if score >30% (1/5/2022); US guided abdominal paracentesis (1/7/2022); US guided abdominal paracentesis (1/11/2022); US guided abdominal paracentesis (1/14/2022); US guided abdominal paracentesis (1/20/2022); US guided abdominal  paracentesis (1/25/2022); US guided abdominal paracentesis (1/28/2022); US guided abdominal paracentesis (2/1/2022); US guided abdominal paracentesis (2/4/2022); US guided abdominal paracentesis (2/11/2022); US guided abdominal paracentesis (2/18/2022); US guided abdominal paracentesis (2/22/2022); US guided abdominal paracentesis (2/25/2022); US guided abdominal paracentesis (3/1/2022); US guided abdominal paracentesis (3/4/2022); US guided abdominal paracentesis (3/8/2022); US guided abdominal paracentesis (3/11/2022); US guided abdominal paracentesis (3/15/2022); US guided abdominal paracentesis (3/18/2022); US guided abdominal paracentesis (3/22/2022); US guided abdominal paracentesis (3/29/2022); US guided abdominal paracentesis (3/25/2022); US guided abdominal paracentesis (4/1/2022); US guided abdominal paracentesis (4/5/2022); US guided abdominal paracentesis (4/8/2022); US guided abdominal paracentesis (4/12/2022); US guided abdominal paracentesis (4/15/2022); US guided abdominal paracentesis (4/19/2022); US guided abdominal paracentesis (4/25/2022); US guided abdominal paracentesis (4/22/2022); US guided abdominal paracentesis (4/28/2022); US guided abdominal paracentesis (5/3/2022); US guided abdominal paracentesis (5/6/2022); US guided abdominal paracentesis (5/10/2022); US guided abdominal paracentesis (5/13/2022); US guided abdominal paracentesis (5/17/2022); US guided abdominal paracentesis (5/20/2022); US guided abdominal paracentesis (5/27/2022); US guided abdominal paracentesis (5/31/2022); US guided abdominal paracentesis (6/3/2022); US guided abdominal paracentesis (6/7/2022); and US guided abdominal paracentesis (6/10/2022).    Family History:  Patient family history includes Diabetes in his father; Multiple myeloma in his father.       Objective   Well appearing patient in no apparent distress; mood and affect are within normal limits.    A full examination was performed including scalp, head,  eyes, ears, nose, lips, neck, chest, axillae, abdomen, back, buttocks, bilateral upper extremities, bilateral lower extremities, hands, feet, fingers, toes, fingernails, and toenails. All findings within normal limits unless otherwise noted below.    Assessment/Plan   1. Nevus  Uniform pigmented macule(s)/papule(s) with reassuring findings on dermoscopy    -Discussed nature of condition  -Reassurance, benign-appearing features on examination today  -Recommend continued observation    2. Lentigo  Tan macules    -Benign appearing on exam  -Reassurance, recommend observation    3. Seborrheic keratosis  Stuck on, waxy macule(s)/papule(s)/plaque(s) with comedo-like openings and milia like cysts    -Discussed nature of condition  -Reassurance, recommend continued observation    4. Screening exam for skin cancer    Follow Up In Dermatology    5. Folliculitis  Chest - Medial (Center)  Follicular based papules/pustules to anterior chest not improved with previously prescribed clindamycin, BPO wash, or clobetasol.  He denies itching.      Differential diagnosis includes transient acantholytic dermatosis based on clinic exam, but patient states topical steroids have not been historically helpful.     -Discussed nature of condition  -Discussed treatment options  -This condition is often worsened by heat exposure, sweat exposure, friction/pressure on the skin. Off-load pressure as possible and wear loose, breathable clothing.  -Recommend:    ketoconazole (NIZOral) 2 % cream - Chest - Medial (Center)  Apply topically 2 times a day.    Related Procedures  Follow Up In Dermatology - Established Patient    6. History of organ transplantation  Liver transplant recipient    -Recommend full body skin exams annually    7. Xerosis cutis  Left Buttock  Dry skin    -Discussed nature of condition  -Discussed gentle skin care habits including using gentle soap such as Dove or Cetaphil to cleanse the skin.   -Recommend to avoid harsh  cleansers/soaps such as: Dial, Lever 2000, Citizen of [place].  -Recommend to use emollients twice daily, once immediately after the shower while the skin is still damp.   -Recommended emollients include: Aveeno Eczema Therapy or Daily Moisturizer, La Roche Posay Lipikar AP Balm, or plain Vaseline.   -Recommend to avoid hot water/showers; lukewarm or cool water/showers will be beneficial.

## 2025-01-25 ENCOUNTER — SPECIALTY PHARMACY (OUTPATIENT)
Dept: PHARMACY | Facility: CLINIC | Age: 55
End: 2025-01-25

## 2025-01-25 PROCEDURE — RXMED WILLOW AMBULATORY MEDICATION CHARGE

## 2025-01-27 ENCOUNTER — PHARMACY VISIT (OUTPATIENT)
Dept: PHARMACY | Facility: CLINIC | Age: 55
End: 2025-01-27
Payer: MEDICARE

## 2025-01-30 ENCOUNTER — APPOINTMENT (OUTPATIENT)
Dept: SURGERY | Facility: CLINIC | Age: 55
End: 2025-01-30
Payer: MEDICARE

## 2025-01-30 ENCOUNTER — HOSPITAL ENCOUNTER (OUTPATIENT)
Dept: RADIOLOGY | Facility: CLINIC | Age: 55
Discharge: HOME | End: 2025-01-30
Payer: MEDICARE

## 2025-01-30 VITALS
RESPIRATION RATE: 16 BRPM | SYSTOLIC BLOOD PRESSURE: 129 MMHG | HEART RATE: 99 BPM | BODY MASS INDEX: 31.92 KG/M2 | HEIGHT: 71 IN | DIASTOLIC BLOOD PRESSURE: 79 MMHG | WEIGHT: 228 LBS

## 2025-01-30 DIAGNOSIS — K70.31 ALCOHOLIC CIRRHOSIS OF LIVER WITH ASCITES (MULTI): ICD-10-CM

## 2025-01-30 PROCEDURE — 74176 CT ABD & PELVIS W/O CONTRAST: CPT

## 2025-01-31 NOTE — PROGRESS NOTES
Subjective   Karl Estevez is a 54 y.o. male who underwent OLT on 6/14/2022 (Liver).     He underwent incisional hernia repair 1-.  Mesh explant 3/11/24  Repair left incisional hernia 12/30/24    Endorsing a combination of reflux, abdominal distension and diarrhea.  Has gained about 5 lb.   Some incision across incision    Review of Systems     Objective    Exam  Gen: AAOx3, NAD  Card: Regular rate and rhythm  Resp: sat well room air, equal chest rise  Abd: incision c/d/I. + mod abdominal distention  Ext: mild lower extremity edema  Lab Results   Component Value Date    CREATININE 1.08 01/16/2025    K 4.4 01/16/2025    GLUCOSE 109 (H) 01/16/2025    HCT 43.7 01/16/2025    WBC 6.4 01/16/2025     01/16/2025    CALCIUM 9.3 01/16/2025     Assessment/Plan      52 y/o history of liver transplant.  Now status post incisional hernia repair of right aspect of subcostal incision 1/29/24.  S/p mesh explant 3/11/24  Right subcostal incisional hernia repair 12/30    Having persistent abdominal distension, reflux and diarrhea  CT scan obtained after clinic - hernia repair looks good. No evidence of transition zone, fluid collection or bowel near repair site. On review there is gastric distension and some fecalization of the small bowel but no large stool burden.   I do not suspect his symptoms are from hernia repair. He is on mounjaro which was increased in 8/2024. Discussed with his endocrinologist Dr Mccall about the possibility of decreasing his dose to see if this helps his symptoms. She will reach out to him to make these adjustments.   I updated Mr. Estevez 1/31/25 of plan going forward. Will schedule a virtual follow up next week to check symptoms.

## 2025-02-04 ENCOUNTER — APPOINTMENT (OUTPATIENT)
Dept: PODIATRY | Facility: CLINIC | Age: 55
End: 2025-02-04
Payer: MEDICARE

## 2025-02-04 DIAGNOSIS — Z79.4 TYPE 2 DIABETES MELLITUS WITH DIABETIC POLYNEUROPATHY, WITH LONG-TERM CURRENT USE OF INSULIN: Primary | ICD-10-CM

## 2025-02-04 DIAGNOSIS — E11.42 TYPE 2 DIABETES MELLITUS WITH DIABETIC POLYNEUROPATHY, WITH LONG-TERM CURRENT USE OF INSULIN: Primary | ICD-10-CM

## 2025-02-04 DIAGNOSIS — B35.1 ONYCHOMYCOSIS: ICD-10-CM

## 2025-02-04 DIAGNOSIS — M14.679 CHARCOT ARTHROPATHY OF MIDFOOT: ICD-10-CM

## 2025-02-04 DIAGNOSIS — M76.829 POSTERIOR TIBIAL TENDON DYSFUNCTION: ICD-10-CM

## 2025-02-04 DIAGNOSIS — B35.3 TINEA PEDIS OF BOTH FEET: ICD-10-CM

## 2025-02-04 DIAGNOSIS — M76.829 POSTERIOR TIBIAL TENDON DYSFUNCTION: Primary | ICD-10-CM

## 2025-02-04 DIAGNOSIS — E11.9 ENCOUNTER FOR DIABETIC FOOT EXAM (MULTI): ICD-10-CM

## 2025-02-04 PROCEDURE — 3061F NEG MICROALBUMINURIA REV: CPT | Performed by: PODIATRIST

## 2025-02-04 PROCEDURE — 99214 OFFICE O/P EST MOD 30 MIN: CPT | Performed by: PODIATRIST

## 2025-02-04 NOTE — PROGRESS NOTES
History Of Present Illness  Karl Estevez is a 54 y.o. male presenting with chief complaint of: patient is a diabetic he is here for new orthotics.  Patient feels that his Charcot foot has worsened.  He is wearing AFOs that are uncomfortable.    PCP Jose Sol DO  Last visit 11/29/24     Past Medical History  He has a past medical history of Abnormal result of other cardiovascular function study (02/24/2022), Acute duodenal ulcer without hemorrhage or perforation (04/08/2020), Acute maxillary sinusitis, unspecified (03/25/2020), Acute maxillary sinusitis, unspecified (02/01/2022), Alcoholic cirrhosis of liver without ascites (Multi) (07/29/2022), Candidiasis of skin and nail (01/17/2022), Chronic kidney disease, stage 4 (severe) (Multi) (07/07/2022), CKD (chronic kidney disease), Encounter for other preprocedural examination (12/08/2022), Hepatic encephalopathy (04/18/2022), Hyperglycemia, unspecified (11/10/2020), Hypo-osmolality and hyponatremia (03/25/2020), Immunocompromised, Insomnia, unspecified (06/19/2021), 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 foot limited to breakdown of skin (08/18/2020), Other diseases of stomach and duodenum, Other disturbances of smell and taste (02/25/2020), Other specified abnormal findings of blood chemistry (11/09/2022), Other specified abnormal findings of blood chemistry (09/29/2021), Personal history of other diseases of the respiratory system (07/11/2022), Personal history of other drug therapy (02/21/2020), Personal history of other endocrine, nutritional and metabolic disease (09/29/2022), Personal history of other endocrine, nutritional and metabolic disease (07/28/2022), Personal history of other infectious and parasitic diseases, Personal history of other infectious and parasitic diseases (02/17/2022), Personal history of other specified conditions, Personal history of pneumonia (recurrent) (09/23/2022), Personal history of pneumonia (recurrent)  (07/13/2022), Shortness of breath (11/09/2022), and Type 2 diabetes mellitus.    Surgical History  He has a past surgical history that includes Other surgical history (02/17/2020); Other surgical history (02/17/2020); Other surgical history (02/17/2020); Other surgical history (12/30/2022); Other surgical history (12/30/2022); US guided abdominal paracentesis (11/26/2019); US guided abdominal paracentesis (12/17/2019); US guided abdominal paracentesis (12/24/2019); US guided abdominal paracentesis (11/13/2019); US guided abdominal paracentesis (1/7/2020); US guided abdominal paracentesis (3/12/2020); US guided abdominal paracentesis (5/29/2020); US guided abdominal paracentesis (6/12/2020); US guided abdominal paracentesis (6/23/2020); US guided abdominal paracentesis (7/9/2020); CT angio abdomen pelvis w and or wo IV IV contrast (8/10/2020); US guided abdominal paracentesis (10/12/2020); US guided abdominal paracentesis (10/22/2020); US guided abdominal paracentesis (10/30/2020); US guided abdominal paracentesis (11/6/2020); US guided abdominal paracentesis (11/13/2020); US guided abdominal paracentesis (11/20/2020); US guided abdominal paracentesis (11/25/2020); US guided abdominal paracentesis (12/1/2020); US guided abdominal paracentesis (12/7/2020); US guided abdominal paracentesis (12/14/2020); US guided abdominal paracentesis (12/18/2020); US guided abdominal paracentesis (12/23/2020); US guided abdominal paracentesis (12/29/2020); US guided abdominal paracentesis (1/7/2021); US guided abdominal paracentesis (1/12/2021); US guided abdominal paracentesis (1/20/2021); US guided abdominal paracentesis (1/27/2021); US guided abdominal paracentesis (2/3/2021); US guided abdominal paracentesis (3/4/2021); US guided abdominal paracentesis (4/29/2021); US guided abdominal paracentesis (7/6/2021); US guided abdominal paracentesis (7/23/2021); US guided abdominal paracentesis (8/5/2021); US guided abdominal  paracentesis (8/19/2021); US guided abdominal paracentesis (8/30/2021); US guided abdominal paracentesis (9/7/2021); US guided abdominal paracentesis (9/10/2021); US guided abdominal paracentesis (9/13/2021); US guided abdominal paracentesis (9/16/2021); US guided abdominal paracentesis (9/22/2021); US guided abdominal paracentesis (10/11/2021); US guided abdominal paracentesis (10/18/2021); US guided abdominal paracentesis (10/26/2021); US guided abdominal paracentesis (11/3/2021); US guided abdominal paracentesis (11/8/2021); US guided abdominal paracentesis (11/12/2021); US guided abdominal paracentesis (11/16/2021); US guided abdominal paracentesis (11/19/2021); US guided abdominal paracentesis (11/23/2021); US guided abdominal paracentesis (11/26/2021); US guided abdominal paracentesis (11/30/2021); US guided abdominal paracentesis (12/3/2021); US guided abdominal paracentesis (12/7/2021); US guided abdominal paracentesis (12/10/2021); US guided abdominal paracentesis (12/14/2021); US guided abdominal paracentesis (12/17/2021); US guided abdominal paracentesis (12/22/2021); US guided abdominal paracentesis (12/27/2021); US guided abdominal paracentesis (12/30/2021); US guided abdominal paracentesis (1/3/2022); CT angio coronary art with heartflow if score >30% (1/5/2022); US guided abdominal paracentesis (1/7/2022); US guided abdominal paracentesis (1/11/2022); US guided abdominal paracentesis (1/14/2022); US guided abdominal paracentesis (1/20/2022); US guided abdominal paracentesis (1/25/2022); US guided abdominal paracentesis (1/28/2022); US guided abdominal paracentesis (2/1/2022); US guided abdominal paracentesis (2/4/2022); US guided abdominal paracentesis (2/11/2022); US guided abdominal paracentesis (2/18/2022); US guided abdominal paracentesis (2/22/2022); US guided abdominal paracentesis (2/25/2022); US guided abdominal paracentesis (3/1/2022); US guided abdominal paracentesis (3/4/2022); US guided  abdominal paracentesis (3/8/2022); US guided abdominal paracentesis (3/11/2022); US guided abdominal paracentesis (3/15/2022); US guided abdominal paracentesis (3/18/2022); US guided abdominal paracentesis (3/22/2022); US guided abdominal paracentesis (3/29/2022); US guided abdominal paracentesis (3/25/2022); US guided abdominal paracentesis (4/1/2022); US guided abdominal paracentesis (4/5/2022); US guided abdominal paracentesis (4/8/2022); US guided abdominal paracentesis (4/12/2022); US guided abdominal paracentesis (4/15/2022); US guided abdominal paracentesis (4/19/2022); US guided abdominal paracentesis (4/25/2022); US guided abdominal paracentesis (4/22/2022); US guided abdominal paracentesis (4/28/2022); US guided abdominal paracentesis (5/3/2022); US guided abdominal paracentesis (5/6/2022); US guided abdominal paracentesis (5/10/2022); US guided abdominal paracentesis (5/13/2022); US guided abdominal paracentesis (5/17/2022); US guided abdominal paracentesis (5/20/2022); US guided abdominal paracentesis (5/27/2022); US guided abdominal paracentesis (5/31/2022); US guided abdominal paracentesis (6/3/2022); US guided abdominal paracentesis (6/7/2022); and US guided abdominal paracentesis (6/10/2022).     Social History  He reports that he quit smoking about 5 years ago. His smoking use included cigarettes. He started smoking about 35 years ago. He has a 30 pack-year smoking history. He has never used smokeless tobacco. He reports that he does not currently use alcohol. He reports that he does not currently use drugs.    Family History  Family History   Problem Relation Name Age of Onset    Diabetes Father      Multiple myeloma Father          Allergies  Morphine, Nsaids (non-steroidal anti-inflammatory drug), Simvastatin, Propoxyphene n-acetaminophen, and Sulfamethoxazole-trimethoprim    Medications  Current Outpatient Medications   Medication Sig Dispense Refill    acetaminophen (Tylenol) 325 mg tablet Take  1 tablet (325 mg) by mouth every 6 hours if needed for mild pain (1 - 3). 50 tablet 0    allopurinol (Zyloprim) 100 mg tablet Take 1 tablet (100 mg) by mouth once daily. 90 tablet 3    amLODIPine (Norvasc) 5 mg tablet take 1 tablet by mouth once daily 90 tablet 3    aspirin 81 mg EC tablet Take 1 tablet (81 mg) by mouth once daily. 90 tablet 3    carvedilol (Coreg) 12.5 mg tablet Take 1 tablet (12.5 mg) by mouth 2 times a day. 180 tablet 3    cholecalciferol (Vitamin D-3) 50 mcg (2,000 unit) capsule Take 2 capsules (100 mcg) by mouth once daily.      clobetasol (Temovate) 0.05 % cream 2 times a day. Apply to affected area on both ears, legs and abdomen      cyanocobalamin, vitamin B-12, 1,000 mcg lozenge Take one tablet every morning before breakfast 90 lozenge 3    cyclobenzaprine (Flexeril) 5 mg tablet Take 1 tablet (5 mg) by mouth 3 times a day as needed.      dapagliflozin propanediol (Farxiga) 10 mg Take 1 tablet (10 mg) by mouth once daily. 90 tablet 3    ferrous sulfate, 325 mg ferrous sulfate, tablet Take 1 tablet by mouth 3 times a day. 270 tablet 3    gabapentin (Neurontin) 300 mg capsule Take 1 capsule with breakfast then take 2 capsules with lunch and 2 with dinner 150 capsule 11    glucagon 1 mg injection inject 1 milligram for 1 dose INTO THE THIGH UPPER ARM or BUTTOCK...      glucose 4 gram chewable tablet Chew 1 tablet (4 g).      insulin aspart (NovoLOG) 100 unit/mL (3 mL) pen Inject as per sliding scale for sugar >150mg/dL, expect up to 10 units daily 10 mL 2    ketoconazole (NIZOral) 2 % cream Apply topically 2 times a day. 60 g 3    magnesium oxide (Mag-Ox) 400 mg tablet Take 1 tablet (400 mg) by mouth once daily. 90 tablet 3    metFORMIN  mg 24 hr tablet Take 2 tablets (1,000 mg) by mouth 2 times daily (morning and late afternoon). Take 1 tablet by mouth twice daily with meals 360 tablet 3    methocarbamol (Robaxin) 500 mg tablet Take 1 tablet (500 mg) by mouth every 6 hours if needed for  "muscle spasms for up to 14 days. 56 tablet 0    nitroglycerin (Nitrostat) 0.4 mg SL tablet Place 1 tablet (0.4 mg) under the tongue every 5 minutes if needed for chest pain (UP TO 3 DOSES).      ondansetron ODT (Zofran-ODT) 4 mg disintegrating tablet Dissolve 1 tablet (4 mg) in the mouth every 8 hours if needed for nausea or vomiting. 30 tablet 3    oxyCODONE (Roxicodone) 5 mg immediate release tablet Take 1 tablet (5 mg) by mouth every 8 hours if needed for severe pain (7 - 10). 90 tablet 0    pantoprazole (ProtoNix) 40 mg EC tablet Take 1 tablet (40 mg) by mouth once daily. 90 tablet 3    pen needle, diabetic 31 gauge x 5/16\" needle Injects 4x daily 400 each 1    tacrolimus (Prograf) 1 mg capsule Take 1 capsule (1 mg) by mouth 2 times a day. 180 capsule 3    tirzepatide (Mounjaro) 12.5 mg/0.5 mL pen injector Inject 12.5 mg under the skin every 7 days. 2 mL 3    tiZANidine (Zanaflex) 4 mg tablet Take 1 tablet (4 mg) by mouth 3 times a day. 90 tablet 1     No current facility-administered medications for this visit.       Review of Systems    REVIEW OF SYSTEMS  GENERAL:  Negative for malaise, significant weight loss, fever  CARDIOVASCULAR: leg swelling   MUSCULOSKELETAL:  Negative for joint pain or swelling, back pain, and muscle pain.  SKIN:  Negative for lesions, rash, and itching  PSYCH:  Negative for sleep disturbance, mood disorder and recent psychosocial stressors  NEURO: Negative, denies any burning, tingling or numbness     Objective:   Vasc: DP and PT pulses are palpable bilateral.  CFT is less than 3 seconds bilateral.  Skin temperature is warm to cool proximal to distal bilateral.      Neuro:  Light touch is not intact to the foot bilateral.  Protective sensation is not intact to the foot when tested with the 5.07 SWM bilateral.  There is no clonus noted.  The hallux is downgoing bilateral.  Burning in feet cannot be replicated.    Derm: Nails are fungal. Skin is supple with normal texture and turgor " noted.  Webspaces are clean, dry and intact bilateral except for fourth webspaces bilateral which are macerated..  There are no hyperkeratoses, ulcerations, verruca or other lesions noted.      Ortho: Muscle strength is 4/5 for all pedal groups tested.  Charcot foot arthropathy the is appreciated.  Patient has significant pes planus.  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  He can use rubbing alcohol in between toes for his maceration.  For patient's burning, we will try a compound cream.  Prescription sent.  Patient has old AFOs.  I feel he would benefit from a new pair.  A prescription for ankle-foot orthoses is given to the patient.  The patient's anatomy is not amenable with OTS/prefab devices and requires a custom device.  The condition the orthosis is being used for is expected to be permanent and requires a custom device.  Control of the foot and ankle more than 1 plan requires the patient to have a custom device.  The patient has a compromised condition that requires a custom device to protect the integrity of the patient's skin.  Last A1c is 5.3 from August 2024.  Will encourage patient to get new A1c  Rx for new custom fit diabetic shoes.

## 2025-02-05 ENCOUNTER — APPOINTMENT (OUTPATIENT)
Dept: SURGERY | Facility: CLINIC | Age: 55
End: 2025-02-05
Payer: MEDICARE

## 2025-02-05 DIAGNOSIS — K30 DELAYED GASTRIC TRANSIT: Primary | ICD-10-CM

## 2025-02-05 DIAGNOSIS — R10.84 GENERALIZED ABDOMINAL PAIN: ICD-10-CM

## 2025-02-05 PROCEDURE — 99212 OFFICE O/P EST SF 10 MIN: CPT | Performed by: STUDENT IN AN ORGANIZED HEALTH CARE EDUCATION/TRAINING PROGRAM

## 2025-02-05 PROCEDURE — 3061F NEG MICROALBUMINURIA REV: CPT | Performed by: STUDENT IN AN ORGANIZED HEALTH CARE EDUCATION/TRAINING PROGRAM

## 2025-02-05 NOTE — PROGRESS NOTES
Subjective   Karl Estevez is a 54 y.o. male who underwent OLT on 6/14/2022 (Liver).     He underwent incisional hernia repair 1-.  Mesh explant 3/11/24  Repair left incisional hernia 12/30/24    Endorsing a combination of reflux, abdominal distension and diarrhea.  Has gained about 5 lb.   Some incision across incision    Review of Systems     Objective    Exam  Gen: AAOx3, NAD  Card: Regular rate and rhythm  Resp: sat well room air, equal chest rise  Abd: incision c/d/I. + mod abdominal distention  Ext: mild lower extremity edema  Lab Results   Component Value Date    CREATININE 1.08 01/16/2025    K 4.4 01/16/2025    GLUCOSE 109 (H) 01/16/2025    HCT 43.7 01/16/2025    WBC 6.4 01/16/2025     01/16/2025    CALCIUM 9.3 01/16/2025     Assessment/Plan      52 y/o history of liver transplant.  Now status post incisional hernia repair of right aspect of subcostal incision 1/29/24.  S/p mesh explant 3/11/24  Right subcostal incisional hernia repair 12/30    Having persistent abdominal distension, reflux and diarrhea  CT scan obtained after clinic - hernia repair looks good. No evidence of transition zone, fluid collection or bowel near repair site. On review there is gastric distension and some fecalization of the small bowel but no large stool burden.   I do not suspect his symptoms are from hernia repair. He is on mounjaro which was increased in 8/2024. Discussed with his endocrinologist Dr Mccall about the possibility of decreasing his dose to see if this helps his symptoms. She will reach out to him to make these adjustments.   I updated Mr. Estevez 1/31/25 of plan going forward. Will schedule a virtual follow up next week to check symptoms.       Update: UUGI SBFT with delayed gastric transit:  Will follow with endocrine for possible further decrease titration of his monjouro.  To follow with Dr. Gonsalez for GI motility  Will send 14 days script of Reglan to his pharmacy. Discussed this may not  have significant effect given the Monjouroa

## 2025-02-11 ENCOUNTER — TELEPHONE (OUTPATIENT)
Facility: HOSPITAL | Age: 55
End: 2025-02-11
Payer: MEDICARE

## 2025-02-14 ENCOUNTER — APPOINTMENT (OUTPATIENT)
Facility: HOSPITAL | Age: 55
End: 2025-02-14
Payer: MEDICARE

## 2025-02-17 ENCOUNTER — HOSPITAL ENCOUNTER (OUTPATIENT)
Dept: RADIOLOGY | Facility: HOSPITAL | Age: 55
Discharge: HOME | End: 2025-02-17
Payer: MEDICARE

## 2025-02-17 DIAGNOSIS — R10.84 GENERALIZED ABDOMINAL PAIN: ICD-10-CM

## 2025-02-17 PROCEDURE — 74240 X-RAY XM UPR GI TRC 1CNTRST: CPT | Performed by: STUDENT IN AN ORGANIZED HEALTH CARE EDUCATION/TRAINING PROGRAM

## 2025-02-17 PROCEDURE — 2550000001 HC RX 255 CONTRASTS: Performed by: STUDENT IN AN ORGANIZED HEALTH CARE EDUCATION/TRAINING PROGRAM

## 2025-02-17 PROCEDURE — 74248 X-RAY SM INT F-THRU STD: CPT | Performed by: STUDENT IN AN ORGANIZED HEALTH CARE EDUCATION/TRAINING PROGRAM

## 2025-02-17 PROCEDURE — 74248 X-RAY SM INT F-THRU STD: CPT

## 2025-02-17 RX ORDER — METOCLOPRAMIDE 5 MG/1
5 TABLET ORAL 3 TIMES DAILY
Qty: 42 TABLET | Refills: 0 | Status: SHIPPED | OUTPATIENT
Start: 2025-02-17 | End: 2025-02-25 | Stop reason: SINTOL

## 2025-02-17 RX ADMIN — IOHEXOL 300 ML: 350 INJECTION, SOLUTION INTRAVENOUS at 10:01

## 2025-02-18 DIAGNOSIS — K43.2 INCISIONAL HERNIA, WITHOUT OBSTRUCTION OR GANGRENE: ICD-10-CM

## 2025-02-18 RX ORDER — OXYCODONE HYDROCHLORIDE 5 MG/1
5 TABLET ORAL EVERY 8 HOURS PRN
Qty: 90 TABLET | Refills: 0 | Status: SHIPPED | OUTPATIENT
Start: 2025-02-18 | End: 2025-03-20

## 2025-02-18 NOTE — TELEPHONE ENCOUNTER
Rx Refill Request     Name: Karl Estevez  :  1970   Medication Name:    oxyCODONE (Roxicodone) 5 mg immediate release tablet - Take 1 tablet (5 mg) by mouth every 8 hours if needed for severe pain (7-10).  Specific Pharmacy location:  [facility]  Date of last appointment:  2024   Date of next appointment:  2025   Best number to reach patient:  [phone]

## 2025-02-21 ENCOUNTER — OFFICE VISIT (OUTPATIENT)
Dept: PRIMARY CARE | Facility: CLINIC | Age: 55
End: 2025-02-21
Payer: MEDICARE

## 2025-02-21 VITALS
OXYGEN SATURATION: 97 % | HEART RATE: 94 BPM | HEIGHT: 71 IN | TEMPERATURE: 97.3 F | WEIGHT: 226 LBS | RESPIRATION RATE: 16 BRPM | SYSTOLIC BLOOD PRESSURE: 126 MMHG | DIASTOLIC BLOOD PRESSURE: 74 MMHG | BODY MASS INDEX: 31.64 KG/M2

## 2025-02-21 DIAGNOSIS — E11.40 DIABETIC NEUROPATHY, PAINFUL (MULTI): ICD-10-CM

## 2025-02-21 DIAGNOSIS — G47.01 INSOMNIA DUE TO MEDICAL CONDITION: ICD-10-CM

## 2025-02-21 DIAGNOSIS — Z79.4 TYPE 2 DIABETES MELLITUS WITH DIABETIC POLYNEUROPATHY, WITH LONG-TERM CURRENT USE OF INSULIN: ICD-10-CM

## 2025-02-21 DIAGNOSIS — I73.00 RAYNAUD'S PHENOMENON WITHOUT GANGRENE: ICD-10-CM

## 2025-02-21 DIAGNOSIS — J42 CHRONIC BRONCHITIS, UNSPECIFIED CHRONIC BRONCHITIS TYPE (MULTI): ICD-10-CM

## 2025-02-21 DIAGNOSIS — M48.062 NEUROGENIC CLAUDICATION DUE TO LUMBAR SPINAL STENOSIS: Primary | ICD-10-CM

## 2025-02-21 DIAGNOSIS — N18.31 CHRONIC KIDNEY DISEASE, STAGE 3A (MULTI): ICD-10-CM

## 2025-02-21 DIAGNOSIS — E11.42 TYPE 2 DIABETES MELLITUS WITH DIABETIC POLYNEUROPATHY, WITH LONG-TERM CURRENT USE OF INSULIN: ICD-10-CM

## 2025-02-21 PROCEDURE — 3078F DIAST BP <80 MM HG: CPT | Performed by: FAMILY MEDICINE

## 2025-02-21 PROCEDURE — 1036F TOBACCO NON-USER: CPT | Performed by: FAMILY MEDICINE

## 2025-02-21 PROCEDURE — 3008F BODY MASS INDEX DOCD: CPT | Performed by: FAMILY MEDICINE

## 2025-02-21 PROCEDURE — 3074F SYST BP LT 130 MM HG: CPT | Performed by: FAMILY MEDICINE

## 2025-02-21 PROCEDURE — 3061F NEG MICROALBUMINURIA REV: CPT | Performed by: FAMILY MEDICINE

## 2025-02-21 PROCEDURE — 99214 OFFICE O/P EST MOD 30 MIN: CPT | Performed by: FAMILY MEDICINE

## 2025-02-21 RX ORDER — BUSPIRONE HYDROCHLORIDE 10 MG/1
10 TABLET ORAL 2 TIMES DAILY PRN
Qty: 60 TABLET | Refills: 0 | Status: SHIPPED | OUTPATIENT
Start: 2025-02-21 | End: 2025-03-23

## 2025-02-21 NOTE — PROGRESS NOTES
"Subjective   Patient ID: Karl Estevez is a 54 y.o. male who presents for Back Pain (3 month follow up ).  HPI    Review of Systems    Objective   /74 (BP Location: Left arm, Patient Position: Sitting, BP Cuff Size: Large adult)   Pulse 94   Temp 36.3 °C (97.3 °F) (Temporal)   Resp 16   Ht 1.803 m (5' 11\")   Wt 103 kg (226 lb)   SpO2 97%   BMI 31.52 kg/m²           Physical Exam        Assessment/Plan   Problem List Items Addressed This Visit    None    " 9:13 AM  I have personally reviewed the OARRS report for Karl Estevez. I have considered the risks of abuse, dependence, addiction and diversion    Is the patient prescribed a combination of a benzodiazepine and opioid?  No    Last Urine Drug Screen / ordered today: Yes  Recent Results (from the past 8760 hours)   Confirmation Opiate/Opioid/Benzo Prescription Compliance    Collection Time: 04/23/24  9:14 AM   Result Value Ref Range    Clonazepam <25 <25 ng/mL    7-Aminoclonazepam <25 <25 ng/mL    Alprazolam <25 <25 ng/mL    Alpha-Hydroxyalprazolam <25 <25 ng/mL    Midazolam <25 <25 ng/mL    Alpha-Hydroxymidazolam <25 <25 ng/mL    Chlordiazepoxide <25 <25 ng/mL    Diazepam <25 <25 ng/mL    Nordiazepam <25 <25 ng/mL    Temazepam <25 <25 ng/mL    Oxazepam <25 <25 ng/mL    Lorazepam <25 <25 ng/mL    Methadone <25 <25 ng/mL    EDDP <25 <25 ng/mL    6-Acetylmorphine <25 <25 ng/mL    Codeine <50 <50 ng/mL    Hydrocodone <25 <25 ng/mL    Hydromorphone <25 <25 ng/mL    Morphine  <50 <50 ng/mL    Norhydrocodone <25 <25 ng/mL    Noroxycodone >1,000 (H) <25 ng/mL    Oxycodone 1,439 (H) <25 ng/mL    Oxymorphone 1,506 (H) <25 ng/mL    Fentanyl <2.5 <2.5 ng/mL    Norfentanyl <2.5 <2.5 ng/mL    Tramadol <50 <50 ng/mL    O-Desmethyltramadol <50 <50 ng/mL    Zolpidem <25 <25 ng/mL    Zolpidem Metabolite (ZCA) <25 <25 ng/mL   Screen Opiate/Opioid/Benzo Prescription Compliance    Collection Time: 04/23/24  9:14 AM   Result Value Ref Range    Creatinine, Urine Random 134.6 20.0 - 370.0 mg/dL    Amphetamine Screen, Urine Presumptive Negative Presumptive Negative    Barbiturate Screen, Urine Presumptive Negative Presumptive Negative    Cannabinoid Screen, Urine Presumptive Negative Presumptive Negative    Cocaine Metabolite Screen, Urine Presumptive Negative Presumptive Negative    PCP Screen, Urine Presumptive Negative Presumptive Negative     Results are as expected.         Controlled Substance Agreement:  Date of the Last  Agreement: 4-  Reviewed Controlled Substance Agreement including but not limited to the benefits, risks, and alternatives to treatment with a Controlled Substance medication(s).    Opioids:  What is the patient's goal of therapy?  Quality of life daily control of Jennifer his low back discomfort and she is a nonsurgical candidate for his right SI L5 radiculopathy and bulging disc disease but also his recurrent upper abdominal pain and diabetic foot pain.  The benefit outweighs the risk and Oarrs abuse side effects or divergence  Is this being achieved with current treatment? yes    I have calculated the patient's Morphine Dose Equivalent (MED):   I have considered referral to Pain Management and/or a specialist, and do not feel it is necessary at this time.    I feel that it is clinically indicated to continue this current medication regimen after consideration of alternative therapies, and other non-opioid treatment.    Opioid Risk Screening:  No data recorded    Pain Assessment:  No data recorded  Review of Systems   Constitutional:  Negative for fatigue, fever and unexpected weight change.   HENT:  Positive for rhinorrhea. Negative for sinus pressure, sinus pain and sore throat.    Eyes:  Negative for visual disturbance.   Respiratory:  Negative for cough, chest tightness, shortness of breath and wheezing.    Cardiovascular:  Negative for chest pain, palpitations and leg swelling.   Gastrointestinal:  Positive for abdominal pain, constipation and nausea. Negative for blood in stool, rectal pain and vomiting.   Genitourinary:  Negative for dysuria, frequency and hematuria.   Musculoskeletal:  Positive for arthralgias, back pain and myalgias.   Skin:  Negative for rash.   Neurological:  Positive for numbness. Negative for weakness, light-headedness and headaches.   Psychiatric/Behavioral:  Positive for sleep disturbance. Negative for behavioral problems, confusion and suicidal ideas.        Objective   /74  "(BP Location: Left arm, Patient Position: Sitting, BP Cuff Size: Large adult)   Pulse 94   Temp 36.3 °C (97.3 °F) (Temporal)   Resp 16   Ht 1.803 m (5' 11\")   Wt 103 kg (226 lb)   SpO2 97%   BMI 31.52 kg/m²          [facility]  Outside Information  Results  MR lumbar spine wo IV contrast (Order [id number])     MR lumbar spine wo IV contrast  Order: [id number]  Narrative    MRN: [id number]  Patient Name: MELANIE TOBAR    STUDY:  MRI L-SPINE WO;  1/5/2023 2:22 pm    INDICATION:  low back pain  M54.16: Lumbar radiculopathy, chronic.    COMPARISON:  None.    ACCESSION NUMBER(S):  [id number]    ORDERING CLINICIAN:  ABIMAEL DOTY    TECHNIQUE:  Sagittal T1, T2, STIR, axial T1 and T2 weighted images of the lumbar  spine were acquired.    FINDINGS:  There is grade 1 anterolisthesis of L5 on S1 secondary to bilateral  L5 pars interarticularis defects. The alignment of the lumbar spine  is otherwise maintained.    Vertebral body heights are preserved. There are multilevel  degenerative endplate changes, most pronounced at L5-S1 with  associated marrow edema. The marrow signal is otherwise within normal  limits. There is disc desiccation and loss of intervertebral disc  height at L5-S1. Intervertebral disc spaces are otherwise maintained.    The lower thoracic cord appears normal. The conus terminates at L1.    T12-L1: There is no significant spinal canal or neural foraminal  stenosis.    L1-2: There is a shallow disc bulge with no significant spinal canal  or neural foraminal stenosis.    L2-3: There is a disc bulge and facet hypertrophy with narrowing of  the bilateral recesses, right more than left, and mild right neural  foraminal narrowing. There is no significant spinal canal or left  neural foraminal stenosis.    L3-4: There is a disc bulge and facet hypertrophy with narrowing of  the bilateral subarticular recesses and mild bilateral neural  foraminal narrowing. There is no significant spinal canal " stenosis.    L4-5: There is a disc bulge and facet hypertrophy with narrowing the  bilateral subarticular recess and moderate bilateral neural foraminal  narrowing. There is no significant spinal canal stenosis.    L5-S1: Disc uncovering with disc bulge eccentric to the right and  facet arthrosis. There is no significant spinal canal stenosis. The  disc bulge approaches the descending right S1 nerve root. Please  correlate clinically for symptoms of right S1 radiculopathy. Severe  bilateral neural foraminal stenosis.    The prevertebral and posterior paraspinous soft tissues are  unremarkable.    IMPRESSION:  Grade 1 anterolisthesis of L5 on S1 secondary to bilateral L5 pars  interarticularis defects. There is severe bilateral neural foraminal  stenosis at L5-S1. No significant spinal canal stenosis throughout  the lumbar spine.    I personally reviewed the I'm    None.      ACCESSION NUMBER(S):  [id number]      ORDERING CLINICIAN:  GRACE BROWN      TECHNIQUE:  An initial radiograph of the abdomen and pelvis was obtained.  This  was followed by  oral ingestion of approximately   300 mL of contrast  Omnipaque 350. Multiple dynamic and static fluoroscopic images of the  esophagus, stomach and duodenum were obtained. Delayed static images  of the abdomen in the posteroanterior projection were obtained at 15,  30, 45 and 60 minute intervals until contrast was observed to reach  the ileocecal junction. Total fluoroscopy time was  1 minute.      FINDINGS:  Initial  image demonstrates  moderate colonic stool burden in a  nonobstructive bowel gas pattern.          The lower esophagus and gastroesophageal junction is normal in  appearance. There is  no evidence of hiatal hernia or  gastroesophageal reflux.      The stomach was well visualized and unremarkable in appearance. There  was a delay of transit of enteric contrast through the pylorus and  into a normal duodenal bulb and C-loop and even after the 60  minute  vitor, there was a significant amount of contrast retained within the  stomach. The duodenal-jejunal junction appears normally positioned  and grossly unremarkable.      The small bowel is of normal caliber with no mucosal thickening  appreciated.  A normal feathery appearance is observed to the  jejunum.  The normal smooth appearance of the ileum is observed.  There is no evidence for annular constricting lesions, large  intraluminal mass lesion, or mucosal ulceration.  Small bowel loops  are observed to separate normally without evidence of mass clumping.      The terminal ileum and cecum are well visualized and within normal  limits. Transit time of contrast to the cecum was identified by  1  hour.      IMPRESSION:  1. Significantly delayed transit of enteric contrast through the  stomach. Otherwise, unremarkable appearance of the stomach and normal  transit of contrast through the remainder of the small bowel to the  ileocecal junction.  2. No small bowel dilatation or evidence of obstruction/ileus.      MACRO:  CT abdomen pelvis wo IV contrast  Status: Final result     PACS Images     Show images for CT abdomen pelvis wo IV contrast  Signed by    Signed Time Phone Pager   Andres Borja MD 1/30/2025 14:33 [phone]      Exam Information    Status Exam Begun Exam Ended   Final 1/30/2025 12:52 1/30/2025 13:23     Study Result    Narrative & Impression   Interpreted By:  Andres Borja,   STUDY:  CT ABDOMEN PELVIS WO IV CONTRAST; 1/30/2025 1:23 pm      INDICATION:  Signs/Symptoms:abdominal distention      COMPARISON:  March 2024.      ACCESSION NUMBER(S):  [id number]      ORDERING CLINICIAN:  GRACE BROWN      TECHNIQUE:  Spiral axial unenhanced images were obtained from the upper abdomen  to the symphysis pubis. Oral contrast was not administered.      All CT examinations are performed with one or more of the following  dose reduction techniques: Automated Exposure Control, adjustment of  mA and/or  kV according to patient size, or use of iterative  reconstruction techniques.      FINDINGS:  Lung bases: Calcified left infrahilar lymph nodes are noted.  Liver: Normal in size without focal lesions.  Gallbladder: Not surgically absent.  Spleen: Mildly prominent containing small calcified granulomas.  Pancreas: Unremarkable.  Adrenal glands: No focal lesions.  The kidneys are normal in size and position.  There are no renal calculi or hydronephrosis.  No abnormal calcifications are seen within the course of the ureters  or within the bladder. Pelvic reproductive organs: Unremarkable.      Unopacified images of the small and large bowel are within normal  limits. The appendix is seen and appears normal.  There is no free air or free fluid in the abdomen and pelvis.  Atherosclerotic calcifications involve the aorta, without focal  aneurysm. Degenerative changes and disc disease involve the L5-S1  level, with 8 mm anterolisthesis of the L5 over the S1 vertebra      IMPRESSION:  No acute process in the abdomen and pelvis. Additional chronic  findings as above.      Signed by: Andres darling MD)       Dx: Type 2 diabetes mellitus without comp...    0 Result Notes       1  Topic       Component  Ref Range & Units 08:10 6 mo ago   POC HEMOGLOBIN A1c  4.2 - 6.5 % 5.4 5.3             Specimen Collected: 02/25/25 08:   ic panel  Order: [id number]   Status: Final result       Visible to patient: Yes (seen)       Dx: Liver transplant recipient (Multi)    0 Result Notes            Component  Ref Range & Units 1 mo ago  (1/16/25) 1 mo ago  (1/6/25) 4 mo ago  (10/28/24) 4 mo ago  (10/22/24) 5 mo ago  (9/11/24) 8 mo ago  (6/18/24) 8 mo ago  (6/6/24)   Glucose  74 - 99 mg/dL 109 High  90 103 High  106 High  91 101 High  92   Sodium  136 - 145 mmol/L 140 136 139 137 137 136 137   Potassium  3.5 - 5.3 mmol/L 4.4 5.1 4.9 4.2 4.1 4.2 4.6   Chloride  98 - 107 mmol/L 99 96 Low  102 99 102 101 102   Bicarbonate  21 - 32 mmol/L 29 34  High  29 30 28 27 28   Anion Gap  10 - 20 mmol/L 16 11 13 12 11 12 12   Urea Nitrogen  6 - 23 mg/dL 14 13 11 18 17 13 17   Creatinine  0.50 - 1.30 mg/dL 1.08 1.34 High  1.26 1.43 High  1.27 1.01 1.32 High    eGFR  >60 mL/min/1.73m*2 82 63 CM 69 CM 59 Low  CM 68 CM 89 CM 65 CM   Comment: Calculations of estimated GFR are performed using the 2021 CKD-EPI Study Refit equation without the race variable for the IDMS-Traceable creatinine methods.  https://jasn.asnjournals.org/content/early/2021/09/22/ASN.8593206270   Calcium  8.6 - 10.6 mg/dL 9.3 9.6 8.6 8.8 R 8.8 R 8.7 R 8.4 Low  R   Resulting Agency [facility]             Specimen Collected: 01/     Order: [id number]   Status: Final result       Visible to patient: Yes (seen)       Dx: Liver transplant recipient (Multi)    0 Result Notes            Component  Ref Range & Units 1 mo ago  (1/16/25) 1 mo ago  (1/6/25) 4 mo ago  (10/28/24) 4 mo ago  (10/22/24) 5 mo ago  (9/11/24) 8 mo ago  (6/6/24) 10 mo ago  (4/29/24)   WBC  4.4 - 11.3 x10*3/uL 6.4 5.8 5.5 6.2 4.8 4.3 Low  4.4   nRBC  0.0 - 0.0 /100 WBCs 0.0 0.0 0.0 0.0 0.0 0.0 0.0   RBC  4.50 - 5.90 x10*6/uL 5.05 4.88 5.23 5.14 4.82 4.23 Low  5.13   Hemoglobin  13.5 - 17.5 g/dL 14.6 14.1 15.2 14.8 14.3 12.4 Low  14.8   Hematocrit  41.0 - 52.0 % 43.7 42.6 46.3 44.7 42.5 38.9 Low  43.0   MCV  80 - 100 fL 87 87 89 87 88 92 84   MCH  26.0 - 34.0 pg 28.9 28.9 29.1 28.8 29.7 29.3 28.8   MCHC  32.0 - 36.0 g/dL 33.4 33.1 32.8 33.1 33.6 31.9 Low  34.4   RDW  11.5 - 14.5 % 13.0 12.9 13.1 13.1 12.6 14.9 High  12.5   Platelets  150 - 450 x10*3/uL 237              Physical Exam  Vital signs reviewed and are normal pulse ox good blood pressure stable  General Inspection feels a mildly obese individual in no acute distress ENT shows mild water rhinorrhea otherwise no thick exudate oral exam is benign  Neck neck is supple out mass adenopathy bruits rigidity no JVD no stridor thyroid is normal pulmonary bronchial  breathing is noted otherwise no wheezing rales or rhonchi  Cardiovascular RSR without significant murmurs gallop or ectopy  Abdominal Y-shaped upper abdominal incision is healing nicely minimal tenderness the mid epigastric area otherwise no ascites bowel sounds are normal no CVA tenderness no rebound tenderness of the upper or lower abdomen.  Peripheral vascular decree sensation to touch from stocking area distally.  Otherwise no edema pulses are intact  Mood mood is stable relaxed without anxiety depressive or cognitive issues  Neurologic joint of the lower extremities cock-up splints are applied bilaterally.  Otherwise no pretibial edema there is no new deficits the upper and lower extremities no resting positional tremor  Skin no rash or petechiae or jaundice skeletal most the tenderness in the right SI joint and inferior medial to it also midline L4-S1 midline tenderness no posterior thoracic rashes no CVA tenderness diffuse paravertebral muscle tightness from L2-L5 reduced with hyperflexion hyperextension of the back      Assessment/Plan   Problem List Items Addressed This Visit    None  Unfortunately he was unable to tolerate Reglan for his gastroparesis needs frequent small meals  Follow-up with endocrinology for his diabetes  Follow-up with liver transplant team.  Evens is kidney functions normalized in last 4 weeks  Kidney function stable as well as liver functions and sugars improved to less than 100  Because of the insomnia and and nighttime anxiety will try to put him on BuSpar instead of controlled substance to help with sleep.  Will continue his every 8 hour oxycodone for the buffed peripheral neuropathy abdominal pain especially low back discomfort which is a nonsurgical chronic back pain  Above diagnosis treatment plan reviewed in detail recheck in 3 months earlier if interval problems continue his immunosuppressive drugs hypertensive medicines the Norvasc is helpful with his Raynaud's phenomenon  and his hypertension  OARRS performed template for opiates completed  @discharge  The above diagnosis and treatment plan was discussed with the patient patient will continue appropriate diet and exercise as reviewed  Patient will recheck earlier if any interval problems of significance or clinical worsening of the above problems.  Agrees above surveillance.  All question were addressed regarding above meds

## 2025-02-25 ENCOUNTER — APPOINTMENT (OUTPATIENT)
Dept: ENDOCRINOLOGY | Facility: CLINIC | Age: 55
End: 2025-02-25
Payer: MEDICARE

## 2025-02-25 ENCOUNTER — TELEPHONE (OUTPATIENT)
Dept: PRIMARY CARE | Facility: CLINIC | Age: 55
End: 2025-02-25

## 2025-02-25 VITALS
BODY MASS INDEX: 32.06 KG/M2 | HEART RATE: 76 BPM | SYSTOLIC BLOOD PRESSURE: 134 MMHG | DIASTOLIC BLOOD PRESSURE: 87 MMHG | WEIGHT: 229 LBS | HEIGHT: 71 IN | TEMPERATURE: 97.7 F

## 2025-02-25 DIAGNOSIS — E11.9 TYPE 2 DIABETES MELLITUS WITHOUT COMPLICATION, UNSPECIFIED WHETHER LONG TERM INSULIN USE (MULTI): ICD-10-CM

## 2025-02-25 DIAGNOSIS — Z94.4 LIVER TRANSPLANT RECIPIENT (MULTI): ICD-10-CM

## 2025-02-25 DIAGNOSIS — Z79.4 TYPE 2 DIABETES MELLITUS WITHOUT COMPLICATION, WITH LONG-TERM CURRENT USE OF INSULIN (MULTI): Primary | ICD-10-CM

## 2025-02-25 DIAGNOSIS — K21.9 GASTROESOPHAGEAL REFLUX DISEASE, UNSPECIFIED WHETHER ESOPHAGITIS PRESENT: ICD-10-CM

## 2025-02-25 DIAGNOSIS — E11.9 TYPE 2 DIABETES MELLITUS WITHOUT COMPLICATION, WITH LONG-TERM CURRENT USE OF INSULIN (MULTI): Primary | ICD-10-CM

## 2025-02-25 DIAGNOSIS — J01.01 ACUTE RECURRENT MAXILLARY SINUSITIS: Primary | ICD-10-CM

## 2025-02-25 PROBLEM — N18.31 CHRONIC KIDNEY DISEASE, STAGE 3A (MULTI): Status: ACTIVE | Noted: 2025-02-25

## 2025-02-25 LAB — POC HEMOGLOBIN A1C: 5.4 % (ref 4.2–6.5)

## 2025-02-25 PROCEDURE — 95251 CONT GLUC MNTR ANALYSIS I&R: CPT | Performed by: STUDENT IN AN ORGANIZED HEALTH CARE EDUCATION/TRAINING PROGRAM

## 2025-02-25 PROCEDURE — 3079F DIAST BP 80-89 MM HG: CPT | Performed by: STUDENT IN AN ORGANIZED HEALTH CARE EDUCATION/TRAINING PROGRAM

## 2025-02-25 PROCEDURE — 99214 OFFICE O/P EST MOD 30 MIN: CPT | Performed by: STUDENT IN AN ORGANIZED HEALTH CARE EDUCATION/TRAINING PROGRAM

## 2025-02-25 PROCEDURE — 3061F NEG MICROALBUMINURIA REV: CPT | Performed by: STUDENT IN AN ORGANIZED HEALTH CARE EDUCATION/TRAINING PROGRAM

## 2025-02-25 PROCEDURE — 3008F BODY MASS INDEX DOCD: CPT | Performed by: STUDENT IN AN ORGANIZED HEALTH CARE EDUCATION/TRAINING PROGRAM

## 2025-02-25 PROCEDURE — 3075F SYST BP GE 130 - 139MM HG: CPT | Performed by: STUDENT IN AN ORGANIZED HEALTH CARE EDUCATION/TRAINING PROGRAM

## 2025-02-25 RX ORDER — CEFDINIR 300 MG/1
300 CAPSULE ORAL 2 TIMES DAILY
Qty: 14 CAPSULE | Refills: 0 | Status: SHIPPED | OUTPATIENT
Start: 2025-02-25 | End: 2025-03-04

## 2025-02-25 RX ORDER — ONDANSETRON 4 MG/1
4 TABLET, ORALLY DISINTEGRATING ORAL EVERY 8 HOURS PRN
Qty: 30 TABLET | Refills: 3 | Status: SHIPPED | OUTPATIENT
Start: 2025-02-25

## 2025-02-25 ASSESSMENT — ENCOUNTER SYMPTOMS
RECTAL PAIN: 0
UNEXPECTED WEIGHT CHANGE: 0
CONFUSION: 0
CHEST TIGHTNESS: 0
SHORTNESS OF BREATH: 0
RHINORRHEA: 1
BLOOD IN STOOL: 0
BACK PAIN: 1
FATIGUE: 0
HEADACHES: 0
WEAKNESS: 0
WHEEZING: 0
NUMBNESS: 1
MYALGIAS: 1
PALPITATIONS: 0
CONSTIPATION: 1
SINUS PRESSURE: 0
ARTHRALGIAS: 1
DYSURIA: 0
ABDOMINAL PAIN: 1
FREQUENCY: 0
LIGHT-HEADEDNESS: 0
NAUSEA: 1
COUGH: 0
SORE THROAT: 0
SINUS PAIN: 0
VOMITING: 0
FEVER: 0
SLEEP DISTURBANCE: 1
HEMATURIA: 0

## 2025-02-25 NOTE — PROGRESS NOTES
"Patient coming in for follow up for T2DM    Subjective   Karl Estevez is a 54 y.o. male who presents for follow up for Type 2 diabetes mellitus.     Lab Results   Component Value Date    HGBA1C 5.4 02/25/2025      Karl Estevez is a 53 y.o. male who presents for follow up for Type 2 diabetes mellitus.   The initial diagnosis of diabetes was made 20 years.   Liver transplant June 14, 2022  A1c 5.4% this visit  Repair vebtral hernia on 12/30/2024   Interval hx:  Mounjaro 12.5 mg once weekly.   Metformin 1000 mg BID  Gabapentin 300-600-600 mg   Farxiga 10 mg OD  Hasn't been taking Novolog         Hypoglycemia frequency: lowest 60s   Hypoglycemia awareness: Yes   June 14 2022 had a liver transplant . Tacrolimus. No steroids.  Diet:  Eats once or twice a day  Breakfast and late lunch  Or dinner  Eats protein  Eggs and sausage potatoes   Known complications due to diabetes included nephropathy and peripheral neuropathy  Foot Exam: Neuropathy. Wear braces has Charcoal in left foot. Follow with podiatry Dr. Rosa bonilla   Eye Exam: No retinopathy. In 2024.   Lipid Panel: Oct 2023 LDL: 76  Urine Albumin: CKD stage 3. Farxiga.   Has delay in gastric emptying  Gets nausea after taking the shot taking zofran which helps  Not losing weight    Review of Systems  all pertinent systems reviewed and are otherwise negative   Objective   /87 (BP Location: Left arm, Patient Position: Sitting, BP Cuff Size: Large adult)   Pulse 76   Temp 36.5 °C (97.7 °F)   Ht 1.803 m (5' 11\")   Wt 104 kg (229 lb)   BMI 31.94 kg/m²   Physical Exam  Constitutional:       General: He is not in acute distress.     Appearance: Normal appearance.   HENT:      Head: Normocephalic and atraumatic.   Eyes:      Extraocular Movements: Extraocular movements intact.      Pupils: Pupils are equal, round, and reactive to light.   Cardiovascular:      Rate and Rhythm: Normal rate and regular rhythm.   Pulmonary:      Effort: Pulmonary effort " "is normal. No respiratory distress.      Breath sounds: Normal breath sounds.   Abdominal:      General: Bowel sounds are normal.      Palpations: Abdomen is soft.      Tenderness: There is no abdominal tenderness.   Skin:     Coloration: Skin is not jaundiced or pale.      Findings: No erythema or rash.   Neurological:      General: No focal deficit present.      Mental Status: He is alert and oriented to person, place, and time.      Deep Tendon Reflexes: Reflexes normal.   Psychiatric:         Mood and Affect: Mood normal.         Behavior: Behavior normal.         Lab Review  Glucose (mg/dL)   Date Value   01/16/2025 109 (H)   01/06/2025 90   10/28/2024 103 (H)     POC HEMOGLOBIN A1c (%)   Date Value   02/25/2025 5.4   08/21/2024 5.3     Hemoglobin A1C (%)   Date Value   03/28/2024 4.5   02/27/2024 4.5   01/23/2024 5.0     Bicarbonate (mmol/L)   Date Value   01/16/2025 29   01/06/2025 34 (H)   10/28/2024 29     Urea Nitrogen (mg/dL)   Date Value   01/16/2025 14   01/06/2025 13   10/28/2024 11     Creatinine (mg/dL)   Date Value   01/16/2025 1.08   01/06/2025 1.34 (H)   10/28/2024 1.26     Lab Results   Component Value Date    CHOL 135 09/11/2024    CHOL 129 10/25/2023    CHOL 109 10/26/2020     Lab Results   Component Value Date    HDL 30.4 09/11/2024    HDL 24.2 10/25/2023    HDL 29.9 (A) 10/26/2020     Lab Results   Component Value Date    LDLCALC 76 09/11/2024    LDLCALC 84 10/25/2023     Lab Results   Component Value Date    TRIG 143 09/11/2024    TRIG 103 10/25/2023    TRIG 93 10/26/2020     No components found for: \"CHOLHDL\"   Lab Results   Component Value Date    TSH 0.95 02/13/2023     Assessment/Plan   Karl Estevez is a 53 y.o. male who presents for follow up for Type 2 diabetes mellitus.   Liver transplant June 14, 2022. Tacrolimus. No steroids.  A1c 5.4% this visit  Repair vebtral hernia on 12/30/2024   Current meds:  Mounjaro 12.5 mg once weekly.   Metformin 1000 mg BID  Gabapentin 300-600-600 " mg   Farxiga 10 mg OD  Hasn't been taking Novolog   AVG B   TIR: 100%  Known complications due to diabetes included nephropathy and peripheral neuropathy  Foot Exam: Neuropathy. Wear braces has Charcoal in left foot. Follow with podiatry Dr. Rosa bonilla   Eye Exam: No retinopathy. In .   Lipid Panel: Oct 2023 LDL: 76  Urine Albumin: CKD stage 3. Farxiga.   Has delay in gastric emptying  Gets nausea after taking the shot taking zofran which helps  Not losing weight    Plan:  Continue Mounjaro 12.5 mg once weekly.  Decrease metformin to 500 mg twice daily. If in 2 weeks sugars look good we will stop it.  In case 1 month after returning to work you continue to gain weight let me know we will increase mounjaro 15 mg  Continue farxiga  Continue gabapentin  Follow with ophthalmology and podiatry     RTC in 6 months  Assessment & Plan  Gastroesophageal reflux disease, unspecified whether esophagitis present    Orders:    ondansetron ODT (Zofran-ODT) 4 mg disintegrating tablet; Dissolve 1 tablet (4 mg) in the mouth every 8 hours if needed for nausea or vomiting.    Type 2 diabetes mellitus without complication, with long-term current use of insulin (Multi)    Orders:    Follow Up In Endocrinology; Future    Body mass index (BMI) 45.0-49.9, adult (Multi)         Liver transplant recipient (Multi)

## 2025-02-25 NOTE — PATIENT INSTRUCTIONS
Continue Mounjaro 12.5 mg once weekly.  Decrease metformin to 500 mg twice daily. If in 2 weeks sugars look good we will stop it.  In case 1 month after returning to work you continue to gain weight let me know we will increase mounjaro 15 mg  Continue farxiga  Continue gabapentin  Follow with ophthalmology and podiatry     RTC in 6 months

## 2025-02-25 NOTE — TELEPHONE ENCOUNTER
The patient is requesting medication to treat what he believe is the beginning of a sinus infection.   He states it started last Friday evening and has worsened.  He has yellowish colored drainage with a lot of sinus pressure.  If medication is sent in, may it be sent to the [place] pharmacy.    Thank you.

## 2025-02-26 NOTE — ASSESSMENT & PLAN NOTE
But definite weight loss since starting Mounjaro and is on a better low-salt low carbohydrate diet  
Can just in the last 4 weeks have normalized with increasing liquids and addition of Mounjaro  
No significant cough and uses albuterol perhaps once or twice a month only active no active wheezing or shortness of breath  
05-Jan-2025 17:02

## 2025-03-03 ENCOUNTER — FOLLOW-UP (OUTPATIENT)
Facility: HOSPITAL | Age: 55
End: 2025-03-03
Payer: MEDICARE

## 2025-03-03 VITALS
DIASTOLIC BLOOD PRESSURE: 83 MMHG | TEMPERATURE: 97.1 F | HEART RATE: 86 BPM | SYSTOLIC BLOOD PRESSURE: 128 MMHG | WEIGHT: 228 LBS | BODY MASS INDEX: 31.8 KG/M2 | OXYGEN SATURATION: 98 %

## 2025-03-03 DIAGNOSIS — Z01.818 ENCOUNTER FOR PRE-TRANSPLANT EVALUATION FOR LIVER TRANSPLANT: ICD-10-CM

## 2025-03-03 DIAGNOSIS — Z94.4 LIVER REPLACED BY TRANSPLANT (MULTI): Primary | ICD-10-CM

## 2025-03-03 PROCEDURE — 99214 OFFICE O/P EST MOD 30 MIN: CPT | Performed by: INTERNAL MEDICINE

## 2025-03-03 RX ORDER — PANTOPRAZOLE SODIUM 40 MG/1
40 TABLET, DELAYED RELEASE ORAL 2 TIMES DAILY
Qty: 180 TABLET | Refills: 3 | Status: SHIPPED | OUTPATIENT
Start: 2025-03-03 | End: 2026-03-03

## 2025-03-03 ASSESSMENT — PAIN SCALES - GENERAL: PAINLEVEL_OUTOF10: 0-NO PAIN

## 2025-03-03 NOTE — PROGRESS NOTES
Subjective   Patient ID: Karl Estevez is a 54 y.o. male who presents for Follow-up for liver transplant.    HPI Still with some problems with early satiety and nausea.   Slow transit.   Occasional constipation.    Last colonoscopy 2020.   Some weight gain.   Still taking ocycodone every day for back pain.    Small bowel series shows delayed gastric emptying.   Small bowel without kinks or stricturing.   HgA1C good.   Eats only one meal a day.   Did not tolerate Reglan x1.      Review of Systems  Nausea.   Some weight gain.   Chronic back pain.       Objective   Physical Exam  Physical Exam  Constitutional:       Appearance: Normal appearance.   Eyes:      General: No scleral icterus.  Cardiovascular:      Rate and Rhythm: Normal rate and regular rhythm.      Heart sounds: No murmur heard.     No gallop.   Pulmonary:      Effort: Pulmonary effort is normal.      Breath sounds: Normal breath sounds.   Abdominal:      General: Abdomen is flat. Bowel sounds are normal. There is no distension.      Palpations: Abdomen is soft. There is no fluid wave, hepatomegaly or splenomegaly.   Wound well healed.       Tenderness: There is no abdominal tenderness.   Musculoskeletal:      Cervical back: Normal range of motion. No tenderness.      Right lower leg: Minimal  edema.      Left lower leg: Minimal  edema.   Braces on legs.     Lymphadenopathy:      Cervical: No cervical adenopathy.   Skin:     Coloration: Skin is not jaundiced.   Neurological:      General: No focal deficit present.      Mental Status: he is alert.       Assessment/Plan     Suspect delayed gastric emptying unclear reason.   But will look at colon to rule out possible colonic pathology.   Can get labs in April.   Normal labs in January.   If colonoscpy negative will get formal gastric emptying study.   Suggest for now double pantoprazole to 40 mg po bid for now but will need to decrease to every day when better or if not helping.   Also delayed gastric  emptying diet avoid fatty foods and bid salads and have 5 small meals a day (snacks).   Follow up with me in 3 months.           Guanako Gonsalez MD 03/03/25 8:30 AM

## 2025-03-03 NOTE — PATIENT INSTRUCTIONS
Plan:    We will get you scheduled for a colonoscopy.    Try to reduce the amount of Oxycodone by taking 1000 mg of Tylenol.     Instead of eating one large meal, try to spread your intake throughout the day.     Return in 3 months

## 2025-03-05 ENCOUNTER — APPOINTMENT (OUTPATIENT)
Dept: PRIMARY CARE | Facility: CLINIC | Age: 55
End: 2025-03-05
Payer: MEDICARE

## 2025-03-05 NOTE — ASSESSMENT & PLAN NOTE
Orders:    ondansetron ODT (Zofran-ODT) 4 mg disintegrating tablet; Dissolve 1 tablet (4 mg) in the mouth every 8 hours if needed for nausea or vomiting.

## 2025-03-06 ENCOUNTER — APPOINTMENT (OUTPATIENT)
Dept: DERMATOLOGY | Facility: CLINIC | Age: 55
End: 2025-03-06
Payer: MEDICARE

## 2025-03-12 ENCOUNTER — TELEPHONE (OUTPATIENT)
Facility: HOSPITAL | Age: 55
End: 2025-03-12
Payer: MEDICARE

## 2025-03-12 DIAGNOSIS — L73.9 FOLLICULITIS: ICD-10-CM

## 2025-03-12 RX ORDER — CLINDAMYCIN PHOSPHATE 10 UG/ML
LOTION TOPICAL 2 TIMES DAILY
Qty: 60 ML | Refills: 1 | Status: SHIPPED | OUTPATIENT
Start: 2025-03-12

## 2025-03-12 NOTE — TELEPHONE ENCOUNTER
Called PT to ask if [facility] called him, he said yes but the first available is June 30 because of the anesthesia. ([facility]). Said I would pass along the message, either we or the D[facility] will get back to him.

## 2025-03-14 ENCOUNTER — TELEPHONE (OUTPATIENT)
Facility: HOSPITAL | Age: 55
End: 2025-03-14
Payer: MEDICARE

## 2025-03-18 DIAGNOSIS — K43.2 INCISIONAL HERNIA, WITHOUT OBSTRUCTION OR GANGRENE: ICD-10-CM

## 2025-03-18 RX ORDER — OXYCODONE HYDROCHLORIDE 5 MG/1
5 TABLET ORAL EVERY 8 HOURS PRN
Qty: 90 TABLET | Refills: 0 | Status: SHIPPED | OUTPATIENT
Start: 2025-03-18 | End: 2025-04-17

## 2025-03-18 NOTE — TELEPHONE ENCOUNTER
Rx Refill Request     Name: Karl Estevez  :  1970   Medication Name:  OXYCODONE 5MG  Specific Pharmacy location:  [facility]  Date of last appointment:  2025   Date of next appointment:  2025   Best number to reach patient:  [phone]

## 2025-03-25 ENCOUNTER — APPOINTMENT (OUTPATIENT)
Dept: DERMATOLOGY | Facility: CLINIC | Age: 55
End: 2025-03-25
Payer: MEDICARE

## 2025-03-25 DIAGNOSIS — L73.9 FOLLICULITIS: ICD-10-CM

## 2025-03-25 PROCEDURE — 1036F TOBACCO NON-USER: CPT | Performed by: NURSE PRACTITIONER

## 2025-03-25 PROCEDURE — 3061F NEG MICROALBUMINURIA REV: CPT | Performed by: NURSE PRACTITIONER

## 2025-03-25 PROCEDURE — 99213 OFFICE O/P EST LOW 20 MIN: CPT | Performed by: NURSE PRACTITIONER

## 2025-03-25 RX ORDER — KETOCONAZOLE 20 MG/G
CREAM TOPICAL 2 TIMES DAILY
Qty: 60 G | Refills: 3 | Status: SHIPPED | OUTPATIENT
Start: 2025-03-25

## 2025-03-25 NOTE — H&P
History Of Present Illness  Karl Estevez is a 54 y.o. male presenting for a colonoscopy.   June 2020 with fair bowel prep. S/p OLT June 2022.      Past Medical History  Past Medical History:   Diagnosis Date    Abnormal result of other cardiovascular function study 02/24/2022    Abnormal stress test    Acute duodenal ulcer without hemorrhage or perforation 04/08/2020    Duodenal ulcer, acute    Acute maxillary sinusitis, unspecified 03/25/2020    Acute non-recurrent maxillary sinusitis    Acute maxillary sinusitis, unspecified 02/01/2022    Sinusitis, acute maxillary    Alcoholic cirrhosis of liver without ascites (Multi) 07/29/2022    Alcoholic cirrhosis    Candidiasis of skin and nail 01/17/2022    Candidal skin infection    Chronic kidney disease, stage 4 (severe) (Multi) 07/07/2022    Chronic kidney disease, stage 4 (severe)    CKD (chronic kidney disease)     Encounter for other preprocedural examination 12/08/2022    Encounter for pre-transplant evaluation for liver transplant    Hepatic encephalopathy 04/18/2022    Hepatic encephalopathy    Hyperglycemia, unspecified 11/10/2020    Steroid-induced hyperglycemia    Hypo-osmolality and hyponatremia 03/25/2020    Dilutional hyponatremia    Immunocompromised     Insomnia, unspecified 06/19/2021    Insomnia    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 foot limited to breakdown of skin 08/18/2020    Skin ulcer of toe of right foot, limited to breakdown of skin    Other diseases of stomach and duodenum     Reactive gastropathy    Other disturbances of smell and taste 02/25/2020    Metallic taste    Other specified abnormal findings of blood chemistry 11/09/2022    D-dimer, elevated    Other specified abnormal findings of blood chemistry 09/29/2021    Increased ammonia level    Personal history of other diseases of the respiratory system 07/11/2022    History of pleural effusion    Personal history of other drug therapy 02/21/2020    History of hepatitis  B vaccination    Personal history of other endocrine, nutritional and metabolic disease 09/29/2022    History of hypokalemia    Personal history of other endocrine, nutritional and metabolic disease 07/28/2022    History of hyperkalemia    Personal history of other infectious and parasitic diseases     History of spontaneous bacterial peritonitis    Personal history of other infectious and parasitic diseases 02/17/2022    History of spontaneous bacterial peritonitis    Personal history of other specified conditions     History of heavy alcohol consumption    Personal history of pneumonia (recurrent) 09/23/2022    History of pneumonia    Personal history of pneumonia (recurrent) 07/13/2022    History of pneumonia    Shortness of breath 11/09/2022    Shortness of breath at rest    Type 2 diabetes mellitus      Surgical History  Past Surgical History:   Procedure Laterality Date    CT ABDOMEN PELVIS ANGIOGRAM W AND/OR WO IV CONTRAST  8/10/2020    CT ABDOMEN PELVIS ANGIOGRAM W AND/OR WO IV CONTRAST 8/10/2020 [facility] CL[facility]ICAL LEGACY    CT ANGIO CORONARY ART WITH HEARTFLOW IF SCORE >30%  1/5/2022    CT HEART CORONARY ANGIOGRAM 1/5/2022 [facility] ANCILLARY LEGACY    OTHER SURGICAL HISTORY  02/17/2020    Complete colonoscopy    OTHER SURGICAL HISTORY  02/17/2020    Cardiac catheterization    OTHER SURGICAL HISTORY  02/17/2020    Biceps tenodesis procedure    OTHER SURGICAL HISTORY  12/30/2022    Hernia repair    OTHER SURGICAL HISTORY  12/30/2022    Liver transplantation    US GUIDED ABDOMINAL PARACENTESIS  11/26/2019    US GUIDED ABDOMINAL PARACENTESIS 11/26/2019 [facility] CLINICAL LEGACY    US GUIDED ABDOMINAL PARACENTESIS  12/17/2019    US GUIDED ABDOMINAL PARACENTESIS 12/17/2019 [facility] CLINICAL LEGACY    US GUIDED ABDOMINAL PARACENTESIS  12/24/2019    US GUIDED ABDOMINAL PARACENTESIS 12/24/2019 [facility]    US GUIDED ABDOMINAL PARACENTESIS  11/13/2019    US GUIDED ABDOMINAL PARACENTESIS 11/13/2019 [facility] CLINICAL LEGACY    US  GUIDED ABDOMINAL PARACENTESIS  1/7/2020    US GUIDED ABDOMINAL PARACENTESIS 1/7/2020 [facility] CLINICAL LEGACY    US GUIDED ABDOMINAL PARACENTESIS  3/12/2020    US GUIDED ABDOMINAL PARACENTESIS 3/12/2020 [facility]    US GUIDED ABDOMINAL PARACENTESIS  5/29/2020    US GUIDED ABDOMINAL PARACENTESIS 5/29/2020 [facility]    US GUIDED ABDOMINAL PARACENTESIS  6/12/2020    US GUIDED ABDOMINAL PARACENTESIS 6/12/2020 [facility]    US GUIDED ABDOMINAL PARACENTESIS  6/23/2020    US GUIDED ABDOMINAL PARACENTESIS 6/23/2020 [facility] CLINICAL LEGACY    US GUIDED ABDOMINAL PARACENTESIS  7/9/2020    US GUIDED ABDOMINAL PARACENTESIS 7/9/2020 [facility]    US GUIDED ABDOMINAL PARACENTESIS  10/12/2020    US GUIDED ABDOMINAL PARACENTESIS 10/12/2020 [facility]    US GUIDED ABDOMINAL PARACENTESIS  10/22/2020    US GUIDED ABDOMINAL PARACENTESIS 10/22/2020 [facility]    US GUIDED ABDOMINAL PARACENTESIS  10/30/2020    US GUIDED ABDOMINAL PARACENTESIS 10/30/2020 [facility]    US GUIDED ABDOMINAL PARACENTESIS  11/6/2020    US GUIDED ABDOMINAL PARACENTESIS 11/6/2020 [facility]    US GUIDED ABDOMINAL PARACENTESIS  11/13/2020    US GUIDED ABDOMINAL PARACENTESIS 11/13/2020 [facility]    US GUIDED ABDOMINAL PARACENTESIS  11/20/2020    US GUIDED ABDOMINAL PARACENTESIS 11/20/2020 [facility]    US GUIDED ABDOMINAL PARACENTESIS  11/25/2020    US GUIDED ABDOMINAL PARACENTESIS 11/25/2020 [facility]    US GUIDED ABDOMINAL PARACENTESIS  12/1/2020    US GUIDED ABDOMINAL PARACENTESIS 12/1/2020 [facility]    US GUIDED ABDOMINAL PARACENTESIS  12/7/2020    US GUIDED ABDOMINAL PARACENTESIS 12/7/2020 [facility]    US GUIDED ABDOMINAL PARACENTESIS  12/14/2020    US GUIDED ABDOMINAL PARACENTESIS 12/14/2020 [facility]    US GUIDED ABDOMINAL PARACENTESIS  12/18/2020    US GUIDED ABDOMINAL PARACENTESIS 12/18/2020 [facility]    US GUIDED ABDOMINAL PARACENTESIS  12/23/2020    US GUIDED ABDOMINAL PARACENTESIS 12/23/2020  [facility]    US GUIDED ABDOMINAL PARACENTESIS  12/29/2020    US GUIDED ABDOMINAL PARACENTESIS 12/29/2020 [facility]    US GUIDED ABDOMINAL PARACENTESIS  1/7/2021    US GUIDED ABDOMINAL PARACENTESIS 1/7/2021 [facility]    US GUIDED ABDOMINAL PARACENTESIS  1/12/2021    US GUIDED ABDOMINAL PARACENTESIS 1/12/2021 [facility]    US GUIDED ABDOMINAL PARACENTESIS  1/20/2021    US GUIDED ABDOMINAL PARACENTESIS 1/20/2021 [facility]    US GUIDED ABDOMINAL PARACENTESIS  1/27/2021    US GUIDED ABDOMINAL PARACENTESIS 1/27/2021 [facility]    US GUIDED ABDOMINAL PARACENTESIS  2/3/2021    US GUIDED ABDOMINAL PARACENTESIS 2/3/2021 [facility]    US GUIDED ABDOMINAL PARACENTESIS  3/4/2021    US GUIDED ABDOMINAL PARACENTESIS 3/4/2021 [facility]    US GUIDED ABDOMINAL PARACENTESIS  4/29/2021    US GUIDED ABDOMINAL PARACENTESIS 4/29/2021 [facility]    US GUIDED ABDOMINAL PARACENTESIS  7/6/2021    US GUIDED ABDOMINAL PARACENTESIS 7/6/2021 [facility]    US GUIDED ABDOMINAL PARACENTESIS  7/23/2021    US GUIDED ABDOMINAL PARACENTESIS 7/23/2021 [facility]    US GUIDED ABDOMINAL PARACENTESIS  8/5/2021    US GUIDED ABDOMINAL PARACENTESIS 8/5/2021 [facility]    US GUIDED ABDOMINAL PARACENTESIS  8/19/2021    US GUIDED ABDOMINAL PARACENTESIS 8/19/2021 [facility]    US GUIDED ABDOMINAL PARACENTESIS  8/30/2021    US GUIDED ABDOMINAL PARACENTESIS 8/30/2021 [facility]    US GUIDED ABDOMINAL PARACENTESIS  9/7/2021    US GUIDED ABDOMINAL PARACENTESIS 9/7/2021 [facility]    US GUIDED ABDOMINAL PARACENTESIS  9/10/2021    US GUIDED ABDOMINAL PARACENTESIS 9/10/2021 [facility]    US GUIDED ABDOMINAL PARACENTESIS  9/13/2021    US GUIDED ABDOMINAL PARACENTESIS 9/13/2021 [facility]    US GUIDED ABDOMINAL PARACENTESIS  9/16/2021    US GUIDED ABDOMINAL PARACENTESIS 9/16/2021 [facility]    US GUIDED ABDOMINAL PARACENTESIS  9/22/2021    US  GUIDED ABDOMINAL PARACENTESIS 9/22/2021 [facility]    US GUIDED ABDOMINAL PARACENTESIS  10/11/2021    US GUIDED ABDOMINAL PARACENTESIS 10/11/2021 [facility]    US GUIDED ABDOMINAL PARACENTESIS  10/18/2021    US GUIDED ABDOMINAL PARACENTESIS 10/18/2021 [facility]    US GUIDED ABDOMINAL PARACENTESIS  10/26/2021    US GUIDED ABDOMINAL PARACENTESIS 10/26/2021 [facility]    US GUIDED ABDOMINAL PARACENTESIS  11/3/2021    US GUIDED ABDOMINAL PARACENTESIS 11/3/2021 [facility]    US GUIDED ABDOMINAL PARACENTESIS  11/8/2021    US GUIDED ABDOMINAL PARACENTESIS 11/8/2021 [facility]    US GUIDED ABDOMINAL PARACENTESIS  11/12/2021    US GUIDED ABDOMINAL PARACENTESIS 11/12/2021 [facility]    US GUIDED ABDOMINAL PARACENTESIS  11/16/2021    US GUIDED ABDOMINAL PARACENTESIS 11/16/2021 [facility]    US GUIDED ABDOMINAL PARACENTESIS  11/19/2021    US GUIDED ABDOMINAL PARACENTESIS 11/19/2021 [facility]    US GUIDED ABDOMINAL PARACENTESIS  11/23/2021    US GUIDED ABDOMINAL PARACENTESIS 11/23/2021 [facility]    US GUIDED ABDOMINAL PARACENTESIS  11/26/2021    US GUIDED ABDOMINAL PARACENTESIS 11/26/2021 [facility]    US GUIDED ABDOMINAL PARACENTESIS  11/30/2021    US GUIDED ABDOMINAL PARACENTESIS 11/30/2021 [facility]    US GUIDED ABDOMINAL PARACENTESIS  12/3/2021    US GUIDED ABDOMINAL PARACENTESIS 12/3/2021 [facility]    US GUIDED ABDOMINAL PARACENTESIS  12/7/2021    US GUIDED ABDOMINAL PARACENTESIS 12/7/2021 [facility]    US GUIDED ABDOMINAL PARACENTESIS  12/10/2021    US GUIDED ABDOMINAL PARACENTESIS 12/10/2021 [facility]    US GUIDED ABDOMINAL PARACENTESIS  12/14/2021    US GUIDED ABDOMINAL PARACENTESIS 12/14/2021 [facility]    US GUIDED ABDOMINAL PARACENTESIS  12/17/2021    US GUIDED ABDOMINAL PARACENTESIS 12/17/2021 [facility]    US GUIDED ABDOMINAL PARACENTESIS   12/22/2021    US GUIDED ABDOMINAL PARACENTESIS 12/22/2021 [facility]    US GUIDED ABDOMINAL PARACENTESIS  12/27/2021    US GUIDED ABDOMINAL PARACENTESIS 12/27/2021 [facility]    US GUIDED ABDOMINAL PARACENTESIS  12/30/2021    US GUIDED ABDOMINAL PARACENTESIS 12/30/2021 [facility]    US GUIDED ABDOMINAL PARACENTESIS  1/3/2022    US GUIDED ABDOMINAL PARACENTESIS 1/3/2022 [facility]    US GUIDED ABDOMINAL PARACENTESIS  1/7/2022    US GUIDED ABDOMINAL PARACENTESIS 1/7/2022 [facility]    US GUIDED ABDOMINAL PARACENTESIS  1/11/2022    US GUIDED ABDOMINAL PARACENTESIS 1/11/2022 [facility]    US GUIDED ABDOMINAL PARACENTESIS  1/14/2022    US GUIDED ABDOMINAL PARACENTESIS 1/14/2022 [facility]    US GUIDED ABDOMINAL PARACENTESIS  1/20/2022    US GUIDED ABDOMINAL PARACENTESIS 1/20/2022 [facility]    US GUIDED ABDOMINAL PARACENTESIS  1/25/2022    US GUIDED ABDOMINAL PARACENTESIS 1/25/2022 [facility]    US GUIDED ABDOMINAL PARACENTESIS  1/28/2022    US GUIDED ABDOMINAL PARACENTESIS 1/28/2022 [facility]    US GUIDED ABDOMINAL PARACENTESIS  2/1/2022    US GUIDED ABDOMINAL PARACENTESIS 2/1/2022 [facility]    US GUIDED ABDOMINAL PARACENTESIS  2/4/2022    US GUIDED ABDOMINAL PARACENTESIS 2/4/2022 [facility]    US GUIDED ABDOMINAL PARACENTESIS  2/11/2022    US GUIDED ABDOMINAL PARACENTESIS 2/11/2022 [facility]    US GUIDED ABDOMINAL PARACENTESIS  2/18/2022    US GUIDED ABDOMINAL PARACENTESIS 2/18/2022 [facility]    US GUIDED ABDOMINAL PARACENTESIS  2/22/2022    US GUIDED ABDOMINAL PARACENTESIS 2/22/2022 [facility]    US GUIDED ABDOMINAL PARACENTESIS  2/25/2022    US GUIDED ABDOMINAL PARACENTESIS 2/25/2022 [facility]    US GUIDED ABDOMINAL PARACENTESIS  3/1/2022    US GUIDED ABDOMINAL PARACENTESIS 3/1/2022 [facility]    US GUIDED ABDOMINAL PARACENTESIS  3/4/2022    US GUIDED ABDOMINAL  PARACENTESIS 3/4/2022 [facility]    US GUIDED ABDOMINAL PARACENTESIS  3/8/2022    US GUIDED ABDOMINAL PARACENTESIS 3/8/2022 [facility]    US GUIDED ABDOMINAL PARACENTESIS  3/11/2022    US GUIDED ABDOMINAL PARACENTESIS 3/11/2022 [facility]    US GUIDED ABDOMINAL PARACENTESIS  3/15/2022    US GUIDED ABDOMINAL PARACENTESIS 3/15/2022 [facility]    US GUIDED ABDOMINAL PARACENTESIS  3/18/2022    US GUIDED ABDOMINAL PARACENTESIS 3/18/2022 [facility]    US GUIDED ABDOMINAL PARACENTESIS  3/22/2022    US GUIDED ABDOMINAL PARACENTESIS 3/22/2022 [facility]    US GUIDED ABDOMINAL PARACENTESIS  3/29/2022    US GUIDED ABDOMINAL PARACENTESIS 3/29/2022 [facility]    US GUIDED ABDOMINAL PARACENTESIS  3/25/2022    US GUIDED ABDOMINAL PARACENTESIS 3/25/2022 [facility]    US GUIDED ABDOMINAL PARACENTESIS  4/1/2022    US GUIDED ABDOMINAL PARACENTESIS 4/1/2022 [facility]    US GUIDED ABDOMINAL PARACENTESIS  4/5/2022    US GUIDED ABDOMINAL PARACENTESIS 4/5/2022 [facility]    US GUIDED ABDOMINAL PARACENTESIS  4/8/2022    US GUIDED ABDOMINAL PARACENTESIS 4/8/2022 [facility]    US GUIDED ABDOMINAL PARACENTESIS  4/12/2022    US GUIDED ABDOMINAL PARACENTESIS 4/12/2022 [facility]    US GUIDED ABDOMINAL PARACENTESIS  4/15/2022    US GUIDED ABDOMINAL PARACENTESIS 4/15/2022 [facility]    US GUIDED ABDOMINAL PARACENTESIS  4/19/2022    US GUIDED ABDOMINAL PARACENTESIS 4/19/2022 [facility]    US GUIDED ABDOMINAL PARACENTESIS  4/25/2022    US GUIDED ABDOMINAL PARACENTESIS 4/25/2022 [facility]    US GUIDED ABDOMINAL PARACENTESIS  4/22/2022    US GUIDED ABDOMINAL PARACENTESIS 4/22/2022 [facility]    US GUIDED ABDOMINAL PARACENTESIS  4/28/2022    US GUIDED ABDOMINAL PARACENTESIS 4/28/2022 [facility]    US GUIDED ABDOMINAL PARACENTESIS  5/3/2022    US GUIDED ABDOMINAL PARACENTESIS 5/3/2022 [facility]    US GUIDED ABDOMINAL PARACENTESIS  5/6/2022    US GUIDED ABDOMINAL PARACENTESIS 5/6/2022 [facility]    US GUIDED ABDOMINAL PARACENTESIS  5/10/2022    US GUIDED ABDOMINAL PARACENTESIS 5/10/2022 [facility]    US GUIDED ABDOMINAL PARACENTESIS  5/13/2022    US GUIDED ABDOMINAL PARACENTESIS 5/13/2022 [facility]    US GUIDED ABDOMINAL PARACENTESIS  5/17/2022    US GUIDED ABDOMINAL PARACENTESIS 5/17/2022 [facility]    US GUIDED ABDOMINAL PARACENTESIS  5/20/2022    US GUIDED ABDOMINAL PARACENTESIS 5/20/2022 [facility] ANCILL[facility] LEGAC[facility]    US GUIDED ABDOMINAL PARACENTESIS  5/27/2022    US GUIDED ABDOMINAL PARACENTESIS 5/27/2022 [facility] ANCILLARY LEGACY    US GUIDED ABDOMINAL PARACENTESIS  5/31/2022    US GUIDED ABDOMINAL PARACENTESIS 5/31/2022 [facility] ANCILLARY LEGACY    US GUIDED ABDOMINAL PARACENTESIS  6/3/2022    US GUIDED ABDOMINAL PARACENTESIS 6/3/2022 [facility] ANCILLARY LEGACY    US GUIDED ABDOMINAL PARACENTESIS  6/7/2022    US GUIDED ABDOMINAL PARACENTESIS 6/7/2022 [facility] ANCILLARY LEGACY    US GUIDED ABDOMINAL PARACENTESIS  6/10/2022    US GUIDED ABDOMINAL PARACENTESIS 6/10/2022 [facility] ANCILLARY LEGACY     Social History  He reports that he quit smoking about 5 years ago. His smoking use included cigarettes. He started smoking about 35 years ago. He has a 30 pack-year smoking history. He has never used smokeless tobacco. He reports that he does not currently use alcohol. He reports that he does not currently use drugs.    Family History  Family History   Problem Relation Name Age of Onset    Diabetes Father      Multiple myeloma Father          Allergies  Allergies   Allergen Reactions    Morphine Nausea/vomiting    Nsaids (Non-Steroidal Anti-Inflammatory Drug) Unknown    Simvastatin Nausea/vomiting    Propoxyphene N-Acetaminophen Nausea Only, Nausea And Vomiting and Unknown     And vomiting    Darvocet    Sulfamethoxazole-Trimethoprim Rash and Other     Light sensitivity     Review of  "Systems  Constitutional/ General: No fever  Eyes: no yellow discoloration  ENT: normal   Cardiovascular: no chest pain  Respiratory: no shortness of breath  Gastrointestinal: denies abdominal pain   Neurologic: no weakness of extremities  Psychiatric: no mood fluctuations  Genitourinary: no hematuria    All other systems have been reviewed and are negative except as noted in the HPI and above.    Physical Exam  Constitutional: awake, alert, in no acute distress  Eyes: EOMI    Respiratory: Bilateral air entry equal   Cardiovascular: regular rate and rhythm   Abdomen: soft, non tender, non distended    Neurologic: gross motor strength intact   Psychiatric: alert, appropriate mood and affect, oriented to time/place/person    Last Recorded Vitals  Blood pressure 102/74, pulse 74, temperature 36 °C (96.8 °F), temperature source Temporal, resp. rate 16, height 1.803 m (5' 11\"), weight 100 kg (221 lb), SpO2 98%.    Assessment/Plan   Plan for colonoscopy today. Will review procedure details, potential risks and obtain informed consent.        Javed Mays MD  "

## 2025-03-25 NOTE — PROGRESS NOTES
Subjective     MELANIE Estevez is a 54 y.o. male who presents for the following: folliculitis.   Established patient in for follow-up for folliculitis to chest last seen January 2025.  Patient was prescribed ketoconazole (NIZOral) 2 % cream   Apply topically 2 times a day.  Patient states today the rash has almost completely cleared he is still using the topical once to twice a day.    Review of Systems:  No other skin or systemic complaints other than what is documented elsewhere in the note.    The following portions of the chart were reviewed this encounter and updated as appropriate:       Skin Cancer History  No skin cancer on file.    Specialty Problems          Dermatology Problems    Achrochordon    Wart viral    Onychomycosis     Past Medical History:  MELANIE Estevez  has a past medical history of Abnormal result of other cardiovascular function study (02/24/2022), Acute duodenal ulcer without hemorrhage or perforation (04/08/2020), Acute maxillary sinusitis, unspecified (03/25/2020), Acute maxillary sinusitis, unspecified (02/01/2022), Alcoholic cirrhosis of liver without ascites (Multi) (07/29/2022), Candidiasis of skin and nail (01/17/2022), Chronic kidney disease, stage 4 (severe) (Multi) (07/07/2022), CKD (chronic kidney disease), Encounter for other preprocedural examination (12/08/2022), Hepatic encephalopathy (04/18/2022), Hyperglycemia, unspecified (11/10/2020), Hypo-osmolality and hyponatremia (03/25/2020), Immunocompromised, Insomnia, unspecified (06/19/2021), 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 foot limited to breakdown of skin (08/18/2020), Other diseases of stomach and duodenum, Other disturbances of smell and taste (02/25/2020), Other specified abnormal findings of blood chemistry (11/09/2022), Other specified abnormal findings of blood chemistry (09/29/2021), Personal history of other diseases of the respiratory system (07/11/2022), Personal history of other drug therapy  (02/21/2020), Personal history of other endocrine, nutritional and metabolic disease (09/29/2022), Personal history of other endocrine, nutritional and metabolic disease (07/28/2022), Personal history of other infectious and parasitic diseases, Personal history of other infectious and parasitic diseases (02/17/2022), Personal history of other specified conditions, Personal history of pneumonia (recurrent) (09/23/2022), Personal history of pneumonia (recurrent) (07/13/2022), Shortness of breath (11/09/2022), and Type 2 diabetes mellitus.    Past Surgical History:  MELANIE Estevez  has a past surgical history that includes Other surgical history (02/17/2020); Other surgical history (02/17/2020); Other surgical history (02/17/2020); Other surgical history (12/30/2022); Other surgical history (12/30/2022); US guided abdominal paracentesis (11/26/2019); US guided abdominal paracentesis (12/17/2019); US guided abdominal paracentesis (12/24/2019); US guided abdominal paracentesis (11/13/2019); US guided abdominal paracentesis (1/7/2020); US guided abdominal paracentesis (3/12/2020); US guided abdominal paracentesis (5/29/2020); US guided abdominal paracentesis (6/12/2020); US guided abdominal paracentesis (6/23/2020); US guided abdominal paracentesis (7/9/2020); CT angio abdomen pelvis w and or wo IV IV contrast (8/10/2020); US guided abdominal paracentesis (10/12/2020); US guided abdominal paracentesis (10/22/2020); US guided abdominal paracentesis (10/30/2020); US guided abdominal paracentesis (11/6/2020); US guided abdominal paracentesis (11/13/2020); US guided abdominal paracentesis (11/20/2020); US guided abdominal paracentesis (11/25/2020); US guided abdominal paracentesis (12/1/2020); US guided abdominal paracentesis (12/7/2020); US guided abdominal paracentesis (12/14/2020); US guided abdominal paracentesis (12/18/2020); US guided abdominal paracentesis (12/23/2020); US guided abdominal paracentesis (12/29/2020);  US guided abdominal paracentesis (1/7/2021); US guided abdominal paracentesis (1/12/2021); US guided abdominal paracentesis (1/20/2021); US guided abdominal paracentesis (1/27/2021); US guided abdominal paracentesis (2/3/2021); US guided abdominal paracentesis (3/4/2021); US guided abdominal paracentesis (4/29/2021); US guided abdominal paracentesis (7/6/2021); US guided abdominal paracentesis (7/23/2021); US guided abdominal paracentesis (8/5/2021); US guided abdominal paracentesis (8/19/2021); US guided abdominal paracentesis (8/30/2021); US guided abdominal paracentesis (9/7/2021); US guided abdominal paracentesis (9/10/2021); US guided abdominal paracentesis (9/13/2021); US guided abdominal paracentesis (9/16/2021); US guided abdominal paracentesis (9/22/2021); US guided abdominal paracentesis (10/11/2021); US guided abdominal paracentesis (10/18/2021); US guided abdominal paracentesis (10/26/2021); US guided abdominal paracentesis (11/3/2021); US guided abdominal paracentesis (11/8/2021); US guided abdominal paracentesis (11/12/2021); US guided abdominal paracentesis (11/16/2021); US guided abdominal paracentesis (11/19/2021); US guided abdominal paracentesis (11/23/2021); US guided abdominal paracentesis (11/26/2021); US guided abdominal paracentesis (11/30/2021); US guided abdominal paracentesis (12/3/2021); US guided abdominal paracentesis (12/7/2021); US guided abdominal paracentesis (12/10/2021); US guided abdominal paracentesis (12/14/2021); US guided abdominal paracentesis (12/17/2021); US guided abdominal paracentesis (12/22/2021); US guided abdominal paracentesis (12/27/2021); US guided abdominal paracentesis (12/30/2021); US guided abdominal paracentesis (1/3/2022); CT angio coronary art with heartflow if score >30% (1/5/2022); US guided abdominal paracentesis (1/7/2022); US guided abdominal paracentesis (1/11/2022); US guided abdominal paracentesis (1/14/2022); US guided abdominal paracentesis  (1/20/2022); US guided abdominal paracentesis (1/25/2022); US guided abdominal paracentesis (1/28/2022); US guided abdominal paracentesis (2/1/2022); US guided abdominal paracentesis (2/4/2022); US guided abdominal paracentesis (2/11/2022); US guided abdominal paracentesis (2/18/2022); US guided abdominal paracentesis (2/22/2022); US guided abdominal paracentesis (2/25/2022); US guided abdominal paracentesis (3/1/2022); US guided abdominal paracentesis (3/4/2022); US guided abdominal paracentesis (3/8/2022); US guided abdominal paracentesis (3/11/2022); US guided abdominal paracentesis (3/15/2022); US guided abdominal paracentesis (3/18/2022); US guided abdominal paracentesis (3/22/2022); US guided abdominal paracentesis (3/29/2022); US guided abdominal paracentesis (3/25/2022); US guided abdominal paracentesis (4/1/2022); US guided abdominal paracentesis (4/5/2022); US guided abdominal paracentesis (4/8/2022); US guided abdominal paracentesis (4/12/2022); US guided abdominal paracentesis (4/15/2022); US guided abdominal paracentesis (4/19/2022); US guided abdominal paracentesis (4/25/2022); US guided abdominal paracentesis (4/22/2022); US guided abdominal paracentesis (4/28/2022); US guided abdominal paracentesis (5/3/2022); US guided abdominal paracentesis (5/6/2022); US guided abdominal paracentesis (5/10/2022); US guided abdominal paracentesis (5/13/2022); US guided abdominal paracentesis (5/17/2022); US guided abdominal paracentesis (5/20/2022); US guided abdominal paracentesis (5/27/2022); US guided abdominal paracentesis (5/31/2022); US guided abdominal paracentesis (6/3/2022); US guided abdominal paracentesis (6/7/2022); and US guided abdominal paracentesis (6/10/2022).    Family History:  Patient family history includes Diabetes in his father; Multiple myeloma in his father.    Social History:  MELANIE NASIR Estevez  reports that he quit smoking about 5 years ago. His smoking use included cigarettes. He started  smoking about 35 years ago. He has a 30 pack-year smoking history. He has never used smokeless tobacco. He reports that he does not currently use alcohol. He reports that he does not currently use drugs.    Allergies:  Morphine, Nsaids (non-steroidal anti-inflammatory drug), Simvastatin, Propoxyphene n-acetaminophen, and Sulfamethoxazole-trimethoprim    Current Medications / CAM's:    Current Outpatient Medications:     acetaminophen (Tylenol) 325 mg tablet, Take 1 tablet (325 mg) by mouth every 6 hours if needed for mild pain (1 - 3)., Disp: 50 tablet, Rfl: 0    allopurinol (Zyloprim) 100 mg tablet, Take 1 tablet (100 mg) by mouth once daily., Disp: 90 tablet, Rfl: 3    amLODIPine (Norvasc) 5 mg tablet, take 1 tablet by mouth once daily, Disp: 90 tablet, Rfl: 3    aspirin 81 mg EC tablet, Take 1 tablet (81 mg) by mouth once daily., Disp: 90 tablet, Rfl: 3    busPIRone (Buspar) 10 mg tablet, Take 1 tablet (10 mg) by mouth 2 times a day as needed (anxiety)., Disp: 60 tablet, Rfl: 0    carvedilol (Coreg) 12.5 mg tablet, Take 1 tablet (12.5 mg) by mouth 2 times a day., Disp: 180 tablet, Rfl: 3    cholecalciferol (Vitamin D-3) 50 mcg (2,000 unit) capsule, Take 2 capsules (100 mcg) by mouth once daily., Disp: , Rfl:     clobetasol (Temovate) 0.05 % cream, 2 times a day. Apply to affected area on both ears, legs and abdomen, Disp: , Rfl:     cyanocobalamin, vitamin B-12, 1,000 mcg lozenge, Take one tablet every morning before breakfast, Disp: 90 lozenge, Rfl: 3    cyclobenzaprine (Flexeril) 5 mg tablet, Take 1 tablet (5 mg) by mouth 3 times a day as needed., Disp: , Rfl:     dapagliflozin propanediol (Farxiga) 10 mg, Take 1 tablet (10 mg) by mouth once daily., Disp: 90 tablet, Rfl: 3    ferrous sulfate, 325 mg ferrous sulfate, tablet, Take 1 tablet by mouth 3 times a day., Disp: 270 tablet, Rfl: 3    gabapentin (Neurontin) 300 mg capsule, Take 1 capsule with breakfast then take 2 capsules with lunch and 2 with dinner,  "Disp: 150 capsule, Rfl: 11    glucagon 1 mg injection, inject 1 milligram for 1 dose INTO THE THIGH UPPER ARM or BUTTOCK..., Disp: , Rfl:     glucose 4 gram chewable tablet, Chew 1 tablet (4 g)., Disp: , Rfl:     insulin aspart (NovoLOG) 100 unit/mL (3 mL) pen, Inject as per sliding scale for sugar >150mg/dL, expect up to 10 units daily, Disp: 10 mL, Rfl: 2    ketoconazole (NIZOral) 2 % cream, Apply topically 2 times a day., Disp: 60 g, Rfl: 3    magnesium oxide (Mag-Ox) 400 mg tablet, Take 1 tablet (400 mg) by mouth once daily., Disp: 90 tablet, Rfl: 3    metFORMIN  mg 24 hr tablet, Take 2 tablets (1,000 mg) by mouth 2 times daily (morning and late afternoon). Take 1 tablet by mouth twice daily with meals, Disp: 360 tablet, Rfl: 3    nitroglycerin (Nitrostat) 0.4 mg SL tablet, Place 1 tablet (0.4 mg) under the tongue every 5 minutes if needed for chest pain (UP TO 3 DOSES)., Disp: , Rfl:     ondansetron ODT (Zofran-ODT) 4 mg disintegrating tablet, Dissolve 1 tablet (4 mg) in the mouth every 8 hours if needed for nausea or vomiting., Disp: 30 tablet, Rfl: 3    oxyCODONE (Roxicodone) 5 mg immediate release tablet, Take 1 tablet (5 mg) by mouth every 8 hours if needed for severe pain (7 - 10)., Disp: 90 tablet, Rfl: 0    pantoprazole (ProtoNix) 40 mg EC tablet, Take 1 tablet (40 mg) by mouth 2 times a day., Disp: 180 tablet, Rfl: 3    pen needle, diabetic 31 gauge x 5/16\" needle, Injects 4x daily, Disp: 400 each, Rfl: 1    tacrolimus (Prograf) 1 mg capsule, Take 1 capsule (1 mg) by mouth 2 times a day., Disp: 180 capsule, Rfl: 3    tirzepatide (Mounjaro) 12.5 mg/0.5 mL pen injector, Inject 12.5 mg under the skin every 7 days., Disp: 2 mL, Rfl: 3    tiZANidine (Zanaflex) 4 mg tablet, Take 1 tablet (4 mg) by mouth 3 times a day., Disp: 90 tablet, Rfl: 1     Objective   Well appearing patient in no apparent distress; mood and affect are within normal limits.      Assessment/Plan   1. Folliculitis  Chest - Medial " (Center)  Clear on exam.        -Discussed nature of condition  -Discussed treatment options  -This condition is often worsened by heat exposure, sweat exposure, friction/pressure on the skin. Off-load pressure as possible and wear loose, breathable clothing.  -Recommend:    Related Procedures  Follow Up In Dermatology - Established Patient    Related Medications  ketoconazole (NIZOral) 2 % cream  Apply topically 2 times a day.

## 2025-03-26 ENCOUNTER — TELEPHONE (OUTPATIENT)
Facility: HOSPITAL | Age: 55
End: 2025-03-26

## 2025-03-26 ENCOUNTER — HOSPITAL ENCOUNTER (OUTPATIENT)
Dept: GASTROENTEROLOGY | Facility: HOSPITAL | Age: 55
Discharge: HOME | End: 2025-03-26
Payer: MEDICARE

## 2025-03-26 ENCOUNTER — ANESTHESIA (OUTPATIENT)
Dept: GASTROENTEROLOGY | Facility: HOSPITAL | Age: 55
End: 2025-03-26
Payer: MEDICARE

## 2025-03-26 ENCOUNTER — ANESTHESIA EVENT (OUTPATIENT)
Dept: GASTROENTEROLOGY | Facility: HOSPITAL | Age: 55
End: 2025-03-26
Payer: MEDICARE

## 2025-03-26 VITALS
WEIGHT: 221 LBS | RESPIRATION RATE: 16 BRPM | HEART RATE: 89 BPM | HEIGHT: 71 IN | SYSTOLIC BLOOD PRESSURE: 112 MMHG | DIASTOLIC BLOOD PRESSURE: 85 MMHG | BODY MASS INDEX: 30.94 KG/M2 | TEMPERATURE: 98 F | OXYGEN SATURATION: 97 %

## 2025-03-26 DIAGNOSIS — Z01.818 ENCOUNTER FOR PRE-TRANSPLANT EVALUATION FOR LIVER TRANSPLANT: ICD-10-CM

## 2025-03-26 LAB — GLUCOSE BLD MANUAL STRIP-MCNC: 91 MG/DL (ref 74–99)

## 2025-03-26 PROCEDURE — 3700000002 HC GENERAL ANESTHESIA TIME - EACH INCREMENTAL 1 MINUTE

## 2025-03-26 PROCEDURE — 3700000001 HC GENERAL ANESTHESIA TIME - INITIAL BASE CHARGE

## 2025-03-26 PROCEDURE — 7100000010 HC PHASE TWO TIME - EACH INCREMENTAL 1 MINUTE

## 2025-03-26 PROCEDURE — 45378 DIAGNOSTIC COLONOSCOPY: CPT | Performed by: STUDENT IN AN ORGANIZED HEALTH CARE EDUCATION/TRAINING PROGRAM

## 2025-03-26 PROCEDURE — G0121 COLON CA SCRN NOT HI RSK IND: HCPCS | Performed by: STUDENT IN AN ORGANIZED HEALTH CARE EDUCATION/TRAINING PROGRAM

## 2025-03-26 PROCEDURE — A45378 PR COLONOSCOPY,DIAGNOSTIC: Performed by: ANESTHESIOLOGY

## 2025-03-26 PROCEDURE — 7100000009 HC PHASE TWO TIME - INITIAL BASE CHARGE

## 2025-03-26 PROCEDURE — A45378 PR COLONOSCOPY,DIAGNOSTIC

## 2025-03-26 PROCEDURE — 2500000004 HC RX 250 GENERAL PHARMACY W/ HCPCS (ALT 636 FOR OP/ED)

## 2025-03-26 PROCEDURE — 82947 ASSAY GLUCOSE BLOOD QUANT: CPT

## 2025-03-26 RX ORDER — MIDAZOLAM HYDROCHLORIDE 1 MG/ML
INJECTION INTRAMUSCULAR; INTRAVENOUS AS NEEDED
Status: DISCONTINUED | OUTPATIENT
Start: 2025-03-26 | End: 2025-03-26

## 2025-03-26 RX ORDER — LABETALOL HYDROCHLORIDE 5 MG/ML
5 INJECTION, SOLUTION INTRAVENOUS ONCE AS NEEDED
OUTPATIENT
Start: 2025-03-26

## 2025-03-26 RX ORDER — LIDOCAINE IN NACL,ISO-OSMOT/PF 30 MG/3 ML
0.1 SYRINGE (ML) INJECTION ONCE
OUTPATIENT
Start: 2025-03-26 | End: 2025-03-26

## 2025-03-26 RX ORDER — DROPERIDOL 2.5 MG/ML
0.62 INJECTION, SOLUTION INTRAMUSCULAR; INTRAVENOUS ONCE AS NEEDED
OUTPATIENT
Start: 2025-03-26

## 2025-03-26 RX ORDER — HYDROMORPHONE HYDROCHLORIDE 1 MG/ML
0.2 INJECTION, SOLUTION INTRAMUSCULAR; INTRAVENOUS; SUBCUTANEOUS EVERY 5 MIN PRN
OUTPATIENT
Start: 2025-03-26

## 2025-03-26 RX ORDER — MIDAZOLAM HYDROCHLORIDE 1 MG/ML
1 INJECTION INTRAMUSCULAR; INTRAVENOUS ONCE AS NEEDED
OUTPATIENT
Start: 2025-03-26

## 2025-03-26 RX ORDER — SODIUM CHLORIDE, SODIUM LACTATE, POTASSIUM CHLORIDE, CALCIUM CHLORIDE 600; 310; 30; 20 MG/100ML; MG/100ML; MG/100ML; MG/100ML
INJECTION, SOLUTION INTRAVENOUS CONTINUOUS PRN
Status: DISCONTINUED | OUTPATIENT
Start: 2025-03-26 | End: 2025-03-26

## 2025-03-26 RX ORDER — PROPOFOL 10 MG/ML
INJECTION, EMULSION INTRAVENOUS AS NEEDED
Status: DISCONTINUED | OUTPATIENT
Start: 2025-03-26 | End: 2025-03-26

## 2025-03-26 RX ORDER — HYDROMORPHONE HYDROCHLORIDE 1 MG/ML
0.5 INJECTION, SOLUTION INTRAMUSCULAR; INTRAVENOUS; SUBCUTANEOUS EVERY 5 MIN PRN
OUTPATIENT
Start: 2025-03-26

## 2025-03-26 RX ORDER — METOCLOPRAMIDE HYDROCHLORIDE 5 MG/ML
10 INJECTION INTRAMUSCULAR; INTRAVENOUS ONCE AS NEEDED
OUTPATIENT
Start: 2025-03-26

## 2025-03-26 RX ORDER — SODIUM CHLORIDE, SODIUM LACTATE, POTASSIUM CHLORIDE, CALCIUM CHLORIDE 600; 310; 30; 20 MG/100ML; MG/100ML; MG/100ML; MG/100ML
100 INJECTION, SOLUTION INTRAVENOUS CONTINUOUS
OUTPATIENT
Start: 2025-03-26 | End: 2025-03-27

## 2025-03-26 RX ORDER — OXYCODONE HYDROCHLORIDE 5 MG/1
10 TABLET ORAL EVERY 4 HOURS PRN
OUTPATIENT
Start: 2025-03-26

## 2025-03-26 RX ORDER — OXYCODONE AND ACETAMINOPHEN 5; 325 MG/1; MG/1
1 TABLET ORAL EVERY 4 HOURS PRN
OUTPATIENT
Start: 2025-03-26

## 2025-03-26 RX ORDER — ACETAMINOPHEN 325 MG/1
650 TABLET ORAL EVERY 4 HOURS PRN
OUTPATIENT
Start: 2025-03-26

## 2025-03-26 RX ORDER — ONDANSETRON HYDROCHLORIDE 2 MG/ML
INJECTION, SOLUTION INTRAVENOUS AS NEEDED
Status: DISCONTINUED | OUTPATIENT
Start: 2025-03-26 | End: 2025-03-26

## 2025-03-26 RX ORDER — LIDOCAINE HCL/PF 100 MG/5ML
SYRINGE (ML) INTRAVENOUS AS NEEDED
Status: DISCONTINUED | OUTPATIENT
Start: 2025-03-26 | End: 2025-03-26

## 2025-03-26 RX ADMIN — ONDANSETRON 4 MG: 2 INJECTION, SOLUTION INTRAMUSCULAR; INTRAVENOUS at 08:44

## 2025-03-26 RX ADMIN — MIDAZOLAM HYDROCHLORIDE 2 MG: 2 INJECTION, SOLUTION INTRAMUSCULAR; INTRAVENOUS at 08:34

## 2025-03-26 RX ADMIN — SODIUM CHLORIDE, POTASSIUM CHLORIDE, SODIUM LACTATE AND CALCIUM CHLORIDE: 600; 310; 30; 20 INJECTION, SOLUTION INTRAVENOUS at 08:34

## 2025-03-26 RX ADMIN — PROPOFOL 50 MG: 10 INJECTION, EMULSION INTRAVENOUS at 08:39

## 2025-03-26 RX ADMIN — PROPOFOL 30 MG: 10 INJECTION, EMULSION INTRAVENOUS at 08:42

## 2025-03-26 RX ADMIN — LIDOCAINE HYDROCHLORIDE 50 MG: 20 INJECTION INTRAVENOUS at 08:39

## 2025-03-26 RX ADMIN — PROPOFOL 200 MCG/KG/MIN: 10 INJECTION, EMULSION INTRAVENOUS at 08:40

## 2025-03-26 ASSESSMENT — PAIN - FUNCTIONAL ASSESSMENT
PAIN_FUNCTIONAL_ASSESSMENT: 0-10

## 2025-03-26 ASSESSMENT — PAIN SCALES - GENERAL
PAINLEVEL_OUTOF10: 0 - NO PAIN
PAIN_LEVEL: 1
PAINLEVEL_OUTOF10: 0 - NO PAIN

## 2025-03-26 ASSESSMENT — COLUMBIA-SUICIDE SEVERITY RATING SCALE - C-SSRS
6. HAVE YOU EVER DONE ANYTHING, STARTED TO DO ANYTHING, OR PREPARED TO DO ANYTHING TO END YOUR LIFE?: NO
1. IN THE PAST MONTH, HAVE YOU WISHED YOU WERE DEAD OR WISHED YOU COULD GO TO SLEEP AND NOT WAKE UP?: NO
2. HAVE YOU ACTUALLY HAD ANY THOUGHTS OF KILLING YOURSELF?: NO

## 2025-03-26 NOTE — ANESTHESIA POSTPROCEDURE EVALUATION
Patient: Karl Estevez    Procedure Summary       Date: 03/26/25 Room / Location: [facility]    Anesthesia Start: 0834 Anesthesia Stop: 0905    Procedure: COLONOSCOPY Diagnosis: Encounter for pre-transplant evaluation for liver transplant    Scheduled Providers: Javed Mays MD Responsible Provider: Eddie Bonilla MD    Anesthesia Type: MAC ASA Status: 3            Anesthesia Type: MAC    Vitals Value Taken Time   /85 03/26/25 0930   Temp 36.7 °C (98 °F) 03/26/25 0900   Pulse 89 03/26/25 0930   Resp 16 03/26/25 0930   SpO2 97 % 03/26/25 0930       Anesthesia Post Evaluation    Patient location during evaluation: PACU  Patient participation: complete - patient participated  Level of consciousness: awake and alert  Pain score: 1  Pain management: adequate  Airway patency: patent  Cardiovascular status: acceptable  Respiratory status: acceptable  Hydration status: acceptable  Postoperative Nausea and Vomiting: none        There were no known notable events for this encounter.

## 2025-03-26 NOTE — TELEPHONE ENCOUNTER
Patient went for colonoscopy today, patient completed prep but was not cleared out.   Gastric emptying study showed delayed gastric emptying.  Discussed with Dr. Gonsalez, patient to do liquids only for 2 days, recommended holding mounjaro for a week to see if it helps.  Discussed with Brandan and he stated that they already reduced his mounjaro and his blood sugars went up.  Message sent to Dr. Mccall.

## 2025-03-26 NOTE — TELEPHONE ENCOUNTER
Patient had a colonoscopy performed today but states fontenot told him he was not cleaned out all the way.  They told him he was distended but they would see him back in about 1 year for his next one.  Patient would like to know who he should talk to about his issues he is still having.

## 2025-03-26 NOTE — ANESTHESIA PREPROCEDURE EVALUATION
Patient: Karl Estevez    Procedure Information       Date/Time: 03/26/25 0820    Scheduled providers: Javed Mays MD    Procedure: COLONOSCOPY    Location: [facility]            Relevant Problems   Cardiac   (+) Benign essential HTN   (+) Hypertension   (+) Unstable angina (Multi)      Pulmonary   (+) Pneumonia      Neuro   (+) Anxiety   (+) Generalized anxiety disorder   (+) Lumbar radiculopathy, chronic   (+) Peripheral polyneuropathy      GI   (+) Esophageal dysphagia   (+) Esophageal varices in cirrhosis (Multi)   (+) GERD (gastroesophageal reflux disease)   (+) Internal hemorrhoid, bleeding      /Renal   (+) BPH (benign prostatic hyperplasia)      Liver   (+) Alcoholic cirrhosis of liver with ascites (Multi)   (+) Fatty liver   (+) History of liver transplant (Multi)   (+) Nodule on liver      Endocrine   (+) Diabetic neuropathy, painful (Multi)   (+) Obesity due to excess calories, unspecified obesity severity   (+) Secondary hyperparathyroidism of renal origin (Multi)   (+) Type 2 diabetes mellitus with neurologic complication      Hematology   (+) Anemia in chronic kidney disease   (+) Macrocytic anemia      Musculoskeletal   (+) DJD (degenerative joint disease)      ID   (+) Cytomegalovirus (CMV) viremia (Multi)   (+) History of MRSA infection   (+) Onychomycosis   (+) Pneumonia   (+) Wart viral       Clinical information reviewed:   Tobacco  Allergies  Meds   Med Hx  Surg Hx   Fam Hx  Soc Hx        NPO Detail:  NPO/Void Status  Carbohydrate Drink Given Prior to Surgery? : N  Date of Last Liquid: 03/25/25  Time of Last Liquid: 2300  Date of Last Solid: 03/24/25  Time of Last Solid: 1900  Last Intake Type: Clear fluids  Time of Last Void: 0700         Physical Exam    Airway  Mallampati: III  TM distance: <3 FB  Neck ROM: limited     Cardiovascular - normal exam     Dental - normal exam     Pulmonary - normal exam     Abdominal            Anesthesia Plan    History of general  anesthesia?: yes  History of complications of general anesthesia?: no    ASA 3     MAC     intravenous induction     Plan discussed with CAA and attending.

## 2025-03-27 ENCOUNTER — TELEPHONE (OUTPATIENT)
Dept: ENDOCRINOLOGY | Facility: HOSPITAL | Age: 55
End: 2025-03-27
Payer: MEDICARE

## 2025-03-27 NOTE — TELEPHONE ENCOUNTER
Called patient and discussed with him his concerns.  Patient has gained 20 pounds.  He has been on Mounjaro 12.5 mg weekly.  Has been having bloating and abdominal discomfort since his surgery end of December for his hernia repair.  He was seen by GI and attempted colonoscopy but did not clear it out.  GI is concerned about Mounjaro delaying gastric emptying which is the way the medication work.  Patient at this time would like to increase his Mounjaro to 15 mg.  We discussed that it could cause worsening in his symptoms and he was willing to try.  We will provide a sample of 2.5 and if no worsening in symptoms will increase to 15 mg

## 2025-04-18 DIAGNOSIS — K43.2 INCISIONAL HERNIA, WITHOUT OBSTRUCTION OR GANGRENE: ICD-10-CM

## 2025-04-18 RX ORDER — OXYCODONE HYDROCHLORIDE 5 MG/1
5 TABLET ORAL EVERY 8 HOURS PRN
Qty: 90 TABLET | Refills: 0 | Status: SHIPPED | OUTPATIENT
Start: 2025-04-18 | End: 2025-05-18

## 2025-04-18 NOTE — TELEPHONE ENCOUNTER
Rx Refill Request     Name: Karl Estevez  :  1970   Medication Name:  oxyCODONE (Roxicodone) 5 mg immediate release tablet - Take 1 tablet (5 mg) by mouth every 8 hours if needed for severe pain (7-10).  Specific Pharmacy location:  [facility]  Date of last appointment:  2025   Date of next appointment:  2025   Best number to reach patient:  [phone]

## 2025-04-22 ENCOUNTER — SPECIALTY PHARMACY (OUTPATIENT)
Dept: PHARMACY | Facility: CLINIC | Age: 55
End: 2025-04-22

## 2025-04-22 PROCEDURE — RXMED WILLOW AMBULATORY MEDICATION CHARGE

## 2025-04-23 ENCOUNTER — PHARMACY VISIT (OUTPATIENT)
Dept: PHARMACY | Facility: CLINIC | Age: 55
End: 2025-04-23
Payer: MEDICARE

## 2025-04-25 ENCOUNTER — LAB (OUTPATIENT)
Dept: LAB | Facility: HOSPITAL | Age: 55
End: 2025-04-25
Payer: MEDICARE

## 2025-04-25 DIAGNOSIS — D84.9 IMMUNOSUPPRESSION: ICD-10-CM

## 2025-04-25 DIAGNOSIS — Z94.4 HISTORY OF LIVER TRANSPLANT (MULTI): ICD-10-CM

## 2025-04-25 DIAGNOSIS — Z94.4 LIVER TRANSPLANT STATUS: Primary | ICD-10-CM

## 2025-04-25 DIAGNOSIS — N18.31 STAGE 3A CHRONIC KIDNEY DISEASE (MULTI): ICD-10-CM

## 2025-04-25 DIAGNOSIS — N18.30 STAGE 3 CHRONIC KIDNEY DISEASE, UNSPECIFIED WHETHER STAGE 3A OR 3B CKD (MULTI): ICD-10-CM

## 2025-04-25 LAB
25(OH)D3 SERPL-MCNC: 86 NG/ML (ref 30–100)
ALBUMIN SERPL BCP-MCNC: 4.3 G/DL (ref 3.4–5)
ALP SERPL-CCNC: 55 U/L (ref 33–120)
ALT SERPL W P-5'-P-CCNC: 10 U/L (ref 10–52)
ANION GAP SERPL CALC-SCNC: 13 MMOL/L (ref 10–20)
AST SERPL W P-5'-P-CCNC: 11 U/L (ref 9–39)
BASOPHILS # BLD AUTO: 0.04 X10*3/UL (ref 0–0.1)
BASOPHILS NFR BLD AUTO: 0.8 %
BILIRUB SERPL-MCNC: 0.4 MG/DL (ref 0–1.2)
BUN SERPL-MCNC: 14 MG/DL (ref 6–23)
CALCIUM SERPL-MCNC: 8.9 MG/DL (ref 8.6–10.6)
CHLORIDE SERPL-SCNC: 103 MMOL/L (ref 98–107)
CO2 SERPL-SCNC: 28 MMOL/L (ref 21–32)
CREAT SERPL-MCNC: 1.12 MG/DL (ref 0.5–1.3)
CREAT UR-MCNC: 168.9 MG/DL (ref 20–370)
EGFRCR SERPLBLD CKD-EPI 2021: 79 ML/MIN/1.73M*2
EOSINOPHIL # BLD AUTO: 0.19 X10*3/UL (ref 0–0.7)
EOSINOPHIL NFR BLD AUTO: 4 %
ERYTHROCYTE [DISTWIDTH] IN BLOOD BY AUTOMATED COUNT: 13.1 % (ref 11.5–14.5)
GLUCOSE SERPL-MCNC: 119 MG/DL (ref 74–99)
HCT VFR BLD AUTO: 40.8 % (ref 41–52)
HGB BLD-MCNC: 13.9 G/DL (ref 13.5–17.5)
IMM GRANULOCYTES # BLD AUTO: 0.01 X10*3/UL (ref 0–0.7)
IMM GRANULOCYTES NFR BLD AUTO: 0.2 % (ref 0–0.9)
LYMPHOCYTES # BLD AUTO: 1.35 X10*3/UL (ref 1.2–4.8)
LYMPHOCYTES NFR BLD AUTO: 28.4 %
MCH RBC QN AUTO: 29.5 PG (ref 26–34)
MCHC RBC AUTO-ENTMCNC: 34.1 G/DL (ref 32–36)
MCV RBC AUTO: 87 FL (ref 80–100)
MONOCYTES # BLD AUTO: 0.33 X10*3/UL (ref 0.1–1)
MONOCYTES NFR BLD AUTO: 6.9 %
NEUTROPHILS # BLD AUTO: 2.83 X10*3/UL (ref 1.2–7.7)
NEUTROPHILS NFR BLD AUTO: 59.7 %
NRBC BLD-RTO: 0 /100 WBCS (ref 0–0)
PLATELET # BLD AUTO: 184 X10*3/UL (ref 150–450)
POTASSIUM SERPL-SCNC: 4.2 MMOL/L (ref 3.5–5.3)
PROT SERPL-MCNC: 6.7 G/DL (ref 6.4–8.2)
PROT UR-ACNC: 13 MG/DL (ref 5–25)
PROT/CREAT UR: 0.08 MG/MG CREAT (ref 0–0.17)
PTH-INTACT SERPL-MCNC: 41.5 PG/ML (ref 18.5–88)
RBC # BLD AUTO: 4.71 X10*6/UL (ref 4.5–5.9)
SODIUM SERPL-SCNC: 140 MMOL/L (ref 136–145)
TACROLIMUS BLD-MCNC: 6 NG/ML
WBC # BLD AUTO: 4.8 X10*3/UL (ref 4.4–11.3)

## 2025-04-25 PROCEDURE — 36415 COLL VENOUS BLD VENIPUNCTURE: CPT

## 2025-04-25 PROCEDURE — 84156 ASSAY OF PROTEIN URINE: CPT

## 2025-04-25 PROCEDURE — 82306 VITAMIN D 25 HYDROXY: CPT

## 2025-04-25 PROCEDURE — 83970 ASSAY OF PARATHORMONE: CPT

## 2025-04-25 PROCEDURE — 82570 ASSAY OF URINE CREATININE: CPT

## 2025-04-25 PROCEDURE — 80053 COMPREHEN METABOLIC PANEL: CPT

## 2025-04-25 PROCEDURE — 80197 ASSAY OF TACROLIMUS: CPT

## 2025-04-25 PROCEDURE — 85025 COMPLETE CBC W/AUTO DIFF WBC: CPT

## 2025-04-28 ENCOUNTER — APPOINTMENT (OUTPATIENT)
Facility: HOSPITAL | Age: 55
End: 2025-04-28
Payer: MEDICARE

## 2025-05-08 DIAGNOSIS — L73.9 FOLLICULITIS: ICD-10-CM

## 2025-05-08 RX ORDER — CLINDAMYCIN PHOSPHATE 10 UG/ML
LOTION TOPICAL 2 TIMES DAILY
Qty: 60 ML | Refills: 1 | Status: SHIPPED | OUTPATIENT
Start: 2025-05-08

## 2025-05-15 DIAGNOSIS — I73.00 RAYNAUD'S PHENOMENON WITHOUT GANGRENE: ICD-10-CM

## 2025-05-15 RX ORDER — AMLODIPINE BESYLATE 5 MG/1
5 TABLET ORAL DAILY
Qty: 90 TABLET | Refills: 3 | Status: SHIPPED | OUTPATIENT
Start: 2025-05-15

## 2025-05-15 NOTE — TELEPHONE ENCOUNTER
Rx Refill Request     Name: Karl Estevez  :  1970   Medication Name:  amlodipine   Specific Pharmacy location:  [facility]   Date of last appointment:  Visit date not found   Date of next appointment:  Visit date not found   Best number to reach patient:  [phone]       Recent Visits  Date Type Provider Dept   25 Office Visit Jose Sol, DO [facility]   24 Office Visit Jose Sol, DO [facility]   24 Office Visit Jose Sol, DO [facility]   24 Office Visit Jose Sol, DO [facility]   24 Office Visit Jose Sol, DO [facility]   Showing recent visits within past 540 days and meeting all other requirements  Future Appointments  Date Type Provider Dept   25 Appointment Jose Sol, DO [facility]   Showing future appointments within next 180 days and meeting all other requirements

## 2025-05-19 DIAGNOSIS — E11.9 TYPE 2 DIABETES MELLITUS WITHOUT COMPLICATION, WITH LONG-TERM CURRENT USE OF INSULIN: Primary | ICD-10-CM

## 2025-05-19 DIAGNOSIS — K43.2 INCISIONAL HERNIA, WITHOUT OBSTRUCTION OR GANGRENE: ICD-10-CM

## 2025-05-19 DIAGNOSIS — Z79.4 TYPE 2 DIABETES MELLITUS WITHOUT COMPLICATION, WITH LONG-TERM CURRENT USE OF INSULIN: Primary | ICD-10-CM

## 2025-05-19 RX ORDER — OXYCODONE HYDROCHLORIDE 5 MG/1
5 TABLET ORAL EVERY 8 HOURS PRN
Qty: 90 TABLET | Refills: 0 | Status: SHIPPED | OUTPATIENT
Start: 2025-05-19 | End: 2025-06-18

## 2025-05-19 NOTE — TELEPHONE ENCOUNTER
Rx Refill Request     Name: Karl Estevez  :  1970   Medication Name:    oxyCODONE (Roxicodone) 5 mg immediate release tablet - Take 1 tablet (5 mg) by mouth every 8 hours if needed for severe pain (7-10).  Specific Pharmacy location:  [facility] [place]  Date of last appointment:  2025   Date of next appointment:  2025   Best number to reach patient:  [phone]

## 2025-05-23 ENCOUNTER — APPOINTMENT (OUTPATIENT)
Dept: PRIMARY CARE | Facility: CLINIC | Age: 55
End: 2025-05-23
Payer: MEDICARE

## 2025-05-23 VITALS
HEIGHT: 71 IN | HEART RATE: 86 BPM | WEIGHT: 224 LBS | OXYGEN SATURATION: 97 % | BODY MASS INDEX: 31.36 KG/M2 | RESPIRATION RATE: 16 BRPM | TEMPERATURE: 97 F | SYSTOLIC BLOOD PRESSURE: 116 MMHG | DIASTOLIC BLOOD PRESSURE: 80 MMHG

## 2025-05-23 DIAGNOSIS — R25.2 MUSCLE CRAMPING: ICD-10-CM

## 2025-05-23 DIAGNOSIS — E11.40 DIABETIC NEUROPATHY, PAINFUL (MULTI): ICD-10-CM

## 2025-05-23 DIAGNOSIS — Z79.899 MEDICATION MANAGEMENT: ICD-10-CM

## 2025-05-23 DIAGNOSIS — N41.1 CHRONIC PROSTATITIS: ICD-10-CM

## 2025-05-23 DIAGNOSIS — N41.0 ACUTE PROSTATITIS: Primary | ICD-10-CM

## 2025-05-23 LAB
POC APPEARANCE, URINE: CLEAR
POC BILIRUBIN, URINE: NEGATIVE
POC BLOOD, URINE: NEGATIVE
POC COLOR, URINE: YELLOW
POC GLUCOSE, URINE: ABNORMAL MG/DL
POC KETONES, URINE: NEGATIVE MG/DL
POC LEUKOCYTES, URINE: NEGATIVE
POC NITRITE,URINE: NEGATIVE
POC PH, URINE: 6 PH
POC PROTEIN, URINE: NEGATIVE MG/DL
POC SPECIFIC GRAVITY, URINE: 1.02
POC UROBILINOGEN, URINE: 0.2 EU/DL

## 2025-05-23 PROCEDURE — 1036F TOBACCO NON-USER: CPT | Performed by: FAMILY MEDICINE

## 2025-05-23 PROCEDURE — 3061F NEG MICROALBUMINURIA REV: CPT | Performed by: FAMILY MEDICINE

## 2025-05-23 PROCEDURE — 81003 URINALYSIS AUTO W/O SCOPE: CPT | Performed by: FAMILY MEDICINE

## 2025-05-23 PROCEDURE — 3079F DIAST BP 80-89 MM HG: CPT | Performed by: FAMILY MEDICINE

## 2025-05-23 PROCEDURE — 3074F SYST BP LT 130 MM HG: CPT | Performed by: FAMILY MEDICINE

## 2025-05-23 PROCEDURE — 3044F HG A1C LEVEL LT 7.0%: CPT | Performed by: FAMILY MEDICINE

## 2025-05-23 PROCEDURE — 99213 OFFICE O/P EST LOW 20 MIN: CPT | Performed by: FAMILY MEDICINE

## 2025-05-23 PROCEDURE — 3008F BODY MASS INDEX DOCD: CPT | Performed by: FAMILY MEDICINE

## 2025-05-23 RX ORDER — CIPROFLOXACIN 250 MG/1
250 TABLET, FILM COATED ORAL 2 TIMES DAILY
Qty: 20 TABLET | Refills: 0 | Status: SHIPPED | OUTPATIENT
Start: 2025-05-23 | End: 2025-06-02

## 2025-05-23 NOTE — PROGRESS NOTES
Subjective   Patient ID: Karl Estevez is a 54 y.o. male who presents for Diabetic neuropathy, painful  (3 month follow up ).  HPI  54-year-old gentleman status post liver transplant here to recheck.  Has been try to wean down is really been off on very minimal oxycodone.  Med agreement is performed template completed and safety is reviewed.  Performed  Does follow endocrinology for his diabetes and remains on his Mounjaro metformin and Farxiga with the as needed Humalog insulin.  Is followed by liver transplant team they have on Norvasc 5 mg as well as Coreg 12.5 mg twice a day.  Takes to 2-4 Neurontin day for his painful diabetic neuropathy.  Otherwise remains on allopurinol and milligrams daily without gouty attacks  Otherwise remains very active without exertional chest pain short of breath or palpitations no new GI  issues no abdominal bloating no change GI and no GI red flags.  Has mild leg burning and frequent urination more nocturia more daytime urgency compatible with earlier prostatitis symptoms fever shaking chills flank pain  Reviewed no reported side effects.  Sugars been stable between 99 and 130  OARRS:  No data recorded  I have personally reviewed the OARRS report for Karl Estevez. I have considered the risks of abuse, dependence, addiction and diversion    Is the patient prescribed a combination of a benzodiazepine and opioid?  No    Last Urine Drug Screen / ordered today: Yes  Recent Results (from the past 8760 hours)   Opiate/Opioid/Benzo Prescription Compliance    Collection Time: 05/23/25  8:14 AM   Result Value Ref Range    Creatinine 97.4 > or = 20.0 mg/dL    pH 5.1 4.5 - 9.0    Oxidant NEGATIVE <200 mcg/mL    Amphetamines NEGATIVE <500 ng/mL    Barbiturates NEGATIVE <300 ng/mL    Cocaine Metabolite NEGATIVE <150 ng/mL    Marijuana Metabolite NEGATIVE <20 ng/mL    Phencyclidine NEGATIVE <25 ng/mL    Alphahydroxyalprazolam NEGATIVE <25 ng/mL    Alphahydroxymidazolam NEGATIVE <50  ng/mL    Alphahydroxytriazolam NEGATIVE <50 ng/mL    Aminoclonazepam NEGATIVE <25 ng/mL    Hydroxyethylflurazepam NEGATIVE <50 ng/mL    Lorazepam NEGATIVE <50 ng/mL    Nordiazepam NEGATIVE <50 ng/mL    Oxazepam NEGATIVE <50 ng/mL    Temazepam NEGATIVE <50 ng/mL    Benzodiazepines Comments      Fentanyl NEGATIVE <0.5 ng/mL    Norfentanyl NEGATIVE <0.5 ng/mL    Fentanyl Comments      6 Acetylmorphine NEGATIVE <10 ng/mL    Heroin Metab Comments      EDDP NEGATIVE <100 ng/mL    Methadone NEGATIVE <100 ng/mL    Methadone Comments      Codeine NEGATIVE <50 ng/mL    Hydrocodone NEGATIVE <50 ng/mL    Hydromorphone NEGATIVE <50 ng/mL    Morphine NEGATIVE <50 ng/mL    Norhydrocodone NEGATIVE <50 ng/mL    Opiates Comments      Noroxycodone NEGATIVE <50 ng/mL    Oxycodone NEGATIVE <50 ng/mL    Oxymorphone NEGATIVE <50 ng/mL    Oxycodone Comments      Desmethyltramadol NEGATIVE <100 ng/mL    Tramadol NEGATIVE <100 ng/mL    Tramadol Comments      Zolpidem NEGATIVE <5 ng/mL    Zolpidem Metabolite NEGATIVE <5 ng/mL    Zolpidem Comments      Notes and Comments       Results are as expected.   No oxycodone since he takes it on appearing basis it is time like to have some of needed for his feet.      Controlled Substance Agreement:  Date of the Last Agreement: 5-  Reviewed Controlled Substance Agreement including but not limited to the benefits, risks, and alternatives to treatment with a Controlled Substance medication(s).    Opioids:  What is the patient's goal of therapy?  Intermittent nighttime in the afternoon pain in back especially and is a peripheral neuropathy from diabetes tries to wean off this is time intake minimal has been off about 1 or 2 days to see if he can go off it maintain Neurontin otherwise no evidence abuse side effects or divergence  Is this being achieved with current treatment? yes    I have calculated the patient's Morphine Dose Equivalent (MED):   I have considered referral to Pain Management and/or  "a specialist, and do not feel it is necessary at this time.    I feel that it is clinically indicated to continue this current medication regimen after consideration of alternative therapies, and other non-opioid treatment.    Opioid Risk Screening:  No data recorded    Pain Assessment:  No data recorded  Review of Systems   Constitutional:  Negative for fatigue, fever and unexpected weight change.   Eyes:  Negative for visual disturbance.   Respiratory:  Negative for cough, chest tightness, shortness of breath and wheezing.    Cardiovascular:  Negative for chest pain, palpitations and leg swelling.   Gastrointestinal:  Negative for abdominal distention, abdominal pain, blood in stool, nausea and vomiting.   Genitourinary:  Positive for dysuria, frequency and urgency. Negative for hematuria.   Musculoskeletal:  Positive for arthralgias and back pain. Negative for myalgias.   Skin:  Negative for rash.   Neurological:  Positive for numbness. Negative for weakness, light-headedness and headaches.   Psychiatric/Behavioral:  Negative for behavioral problems, confusion, sleep disturbance and suicidal ideas.        Objective   /80 (BP Location: Right arm, Patient Position: Sitting, BP Cuff Size: Large adult)   Pulse 86   Temp 36.1 °C (97 °F) (Temporal)   Resp 16   Ht 1.803 m (5' 11\")   Wt 102 kg (224 lb)   SpO2 97%   BMI 31.24 kg/m²         Component  Ref Range & Units 08:16   POC Color, Urine  Straw, Yellow, Light-Yellow Yellow   POC Appearance, Urine  Clear Clear   POC Glucose, Urine  NEGATIVE mg/dl 500 (3+) Abnormal    POC Bilirubin, Urine  NEGATIVE NEGATIVE   POC Ketones, Urine  NEGATIVE mg/dl NEGATIVE   POC Specific Gravity, Urine  1.005 - 1.035 1.020   POC Blood, Urine  NEGATIVE NEGATIVE   POC PH, Urine  No Reference Range Established PH 6.0   POC Protein, Urine  NEGATIVE mg/dl NEGATIVE   POC Urobilinogen, Urine  0.2, 1.0 EU/DL 0.2   Poc Nitrite, Urine  NEGATIVE NEGATIVE   POC Leukocytes, Urine  NEGATIVE " NEGATIVE             Specimen Collected: 05/23/25 08:16 Last Resulted: 05/23/25 08:16         Physical Exam  Vital sign reviewed normal weight set about 2 pounds  General-inspection was a pleasant mildly obese individual in no acute distress  Neck neck is supple no mass adenopathy bruits rigidity  Cardiovascular RSR without significant murmurs gallop or ectopy  Pulmonary-chest clear anteriorly and posteriorly  Abdominal scars well-healed but no distention no ascites no organomegaly mass or rebound some fullness in the suprapubic area but otherwise no CVA tenderness rectal exam deferred  Peripheral vascular no symmetric asymmetric edema no motor deficits mildly reduced sensation to vibration of the lower feet and dorsal feet  Mood mood is stable without anxiety depression or cognitive issues  ns abnormal data Comprehensive Metabolic Panel  Order: [id number]   Status: Final result       Dx: History of liver transplant (Multi); ...    Test Result Released: Yes (seen)    0 Result Notes            Component  Ref Range & Units 1 mo ago  (4/25/25) 4 mo ago  (1/16/25) 4 mo ago  (1/6/25) 7 mo ago  (10/28/24) 7 mo ago  (10/22/24) 8 mo ago  (9/11/24) 11 mo ago  (6/18/24)   Glucose  74 - 99 mg/dL 119 High  109 High  90 103 High  106 High  91 101 High    Sodium  136 - 145 mmol/L 140 140 136 139 137 137 136   Potassium  3.5 - 5.3 mmol/L 4.2 4.4 5.1 4.9 4.2 4.1 4.2   Chloride  98 - 107 mmol/L 103 99 96 Low  102 99 102 101   Bicarbonate  21 - 32 mmol/L 28 29 34 High  29 30 28 27   Anion Gap  10 - 20 mmol/L 13 16 11 13 12 11 12   Urea Nitrogen  6 - 23 mg/dL 14 14 13 11 18 17 13   Creatinine  0.50 - 1.30 mg/dL 1.12 1.08 1.34 High  1.26 1.43 High  1.27 1.01   eGFR  >60 mL/min/1.73m*2 79 82 CM 63 CM 69 CM 59 Low  CM 68 CM 89 CM   Comment: Calculations of estimated GFR are performed using the 2021 CKD-EPI Study Refit equation without the race variable for the IDMS-Traceable creatinine  methods.  https://jasn.asnjournals.org/content/early/2021/09/22/ASN.4479360264   Calcium  8.6 - 10.6 mg/dL 8.9 9.3 9.6 8.6 8.8 R 8.8 R 8.7 R   Albumin  3.4 - 5.0 g/dL 4.3  4.0 4.3 4.4 4.3 4.1   Alkaline Phosphatase  33 - 120 U/L 55  74 59 57 56 62   Total Protein  6.4 - 8.2 g/dL 6.7  6.4 6.9 6.9 6.6 6.5   AST  9 - 39 U/L 11  8 Low  11 13 12 13   Bilirubin, Total  0.0 - 1.2 mg/dL 0.4  0.4 0.7 0.6 0.5 0.6   ALT  10 - 52 U/L 10            Notes            Component  Ref Range & Units 1 mo ago  (4/25/25) 4 mo ago  (1/16/25) 4 mo ago  (1/6/25) 7 mo ago  (10/28/24) 7 mo ago  (10/22/24) 8 mo ago  (9/11/24) 11 mo ago  (6/6/24)   WBC  4.4 - 11.3 x10*3/uL 4.8 6.4 5.8 5.5 6.2 4.8 4.3 Low    nRBC  0.0 - 0.0 /100 WBCs 0.0 0.0 0.0 0.0 0.0 0.0 0.0   RBC  4.50 - 5.90 x10*6/uL 4.71 5.05 4.88 5.23 5.14 4.82 4.23 Low    Hemoglobin  13.5 - 17.5 g/dL 13.9 14.6 14.1 15.2 14.8 14.3 12.4 Low    Hematocrit  41.0 - 52.0 % 40.8 Low  43.7 42.6 46.3 44.7 42.5 38.9 Low    MCV  80 - 100 fL 87 87 87 89 87 88 92   MCH  26.0 - 34.0 pg 29.5 28.9 28.9 29.1 28.8 29.7 29.3   MCHC  32.0 - 36.0 g/dL 34.1 33.4 33.1 32.8 33.1 33.6 31.9 Low    RDW  11.5 - 14.5 % 13.1 13.0 12.9 13.1 13.1 12.6 14.9 High    Platelets  150 - 450 x10*3/uL 184 237 224 208 184 167 120 Low    Neutrophils %  40.0 - 80.0 % 59.7  66.3 65.7 68.5 57.7 62.8   Immature Granulocytes %, Automated  0.0 - 0.9 % 0.2            Type 2 diabetes mellitus without comp...    Test Result Released: Yes (seen)    0 Result Notes       1  Topic       Component  Ref Range & Units 3 mo ago 9 mo ago   POC HEMOGLOBIN A1c  4.2 - 6.5 % 5.4 5.3             Specimen Collected: 02/25/25 08:10 Last Resulted: 02/25/25 08:10       Vitamin D 25-Hydroxy,Total (for eval of Vitamin D levels)  Order: [id number]   Status: Final result       Dx: Stage 3a chronic kidney disease (Multi)    Test Result Released: Yes (seen)    0 Result Notes   important suggestion  Newer results are available. Click to view them now.                  Component  Ref Range & Units 4 mo ago  (1/6/25) 7 mo ago  (10/28/24) 7 mo ago  (10/22/24) 8 mo ago  (9/11/24) 11 mo ago  (6/18/24) 11 mo ago  (6/6/24) 1 yr ago  (7/21/23)   Vitamin D, 25-Hydroxy, Total  30 - 100 ng/mL 56 62 45 41 42 39 28 Abnormal  R, CM   Resulting Agency [facility]                Assessment/Plan   Problem List Items Addressed This Visit       Diabetic neuropathy, painful (Multi)    Medication management     Other Visit Diagnoses         Chronic prostatitis            Patient dipstick negative but otherwise will place on Cipro as needed best on his 500 mg twice a day for 10 days if interval worsening or persistence recheck in 14 days otherwise we will slowly reduce his oxycodone since he takes it as needed at this time A1c is less than 6 and his liver functions were normal CBC stable  Follow-up with the endocrinologist following his diabetes as well as his liver transplant team.  Follow-up in 3 months  Or as performed template for opiates completed safety is reviewed med agreement as well as urine tox screen performed  @discharge  The above diagnosis and treatment plan was discussed with the patient patient will continue appropriate diet and exercise as reviewed  Patient will recheck earlier if any interval problems of significance or clinical worsening of the above problems.  Agrees above surveillance.  All question were addressed regarding above meds

## 2025-05-25 LAB
1OH-MIDAZOLAM UR-MCNC: NEGATIVE NG/ML
7AMINOCLONAZEPAM UR-MCNC: NEGATIVE NG/ML
A-OH ALPRAZ UR-MCNC: NEGATIVE NG/ML
A-OH-TRIAZOLAM UR-MCNC: NEGATIVE NG/ML
AMPHETAMINES UR QL: NEGATIVE NG/ML
BARBITURATES UR QL: NEGATIVE NG/ML
BZE UR QL: NEGATIVE NG/ML
CODEINE UR-MCNC: NEGATIVE NG/ML
CREAT UR-MCNC: 97.4 MG/DL
DRUG SCREEN COMMENT UR-IMP: NORMAL
EDDP UR-MCNC: NORMAL NG/ML
FENTANYL UR-MCNC: NEGATIVE NG/ML
HYDROCODONE UR-MCNC: NEGATIVE NG/ML
HYDROMORPHONE UR-MCNC: NEGATIVE NG/ML
LORAZEPAM UR-MCNC: NEGATIVE NG/ML
METHADONE UR-MCNC: NORMAL UG/L
MORPHINE UR-MCNC: NEGATIVE NG/ML
NORDIAZEPAM UR-MCNC: NEGATIVE NG/ML
NORFENTANYL UR-MCNC: NEGATIVE NG/ML
NORHYDROCODONE UR CFM-MCNC: NEGATIVE NG/ML
NOROXYCODONE UR CFM-MCNC: NEGATIVE NG/ML
NORTRAMADOL UR-MCNC: NEGATIVE NG/ML
OH-ETHYLFLURAZ UR-MCNC: NEGATIVE NG/ML
OXAZEPAM UR-MCNC: NEGATIVE NG/ML
OXIDANTS UR QL: NEGATIVE MCG/ML
OXYCODONE UR CFM-MCNC: NEGATIVE NG/ML
OXYMORPHONE UR CFM-MCNC: NEGATIVE NG/ML
PCP UR QL: NEGATIVE NG/ML
PH UR: 5.1 [PH] (ref 4.5–9)
QUEST 6 ACETYLMORPHINE: NEGATIVE NG/ML
QUEST NOTES AND COMMENTS: NORMAL
QUEST ZOLPIDEM: NEGATIVE NG/ML
TEMAZEPAM UR-MCNC: NEGATIVE NG/ML
THC UR QL: NEGATIVE NG/ML
TRAMADOL UR-MCNC: NEGATIVE NG/ML
ZOLPIDEM PHENYL-4-CARB UR CFM-MCNC: NEGATIVE NG/ML

## 2025-05-26 PROBLEM — N41.1 CHRONIC PROSTATITIS: Status: ACTIVE | Noted: 2025-05-26

## 2025-05-26 LAB
1OH-MIDAZOLAM UR-MCNC: NEGATIVE NG/ML
7AMINOCLONAZEPAM UR-MCNC: NEGATIVE NG/ML
A-OH ALPRAZ UR-MCNC: NEGATIVE NG/ML
A-OH-TRIAZOLAM UR-MCNC: NEGATIVE NG/ML
AMPHETAMINES UR QL: NEGATIVE NG/ML
BARBITURATES UR QL: NEGATIVE NG/ML
BZE UR QL: NEGATIVE NG/ML
CODEINE UR-MCNC: NEGATIVE NG/ML
CREAT UR-MCNC: 97.4 MG/DL
DRUG SCREEN COMMENT UR-IMP: NORMAL
EDDP UR-MCNC: NEGATIVE NG/ML
FENTANYL UR-MCNC: NEGATIVE NG/ML
HYDROCODONE UR-MCNC: NEGATIVE NG/ML
HYDROMORPHONE UR-MCNC: NEGATIVE NG/ML
LORAZEPAM UR-MCNC: NEGATIVE NG/ML
METHADONE UR-MCNC: NEGATIVE NG/ML
MORPHINE UR-MCNC: NEGATIVE NG/ML
NORDIAZEPAM UR-MCNC: NEGATIVE NG/ML
NORFENTANYL UR-MCNC: NEGATIVE NG/ML
NORHYDROCODONE UR CFM-MCNC: NEGATIVE NG/ML
NOROXYCODONE UR CFM-MCNC: NEGATIVE NG/ML
NORTRAMADOL UR-MCNC: NEGATIVE NG/ML
OH-ETHYLFLURAZ UR-MCNC: NEGATIVE NG/ML
OXAZEPAM UR-MCNC: NEGATIVE NG/ML
OXIDANTS UR QL: NEGATIVE MCG/ML
OXYCODONE UR CFM-MCNC: NEGATIVE NG/ML
OXYMORPHONE UR CFM-MCNC: NEGATIVE NG/ML
PCP UR QL: NEGATIVE NG/ML
PH UR: 5.1 [PH] (ref 4.5–9)
QUEST 6 ACETYLMORPHINE: NEGATIVE NG/ML
QUEST NOTES AND COMMENTS: NORMAL
QUEST ZOLPIDEM: NEGATIVE NG/ML
TEMAZEPAM UR-MCNC: NEGATIVE NG/ML
THC UR QL: NEGATIVE NG/ML
TRAMADOL UR-MCNC: NEGATIVE NG/ML
ZOLPIDEM PHENYL-4-CARB UR CFM-MCNC: NEGATIVE NG/ML

## 2025-05-26 ASSESSMENT — ENCOUNTER SYMPTOMS
SHORTNESS OF BREATH: 0
VOMITING: 0
UNEXPECTED WEIGHT CHANGE: 0
DYSURIA: 1
HEMATURIA: 0
ABDOMINAL PAIN: 0
WEAKNESS: 0
WHEEZING: 0
BLOOD IN STOOL: 0
NAUSEA: 0
NUMBNESS: 1
FEVER: 0
COUGH: 0
FREQUENCY: 1
CONFUSION: 0
LIGHT-HEADEDNESS: 0
HEADACHES: 0
BACK PAIN: 1
FATIGUE: 0
ABDOMINAL DISTENTION: 0
MYALGIAS: 0
PALPITATIONS: 0
ARTHRALGIAS: 1
CHEST TIGHTNESS: 0
SLEEP DISTURBANCE: 0

## 2025-06-02 DIAGNOSIS — I15.9 SECONDARY HYPERTENSION: ICD-10-CM

## 2025-06-02 RX ORDER — CARVEDILOL 12.5 MG/1
12.5 TABLET ORAL 2 TIMES DAILY
Qty: 180 TABLET | Refills: 0 | Status: SHIPPED | OUTPATIENT
Start: 2025-06-02 | End: 2026-06-02

## 2025-06-09 ENCOUNTER — APPOINTMENT (OUTPATIENT)
Facility: HOSPITAL | Age: 55
End: 2025-06-09
Payer: MEDICARE

## 2025-06-11 ENCOUNTER — TELEPHONE (OUTPATIENT)
Dept: PRIMARY CARE | Facility: CLINIC | Age: 55
End: 2025-06-11
Payer: MEDICARE

## 2025-06-11 DIAGNOSIS — E11.40 DIABETIC NEUROPATHY, PAINFUL (MULTI): ICD-10-CM

## 2025-06-11 DIAGNOSIS — M48.062 NEUROGENIC CLAUDICATION DUE TO LUMBAR SPINAL STENOSIS: ICD-10-CM

## 2025-06-16 ENCOUNTER — OFFICE VISIT (OUTPATIENT)
Facility: HOSPITAL | Age: 55
End: 2025-06-16
Payer: MEDICARE

## 2025-06-16 VITALS
HEART RATE: 101 BPM | BODY MASS INDEX: 30.78 KG/M2 | OXYGEN SATURATION: 97 % | WEIGHT: 220.7 LBS | DIASTOLIC BLOOD PRESSURE: 80 MMHG | TEMPERATURE: 97.9 F | SYSTOLIC BLOOD PRESSURE: 125 MMHG

## 2025-06-16 DIAGNOSIS — Z94.4 LIVER REPLACED BY TRANSPLANT (MULTI): ICD-10-CM

## 2025-06-16 DIAGNOSIS — I15.9 SECONDARY HYPERTENSION: ICD-10-CM

## 2025-06-16 PROCEDURE — 3044F HG A1C LEVEL LT 7.0%: CPT | Performed by: INTERNAL MEDICINE

## 2025-06-16 PROCEDURE — 3079F DIAST BP 80-89 MM HG: CPT | Performed by: INTERNAL MEDICINE

## 2025-06-16 PROCEDURE — 3074F SYST BP LT 130 MM HG: CPT | Performed by: INTERNAL MEDICINE

## 2025-06-16 PROCEDURE — 99214 OFFICE O/P EST MOD 30 MIN: CPT | Performed by: INTERNAL MEDICINE

## 2025-06-16 PROCEDURE — 3061F NEG MICROALBUMINURIA REV: CPT | Performed by: INTERNAL MEDICINE

## 2025-06-16 RX ORDER — CARVEDILOL 12.5 MG/1
12.5 TABLET ORAL 2 TIMES DAILY
Qty: 180 TABLET | Refills: 0 | Status: SHIPPED | OUTPATIENT
Start: 2025-06-16 | End: 2026-06-16

## 2025-06-16 RX ORDER — TACROLIMUS 1 MG/1
1 CAPSULE ORAL 2 TIMES DAILY
Qty: 180 CAPSULE | Refills: 3 | Status: SHIPPED | OUTPATIENT
Start: 2025-06-16 | End: 2026-06-16

## 2025-06-16 ASSESSMENT — PAIN SCALES - GENERAL: PAINLEVEL_OUTOF10: 0-NO PAIN

## 2025-06-16 NOTE — PROGRESS NOTES
Subjective   Patient ID: Karl Estevez is a 54 y.o. male who presents for Liver Transplant   HPI Transplanted for MAFLD and ALD and cirrhosis.   Doing well.   Had hernia repair.   Losing weight well on Monjaro.  Working full time pouring concrete.   Needs to wear sun block while out in the sun.  3 years post transplant.      Review of Systems  No report of nausea vomiting or diarrhea.       Objective   Physical Exam  Physical Exam  Constitutional:       Appearance: Normal appearance.   Eyes:      General: No scleral icterus.  Cardiovascular:      Rate and Rhythm: Normal rate and regular rhythm.      Heart sounds: No murmur heard.     No gallop.   Pulmonary:      Effort: Pulmonary effort is normal.      Breath sounds: Normal breath sounds.   Abdominal:      General: Abdomen is flat. Bowel sounds are normal. There is no distension.      Palpations: Abdomen is soft. There is no fluid wave, hepatomegaly or splenomegaly.      Tenderness: There is no abdominal tenderness.   Repaired hernia good.    Musculoskeletal:      Cervical back: Normal range of motion. No tenderness.      Right lower leg: No edema.      Left lower leg: No edema.   Lymphadenopathy:      Cervical: No cervical adenopathy.   Skin:     Coloration: Skin is not jaundiced.   Neurological:      General: No focal deficit present.      Mental Status: He  is alert.       Assessment/Plan     Doing well post liver transplant.   Liver tests excellent from 4/25/2025.  Cr 1.12.   Continue current immune suppression follow up in 6 months labs in 3 months.     Follow up with Dr. Augustine.          Guanako Gonsalez MD 06/16/25 8:28 AM

## 2025-06-16 NOTE — PATIENT INSTRUCTIONS
Recommend using sun screen when in sun  Dermatology yearly  Labs: every 3 months due in July  RTC: 6 months with Dr. Augustine

## 2025-06-24 DIAGNOSIS — K43.2 INCISIONAL HERNIA, WITHOUT OBSTRUCTION OR GANGRENE: ICD-10-CM

## 2025-06-24 RX ORDER — OXYCODONE HYDROCHLORIDE 5 MG/1
5 TABLET ORAL EVERY 8 HOURS PRN
Qty: 90 TABLET | Refills: 0 | Status: SHIPPED | OUTPATIENT
Start: 2025-06-24 | End: 2025-07-24

## 2025-06-24 NOTE — TELEPHONE ENCOUNTER
Rx Refill Request     Name: Karl Estevez  :  1970   Medication Name:      Specific Pharmacy location:    oxyCODONE (Roxicodone) 5 mg immediate release tablet - Take 1 tablet (5 mg) by mouth every 8 hours if needed for severe pain (7 - 10).   [facility] - [address] / [place]  Date of last appointment:  2025   Date of next appointment:  2025   Best number to reach patient:  [phone]

## 2025-07-22 DIAGNOSIS — K21.9 GASTROESOPHAGEAL REFLUX DISEASE, UNSPECIFIED WHETHER ESOPHAGITIS PRESENT: ICD-10-CM

## 2025-07-22 RX ORDER — ONDANSETRON 4 MG/1
4 TABLET, ORALLY DISINTEGRATING ORAL EVERY 8 HOURS PRN
Qty: 30 TABLET | Refills: 3 | Status: SHIPPED | OUTPATIENT
Start: 2025-07-22

## 2025-07-25 DIAGNOSIS — R42 DIZZINESS: ICD-10-CM

## 2025-07-25 DIAGNOSIS — K43.2 INCISIONAL HERNIA, WITHOUT OBSTRUCTION OR GANGRENE: ICD-10-CM

## 2025-07-25 RX ORDER — MECLIZINE HCL 12.5 MG 12.5 MG/1
12.5 TABLET ORAL 3 TIMES DAILY PRN
Qty: 30 TABLET | Refills: 0 | Status: SHIPPED | OUTPATIENT
Start: 2025-07-25 | End: 2026-07-25

## 2025-07-25 RX ORDER — OXYCODONE HYDROCHLORIDE 5 MG/1
5 TABLET ORAL EVERY 8 HOURS PRN
Qty: 90 TABLET | Refills: 0 | Status: SHIPPED | OUTPATIENT
Start: 2025-07-25 | End: 2025-08-24

## 2025-07-25 NOTE — TELEPHONE ENCOUNTER
Rx Refill Request     Name: Karl Estevez  :  1970   Medication Name:  OXYCODONE 5MG  Specific Pharmacy location:  [facility]  Date of last appointment:  2025   Date of next appointment:  2025   Best number to reach patient:  [phone]       HAS BEEN HAVING DIZZINESS OFF AND ON COULD DR FILL HIS OLD RX FOR MECLEZINE, HAD A FEW LEFT AND STATES THEY WORKED  PLEASE ADVISE

## 2025-07-29 DIAGNOSIS — J20.9 ACUTE BRONCHITIS, UNSPECIFIED ORGANISM: Primary | ICD-10-CM

## 2025-07-29 NOTE — TELEPHONE ENCOUNTER
The patient has been experiencing symptoms of a sinus infection for two days now.  He is requesting an antibiotic for treatment to be sent to the pharmacy below.  He has a stuffy nose and head/ nasal congestion that is painful and uncomfortable.    The patient is aware Dr. Sol is out until Monday and has asked that it is highlighted in this message that he has these sinus infections periodically and that the medication he is usually prescribed would be listed in his chart.    He uses [facility].    Please advise.  Thank you.

## 2025-07-31 RX ORDER — AZITHROMYCIN 250 MG/1
TABLET, FILM COATED ORAL
Qty: 6 TABLET | Refills: 0 | Status: SHIPPED | OUTPATIENT
Start: 2025-07-31 | End: 2025-08-05

## 2025-08-01 DIAGNOSIS — E11.42 TYPE 2 DIABETES MELLITUS WITH DIABETIC POLYNEUROPATHY, WITHOUT LONG-TERM CURRENT USE OF INSULIN: ICD-10-CM

## 2025-08-01 RX ORDER — DAPAGLIFLOZIN 10 MG/1
10 TABLET, FILM COATED ORAL DAILY
Qty: 90 TABLET | Refills: 1 | Status: SHIPPED | OUTPATIENT
Start: 2025-08-01 | End: 2026-08-01

## 2025-08-19 ENCOUNTER — APPOINTMENT (OUTPATIENT)
Facility: CLINIC | Age: 55
End: 2025-08-19
Payer: MEDICARE

## 2025-08-26 DIAGNOSIS — K43.2 INCISIONAL HERNIA, WITHOUT OBSTRUCTION OR GANGRENE: ICD-10-CM

## 2025-08-26 DIAGNOSIS — Z79.4 TYPE 2 DIABETES MELLITUS WITHOUT COMPLICATION, WITH LONG-TERM CURRENT USE OF INSULIN: ICD-10-CM

## 2025-08-26 DIAGNOSIS — E11.9 TYPE 2 DIABETES MELLITUS WITHOUT COMPLICATION, WITH LONG-TERM CURRENT USE OF INSULIN: ICD-10-CM

## 2025-08-26 RX ORDER — OXYCODONE HYDROCHLORIDE 5 MG/1
5 TABLET ORAL EVERY 8 HOURS PRN
Qty: 90 TABLET | Refills: 0 | Status: SHIPPED | OUTPATIENT
Start: 2025-08-26 | End: 2025-09-25

## 2025-08-29 ENCOUNTER — HOSPITAL ENCOUNTER (OUTPATIENT)
Dept: RADIOLOGY | Facility: CLINIC | Age: 55
Discharge: HOME | End: 2025-08-29
Payer: MEDICARE

## 2025-08-29 ENCOUNTER — OFFICE VISIT (OUTPATIENT)
Dept: PRIMARY CARE | Facility: CLINIC | Age: 55
End: 2025-08-29
Payer: MEDICARE

## 2025-08-29 ENCOUNTER — APPOINTMENT (OUTPATIENT)
Dept: PRIMARY CARE | Facility: CLINIC | Age: 55
End: 2025-08-29
Payer: MEDICARE

## 2025-08-29 VITALS
TEMPERATURE: 96.8 F | OXYGEN SATURATION: 98 % | RESPIRATION RATE: 12 BRPM | HEART RATE: 98 BPM | WEIGHT: 214 LBS | BODY MASS INDEX: 29.96 KG/M2 | DIASTOLIC BLOOD PRESSURE: 70 MMHG | SYSTOLIC BLOOD PRESSURE: 108 MMHG | HEIGHT: 71 IN

## 2025-08-29 DIAGNOSIS — Z79.899 MEDICATION MANAGEMENT: ICD-10-CM

## 2025-08-29 DIAGNOSIS — L20.82 FLEXURAL ECZEMA: ICD-10-CM

## 2025-08-29 DIAGNOSIS — N18.1 DIABETES MELLITUS DUE TO UNDERLYING CONDITION, CONTROLLED, WITH STAGE 1 CHRONIC KIDNEY DISEASE, WITHOUT LONG-TERM CURRENT USE OF INSULIN (MULTI): ICD-10-CM

## 2025-08-29 DIAGNOSIS — M48.062 NEUROGENIC CLAUDICATION DUE TO LUMBAR SPINAL STENOSIS: ICD-10-CM

## 2025-08-29 DIAGNOSIS — R42 DIZZINESS DUE TO OLD HEAD INJURY: Primary | ICD-10-CM

## 2025-08-29 DIAGNOSIS — E08.22 DIABETES MELLITUS DUE TO UNDERLYING CONDITION, CONTROLLED, WITH STAGE 1 CHRONIC KIDNEY DISEASE, WITHOUT LONG-TERM CURRENT USE OF INSULIN (MULTI): ICD-10-CM

## 2025-08-29 DIAGNOSIS — S09.90XS DIZZINESS DUE TO OLD HEAD INJURY: ICD-10-CM

## 2025-08-29 DIAGNOSIS — I85.10 ESOPHAGEAL VARICES IN CIRRHOSIS (MULTI): ICD-10-CM

## 2025-08-29 DIAGNOSIS — E11.40 DIABETIC NEUROPATHY, PAINFUL (MULTI): ICD-10-CM

## 2025-08-29 DIAGNOSIS — S09.90XS DIZZINESS DUE TO OLD HEAD INJURY: Primary | ICD-10-CM

## 2025-08-29 DIAGNOSIS — R42 DIZZINESS DUE TO OLD HEAD INJURY: ICD-10-CM

## 2025-08-29 DIAGNOSIS — E79.0 HYPERURICEMIA: ICD-10-CM

## 2025-08-29 DIAGNOSIS — I10 BENIGN ESSENTIAL HTN: ICD-10-CM

## 2025-08-29 DIAGNOSIS — I73.00 RAYNAUD'S PHENOMENON WITHOUT GANGRENE: ICD-10-CM

## 2025-08-29 DIAGNOSIS — Z94.4 LIVER TRANSPLANT RECIPIENT (MULTI): ICD-10-CM

## 2025-08-29 DIAGNOSIS — K74.60 ESOPHAGEAL VARICES IN CIRRHOSIS (MULTI): ICD-10-CM

## 2025-08-29 PROBLEM — K70.31 ALCOHOLIC CIRRHOSIS OF LIVER WITH ASCITES (MULTI): Status: RESOLVED | Noted: 2023-03-02 | Resolved: 2025-08-29

## 2025-08-29 PROBLEM — B25.9 CYTOMEGALOVIRUS (CMV) VIREMIA (MULTI): Status: RESOLVED | Noted: 2023-08-17 | Resolved: 2025-08-29

## 2025-08-29 PROBLEM — R06.00 DYSPNEA: Status: RESOLVED | Noted: 2023-03-02 | Resolved: 2025-08-29

## 2025-08-29 PROBLEM — J18.9 PNEUMONIA: Status: RESOLVED | Noted: 2023-03-02 | Resolved: 2025-08-29

## 2025-08-29 PROCEDURE — 99214 OFFICE O/P EST MOD 30 MIN: CPT | Performed by: FAMILY MEDICINE

## 2025-08-29 PROCEDURE — 3008F BODY MASS INDEX DOCD: CPT | Performed by: FAMILY MEDICINE

## 2025-08-29 PROCEDURE — 3078F DIAST BP <80 MM HG: CPT | Performed by: FAMILY MEDICINE

## 2025-08-29 PROCEDURE — 3061F NEG MICROALBUMINURIA REV: CPT | Performed by: FAMILY MEDICINE

## 2025-08-29 PROCEDURE — 3074F SYST BP LT 130 MM HG: CPT | Performed by: FAMILY MEDICINE

## 2025-08-29 PROCEDURE — 70450 CT HEAD/BRAIN W/O DYE: CPT

## 2025-08-29 PROCEDURE — 3044F HG A1C LEVEL LT 7.0%: CPT | Performed by: FAMILY MEDICINE

## 2025-08-29 PROCEDURE — 1036F TOBACCO NON-USER: CPT | Performed by: FAMILY MEDICINE

## 2025-08-29 RX ORDER — CLOBETASOL PROPIONATE 0.5 MG/G
CREAM TOPICAL 2 TIMES DAILY
Qty: 30 G | Refills: 0 | Status: SHIPPED | OUTPATIENT
Start: 2025-08-29 | End: 2025-09-28

## 2025-08-29 ASSESSMENT — ENCOUNTER SYMPTOMS
SEIZURES: 0
CHEST TIGHTNESS: 0
MYALGIAS: 0
SINUS PRESSURE: 0
PALPITATIONS: 0
DECREASED CONCENTRATION: 0
WEAKNESS: 0
HEMATURIA: 0
NAUSEA: 0
DIZZINESS: 1
ARTHRALGIAS: 1
ABDOMINAL PAIN: 0
HEADACHES: 1
RECTAL PAIN: 0
FLANK PAIN: 0
SINUS PAIN: 0
FREQUENCY: 1
CONFUSION: 0
VOMITING: 0
SHORTNESS OF BREATH: 0
LIGHT-HEADEDNESS: 1
FEVER: 0
RHINORRHEA: 1
BACK PAIN: 1
FATIGUE: 0
COUGH: 0
BLOOD IN STOOL: 0
UNEXPECTED WEIGHT CHANGE: 0
SLEEP DISTURBANCE: 0
BRUISES/BLEEDS EASILY: 0
DYSURIA: 0

## 2025-09-11 ENCOUNTER — APPOINTMENT (OUTPATIENT)
Facility: CLINIC | Age: 55
End: 2025-09-11
Payer: MEDICARE

## 2025-10-02 ENCOUNTER — APPOINTMENT (OUTPATIENT)
Dept: ENDOCRINOLOGY | Facility: CLINIC | Age: 55
End: 2025-10-02
Payer: MEDICARE

## 2025-12-03 ENCOUNTER — APPOINTMENT (OUTPATIENT)
Dept: PRIMARY CARE | Facility: CLINIC | Age: 55
End: 2025-12-03
Payer: MEDICARE

## 2026-01-12 ENCOUNTER — APPOINTMENT (OUTPATIENT)
Dept: PRIMARY CARE | Facility: CLINIC | Age: 56
End: 2026-01-12
Payer: MEDICARE

## 2026-04-02 ENCOUNTER — APPOINTMENT (OUTPATIENT)
Dept: DERMATOLOGY | Facility: CLINIC | Age: 56
End: 2026-04-02
Payer: MEDICARE

## 2026-04-03 ENCOUNTER — APPOINTMENT (OUTPATIENT)
Dept: DERMATOLOGY | Facility: CLINIC | Age: 56
End: 2026-04-03
Payer: MEDICARE

## (undated) DEVICE — Device

## (undated) DEVICE — SUTURE, VICRYL, 3-0, 27 IN, SH, VIOLET

## (undated) DEVICE — CLIP, LIGATING, HORIZON, LARGE, TITANIUM

## (undated) DEVICE — TIP, SUCTION, YANKAUER, BULB, ADULT

## (undated) DEVICE — TOWELS 4-PK

## (undated) DEVICE — DRESSING, GAUZE, 16 PLY, 4 X 4 IN, STERILE

## (undated) DEVICE — DRAPE, SHEET, THREE QUARTER, FAN FOLD, 57 X 77 IN

## (undated) DEVICE — DRAPE, INSTRUMENT, W/POUCH, STERI DRAPE, 7 X 11 IN, DISPOSABLE, STERILE

## (undated) DEVICE — TRAY, MINOR, SINGLE BASIN, STERILE

## (undated) DEVICE — SUTURE, SILK, 2-0, 18 IN, BLACK

## (undated) DEVICE — SUTURE, SILK, 4-0, 18 IN, LABYRINTH, BLACK

## (undated) DEVICE — COVER, TABLE, 44 X 75 IN, DISPOSABLE, LF, STERILE

## (undated) DEVICE — WOUND SYSTEM, DEBRIDEMENT & CLEANING, O.R DUOPAK

## (undated) DEVICE — CLIP, LIGATING, W/ADHESIVE PAD, MEDIUM, TITANIUM

## (undated) DEVICE — DRESSING, ADHESIVE, ISLAND, TELFA, 4 X 10 IN

## (undated) DEVICE — DISSECTOR, KITTNER, PEANUT, 5MM

## (undated) DEVICE — BOWL, BASIN, 32 OZ, STERILE

## (undated) DEVICE — CLIP, LIGATING, W/ADHESIVE, WIDE SLOT, SMALL, TITANIUM

## (undated) DEVICE — PAD, GROUNDING, ELECTROSURGICAL, DUAL

## (undated) DEVICE — COVER, CART, 45 X 27 X 48 IN, CLEAR

## (undated) DEVICE — MANIFOLD, 4 PORT NEPTUNE STANDARD

## (undated) DEVICE — DRESSING, TRANSPARENT, TEGADERM, 4 X 4-3/4 IN

## (undated) DEVICE — DRAIN, CHANNEL W/TROCAR 15F

## (undated) DEVICE — SUTURE, SILK, 3-0, 18 IN, MULTIPACK, BLACK